# Patient Record
Sex: MALE | Race: WHITE | NOT HISPANIC OR LATINO | Employment: FULL TIME | URBAN - NONMETROPOLITAN AREA
[De-identification: names, ages, dates, MRNs, and addresses within clinical notes are randomized per-mention and may not be internally consistent; named-entity substitution may affect disease eponyms.]

---

## 2017-01-20 ENCOUNTER — OFFICE VISIT (OUTPATIENT)
Dept: MEDICAL GROUP | Facility: PHYSICIAN GROUP | Age: 67
End: 2017-01-20
Payer: MEDICARE

## 2017-01-20 VITALS
SYSTOLIC BLOOD PRESSURE: 132 MMHG | WEIGHT: 210 LBS | BODY MASS INDEX: 32.96 KG/M2 | DIASTOLIC BLOOD PRESSURE: 80 MMHG | OXYGEN SATURATION: 95 % | HEIGHT: 67 IN | TEMPERATURE: 97 F | HEART RATE: 73 BPM | RESPIRATION RATE: 16 BRPM

## 2017-01-20 DIAGNOSIS — M15.9 PRIMARY OSTEOARTHRITIS INVOLVING MULTIPLE JOINTS: ICD-10-CM

## 2017-01-20 DIAGNOSIS — G89.29 CHRONIC BILATERAL LOW BACK PAIN, WITH SCIATICA PRESENCE UNSPECIFIED: ICD-10-CM

## 2017-01-20 DIAGNOSIS — F11.20 OPIOID DEPENDENCE, CONTINUOUS (HCC): ICD-10-CM
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
## 2017-01-20 DIAGNOSIS — M54.50 CHRONIC BILATERAL LOW BACK PAIN WITHOUT SCIATICA: ICD-10-CM

## 2017-01-20 DIAGNOSIS — G89.29 CHRONIC BILATERAL LOW BACK PAIN WITHOUT SCIATICA: ICD-10-CM

## 2017-01-20 DIAGNOSIS — G47.01 INSOMNIA DUE TO MEDICAL CONDITION: ICD-10-CM

## 2017-01-20 DIAGNOSIS — M54.5 CHRONIC BILATERAL LOW BACK PAIN, WITH SCIATICA PRESENCE UNSPECIFIED: ICD-10-CM

## 2017-01-20 PROCEDURE — 99213 OFFICE O/P EST LOW 20 MIN: CPT | Performed by: NURSE PRACTITIONER

## 2017-01-20 RX ORDER — CYCLOBENZAPRINE HCL 10 MG
10 TABLET ORAL 3 TIMES DAILY PRN
Qty: 30 TAB | Refills: 3 | Status: SHIPPED | OUTPATIENT
Start: 2017-01-20 | End: 2017-07-14 | Stop reason: SDUPTHER

## 2017-01-20 RX ORDER — HYDROCODONE BITARTRATE AND ACETAMINOPHEN 10; 325 MG/1; MG/1
1 TABLET ORAL EVERY 6 HOURS PRN
Qty: 120 TAB | Refills: 0 | Status: SHIPPED | OUTPATIENT
Start: 2017-03-04 | End: 2017-01-20 | Stop reason: SDUPTHER

## 2017-01-20 RX ORDER — HYDROCODONE BITARTRATE AND ACETAMINOPHEN 10; 325 MG/1; MG/1
1 TABLET ORAL EVERY 6 HOURS PRN
Qty: 120 TAB | Refills: 0 | Status: SHIPPED | OUTPATIENT
Start: 2017-02-04 | End: 2017-01-20 | Stop reason: SDUPTHER

## 2017-01-20 RX ORDER — HYDROCODONE BITARTRATE AND ACETAMINOPHEN 10; 325 MG/1; MG/1
1 TABLET ORAL EVERY 6 HOURS PRN
Qty: 120 TAB | Refills: 0 | Status: SHIPPED | OUTPATIENT
Start: 2017-04-04 | End: 2017-04-13 | Stop reason: SDUPTHER

## 2017-01-20 NOTE — ASSESSMENT & PLAN NOTE
Consequences of Chronic Opiate therapy:  (5 A's)  Pain level without medications: 10  Pain level after pain medications: 6  What does pain medication benefit for you? MOVE hands  What does pain medication not benefit for you? NONE  Analgesia:  significantly improved  Activity:  significantly improved  Adverse Events:  Positive UDS and now clear  Aberrant Behaviors:  NONE currently.   Affect/Mood: good grooming, full facial expressions, normal speech pattern and content, normal thought patterns, normal perception  Last CMP:   Current   Appropriate Imaging done:

## 2017-01-20 NOTE — MR AVS SNAPSHOT
"        Gregg Juan   2017 11:20 AM   Office Visit   MRN: [id number]    Department:  [facility]   Dept Phone:  [phone]    Description:  Male : 1950   Provider:  CONSTANTINO Gaytan           Reason for Visit     Follow-Up meds      Allergies as of 2017     No Known Allergies      You were diagnosed with     Opioid dependence, continuous (CMS-HCC)   [427364]       Chronic bilateral low back pain without sciatica   [8652147]       Primary osteoarthritis involving multiple joints   [4573193]       Insomnia due to medical condition   [187781]       Fall, initial encounter   [051529]       Chronic bilateral low back pain, with sciatica presence unspecified   [3549921]         Vital Signs     Blood Pressure Pulse Temperature Respirations Height Weight    132/80 mmHg 73 36.1 °C (97 °F) 16 1.714 m (5' 7.48\") 95.255 kg (210 lb)    Body Mass Index Oxygen Saturation Smoking Status             32.42 kg/m2 95% Former Smoker         Basic Information     Date Of Birth Sex Race Ethnicity Preferred Language    1950 Male White Non- English      Problem List              ICD-10-CM Priority Class Noted - Resolved    Chronic low back pain M54.5, G89.29   2014 - Present    Osteoarthritis M19.90   2014 - Present    COPD (chronic obstructive pulmonary disease) (CMS-HCC) J44.9   2014 - Present    Dyslipidemia E78.5   2014 - Present    Vitamin D deficiency E55.9   2014 - Present    Opioid dependence, continuous (CMS-HCC) F11.20   10/20/2014 - Present    Chronic pain syndrome G89.4   10/20/2014 - Present    De Quervain's tenosynovitis, bilateral M65.4   2015 - Present    Trigger finger of both hands M65.30   2016 - Present    Fall W19.XXXA   2016 - Present    Positive urine drug screen R82.5   2016 - Present    Insomnia due to medical condition G47.01   2017 - Present      Health Maintenance        Date Due Completion Dates    IMM DTaP/Tdap/Td " Vaccine (1 - Tdap) 10/14/1970 ---    IMM ZOSTER VACCINE 10/14/2011 ---    IMM INFLUENZA (1) 9/1/2016 ---    IMM PNEUMOCOCCAL 65+ (ADULT) LOW/MEDIUM RISK SERIES (1 of 2 - PCV13) 10/14/2016 ---    COLONOSCOPY 4/7/2024 4/7/2014 (Declined)    Override on 4/7/2014: Patient Declined            Current Immunizations     No immunizations on file.      Below and/or attached are the medications your provider expects you to take. Review all of your home medications and newly ordered medications with your provider and/or pharmacist. Follow medication instructions as directed by your provider and/or pharmacist. Please keep your medication list with you and share with your provider. Update the information when medications are discontinued, doses are changed, or new medications (including over-the-counter products) are added; and carry medication information at all times in the event of emergency situations     Allergies:  No Known Allergies          Medications  Valid as of: January 20, 2017 - 11:40 AM    Generic Name Brand Name Tablet Size Instructions for use    Albuterol Sulfate (Aero Soln) albuterol 108 (90 BASE) MCG/ACT INHALE 2 PUFFS BY MOUTH EVERY 6 HOURS AS NEEDED FOR SHORTNESS OF BREATH        Cyclobenzaprine HCl (Tab) FLEXERIL 10 MG Take 1 Tab by mouth 3 times a day as needed.        Hydrocodone-Acetaminophen (Tab) NORCO 5-325 MG         Hydrocodone-Acetaminophen (Tab) NORCO  MG Take 1 Tab by mouth every 6 hours as needed for up to 30 days.        Pneumococcal 13-Eunice Conj Vacc (Suspension) PREVNAR 13  0.5 mL by Intramuscular route Once PRN for up to 1 dose.        .                 Medicines prescribed today were sent to:     [facility] - [address] [facility]    [address] [place], [place] [address] [address]    Phone: [phone] Fax: [phone]    Open 24 Hours?: No      Medication refill instructions:       If your prescription bottle indicates you have medication refills left, it is not necessary to call your  provider’s office. Please contact your pharmacy and they will refill your medication.    If your prescription bottle indicates you do not have any refills left, you may request refills at any time through one of the following ways: The online [employer] system (except Urgent Care), by calling your provider’s office, or by asking your pharmacy to contact your provider’s office with a refill request. Medication refills are processed only during regular business hours and may not be available until the next business day. Your provider may request additional information or to have a follow-up visit with you prior to refilling your medication.   *Please Note: Medication refills are assigned a new Rx number when refilled electronically. Your pharmacy may indicate that no refills were authorized even though a new prescription for the same medication is available at the pharmacy. Please request the medicine by name with the pharmacy before contacting your provider for a refill.        Other Notes About Your Plan     Patient UDS appropriate 11/ 2016.     UDS is inappropriate. + MJ and Ampehtamines. 10/27/2106                   [employer] Status: Patient Declined

## 2017-01-20 NOTE — PROGRESS NOTES
Chief Complaint   Patient presents with   • Follow-Up     meds       HISTORY OF PRESENT ILLNESS: Patient is a 66 y.o. established male patient here for return visit on pain management. In October 2016 patient had a positive urine drug screen for marijuana which he fully admitted to. There was also an additional positivity to methyl amphetamines that patient denied he subsequently has had a clean drug screen. Is currently only taking the hydrocodone as directed. He is in significant pain today due to the weather. The weather is quite cold and he has osteoarthritis and tendon pain seems to be exacerbated by the cold weather.    Opioid dependence, continuous  Consequences of Chronic Opiate therapy:  (5 A's)  Pain level without medications: 10  Pain level after pain medications: 6  What does pain medication benefit for you? MOVE hands  What does pain medication not benefit for you? NONE  Analgesia:  significantly improved  Activity:  significantly improved  Adverse Events:  Positive UDS and now clear  Aberrant Behaviors:  NONE currently.   Affect/Mood: good grooming, full facial expressions, normal speech pattern and content, normal thought patterns, normal perception  Last CMP:   Current   Appropriate Imaging done:       Patient has insomnia and had taken some Flexeril 10 mg which seemed to help him sleep. The Flexeril offers a slight bit of sedation which also can be hazardous as secondary to wrist following that we will continue to refill this as long as patient is getting benefit versus the risk. For his insomnia    Allergies:Review of patient's allergies indicates no known allergies.    Current Outpatient Prescriptions Ordered in [employer]   Medication Sig Dispense Refill   • [START ON 4/4/2017] hydrocodone/acetaminophen (NORCO)  MG Tab Take 1 Tab by mouth every 6 hours as needed for up to 30 days. 120 Tab 0   • cyclobenzaprine (FLEXERIL) 10 MG Tab Take 1 Tab by mouth 3 times a day as needed. 30 Tab 3   •  "albuterol (VENTOLIN HFA) 108 (90 BASE) MCG/ACT Aero Soln inhalation aerosol INHALE 2 PUFFS BY MOUTH EVERY 6 HOURS AS NEEDED FOR SHORTNESS OF BREATH 16 g 3   • NORCO 5-325 MG Tab per tablet      • pneumococcal 13-Eunice Conj Vacc (PREVNAR 13) syringe 0.5 mL by Intramuscular route Once PRN for up to 1 dose. 0.5 Each 0     No current [facility]-ordered facility-administered medications on file.       No past medical history on file.    Social History   Substance Use Topics   • Smoking status: Former Smoker -- 35 years     Types: Cigarettes     Quit date: 2005   • Smokeless tobacco: Never Used   • Alcohol Use: No       Family Status   Relation Status Death Age   • Mother     • Father     • Sister Alive    • Brother Alive    • Maternal Grandmother     • Maternal Grandfather     • Paternal Grandmother     • Paternal Grandfather     • Brother Alive      Family History   Problem Relation Age of Onset   • Dementia Mother    • Heart Disease Father    • Heart Attack Father    • Alcohol/Drug Father    • Cancer Neg Hx    • Diabetes Neg Hx    • Hypertension Neg Hx    • Arthritis Brother        ROS: As documented in my HPI      Exam:  Blood pressure 132/80, pulse 73, temperature 36.1 °C (97 °F), resp. rate 16, height 1.714 m (5' 7.48\"), weight 95.255 kg (210 lb), SpO2 95 %.  General:  Well nourished, well developed male in NAD  Head: No lesions  Neck: Supple.  Pulmonary:  Normal effort. No rales, ronchi, or wheezing.  Cardiovascular: Regular rate and rhythm without murmur.   Abdomen: Soft nontender, normal bowel sounds  Extremities: no clubbing, cyanosis, or edema.  BACK Back:  Back symmetric, no curvature. ROM normal. No CVA tenderness. Tender at SI joints and Hips.  Hands: turned in and swollen joints. No redness.   Psych: Alert and oriented x3. Normal mood and affect.   Neurological: No focal deficits- using cane for ambulation and balance.     Please note that this dictation was " created using voice recognition software. I have made every reasonable attempt to correct obvious errors, but I expect that there are errors of grammar and possibly content that I did not discover before finalizing the note.    Assessment/Plan:   Opioid dependence, continuous (CMS-[facility])  Current status of condition is chronic and controlled on therapy.      Chronic bilateral low back pain without sciatica  hydrocodone/acetaminophen (NORCO)  MG Tab    : hydrocodone/acetaminophen (NORCO)  MG Tab     hydrocodone/acetaminophen (NORCO)  MG Tab    Primary osteoarthritis involving multiple joints  hydrocodone/acetaminophen (NORCO)  MG Tab    cyclobenzaprine (FLEXERIL) 10 MG Tab    : hydrocodone/acetaminophen (NORCO)  MG Tab     hydrocodone/acetaminophen (NORCO)  MG Tab    Insomnia due to medical condition           Chronic bilateral low back pain, with sciatica presence unspecified  cyclobenzaprine (FLEXERIL) 10 MG Tab

## 2017-01-30 ENCOUNTER — TELEPHONE (OUTPATIENT)
Dept: MEDICAL GROUP | Facility: PHYSICIAN GROUP | Age: 67
End: 2017-01-30

## 2017-01-30 NOTE — TELEPHONE ENCOUNTER
Manda with [facility] requesting ok to fill Norco on Feb 3 and March 3 as original RX written for Feb 4 and March 4 and pharmacy is closed. Please advise

## 2017-04-03 ENCOUNTER — HOSPITAL ENCOUNTER (OUTPATIENT)
Facility: MEDICAL CENTER | Age: 67
End: 2017-04-03
Attending: NURSE PRACTITIONER
Payer: MEDICARE

## 2017-04-03 ENCOUNTER — OFFICE VISIT (OUTPATIENT)
Dept: URGENT CARE | Facility: PHYSICIAN GROUP | Age: 67
End: 2017-04-03
Payer: MEDICARE

## 2017-04-03 VITALS
WEIGHT: 213 LBS | BODY MASS INDEX: 32.28 KG/M2 | OXYGEN SATURATION: 97 % | RESPIRATION RATE: 16 BRPM | HEIGHT: 68 IN | HEART RATE: 88 BPM | TEMPERATURE: 99.1 F | DIASTOLIC BLOOD PRESSURE: 76 MMHG | SYSTOLIC BLOOD PRESSURE: 142 MMHG

## 2017-04-03 DIAGNOSIS — R31.9 HEMATURIA: ICD-10-CM

## 2017-04-03 LAB
APPEARANCE UR: NORMAL
BILIRUB UR STRIP-MCNC: NEGATIVE MG/DL
COLOR UR AUTO: NORMAL
GLUCOSE UR STRIP.AUTO-MCNC: NEGATIVE MG/DL
KETONES UR STRIP.AUTO-MCNC: NEGATIVE MG/DL
LEUKOCYTE ESTERASE UR QL STRIP.AUTO: NEGATIVE
NITRITE UR QL STRIP.AUTO: NEGATIVE
PH UR STRIP.AUTO: 6 [PH] (ref 5–8)
PROT UR QL STRIP: 30 MG/DL
RBC UR QL AUTO: NORMAL
SP GR UR STRIP.AUTO: 1.02
UROBILINOGEN UR STRIP-MCNC: NEGATIVE MG/DL

## 2017-04-03 PROCEDURE — 1036F TOBACCO NON-USER: CPT | Performed by: NURSE PRACTITIONER

## 2017-04-03 PROCEDURE — 1101F PT FALLS ASSESS-DOCD LE1/YR: CPT | Performed by: NURSE PRACTITIONER

## 2017-04-03 PROCEDURE — 87086 URINE CULTURE/COLONY COUNT: CPT

## 2017-04-03 PROCEDURE — 81002 URINALYSIS NONAUTO W/O SCOPE: CPT | Performed by: NURSE PRACTITIONER

## 2017-04-03 PROCEDURE — 4040F PNEUMOC VAC/ADMIN/RCVD: CPT | Mod: 8P | Performed by: NURSE PRACTITIONER

## 2017-04-03 PROCEDURE — 3017F COLORECTAL CA SCREEN DOC REV: CPT | Mod: 8P | Performed by: NURSE PRACTITIONER

## 2017-04-03 PROCEDURE — G8419 CALC BMI OUT NRM PARAM NOF/U: HCPCS | Performed by: NURSE PRACTITIONER

## 2017-04-03 PROCEDURE — 99214 OFFICE O/P EST MOD 30 MIN: CPT | Performed by: NURSE PRACTITIONER

## 2017-04-03 PROCEDURE — G8432 DEP SCR NOT DOC, RNG: HCPCS | Performed by: NURSE PRACTITIONER

## 2017-04-03 RX ORDER — CIPROFLOXACIN 500 MG/1
500 TABLET, FILM COATED ORAL 2 TIMES DAILY
Qty: 20 TAB | Refills: 0 | Status: SHIPPED | OUTPATIENT
Start: 2017-04-03 | End: 2018-02-01

## 2017-04-03 NOTE — MR AVS SNAPSHOT
"Vikyyd Juan   4/3/2017 11:00 AM   Office Visit   MRN: [id number]    Department:  [facility]   Dept Phone:  [phone]    Description:  Male : 1950   Provider:  BRITTANIE Neil           Reason for Visit     Blood in Urine X 3 days      Allergies as of 4/3/2017     No Known Allergies      You were diagnosed with     Hematuria   [[id number]]         Vital Signs     Blood Pressure Pulse Temperature Respirations Height Weight    142/76 mmHg 88 37.3 °C (99.1 °F) 16 1.727 m (5' 8\") 96.616 kg (213 lb)    Body Mass Index Oxygen Saturation Smoking Status             32.39 kg/m2 97% Former Smoker         Basic Information     Date Of Birth Sex Race Ethnicity Preferred Language    1950 Male White Non- English      Your appointments     2017 10:40 AM   Established Patient with CONSTANTINO Gaytan   [address] [address] [phone]           You will be receiving a confirmation call a few days before your appointment from our automated call confirmation system.              Problem List              ICD-10-CM Priority Class Noted - Resolved    Chronic low back pain M54.5, G89.29   2014 - Present    Osteoarthritis M19.90   2014 - Present    COPD (chronic obstructive pulmonary disease) (CMS-HCC) J44.9   2014 - Present    Dyslipidemia E78.5   2014 - Present    Vitamin D deficiency E55.9   2014 - Present    Opioid dependence, continuous (CMS-HCC) F11.20   10/20/2014 - Present    Chronic pain syndrome G89.4   10/20/2014 - Present    De Quervain's tenosynovitis, bilateral M65.4   2015 - Present    Trigger finger of both hands M65.30   2016 - Present    Fall W19.XXXA   2016 - Present    Positive urine drug screen R82.5   2016 - Present    Insomnia due to medical condition G47.01   2017 - Present      Health Maintenance        Date Due Completion Dates    IMM " DTaP/Tdap/Td Vaccine (1 - Tdap) 10/14/1970 ---    IMM ZOSTER VACCINE 10/14/2011 ---    IMM PNEUMOCOCCAL 65+ (ADULT) LOW/MEDIUM RISK SERIES (1 of 2 - PCV13) 10/14/2016 ---    COLONOSCOPY 4/7/2024 4/7/2014 (Declined)    Override on 4/7/2014: Patient Declined            Results     POCT Urinalysis      Component Value Standard Range & Units    POC Color Brown Negative    POC Appearance Cloudy Negative    POC Leukocyte Esterase Negative Negative    POC Nitrites Negative Negative    POC Urobiligen Negative Negative (0.2) mg/dL    POC Protein 30 Negative mg/dL    POC Urine PH 6.0 5.0 - 8.0    POC Blood Large hemolyzed Negative    POC Specific Gravity 1.020 <1.005 - >1.030    POC Ketones Negative Negative mg/dL    POC Biliruben Negative Negative mg/dL    POC Glucose negative Negative mg/dL                        Current Immunizations     No immunizations on file.      Below and/or attached are the medications your provider expects you to take. Review all of your home medications and newly ordered medications with your provider and/or pharmacist. Follow medication instructions as directed by your provider and/or pharmacist. Please keep your medication list with you and share with your provider. Update the information when medications are discontinued, doses are changed, or new medications (including over-the-counter products) are added; and carry medication information at all times in the event of emergency situations     Allergies:  No Known Allergies          Medications  Valid as of: April 03, 2017 - 11:53 AM    Generic Name Brand Name Tablet Size Instructions for use    Albuterol Sulfate (Aero Soln) albuterol 108 (90 BASE) MCG/ACT INHALE 2 PUFFS BY MOUTH EVERY 6 HOURS AS NEEDED FOR SHORTNESS OF BREATH        Ciprofloxacin HCl (Tab) CIPRO 500 MG Take 1 Tab by mouth 2 times a day.        Cyclobenzaprine HCl (Tab) FLEXERIL 10 MG Take 1 Tab by mouth 3 times a day as needed.        Hydrocodone-Acetaminophen (Tab) NORCO 5-325  MG         Hydrocodone-Acetaminophen (Tab) NORCO  MG Take 1 Tab by mouth every 6 hours as needed for up to 30 days.        Pneumococcal 13-Eunice Conj Vacc (Suspension) PREVNAR 13  0.5 mL by Intramuscular route Once PRN for up to 1 dose.        .                 Medicines prescribed today were sent to:     [facility] [place] - [address] [address]    Phone: [phone] Fax: [phone]    Open 24 Hours?: No      Medication refill instructions:       If your prescription bottle indicates you have medication refills left, it is not necessary to call your provider’s office. Please contact your pharmacy and they will refill your medication.    If your prescription bottle indicates you do not have any refills left, you may request refills at any time through one of the following ways: The online [employer] system (except Urgent Care), by calling your provider’s office, or by asking your pharmacy to contact your provider’s office with a refill request. Medication refills are processed only during regular business hours and may not be available until the next business day. Your provider may request additional information or to have a follow-up visit with you prior to refilling your medication.   *Please Note: Medication refills are assigned a new Rx number when refilled electronically. Your pharmacy may indicate that no refills were authorized even though a new prescription for the same medication is available at the pharmacy. Please request the medicine by name with the pharmacy before contacting your provider for a refill.        Your To Do List     Future Labs/Procedures Complete By Expires    URINE CULTURE(NEW)  As directed 4/4/2018      Other Notes About Your Plan     Patient UDS appropriate 11/ 2016.     UDS is inappropriate. + MJ and Ampehtamines. 10/27/2106                   [employer] Status: Patient Declined

## 2017-04-03 NOTE — PROGRESS NOTES
Subjective:      Gregg Martinez is a 66 y.o. male who presents with Blood in Urine            HPI  Gregg is a 66 year old male who is here for hematuria x 3 days. 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 Denies back pain, low abdominal pain, fever, n/v, incontinence, swelling/pain in testicles, d/c from penis. No overuse of Ibuprofen, takes meds for chronic back pain. Had similar episode last summer with similar activities but blood went away. States no blood in urine the last 2 days but today it came back. Has been drinking cranberry juice and green tea.    PMH:  has no past medical history of Heart attack (CMS-HCC), Heart murmur, Seizure (CMS-[facility]), Stroke (CMS-[facility]), Hypertension, Type II or unspecified type diabetes mellitus without mention of complication, not stated as uncontrolled, or Unspecified asthma(493.90).  MEDS:   Current outpatient prescriptions:   •  ciprofloxacin (CIPRO) 500 MG Tab, Take 1 Tab by mouth 2 times a day., Disp: 20 Tab, Rfl: 0  •  [START ON 4/4/2017] hydrocodone/acetaminophen (NORCO)  MG Tab, Take 1 Tab by mouth every 6 hours as needed for up to 30 days., Disp: 120 Tab, Rfl: 0  •  cyclobenzaprine (FLEXERIL) 10 MG Tab, Take 1 Tab by mouth 3 times a day as needed., Disp: 30 Tab, Rfl: 3  •  albuterol (VENTOLIN HFA) 108 (90 BASE) MCG/ACT Aero Soln inhalation aerosol, INHALE 2 PUFFS BY MOUTH EVERY 6 HOURS AS NEEDED FOR SHORTNESS OF BREATH, Disp: 16 g, Rfl: 3  •  pneumococcal 13-Eunice Conj Vacc (PREVNAR 13) syringe, 0.5 mL by Intramuscular route Once PRN for up to 1 dose., Disp: 0.5 Each, Rfl: 0  •  NORCO 5-325 MG Tab per tablet, , Disp: , Rfl:   ALLERGIES: No Known Allergies  SURGHX:   Past Surgical History   Procedure Laterality Date   • Knee arthroscopy     • Open reduction       SOCHX:  reports that he quit smoking about 11 years ago. His smoking use included Cigarettes. He quit after 35 years of use. He has never used smokeless tobacco. He reports that he  "does not drink alcohol or use illicit drugs.  FH: Family history was reviewed, no pertinent findings to report    Review of Systems   All other systems reviewed and are negative.         Objective:     /76 mmHg  Pulse 88  Temp(Src) 37.3 °C (99.1 °F)  Resp 16  Ht 1.727 m (5' 8\")  Wt 96.616 kg (213 lb)  BMI 32.39 kg/m2  SpO2 97%     Physical Exam   Constitutional: He is oriented to person, place, and time. He appears well-developed and well-nourished. No distress.   HENT:   Head: Normocephalic.   Eyes: Conjunctivae and EOM are normal. Pupils are equal, round, and reactive to light.   Cardiovascular: Normal rate.    Pulmonary/Chest: Effort normal.   Abdominal: Soft. Bowel sounds are normal. He exhibits no distension and no ascites. There is no tenderness. There is no rigidity, no rebound, no guarding and no CVA tenderness.   Musculoskeletal: Normal range of motion.   Neurological: He is alert and oriented to person, place, and time.   Skin: Skin is warm and dry. He is not diaphoretic.   Vitals reviewed.         POC Color Brown Negative Final      POC Appearance Cloudy Negative Final     POC Leukocyte Esterase Negative Negative Final     POC Nitrites Negative Negative Final     POC Urobiligen Negative Negative (0.2) mg/dL Final     POC Protein 30 Negative mg/dL Final     POC Urine PH 6.0 5.0 - 8.0 Final     POC Blood Large hemolyzed Negative Final     POC Specific Gravity 1.020 <1.005 - >1.030 Final     POC Ketones Negative Negative mg/dL Final     POC Biliruben Negative Negative mg/dL Final     POC Glucose negative Negative mg/dL Final          Assessment/Plan:     1. Hematuria    - POCT Urinalysis  - URINE CULTURE(NEW); Future  - ciprofloxacin (CIPRO) 500 MG Tab; Take 1 Tab by mouth 2 times a day.  Dispense: 20 Tab; Refill: 0    Increase water intake  Urinate more frequently and empty bladder completely  Avoid lifting heavy objects that exacerbate chronic back pain  Monitor for back/flank pain, difficulty " urinating, blood in urine, fever, n/v, dysuria- need re-evaluation

## 2017-04-05 LAB
BACTERIA UR CULT: NORMAL
SIGNIFICANT IND 70042: NORMAL
SITE SITE: NORMAL
SOURCE SOURCE: NORMAL

## 2017-04-13 ENCOUNTER — OFFICE VISIT (OUTPATIENT)
Dept: MEDICAL GROUP | Facility: PHYSICIAN GROUP | Age: 67
End: 2017-04-13
Payer: MEDICARE

## 2017-04-13 VITALS
TEMPERATURE: 98.2 F | HEART RATE: 80 BPM | BODY MASS INDEX: 33.34 KG/M2 | HEIGHT: 68 IN | SYSTOLIC BLOOD PRESSURE: 130 MMHG | WEIGHT: 220 LBS | DIASTOLIC BLOOD PRESSURE: 78 MMHG | RESPIRATION RATE: 14 BRPM | OXYGEN SATURATION: 96 %

## 2017-04-13 DIAGNOSIS — G89.29 CHRONIC BILATERAL LOW BACK PAIN WITHOUT SCIATICA: ICD-10-CM

## 2017-04-13 DIAGNOSIS — M54.50 CHRONIC BILATERAL LOW BACK PAIN WITHOUT SCIATICA: ICD-10-CM

## 2017-04-13 DIAGNOSIS — F11.20 OPIOID DEPENDENCE, CONTINUOUS (HCC): ICD-10-CM

## 2017-04-13 DIAGNOSIS — M54.5 CHRONIC BILATERAL LOW BACK PAIN, WITH SCIATICA PRESENCE UNSPECIFIED: ICD-10-CM

## 2017-04-13 DIAGNOSIS — M15.9 PRIMARY OSTEOARTHRITIS INVOLVING MULTIPLE JOINTS: ICD-10-CM

## 2017-04-13 DIAGNOSIS — G89.29 CHRONIC BILATERAL LOW BACK PAIN, WITH SCIATICA PRESENCE UNSPECIFIED: ICD-10-CM

## 2017-04-13 DIAGNOSIS — Z79.891 CHRONIC USE OF OPIATE DRUGS THERAPEUTIC PURPOSES: ICD-10-CM

## 2017-04-13 PROCEDURE — 3017F COLORECTAL CA SCREEN DOC REV: CPT | Mod: 8P | Performed by: NURSE PRACTITIONER

## 2017-04-13 PROCEDURE — 1036F TOBACCO NON-USER: CPT | Performed by: NURSE PRACTITIONER

## 2017-04-13 PROCEDURE — 99214 OFFICE O/P EST MOD 30 MIN: CPT | Performed by: NURSE PRACTITIONER

## 2017-04-13 PROCEDURE — 4040F PNEUMOC VAC/ADMIN/RCVD: CPT | Mod: 8P | Performed by: NURSE PRACTITIONER

## 2017-04-13 PROCEDURE — G8432 DEP SCR NOT DOC, RNG: HCPCS | Performed by: NURSE PRACTITIONER

## 2017-04-13 PROCEDURE — G8419 CALC BMI OUT NRM PARAM NOF/U: HCPCS | Performed by: NURSE PRACTITIONER

## 2017-04-13 PROCEDURE — 1101F PT FALLS ASSESS-DOCD LE1/YR: CPT | Performed by: NURSE PRACTITIONER

## 2017-04-13 RX ORDER — HYDROCODONE BITARTRATE AND ACETAMINOPHEN 10; 325 MG/1; MG/1
1 TABLET ORAL EVERY 6 HOURS PRN
Qty: 120 TAB | Refills: 0 | Status: SHIPPED | OUTPATIENT
Start: 2017-05-04 | End: 2017-04-13 | Stop reason: SDUPTHER

## 2017-04-13 RX ORDER — HYDROCODONE BITARTRATE AND ACETAMINOPHEN 10; 325 MG/1; MG/1
1 TABLET ORAL EVERY 6 HOURS PRN
Qty: 120 TAB | Refills: 0 | Status: SHIPPED | OUTPATIENT
Start: 2017-06-02 | End: 2017-04-13 | Stop reason: SDUPTHER

## 2017-04-13 RX ORDER — HYDROCODONE BITARTRATE AND ACETAMINOPHEN 10; 325 MG/1; MG/1
1 TABLET ORAL EVERY 6 HOURS PRN
Qty: 120 TAB | Refills: 0 | Status: SHIPPED | OUTPATIENT
Start: 2017-07-02 | End: 2017-07-14 | Stop reason: SDUPTHER

## 2017-04-13 ASSESSMENT — LIFESTYLE VARIABLES: HISTORY_ALCOHOL_USE: 0

## 2017-04-13 ASSESSMENT — ENCOUNTER SYMPTOMS: DEPRESSION: 0

## 2017-04-13 NOTE — ASSESSMENT & PLAN NOTE
Patient has chronic back pain. He takes hydrocodone for pain. He is here for refill on pain medications.

## 2017-04-13 NOTE — ASSESSMENT & PLAN NOTE
Chronic pain recheck:   Last dose of controlled substance: today   Chronic pain treated with norco 10 mg/ 325  taken 4 times a day    He  reports that he does not drink alcohol.  He  reports that he does not use illicit drugs.    Consequences of Chronic Opiate therapy:  (5 A's)  Analgesia: Compared to no treatment or prior treatment, pain is currently improved  Activity: improved  Adverse Events: He denies constipation, dry mouth, itchy skin, nausea and sedation  Aberrant Behaviors: He reports he is taking medication as prescribed and is not veering from agreed treatment regimen. There have been no inappropriate refills or lost/stolen meds reported.   Affect/Mood: Pain is not impacting patient's mood.  Patient denies depression/anxiety.    Nonnarcotic treatments that are being used: none.   Treatment goals discussed.    Opioid Risk Score: 7    Interpretation of Opioid Risk Score   Score 0-3 = Low risk of abuse. Do UDS at least once per year.  Score 4-7 = Moderate risk of abuse. Do UDS 1-4 times per year.  Score 8+ = High risk of abuse. Refer to specialist.    Last order of CONTROLLED SUBSTANCE TREATMENT AGREEMENT was found on 4/13/2017 from Office Visit on 4/13/2017     UDS Summary     Patient has no health maintenance due at this time        Most recent UDS reviewed today and is consistent with prescribed medications.    I have reviewed the medical records, the Prescription Monitoring Program and I have determined that controlled substance treatment is medically indicated.

## 2017-04-13 NOTE — PROGRESS NOTES
Chief Complaint   Patient presents with   • Chronic Opiate Therapy       HISTORY OF PRESENT ILLNESS: Patient is a @ age 66 here  today to discuss:    Interval history:     Hospitalizations NO     Injuries NO     Illness  NO - HEMATURIA treated with antibiotics. Resolved. Asymptomatic.  Last recheck on chronic conditions was 10 /2016,         Chronic low back pain  Patient has chronic back pain. He takes hydrocodone for pain. He is here for refill on pain medications.     Chronic use of opiate drugs therapeutic purposes  Chronic pain recheck:   Last dose of controlled substance: today   Chronic pain treated with norco 10 mg/ 325  taken 4 times a day    He  reports that he does not drink alcohol.  He  reports that he does not use illicit drugs. ( History of abnormal UDS) Clear now.     Consequences of Chronic Opiate therapy:  (5 A's)  Analgesia: Compared to no treatment or prior treatment, pain is currently improved  Activity: improved  Adverse Events: He denies constipation, dry mouth, itchy skin, nausea and sedation  Aberrant Behaviors: He reports he is taking medication as prescribed and is not veering from agreed treatment regimen. There have been no inappropriate refills or lost/stolen meds reported.   Affect/Mood: Pain is not impacting patient's mood.  Patient denies depression/anxiety.    Nonnarcotic treatments that are being used: none.   Treatment goals discussed.    Opioid Risk Score: 7    Interpretation of Opioid Risk Score   Score 0-3 = Low risk of abuse. Do UDS at least once per year.  Score 4-7 = Moderate risk of abuse. Do UDS 1-4 times per year.  Score 8+ = High risk of abuse. Refer to specialist.    Last order of CONTROLLED SUBSTANCE TREATMENT AGREEMENT was found on 4/13/2017 from Office Visit on 4/13/2017     UDS Summary - recent was consistent.     Patient has no health maintenance due at this time        Most recent UDS reviewed today and is consistent with prescribed medications.    I have reviewed  "the medical records, the Prescription Monitoring Program and I have determined that controlled substance treatment is medically indicated.              Allergies:Review of patient's allergies indicates no known allergies.    Current Outpatient Prescriptions Ordered in [facility]   Medication Sig Dispense Refill   • [START ON 2017] hydrocodone/acetaminophen (NORCO)  MG Tab Take 1 Tab by mouth every 6 hours as needed for up to 30 days. 120 Tab 0   • cyclobenzaprine (FLEXERIL) 10 MG Tab Take 1 Tab by mouth 3 times a day as needed. 30 Tab 3   • albuterol (VENTOLIN HFA) 108 (90 BASE) MCG/ACT Aero Soln inhalation aerosol INHALE 2 PUFFS BY MOUTH EVERY 6 HOURS AS NEEDED FOR SHORTNESS OF BREATH 16 g 3   • pneumococcal 13-Eunice Conj Vacc (PREVNAR 13) syringe 0.5 mL by Intramuscular route Once PRN for up to 1 dose. 0.5 Each 0   • ciprofloxacin (CIPRO) 500 MG Tab Take 1 Tab by mouth 2 times a day. 20 Tab 0   • NORCO 5-325 MG Tab per tablet        No current Epic-ordered facility-administered medications on file.       History reviewed. No pertinent past medical history.    Social History   Substance Use Topics   • Smoking status: Former Smoker -- 35 years     Types: Cigarettes     Quit date: 2005   • Smokeless tobacco: Never Used   • Alcohol Use: No       Family Status   Relation Status Death Age   • Mother     • Father     • Sister Alive    • Brother Alive    • Maternal Grandmother     • Maternal Grandfather     • Paternal Grandmother     • Paternal Grandfather     • Brother Alive      Family History   Problem Relation Age of Onset   • Dementia Mother    • Heart Disease Father    • Heart Attack Father    • Alcohol/Drug Father    • Cancer Neg Hx    • Diabetes Neg Hx    • Hypertension Neg Hx    • Arthritis Brother        ROS: As documented in my HPI      Exam:  Blood pressure 130/78, pulse 80, temperature 36.8 °C (98.2 °F), resp. rate 14, height 1.727 m (5' 8\"), weight " 99.791 kg (220 lb), SpO2 96 %.  General:  Well nourished, well developed male in NAD  Head: Nontender scalp. No lesions  Neck: Supple.   Pulmonary:  Normal effort.   Cardiovascular: Regular rate and rhythm without murmur.   BACK: using cane for support. Tender at lumbar disc. Gross motor and sensation is present.   Extremities: no clubbing, cyanosis, or edema.  Psych: Alert and oriented x3.  Neurological: No focal deficits    Please note that this dictation was created using voice recognition software. I have made every reasonable attempt to correct obvious errors, but I expect that there are errors of grammar and possibly content that I did not discover before finalizing the note.    Assessment/Plan:  1. Opioid dependence, continuous (CMS-[facility])  Controlled Substance Treatment Agreement   2. Chronic bilateral low back pain, with sciatica presence unspecified  Current status of condition is chronic and controlled on therapy.     3. Chronic use of opiate drugs therapeutic purposes  Current status of condition is chronic and controlled on therapy.     4. Chronic bilateral low back pain without sciatica  hydrocodone/acetaminophen (NORCO)  MG Tab    : hydrocodone/acetaminophen (NORCO)  MG Tab    : hydrocodone/acetaminophen (NORCO)  MG Tab   5. Primary osteoarthritis involving multiple joints  hydrocodone/acetaminophen (NORCO)  MG Tab         hydrocodone/acetaminophen (NORCO)  MG Tab

## 2017-04-13 NOTE — MR AVS SNAPSHOT
"        Gregg Martinez   2017 10:40 AM   Office Visit   MRN: [id number]    Department:  [facility]   Dept Phone:  [phone]    Description:  Male : 1950   Provider:  CONSTANTINO Gaytan           Reason for Visit     Chronic Opiate Therapy           Allergies as of 2017     No Known Allergies      You were diagnosed with     Opioid dependence, continuous (CMS-HCC)   [139297]       Chronic bilateral low back pain, with sciatica presence unspecified   [8417905]       Chronic use of opiate drugs therapeutic purposes   [6042357]       Chronic bilateral low back pain without sciatica   [7543882]       Primary osteoarthritis involving multiple joints   [1415512]         Vital Signs     Blood Pressure Pulse Temperature Respirations Height Weight    130/78 mmHg 80 36.8 °C (98.2 °F) 14 1.727 m (5' 8\") 99.791 kg (220 lb)    Body Mass Index Oxygen Saturation Smoking Status             33.46 kg/m2 96% Former Smoker         Basic Information     Date Of Birth Sex Race Ethnicity Preferred Language    1950 Male White Non- English      Your appointments     2017 10:40 AM   Established Patient with CONSTANTINO Gaytan   [address] [address] [phone]           You will be receiving a confirmation call a few days before your appointment from our automated call confirmation system.              Problem List              ICD-10-CM Priority Class Noted - Resolved    Chronic low back pain M54.5, G89.29   2014 - Present    Osteoarthritis M19.90   2014 - Present    COPD (chronic obstructive pulmonary disease) (CMS-HCC) J44.9   2014 - Present    Dyslipidemia E78.5   2014 - Present    Vitamin D deficiency E55.9   2014 - Present    Opioid dependence, continuous (CMS-HCC) F11.20   10/20/2014 - Present    De Quervain's tenosynovitis, bilateral M65.4   2015 - Present    Trigger finger of both hands " M65.30   1/11/2016 - Present    Fall W19.XXXA   4/4/2016 - Present    Insomnia due to medical condition G47.01   1/20/2017 - Present    Chronic use of opiate drugs therapeutic purposes Z79.899   4/13/2017 - Present      Health Maintenance        Date Due Completion Dates    IMM DTaP/Tdap/Td Vaccine (1 - Tdap) 10/14/1970 ---    IMM ZOSTER VACCINE 10/14/2011 ---    IMM PNEUMOCOCCAL 65+ (ADULT) LOW/MEDIUM RISK SERIES (1 of 2 - PCV13) 10/14/2016 ---    COLONOSCOPY 4/7/2024 4/7/2014 (Declined)    Override on 4/7/2014: Patient Declined            Current Immunizations     No immunizations on file.      Below and/or attached are the medications your provider expects you to take. Review all of your home medications and newly ordered medications with your provider and/or pharmacist. Follow medication instructions as directed by your provider and/or pharmacist. Please keep your medication list with you and share with your provider. Update the information when medications are discontinued, doses are changed, or new medications (including over-the-counter products) are added; and carry medication information at all times in the event of emergency situations     Allergies:  No Known Allergies          Medications  Valid as of: April 13, 2017 - 10:37 AM    Generic Name Brand Name Tablet Size Instructions for use    Albuterol Sulfate (Aero Soln) albuterol 108 (90 BASE) MCG/ACT INHALE 2 PUFFS BY MOUTH EVERY 6 HOURS AS NEEDED FOR SHORTNESS OF BREATH        Ciprofloxacin HCl (Tab) CIPRO 500 MG Take 1 Tab by mouth 2 times a day.        Cyclobenzaprine HCl (Tab) FLEXERIL 10 MG Take 1 Tab by mouth 3 times a day as needed.        Hydrocodone-Acetaminophen (Tab) NORCO 5-325 MG         Hydrocodone-Acetaminophen (Tab) NORCO  MG Take 1 Tab by mouth every 6 hours as needed for up to 30 days.        Pneumococcal 13-Eunice Conj Vacc (Suspension) PREVNAR 13  0.5 mL by Intramuscular route Once PRN for up to 1 dose.        .                    Medicines prescribed today were sent to:     [facility] - [place], [place] - [address] [facility]    [address] [facility] [address] [place] [address]    Phone: [phone] Fax: [phone]    Open 24 Hours?: No      Medication refill instructions:       If your prescription bottle indicates you have medication refills left, it is not necessary to call your provider’s office. Please contact your pharmacy and they will refill your medication.    If your prescription bottle indicates you do not have any refills left, you may request refills at any time through one of the following ways: The online [employer] system (except Urgent Care), by calling your provider’s office, or by asking your pharmacy to contact your provider’s office with a refill request. Medication refills are processed only during regular business hours and may not be available until the next business day. Your provider may request additional information or to have a follow-up visit with you prior to refilling your medication.   *Please Note: Medication refills are assigned a new Rx number when refilled electronically. Your pharmacy may indicate that no refills were authorized even though a new prescription for the same medication is available at the pharmacy. Please request the medicine by name with the pharmacy before contacting your provider for a refill.        Other Notes About Your Plan                      MyChart Status: Patient Declined

## 2017-06-09 ENCOUNTER — TELEPHONE (OUTPATIENT)
Dept: MEDICAL GROUP | Facility: PHYSICIAN GROUP | Age: 67
End: 2017-06-09

## 2017-06-09 RX ORDER — HYDROCODONE BITARTRATE AND ACETAMINOPHEN 10; 325 MG/1; MG/1
1 TABLET ORAL EVERY 8 HOURS PRN
Qty: 60 TAB | Refills: 0 | Status: SHIPPED | OUTPATIENT
Start: 2017-06-09 | End: 2017-07-02
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
## 2017-06-14 ENCOUNTER — TELEPHONE (OUTPATIENT)
Dept: MEDICAL GROUP | Facility: PHYSICIAN GROUP | Age: 67
End: 2017-06-14
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
## 2017-07-14 ENCOUNTER — OFFICE VISIT (OUTPATIENT)
Dept: MEDICAL GROUP | Facility: PHYSICIAN GROUP | Age: 67
End: 2017-07-14
Payer: MEDICARE

## 2017-07-14 VITALS
HEART RATE: 74 BPM | RESPIRATION RATE: 16 BRPM | HEIGHT: 68 IN | OXYGEN SATURATION: 98 % | DIASTOLIC BLOOD PRESSURE: 76 MMHG | BODY MASS INDEX: 30.92 KG/M2 | SYSTOLIC BLOOD PRESSURE: 128 MMHG | TEMPERATURE: 98.1 F | WEIGHT: 204 LBS

## 2017-07-14 DIAGNOSIS — M15.9 PRIMARY OSTEOARTHRITIS INVOLVING MULTIPLE JOINTS: ICD-10-CM

## 2017-07-14 DIAGNOSIS — G89.29 CHRONIC BILATERAL LOW BACK PAIN WITHOUT SCIATICA: ICD-10-CM

## 2017-07-14 DIAGNOSIS — G89.29 CHRONIC BILATERAL LOW BACK PAIN, WITH SCIATICA PRESENCE UNSPECIFIED: ICD-10-CM

## 2017-07-14 DIAGNOSIS — M54.5 CHRONIC BILATERAL LOW BACK PAIN, WITH SCIATICA PRESENCE UNSPECIFIED: ICD-10-CM
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
## 2017-07-14 DIAGNOSIS — M54.50 CHRONIC BILATERAL LOW BACK PAIN WITHOUT SCIATICA: ICD-10-CM

## 2017-07-14 DIAGNOSIS — M79.89 SWELLING OF RIGHT HAND: ICD-10-CM

## 2017-07-14 DIAGNOSIS — Z79.891 CHRONIC USE OF OPIATE DRUGS THERAPEUTIC PURPOSES: ICD-10-CM

## 2017-07-14 PROCEDURE — 99213 OFFICE O/P EST LOW 20 MIN: CPT | Performed by: NURSE PRACTITIONER

## 2017-07-14 RX ORDER — HYDROCODONE BITARTRATE AND ACETAMINOPHEN 10; 325 MG/1; MG/1
1 TABLET ORAL EVERY 6 HOURS PRN
Qty: 120 TAB | Refills: 0 | Status: SHIPPED | OUTPATIENT
Start: 2017-08-03 | End: 2017-07-14 | Stop reason: SDUPTHER

## 2017-07-14 RX ORDER — CYCLOBENZAPRINE HCL 10 MG
10 TABLET ORAL 3 TIMES DAILY PRN
Qty: 30 TAB | Refills: 3 | Status: SHIPPED | OUTPATIENT
Start: 2017-07-14 | End: 2018-01-03 | Stop reason: SDUPTHER

## 2017-07-14 RX ORDER — HYDROCODONE BITARTRATE AND ACETAMINOPHEN 10; 325 MG/1; MG/1
1 TABLET ORAL EVERY 6 HOURS PRN
Qty: 120 TAB | Refills: 0 | Status: SHIPPED | OUTPATIENT
Start: 2017-09-03 | End: 2017-10-04 | Stop reason: SDUPTHER

## 2017-07-14 NOTE — MR AVS SNAPSHOT
"        Gregg Juan   2017 10:20 AM   Office Visit   MRN: [id number]    Department:  [facility]   Dept Phone:  [phone]    Description:  Male : 1950   Provider:  CONSTANTINO Gaytan           Reason for Visit     Medication Refill hydrocodone, albuterol, cyclobenzaprine      Allergies as of 2017     No Known Allergies      You were diagnosed with     Chronic bilateral low back pain, with sciatica presence unspecified   [4682533]       Swelling of right hand   [4558803]       Chronic use of opiate drugs therapeutic purposes   [2485262]       Chronic bilateral low back pain without sciatica   [6187433]       Primary osteoarthritis involving multiple joints   [5539647]       Fall, initial encounter   [424395]         Vital Signs     Blood Pressure Pulse Temperature Respirations Height Weight    128/76 mmHg 74 36.7 °C (98.1 °F) 16 1.727 m (5' 7.99\") 92.534 kg (204 lb)    Body Mass Index Oxygen Saturation Smoking Status             31.03 kg/m2 98% Former Smoker         Basic Information     Date Of Birth Sex Race Ethnicity Preferred Language    1950 Male White Non- English      Your appointments     Oct 04, 2017 11:20 AM   Established Patient with CONSTANTINO Gaytan   [address] [address] [phone]           You will be receiving a confirmation call a few days before your appointment from our automated call confirmation system.              Problem List              ICD-10-CM Priority Class Noted - Resolved    Chronic low back pain M54.5, G89.29 High  2014 - Present    Osteoarthritis M19.90 Low  2014 - Present    COPD (chronic obstructive pulmonary disease) (CMS-HCC) J44.9 High  2014 - Present    Dyslipidemia E78.5 High  2014 - Present    Vitamin D deficiency E55.9 High  2014 - Present    Opioid dependence, continuous (CMS-HCC) F11.20 High  10/20/2014 - Present    De Quervain's " tenosynovitis, bilateral M65.4 Low  1/9/2015 - Present    Trigger finger of both hands M65.30 High  1/11/2016 - Present    Fall W19.XXXA Low  4/4/2016 - Present    Insomnia due to medical condition G47.01 Low  1/20/2017 - Present    Chronic use of opiate drugs therapeutic purposes Z79.891 High  4/13/2017 - Present    Swelling of right hand M79.89   7/14/2017 - Present      Health Maintenance        Date Due Completion Dates    IMM DTaP/Tdap/Td Vaccine (1 - Tdap) 10/14/1970 ---    IMM ZOSTER VACCINE 10/14/2011 ---    IMM PNEUMOCOCCAL 65+ (ADULT) LOW/MEDIUM RISK SERIES (1 of 2 - PCV13) 10/14/2016 ---    IMM INFLUENZA (1) 9/1/2017 ---    COLONOSCOPY 4/7/2024 4/7/2014 (Declined)    Override on 4/7/2014: Patient Declined            Current Immunizations     No immunizations on file.      Below and/or attached are the medications your provider expects you to take. Review all of your home medications and newly ordered medications with your provider and/or pharmacist. Follow medication instructions as directed by your provider and/or pharmacist. Please keep your medication list with you and share with your provider. Update the information when medications are discontinued, doses are changed, or new medications (including over-the-counter products) are added; and carry medication information at all times in the event of emergency situations     Allergies:  No Known Allergies          Medications  Valid as of: July 14, 2017 - 11:03 AM    Generic Name Brand Name Tablet Size Instructions for use    Albuterol Sulfate (Aero Soln) albuterol 108 (90 BASE) MCG/ACT INHALE 2 PUFFS BY MOUTH EVERY 6 HOURS AS NEEDED FOR SHORTNESS OF BREATH        Ciprofloxacin HCl (Tab) CIPRO 500 MG Take 1 Tab by mouth 2 times a day.        Cyclobenzaprine HCl (Tab) FLEXERIL 10 MG Take 1 Tab by mouth 3 times a day as needed.        Hydrocodone-Acetaminophen (Tab) NORCO 5-325 MG         Hydrocodone-Acetaminophen (Tab) NORCO  MG Take 1 Tab by mouth  every 6 hours as needed for up to 30 days.        Pneumococcal 13-Eunice Conj Vacc (Suspension) PREVNAR 13  0.5 mL by Intramuscular route Once PRN for up to 1 dose.        .                 Medicines prescribed today were sent to:     [facility] [place] - [address] [facility]    [address] [place] [address] [place] [address]    Phone: [phone] Fax: [phone]    Open 24 Hours?: No      Medication refill instructions:       If your prescription bottle indicates you have medication refills left, it is not necessary to call your provider’s office. Please contact your pharmacy and they will refill your medication.    If your prescription bottle indicates you do not have any refills left, you may request refills at any time through one of the following ways: The online [employer] system (except Urgent Care), by calling your provider’s office, or by asking your pharmacy to contact your provider’s office with a refill request. Medication refills are processed only during regular business hours and may not be available until the next business day. Your provider may request additional information or to have a follow-up visit with you prior to refilling your medication.   *Please Note: Medication refills are assigned a new Rx number when refilled electronically. Your pharmacy may indicate that no refills were authorized even though a new prescription for the same medication is available at the pharmacy. Please request the medicine by name with the pharmacy before contacting your provider for a refill.        Your To Do List     Future Labs/Procedures Complete By Expires    MR-LUMBAR SPINE-W/O  As directed 7/14/2018      Referral     A referral request has been sent to our patient care coordination department. Please allow 3-5 business days for us to process this request and contact you either by phone or mail. If you do not hear from us by the 5th business day, please call us at [phone].        Other Notes About Your Plan                            MyChart Status: Patient Declined
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
## 2017-07-14 NOTE — ASSESSMENT & PLAN NOTE
Patient has a 2 week episode of hand swelling near the wrist. No trauma or infection noted. Patient has history of tendonitis.

## 2017-07-14 NOTE — ASSESSMENT & PLAN NOTE
"Patient states his back \"went out\"  . Pain was like a knife in his back. No numbness. Bowel and Bladder normal. Patient had to lay down on his side to relieve the pain. Patient needs to repeat his MRI . He is extremely claustrophobic.   "

## 2017-08-08 ENCOUNTER — APPOINTMENT (OUTPATIENT)
Dept: MEDICAL GROUP | Facility: PHYSICIAN GROUP | Age: 67
End: 2017-08-08
Payer: MEDICARE

## 2017-08-08 ENCOUNTER — TELEMEDICINE ORIGINATING SITE VISIT (OUTPATIENT)
Dept: MEDICAL GROUP | Facility: PHYSICIAN GROUP | Age: 67
End: 2017-08-08
Payer: MEDICARE

## 2017-08-08 DIAGNOSIS — G89.29 CHRONIC RIGHT-SIDED LOW BACK PAIN WITHOUT SCIATICA: ICD-10-CM

## 2017-08-08 DIAGNOSIS — M54.50 CHRONIC RIGHT-SIDED LOW BACK PAIN WITHOUT SCIATICA: ICD-10-CM

## 2017-08-17 ENCOUNTER — TELEPHONE (OUTPATIENT)
Dept: MEDICAL GROUP | Facility: CLINIC | Age: 67
End: 2017-08-17

## 2017-08-31 ENCOUNTER — TELEPHONE (OUTPATIENT)
Dept: MEDICAL GROUP | Facility: PHYSICIAN GROUP | Age: 67
End: 2017-08-31

## 2017-08-31 NOTE — TELEPHONE ENCOUNTER
Ten with [facility] asking for early refill tomorrow 9/1 for Norco. Original RX due Sept 3 and pharmacy closed the 3 and 4. I  for this time early refill.

## 2017-10-03 NOTE — ASSESSMENT & PLAN NOTE
Patient is on chronic pain management and is due for labs.  Patient feeling poorly with the weather. Has referral to pain management.   Chronic pain recheck:   Last dose of controlled substance: This am   Chronic pain treated with Norco 10 mg / 325  taken 2-3 times a day    He  reports that he does not drink alcohol.  He  reports that he does not use drugs.    Consequences of Chronic Opiate therapy:  (5 A's)  Analgesia: Compared to no treatment or prior treatment, pain is currently improved  Activity: improved  Adverse Events: He denies constipation, dry mouth, itchy skin, nausea and sedation  Aberrant Behaviors: He reports he is taking medication as prescribed and is not veering from agreed treatment regimen. There have been no inappropriate refills or lost/stolen meds reported.   Affect/Mood: Pain is not impacting patient's mood.  Patient denies depression/anxiety.    Nonnarcotic treatments that are being used: muscle relaxers.   Treatment goals discussed.    Opioid Risk Score: 7    Interpretation of Opioid Risk Score   Score 0-3 = Low risk of abuse. Do UDS at least once per year.  Score 4-7 = Moderate risk of abuse. Do UDS 1-4 times per year.  Score 8+ = High risk of abuse. Refer to specialist.    Last order of CONTROLLED SUBSTANCE TREATMENT AGREEMENT was found on 4/13/2017 from Office Visit on 4/13/2017     UDS Summary                URINE DRUG SCREEN Next Due 10/16/2017      Done 10/21/2016 PAIN MANAGEMENT PANEL, SCRN W/ RFLX TO QNT        Most recent UDS reviewed today and is consistent with prescribed medications.    I have reviewed the medical records, the Prescription Monitoring Program and I have determined that controlled substance treatment is medically indicated.

## 2017-10-04 ENCOUNTER — OFFICE VISIT (OUTPATIENT)
Dept: MEDICAL GROUP | Facility: PHYSICIAN GROUP | Age: 67
End: 2017-10-04
Payer: MEDICARE

## 2017-10-04 VITALS
BODY MASS INDEX: 31.71 KG/M2 | HEART RATE: 80 BPM | SYSTOLIC BLOOD PRESSURE: 130 MMHG | HEIGHT: 67 IN | WEIGHT: 202 LBS | DIASTOLIC BLOOD PRESSURE: 74 MMHG | RESPIRATION RATE: 14 BRPM | TEMPERATURE: 98.6 F | OXYGEN SATURATION: 99 %

## 2017-10-04 DIAGNOSIS — Z79.891 CHRONIC USE OF OPIATE DRUGS THERAPEUTIC PURPOSES: ICD-10-CM

## 2017-10-04 DIAGNOSIS — E55.9 VITAMIN D DEFICIENCY: ICD-10-CM

## 2017-10-04 DIAGNOSIS — E78.5 DYSLIPIDEMIA: ICD-10-CM

## 2017-10-04 DIAGNOSIS — M15.9 PRIMARY OSTEOARTHRITIS INVOLVING MULTIPLE JOINTS: ICD-10-CM

## 2017-10-04 DIAGNOSIS — M54.50 CHRONIC BILATERAL LOW BACK PAIN WITHOUT SCIATICA: ICD-10-CM

## 2017-10-04 DIAGNOSIS — R05.9 COUGH: ICD-10-CM

## 2017-10-04 DIAGNOSIS — Z12.5 SCREENING FOR PROSTATE CANCER: ICD-10-CM

## 2017-10-04 DIAGNOSIS — R06.02 SOB (SHORTNESS OF BREATH): ICD-10-CM

## 2017-10-04 DIAGNOSIS — G89.29 CHRONIC BILATERAL LOW BACK PAIN WITHOUT SCIATICA: ICD-10-CM

## 2017-10-04 PROCEDURE — 99214 OFFICE O/P EST MOD 30 MIN: CPT | Performed by: NURSE PRACTITIONER

## 2017-10-04 RX ORDER — HYDROCODONE BITARTRATE AND ACETAMINOPHEN 10; 325 MG/1; MG/1
1 TABLET ORAL EVERY 6 HOURS PRN
Qty: 120 TAB | Refills: 0 | Status: SHIPPED | OUTPATIENT
Start: 2017-11-03 | End: 2017-12-03

## 2017-10-04 RX ORDER — HYDROCODONE BITARTRATE AND ACETAMINOPHEN 10; 325 MG/1; MG/1
1 TABLET ORAL EVERY 6 HOURS PRN
Qty: 120 TAB | Refills: 0 | Status: SHIPPED | OUTPATIENT
Start: 2017-10-04 | End: 2017-10-04 | Stop reason: SDUPTHER

## 2017-10-04 RX ORDER — AZITHROMYCIN 250 MG/1
TABLET, FILM COATED ORAL
Qty: 6 TAB | Refills: 0 | Status: SHIPPED | OUTPATIENT
Start: 2017-10-04 | End: 2018-02-01

## 2017-10-04 RX ORDER — ALBUTEROL SULFATE 90 UG/1
AEROSOL, METERED RESPIRATORY (INHALATION)
Qty: 16 G | Refills: 3 | Status: SHIPPED | OUTPATIENT
Start: 2017-10-04 | End: 2018-01-03 | Stop reason: SDUPTHER

## 2017-10-04 NOTE — PROGRESS NOTES
Chief Complaint   Patient presents with   • Back Pain     med refill       HISTORY OF PRESENT ILLNESS: Patient is a @ age 66 here  today to discuss:     Interval history:     Hospitalizations  No     Injuries  No     Illness No - chronic.         Dyslipidemia  Patient has a history of hyperlipidemia need to obtain new labs.    Chronic use of opiate drugs therapeutic purposes  Patient is on chronic pain management and is due for labs. He has chronic back pain and hand pain. Patient has seen [facility]. He will obtain MRI in [place] when transportation available.   Patient feeling poorly with the weather. Has referral to pain management.   Chronic pain recheck:   Last dose of controlled substance: This am   Chronic pain treated with Norco 10 mg / 325  taken 2-3 times a day    He  reports that he does not drink alcohol.  He  reports that he does not use drugs.    Consequences of Chronic Opiate therapy:  (5 A's)  Analgesia: Compared to no treatment or prior treatment, pain is currently improved  Activity: improved  Adverse Events: He denies constipation, dry mouth, itchy skin, nausea and sedation  Aberrant Behaviors: He reports he is taking medication as prescribed and is not veering from agreed treatment regimen. There have been no inappropriate refills or lost/stolen meds reported.   Affect/Mood: Pain is not impacting patient's mood.  Patient denies depression/anxiety.    Nonnarcotic treatments that are being used: muscle relaxers.   Treatment goals discussed.    Opioid Risk Score: 7    Interpretation of Opioid Risk Score   Score 0-3 = Low risk of abuse. Do UDS at least once per year.  Score 4-7 = Moderate risk of abuse. Do UDS 1-4 times per year.  Score 8+ = High risk of abuse. Refer to specialist.    Last order of CONTROLLED SUBSTANCE TREATMENT AGREEMENT was found on 4/13/2017 from Office Visit on 4/13/2017     UDS Summary                URINE DRUG SCREEN Next Due 10/16/2017      Done 10/21/2016 PAIN MANAGEMENT  PANEL, SCRN W/ RFLX TO QNT        Most recent UDS reviewed today and is consistent with prescribed medications.    I have reviewed the medical records, the Prescription Monitoring Program and I have determined that controlled substance treatment is medically indicated.        Vitamin D deficiency  Patient has history of vitamin D deficiency will need to recheck levels.    Cough  Patient reports - carmella some sort of illness a month ago. Patient had running nose, cough and fever. Patient now has nagging cough with mucus production. Having some shortness of breath. Patient does not have inhaler. He has tried mucinex.     Trigger finger of both hands  Patient continues to have chronic pain to both his hands. His right hand is slightly more deformed than the left with swelling very evident no redness or heat noted.        Allergies:Review of patient's allergies indicates no known allergies.    Current Outpatient Prescriptions Ordered in [facility]   Medication Sig Dispense Refill   • [START ON 11/3/2017] hydrocodone/acetaminophen (NORCO)  MG Tab Take 1 Tab by mouth every 6 hours as needed for up to 30 days. 120 Tab 0   • [START ON 11/3/2017] hydrocodone/acetaminophen (NORCO)  MG Tab Take 1 Tab by mouth every 6 hours as needed for up to 30 days. 120 Tab 0   • albuterol (VENTOLIN HFA) 108 (90 Base) MCG/ACT Aero Soln inhalation aerosol INHALE 2 PUFFS BY MOUTH EVERY 6 HOURS AS NEEDED FOR SHORTNESS OF BREATH 16 g 3   • azithromycin (ZITHROMAX) 250 MG Tab Take two pills today and then one pill daily until finished 6 Tab 0   • cyclobenzaprine (FLEXERIL) 10 MG Tab Take 1 Tab by mouth 3 times a day as needed. 30 Tab 3   • ciprofloxacin (CIPRO) 500 MG Tab Take 1 Tab by mouth 2 times a day. 20 Tab 0   • NORCO 5-325 MG Tab per tablet      • pneumococcal 13-Eunice Conj Vacc (PREVNAR 13) syringe 0.5 mL by Intramuscular route Once PRN for up to 1 dose. 0.5 Each 0     No current [facility]-ordered facility-administered medications on  "file.        No past medical history on file.    Social History   Substance Use Topics   • Smoking status: Former Smoker     Years: 35.00     Types: Cigarettes     Quit date: 2005   • Smokeless tobacco: Never Used   • Alcohol use No       Family Status   Relation Status   • Mother    • Father    • Sister Alive   • Brother Alive   • Maternal Grandmother    • Maternal Grandfather    • Paternal Grandmother    • Paternal Grandfather    • Brother Alive     Family History   Problem Relation Age of Onset   • Dementia Mother    • Heart Disease Father    • Heart Attack Father    • Alcohol/Drug Father    • Cancer Neg Hx    • Diabetes Neg Hx    • Hypertension Neg Hx    • Arthritis Brother        ROS: As documented in my HPI      Exam:  Blood pressure 130/74, pulse 80, temperature 37 °C (98.6 °F), resp. rate 14, height 1.702 m (5' 7\"), weight 91.6 kg (202 lb), SpO2 99 %.  General:  Well nourished, well developed male in NAD  Head: Nontender scalp. No lesions  Neck: Supple. Symmetric Thyroid palpated. No bruits  Pulmonary:  Fair effort. Wheezing and rhoncerous cough.   Cardiovascular: Regular rate and rhythm without murmur.   Abdomen: Soft nontender,  Extremities: no clubbing, cyanosis, or edema.  Hands: bilateral swelling to distal joints. Contracture to right hand   Psych: Alert and oriented x3.    Neurological: No focal deficits    Please note that this dictation was created using voice recognition software. I have made every reasonable attempt to correct obvious errors, but I expect that there are errors of grammar and possibly content that I did not discover before finalizing the note.    Assessment/Plan:  1. Dyslipidemia  LIPID PROFILE    TSH+FREE T4   2. Chronic use of opiate drugs therapeutic purposes  LIPID PROFILE    COMP METABOLIC PANEL    VITAMIN D,25 HYDROXY   3. Vitamin D deficiency  VITAMIN D,25 HYDROXY   4. Screening for prostate cancer  PROSTATE SPECIFIC AG " SCREENING   5. Cough  azithromycin (ZITHROMAX) 250 MG Tab   6. Chronic bilateral low back pain without sciatica  hydrocodone/acetaminophen (NORCO)  MG Tab           7. Primary osteoarthritis involving multiple joints  hydrocodone/acetaminophen (NORCO)  MG Tab           8. SOB (shortness of breath)  albuterol (VENTOLIN HFA) 108 (90 Base) MCG/ACT Aero Soln inhalation aerosol   9. Trigger finger of both hands  Current status of condition is chronic and controlled on therapy.

## 2017-10-04 NOTE — ASSESSMENT & PLAN NOTE
Patient continues to have chronic pain to both his hands. His right hand is slightly more deformed than the left with swelling very evident no redness or heat noted.

## 2017-10-04 NOTE — LETTER
October 4, 2017        Gregg Martinez  [address]  [place] [address]        Dear Gregg:    This is reminder that you will need to have your lab work done before your next pain management visit. These labs have been ordered and you do not need to carry any paperwork with you. Make sure you have fasted at least 10 hours prior to the labs. You can do this at the [facility], [facility] or any [facility] lab.    Remember also to schedule your MRI see you can pursue specialist for your back.    If you have any questions or concerns, please don't hesitate to call.        Sincerely,        DONG Gaytan.    Electronically Signed

## 2017-10-04 NOTE — ASSESSMENT & PLAN NOTE
Patient reports - carmella some sort of illness a month ago. Patient had running nose, cough and fever. Patient now has nagging cough with mucus production. Having some shortness of breath. Patient does not have inhaler. He has tried mucinex.

## 2017-11-30 ENCOUNTER — HOSPITAL ENCOUNTER (OUTPATIENT)
Dept: LAB | Facility: MEDICAL CENTER | Age: 67
End: 2017-11-30
Attending: NURSE PRACTITIONER
Payer: MEDICARE

## 2017-11-30 DIAGNOSIS — Z79.891 CHRONIC USE OF OPIATE DRUGS THERAPEUTIC PURPOSES: ICD-10-CM

## 2017-11-30 DIAGNOSIS — E55.9 VITAMIN D DEFICIENCY: ICD-10-CM

## 2017-11-30 DIAGNOSIS — Z12.5 SCREENING FOR PROSTATE CANCER: ICD-10-CM

## 2017-11-30 DIAGNOSIS — E78.5 DYSLIPIDEMIA: ICD-10-CM

## 2017-11-30 LAB
25(OH)D3 SERPL-MCNC: 30 NG/ML (ref 30–100)
ALBUMIN SERPL BCP-MCNC: 4.2 G/DL (ref 3.2–4.9)
ALBUMIN/GLOB SERPL: 1.6 G/DL
ALP SERPL-CCNC: 64 U/L (ref 30–99)
ALT SERPL-CCNC: 28 U/L (ref 2–50)
ANION GAP SERPL CALC-SCNC: 10 MMOL/L (ref 0–11.9)
AST SERPL-CCNC: 19 U/L (ref 12–45)
BILIRUB SERPL-MCNC: 0.6 MG/DL (ref 0.1–1.5)
BUN SERPL-MCNC: 15 MG/DL (ref 8–22)
CALCIUM SERPL-MCNC: 9.8 MG/DL (ref 8.5–10.5)
CHLORIDE SERPL-SCNC: 104 MMOL/L (ref 96–112)
CHOLEST SERPL-MCNC: 198 MG/DL (ref 100–199)
CO2 SERPL-SCNC: 25 MMOL/L (ref 20–33)
CREAT SERPL-MCNC: 0.65 MG/DL (ref 0.5–1.4)
GFR SERPL CREATININE-BSD FRML MDRD: >60 ML/MIN/1.73 M 2
GLOBULIN SER CALC-MCNC: 2.7 G/DL (ref 1.9–3.5)
GLUCOSE SERPL-MCNC: 86 MG/DL (ref 65–99)
HDLC SERPL-MCNC: 45 MG/DL
LDLC SERPL CALC-MCNC: 104 MG/DL
POTASSIUM SERPL-SCNC: 4.2 MMOL/L (ref 3.6–5.5)
PROT SERPL-MCNC: 6.9 G/DL (ref 6–8.2)
PSA SERPL-MCNC: 1.67 NG/ML (ref 0–4)
SODIUM SERPL-SCNC: 139 MMOL/L (ref 135–145)
T4 FREE SERPL-MCNC: 0.74 NG/DL (ref 0.53–1.43)
TRIGL SERPL-MCNC: 245 MG/DL (ref 0–149)
TSH SERPL DL<=0.005 MIU/L-ACNC: 2.35 UIU/ML (ref 0.3–3.7)

## 2017-11-30 PROCEDURE — 80061 LIPID PANEL: CPT

## 2017-11-30 PROCEDURE — 80053 COMPREHEN METABOLIC PANEL: CPT

## 2017-11-30 PROCEDURE — 84153 ASSAY OF PSA TOTAL: CPT | Mod: GA

## 2017-11-30 PROCEDURE — 84439 ASSAY OF FREE THYROXINE: CPT

## 2017-11-30 PROCEDURE — 36415 COLL VENOUS BLD VENIPUNCTURE: CPT

## 2017-11-30 PROCEDURE — 82306 VITAMIN D 25 HYDROXY: CPT

## 2017-11-30 PROCEDURE — 84443 ASSAY THYROID STIM HORMONE: CPT

## 2018-01-03 ENCOUNTER — OFFICE VISIT (OUTPATIENT)
Dept: MEDICAL GROUP | Facility: PHYSICIAN GROUP | Age: 68
End: 2018-01-03
Payer: MEDICARE

## 2018-01-03 VITALS
HEART RATE: 78 BPM | HEIGHT: 67 IN | OXYGEN SATURATION: 98 % | SYSTOLIC BLOOD PRESSURE: 130 MMHG | DIASTOLIC BLOOD PRESSURE: 82 MMHG | RESPIRATION RATE: 16 BRPM | TEMPERATURE: 98.2 F | BODY MASS INDEX: 32.65 KG/M2 | WEIGHT: 208 LBS

## 2018-01-03 DIAGNOSIS — M79.89 SWELLING OF RIGHT HAND: ICD-10-CM

## 2018-01-03 DIAGNOSIS — J42 CHRONIC BRONCHITIS, UNSPECIFIED CHRONIC BRONCHITIS TYPE (HCC): ICD-10-CM

## 2018-01-03 DIAGNOSIS — R06.02 SOB (SHORTNESS OF BREATH): ICD-10-CM

## 2018-01-03 DIAGNOSIS — M15.9 PRIMARY OSTEOARTHRITIS INVOLVING MULTIPLE JOINTS: ICD-10-CM

## 2018-01-03 DIAGNOSIS — M54.5 CHRONIC BILATERAL LOW BACK PAIN, WITH SCIATICA PRESENCE UNSPECIFIED: ICD-10-CM
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
## 2018-01-03 DIAGNOSIS — M54.50 CHRONIC RIGHT-SIDED LOW BACK PAIN WITHOUT SCIATICA: ICD-10-CM

## 2018-01-03 DIAGNOSIS — G89.29 CHRONIC RIGHT-SIDED LOW BACK PAIN WITHOUT SCIATICA: ICD-10-CM

## 2018-01-03 DIAGNOSIS — G89.29 CHRONIC BILATERAL LOW BACK PAIN, WITH SCIATICA PRESENCE UNSPECIFIED: ICD-10-CM

## 2018-01-03 DIAGNOSIS — Z79.891 CHRONIC USE OF OPIATE DRUGS THERAPEUTIC PURPOSES: ICD-10-CM

## 2018-01-03 PROCEDURE — 99213 OFFICE O/P EST LOW 20 MIN: CPT | Performed by: NURSE PRACTITIONER

## 2018-01-03 RX ORDER — ALBUTEROL SULFATE 90 UG/1
AEROSOL, METERED RESPIRATORY (INHALATION)
Qty: 16 G | Refills: 3 | Status: SHIPPED | OUTPATIENT
Start: 2018-01-03 | End: 2018-03-08 | Stop reason: SDUPTHER

## 2018-01-03 RX ORDER — CYCLOBENZAPRINE HCL 10 MG
10 TABLET ORAL 3 TIMES DAILY PRN
Qty: 60 TAB | Refills: 3 | Status: SHIPPED | OUTPATIENT
Start: 2018-01-03 | End: 2018-02-01

## 2018-01-03 ASSESSMENT — PATIENT HEALTH QUESTIONNAIRE - PHQ9: CLINICAL INTERPRETATION OF PHQ2 SCORE: 0

## 2018-01-03 NOTE — ASSESSMENT & PLAN NOTE
Patient continues to have pain in lower back. Patient came to the realization he no longer wants to take pain medicine. He's been taking hydrocodone for over 20 years and has decided to just go easy on it and is not requesting a refill of hydrocodone today. However he did share with me that he is taking cannabis in the form of smoking for pain relief and muscle spasm. I appreciated his honesty and told him that it is perfectly fine for him to discontinue the controlled substance and if he would prefer cannabis and so be it.

## 2018-01-03 NOTE — PROGRESS NOTES
Chief Complaint   Patient presents with   • Back Pain     MRI scheduled Friday / having surgery   • Other     no medications wanted       HISTORY OF PRESENT ILLNESS: Patient is a @ age 67 here  today to discuss going off pain medication.     Interval history:     Hospitalizations  No     Injuries no     Illness no     Ongoing evaluation from [facility].         Chronic use of opiate drugs therapeutic purposes  Patient has decided not to continue with chronic use of opioids for pain.         Trigger finger of both hands  Patient continues to have pain in digits.  His mobility is affected by: pain in joints of wrist.     COPD (chronic obstructive pulmonary disease) (CMS-[facility])  Patient will use his albuterol inhaler as needed. NO wheeze or COUGH. Needs to have refill of inhaler. No fever. No chills. Patient lives out about 2 hours, exposed to hay and dust.     Chronic low back pain  Patient continues to have pain in lower back. Patient came to the realization he no longer wants to take pain medicine. He's been taking hydrocodone for over 20 years and has decided to just go easy on it and is not requesting a refill of hydrocodone today. However he did share with me that he is taking cannabis in the form of smoking for pain relief and muscle spasm. I appreciated his honesty and told him that it is perfectly fine for him to discontinue the controlled substance and if he would prefer cannabis and so be it.    Swelling of right hand  Patient continues to see orthopedics/neurosurgery. He has an MRI scheduled for this week and will follow up with them. He is using muscle relaxers that seemed to help with his back and his hands I will refill that today.        Allergies:Patient has no known allergies.    Current Outpatient Prescriptions Ordered in [facility]   Medication Sig Dispense Refill   • albuterol (VENTOLIN HFA) 108 (90 Base) MCG/ACT Aero Soln inhalation aerosol INHALE 2 PUFFS BY MOUTH EVERY 6 HOURS AS NEEDED FOR SHORTNESS  "OF BREATH 16 g 3   • cyclobenzaprine (FLEXERIL) 10 MG Tab Take 1 Tab by mouth 3 times a day as needed. 60 Tab 3   • NORCO 5-325 MG Tab per tablet      • azithromycin (ZITHROMAX) 250 MG Tab Take two pills today and then one pill daily until finished 6 Tab 0   • ciprofloxacin (CIPRO) 500 MG Tab Take 1 Tab by mouth 2 times a day. 20 Tab 0   • pneumococcal 13-Eunice Conj Vacc (PREVNAR 13) syringe 0.5 mL by Intramuscular route Once PRN for up to 1 dose. 0.5 Each 0     No current [facility]-ordered facility-administered medications on file.        No past medical history on file.    Social History   Substance Use Topics   • Smoking status: Former Smoker     Years: 35.00     Types: Cigarettes     Quit date: 2005   • Smokeless tobacco: Never Used   • Alcohol use No       Family Status   Relation Status   • Mother    • Father    • Sister Alive   • Brother Alive   • Maternal Grandmother    • Maternal Grandfather    • Paternal Grandmother    • Paternal Grandfather    • Brother Alive   • Neg Hx      Family History   Problem Relation Age of Onset   • Dementia Mother    • Heart Disease Father    • Heart Attack Father    • Alcohol/Drug Father    • Arthritis Brother    • Cancer Neg Hx    • Diabetes Neg Hx    • Hypertension Neg Hx        ROS: As documented in my HPI      Exam:  Blood pressure 130/82, pulse 78, temperature 36.8 °C (98.2 °F), resp. rate 16, height 1.702 m (5' 7\"), weight 94.3 kg (208 lb), SpO2 98 %.  General:  Well nourished, well developed male in NAD  Head: Nontender scalp. No lesions  Neck: Supple. Symmetric   Pulmonary:  Normal effort. No rales, ronchi, or wheezing.  Cardiovascular: Regular rate   Abdomen: Soft nontender, normal bowel sounds  Extremities: Right hand swollen at wrist. ROM very painful at joints. No heat or redness.  Psych: Alert and oriented x3.  Neurological: No focal deficits    Please note that this dictation was created using voice recognition " software. I have made every reasonable attempt to correct obvious errors, but I expect that there are errors of grammar and possibly content that I did not discover before finalizing the note.    Assessment/Plan:  1. Chronic use of opiate drugs therapeutic purposes   NO RX today. Per patient's wishes. He may call back for continuation of medication. Right now he is using cannabis for pain relief     Trigger finger of both hands   stable But does depend on Flexeril to help with muscle spasms     Chronic bronchitis, unspecified chronic bronchitis type (CMS-HCC)  Refilled inhaler Albuterol     Chronic right-sided low back pain without sciatica  Flexeril ordered. May use cannabis on his own discretion.     SOB (shortness of breath)  albuterol (VENTOLIN HFA) 108 (90 Base) MCG/ACT Aero Soln inhalation aerosol    Fall, initial encounter  cyclobenzaprine (FLEXERIL) 10 MG Tab              Swelling of right hand   Patient has follow-up MRI and will continue to see neurosurgery

## 2018-01-03 NOTE — ASSESSMENT & PLAN NOTE
Patient will use his albuterol inhaler as needed. NO wheeze or COUGH. Needs to have refill of inhaler. No fever. No chills. Patient lives out about 2 hours, exposed to hay and dust.

## 2018-01-03 NOTE — ASSESSMENT & PLAN NOTE
Patient continues to see orthopedics/neurosurgery. He has an MRI scheduled for this week and will follow up with them. He is using muscle relaxers that seemed to help with his back and his hands I will refill that today.

## 2018-01-04 ENCOUNTER — TELEPHONE (OUTPATIENT)
Dept: MEDICAL GROUP | Facility: PHYSICIAN GROUP | Age: 68
End: 2018-01-04

## 2018-01-04 NOTE — TELEPHONE ENCOUNTER
MEDICATION PRIOR AUTHORIZATION NEEDED:    1. Name of Medication: Flexeril    2. Requested By (Name of Pharmacy): [facility]     3. Is insurance on file current? YES    4. What is the name & phone number of the 3rd party payor? [facility] [phone] FAX / [phone] Phone

## 2018-01-05 ENCOUNTER — HOSPITAL ENCOUNTER (OUTPATIENT)
Dept: RADIOLOGY | Facility: MEDICAL CENTER | Age: 68
End: 2018-01-05
Attending: NURSE PRACTITIONER
Payer: MEDICARE

## 2018-01-05 ENCOUNTER — TELEPHONE (OUTPATIENT)
Dept: MEDICAL GROUP | Facility: PHYSICIAN GROUP | Age: 68
End: 2018-01-05

## 2018-01-05 DIAGNOSIS — G89.29 CHRONIC BILATERAL LOW BACK PAIN, WITH SCIATICA PRESENCE UNSPECIFIED: ICD-10-CM

## 2018-01-05 DIAGNOSIS — M54.5 CHRONIC BILATERAL LOW BACK PAIN, WITH SCIATICA PRESENCE UNSPECIFIED: ICD-10-CM

## 2018-01-05 PROCEDURE — 72148 MRI LUMBAR SPINE W/O DYE: CPT

## 2018-01-06 ENCOUNTER — TELEPHONE (OUTPATIENT)
Dept: MEDICAL GROUP | Facility: PHYSICIAN GROUP | Age: 68
End: 2018-01-06

## 2018-01-06 NOTE — TELEPHONE ENCOUNTER
MEDICATION PRIOR AUTHORIZATION NEEDED:    1. Name of Medication: Flexeril    2. Requested By (Name of Pharmacy): [facility]     3. Is insurance on file current? yes    4. What is the name & phone number of the 3rd party payor? Enrike Arriaga [phone] FAX / [phone] PHONE

## 2018-02-01 ENCOUNTER — HOSPITAL ENCOUNTER (INPATIENT)
Facility: MEDICAL CENTER | Age: 68
LOS: 2 days | DRG: 871 | End: 2018-02-03
Attending: EMERGENCY MEDICINE | Admitting: INTERNAL MEDICINE
Payer: MEDICARE

## 2018-02-01 ENCOUNTER — APPOINTMENT (OUTPATIENT)
Dept: RADIOLOGY | Facility: MEDICAL CENTER | Age: 68
DRG: 871 | End: 2018-02-01
Attending: EMERGENCY MEDICINE
Payer: MEDICARE

## 2018-02-01 ENCOUNTER — RESOLUTE PROFESSIONAL BILLING HOSPITAL PROF FEE (OUTPATIENT)
Dept: HOSPITALIST | Facility: MEDICAL CENTER | Age: 68
End: 2018-02-01
Payer: MEDICARE

## 2018-02-01 DIAGNOSIS — J44.1 COPD EXACERBATION (HCC): ICD-10-CM

## 2018-02-01 DIAGNOSIS — J42 CHRONIC BRONCHITIS, UNSPECIFIED CHRONIC BRONCHITIS TYPE (HCC): ICD-10-CM

## 2018-02-01 DIAGNOSIS — J44.1 ACUTE EXACERBATION OF CHRONIC OBSTRUCTIVE PULMONARY DISEASE (COPD) (HCC): ICD-10-CM

## 2018-02-01 LAB
ANION GAP SERPL CALC-SCNC: 11 MMOL/L (ref 0–11.9)
BASOPHILS # BLD AUTO: 1.1 % (ref 0–1.8)
BASOPHILS # BLD: 0.13 K/UL (ref 0–0.12)
BNP SERPL-MCNC: 8 PG/ML (ref 0–100)
BUN SERPL-MCNC: 13 MG/DL (ref 8–22)
CALCIUM SERPL-MCNC: 9 MG/DL (ref 8.5–10.5)
CHLORIDE SERPL-SCNC: 100 MMOL/L (ref 96–112)
CO2 SERPL-SCNC: 26 MMOL/L (ref 20–33)
CREAT SERPL-MCNC: 0.68 MG/DL (ref 0.5–1.4)
EKG IMPRESSION: NORMAL
EKG IMPRESSION: NORMAL
EOSINOPHIL # BLD AUTO: 0.01 K/UL (ref 0–0.51)
EOSINOPHIL NFR BLD: 0.1 % (ref 0–6.9)
ERYTHROCYTE [DISTWIDTH] IN BLOOD BY AUTOMATED COUNT: 47.8 FL (ref 35.9–50)
FLUAV RNA SPEC QL NAA+PROBE: NEGATIVE
FLUBV RNA SPEC QL NAA+PROBE: NEGATIVE
GLUCOSE SERPL-MCNC: 140 MG/DL (ref 65–99)
HCT VFR BLD AUTO: 45 % (ref 42–52)
HGB BLD-MCNC: 15.2 G/DL (ref 14–18)
IMM GRANULOCYTES # BLD AUTO: 0.21 K/UL (ref 0–0.11)
IMM GRANULOCYTES NFR BLD AUTO: 1.7 % (ref 0–0.9)
LYMPHOCYTES # BLD AUTO: 1.59 K/UL (ref 1–4.8)
LYMPHOCYTES NFR BLD: 13.2 % (ref 22–41)
MCH RBC QN AUTO: 30.9 PG (ref 27–33)
MCHC RBC AUTO-ENTMCNC: 33.8 G/DL (ref 33.7–35.3)
MCV RBC AUTO: 91.5 FL (ref 81.4–97.8)
MONOCYTES # BLD AUTO: 0.82 K/UL (ref 0–0.85)
MONOCYTES NFR BLD AUTO: 6.8 % (ref 0–13.4)
NEUTROPHILS # BLD AUTO: 9.31 K/UL (ref 1.82–7.42)
NEUTROPHILS NFR BLD: 77.1 % (ref 44–72)
NRBC # BLD AUTO: 0 K/UL
NRBC BLD-RTO: 0 /100 WBC
PLATELET # BLD AUTO: 538 K/UL (ref 164–446)
PMV BLD AUTO: 9 FL (ref 9–12.9)
POTASSIUM SERPL-SCNC: 4 MMOL/L (ref 3.6–5.5)
PROCALCITONIN SERPL-MCNC: <0.05 NG/ML
RBC # BLD AUTO: 4.92 M/UL (ref 4.7–6.1)
SODIUM SERPL-SCNC: 137 MMOL/L (ref 135–145)
TROPONIN I SERPL-MCNC: 0.01 NG/ML (ref 0–0.04)
WBC # BLD AUTO: 12.1 K/UL (ref 4.8–10.8)

## 2018-02-01 PROCEDURE — 700111 HCHG RX REV CODE 636 W/ 250 OVERRIDE (IP): Performed by: INTERNAL MEDICINE

## 2018-02-01 PROCEDURE — 84484 ASSAY OF TROPONIN QUANT: CPT

## 2018-02-01 PROCEDURE — 700102 HCHG RX REV CODE 250 W/ 637 OVERRIDE(OP): Performed by: INTERNAL MEDICINE

## 2018-02-01 PROCEDURE — 84145 PROCALCITONIN (PCT): CPT

## 2018-02-01 PROCEDURE — 87502 INFLUENZA DNA AMP PROBE: CPT

## 2018-02-01 PROCEDURE — 99223 1ST HOSP IP/OBS HIGH 75: CPT | Mod: AI | Performed by: INTERNAL MEDICINE

## 2018-02-01 PROCEDURE — 99285 EMERGENCY DEPT VISIT HI MDM: CPT

## 2018-02-01 PROCEDURE — 71045 X-RAY EXAM CHEST 1 VIEW: CPT

## 2018-02-01 PROCEDURE — 96374 THER/PROPH/DIAG INJ IV PUSH: CPT

## 2018-02-01 PROCEDURE — 700101 HCHG RX REV CODE 250: Performed by: EMERGENCY MEDICINE

## 2018-02-01 PROCEDURE — 700105 HCHG RX REV CODE 258: Performed by: INTERNAL MEDICINE

## 2018-02-01 PROCEDURE — 770006 HCHG ROOM/CARE - MED/SURG/GYN SEMI*

## 2018-02-01 PROCEDURE — 85025 COMPLETE CBC W/AUTO DIFF WBC: CPT

## 2018-02-01 PROCEDURE — A9270 NON-COVERED ITEM OR SERVICE: HCPCS | Performed by: INTERNAL MEDICINE

## 2018-02-01 PROCEDURE — 94640 AIRWAY INHALATION TREATMENT: CPT

## 2018-02-01 PROCEDURE — 700111 HCHG RX REV CODE 636 W/ 250 OVERRIDE (IP): Performed by: EMERGENCY MEDICINE

## 2018-02-01 PROCEDURE — 93005 ELECTROCARDIOGRAM TRACING: CPT | Performed by: EMERGENCY MEDICINE

## 2018-02-01 PROCEDURE — 94760 N-INVAS EAR/PLS OXIMETRY 1: CPT

## 2018-02-01 PROCEDURE — 83880 ASSAY OF NATRIURETIC PEPTIDE: CPT

## 2018-02-01 PROCEDURE — 93005 ELECTROCARDIOGRAM TRACING: CPT

## 2018-02-01 PROCEDURE — 36415 COLL VENOUS BLD VENIPUNCTURE: CPT

## 2018-02-01 PROCEDURE — 80048 BASIC METABOLIC PNL TOTAL CA: CPT

## 2018-02-01 RX ORDER — ALBUTEROL SULFATE 90 UG/1
1 AEROSOL, METERED RESPIRATORY (INHALATION)
Status: DISCONTINUED | OUTPATIENT
Start: 2018-02-01 | End: 2018-02-03 | Stop reason: HOSPADM

## 2018-02-01 RX ORDER — CYCLOBENZAPRINE HCL 10 MG
10 TABLET ORAL 3 TIMES DAILY PRN
Status: DISCONTINUED | OUTPATIENT
Start: 2018-02-01 | End: 2018-02-03 | Stop reason: HOSPADM

## 2018-02-01 RX ORDER — AMOXICILLIN 250 MG
2 CAPSULE ORAL 2 TIMES DAILY
Status: DISCONTINUED | OUTPATIENT
Start: 2018-02-01 | End: 2018-02-03 | Stop reason: HOSPADM

## 2018-02-01 RX ORDER — IPRATROPIUM BROMIDE AND ALBUTEROL SULFATE 2.5; .5 MG/3ML; MG/3ML
3 SOLUTION RESPIRATORY (INHALATION)
Status: DISCONTINUED | OUTPATIENT
Start: 2018-02-01 | End: 2018-02-03 | Stop reason: HOSPADM

## 2018-02-01 RX ORDER — GUAIFENESIN/DEXTROMETHORPHAN 100-10MG/5
10 SYRUP ORAL EVERY 6 HOURS PRN
Status: DISCONTINUED | OUTPATIENT
Start: 2018-02-01 | End: 2018-02-03 | Stop reason: HOSPADM

## 2018-02-01 RX ORDER — CYCLOBENZAPRINE HCL 10 MG
10 TABLET ORAL 3 TIMES DAILY PRN
COMMUNITY
End: 2018-06-07 | Stop reason: SDUPTHER

## 2018-02-01 RX ORDER — BISACODYL 10 MG
10 SUPPOSITORY, RECTAL RECTAL
Status: DISCONTINUED | OUTPATIENT
Start: 2018-02-01 | End: 2018-02-03 | Stop reason: HOSPADM

## 2018-02-01 RX ORDER — AZITHROMYCIN 250 MG/1
250 TABLET, FILM COATED ORAL DAILY
Status: DISCONTINUED | OUTPATIENT
Start: 2018-02-02 | End: 2018-02-03 | Stop reason: HOSPADM

## 2018-02-01 RX ORDER — METHYLPREDNISOLONE SODIUM SUCCINATE 40 MG/ML
40 INJECTION, POWDER, LYOPHILIZED, FOR SOLUTION INTRAMUSCULAR; INTRAVENOUS EVERY 6 HOURS
Status: DISCONTINUED | OUTPATIENT
Start: 2018-02-02 | End: 2018-02-03 | Stop reason: HOSPADM

## 2018-02-01 RX ORDER — ACETAMINOPHEN 325 MG/1
650 TABLET ORAL EVERY 6 HOURS PRN
Status: DISCONTINUED | OUTPATIENT
Start: 2018-02-01 | End: 2018-02-03 | Stop reason: HOSPADM

## 2018-02-01 RX ORDER — ONDANSETRON 2 MG/ML
4 INJECTION INTRAMUSCULAR; INTRAVENOUS EVERY 4 HOURS PRN
Status: DISCONTINUED | OUTPATIENT
Start: 2018-02-01 | End: 2018-02-03 | Stop reason: HOSPADM

## 2018-02-01 RX ORDER — POLYETHYLENE GLYCOL 3350 17 G/17G
1 POWDER, FOR SOLUTION ORAL
Status: DISCONTINUED | OUTPATIENT
Start: 2018-02-01 | End: 2018-02-03 | Stop reason: HOSPADM

## 2018-02-01 RX ORDER — SODIUM CHLORIDE 9 MG/ML
INJECTION, SOLUTION INTRAVENOUS CONTINUOUS
Status: DISCONTINUED | OUTPATIENT
Start: 2018-02-01 | End: 2018-02-03 | Stop reason: HOSPADM

## 2018-02-01 RX ORDER — ONDANSETRON 4 MG/1
4 TABLET, ORALLY DISINTEGRATING ORAL EVERY 4 HOURS PRN
Status: DISCONTINUED | OUTPATIENT
Start: 2018-02-01 | End: 2018-02-03 | Stop reason: HOSPADM

## 2018-02-01 RX ORDER — PREDNISONE 20 MG/1
60 TABLET ORAL ONCE
Status: COMPLETED | OUTPATIENT
Start: 2018-02-01 | End: 2018-02-01

## 2018-02-01 RX ORDER — AZITHROMYCIN 250 MG/1
500 TABLET, FILM COATED ORAL ONCE
Status: COMPLETED | OUTPATIENT
Start: 2018-02-01 | End: 2018-02-01

## 2018-02-01 RX ADMIN — ALBUTEROL SULFATE 2.5 MG: 2.5 SOLUTION RESPIRATORY (INHALATION) at 19:48

## 2018-02-01 RX ADMIN — SODIUM CHLORIDE: 9 INJECTION, SOLUTION INTRAVENOUS at 23:45

## 2018-02-01 RX ADMIN — METHYLPREDNISOLONE SODIUM SUCCINATE 40 MG: 40 INJECTION, POWDER, FOR SOLUTION INTRAMUSCULAR; INTRAVENOUS at 22:20

## 2018-02-01 RX ADMIN — AZITHROMYCIN 500 MG: 250 TABLET, FILM COATED ORAL at 22:18

## 2018-02-01 RX ADMIN — PREDNISONE 60 MG: 20 TABLET ORAL at 19:37

## 2018-02-01 ASSESSMENT — COPD QUESTIONNAIRES
HAVE YOU SMOKED AT LEAST 100 CIGARETTES IN YOUR ENTIRE LIFE: YES
DURING THE PAST 4 WEEKS HOW MUCH DID YOU FEEL SHORT OF BREATH: NONE/LITTLE OF THE TIME
DO YOU EVER COUGH UP ANY MUCUS OR PHLEGM?: NO/ONLY WITH OCCASIONAL COLDS OR INFECTIONS
COPD SCREENING SCORE: 4

## 2018-02-01 ASSESSMENT — PATIENT HEALTH QUESTIONNAIRE - PHQ9
SUM OF ALL RESPONSES TO PHQ9 QUESTIONS 1 AND 2: 0
1. LITTLE INTEREST OR PLEASURE IN DOING THINGS: NOT AT ALL
2. FEELING DOWN, DEPRESSED, IRRITABLE, OR HOPELESS: NOT AT ALL
SUM OF ALL RESPONSES TO PHQ QUESTIONS 1-9: 0

## 2018-02-01 ASSESSMENT — LIFESTYLE VARIABLES
EVER_SMOKED: YES
ALCOHOL_USE: NO

## 2018-02-01 ASSESSMENT — PAIN SCALES - GENERAL: PAINLEVEL_OUTOF10: 1

## 2018-02-02 ENCOUNTER — APPOINTMENT (OUTPATIENT)
Dept: RADIOLOGY | Facility: MEDICAL CENTER | Age: 68
DRG: 871 | End: 2018-02-02
Attending: INTERNAL MEDICINE
Payer: MEDICARE

## 2018-02-02 PROBLEM — A41.9 SEPSIS (HCC): Status: ACTIVE | Noted: 2018-02-02

## 2018-02-02 LAB
LACTATE BLD-SCNC: 1.6 MMOL/L (ref 0.5–2)
LACTATE BLD-SCNC: 2.1 MMOL/L (ref 0.5–2)

## 2018-02-02 PROCEDURE — 94640 AIRWAY INHALATION TREATMENT: CPT

## 2018-02-02 PROCEDURE — 36415 COLL VENOUS BLD VENIPUNCTURE: CPT

## 2018-02-02 PROCEDURE — 700101 HCHG RX REV CODE 250: Performed by: INTERNAL MEDICINE

## 2018-02-02 PROCEDURE — 700105 HCHG RX REV CODE 258: Performed by: INTERNAL MEDICINE

## 2018-02-02 PROCEDURE — 83605 ASSAY OF LACTIC ACID: CPT | Mod: 91

## 2018-02-02 PROCEDURE — 770006 HCHG ROOM/CARE - MED/SURG/GYN SEMI*

## 2018-02-02 PROCEDURE — A9270 NON-COVERED ITEM OR SERVICE: HCPCS | Performed by: INTERNAL MEDICINE

## 2018-02-02 PROCEDURE — 700111 HCHG RX REV CODE 636 W/ 250 OVERRIDE (IP): Performed by: INTERNAL MEDICINE

## 2018-02-02 PROCEDURE — 700102 HCHG RX REV CODE 250 W/ 637 OVERRIDE(OP): Performed by: INTERNAL MEDICINE

## 2018-02-02 PROCEDURE — 99233 SBSQ HOSP IP/OBS HIGH 50: CPT | Performed by: INTERNAL MEDICINE

## 2018-02-02 PROCEDURE — 71046 X-RAY EXAM CHEST 2 VIEWS: CPT

## 2018-02-02 PROCEDURE — 87040 BLOOD CULTURE FOR BACTERIA: CPT

## 2018-02-02 PROCEDURE — 94760 N-INVAS EAR/PLS OXIMETRY 1: CPT

## 2018-02-02 RX ORDER — SODIUM CHLORIDE 9 MG/ML
30 INJECTION, SOLUTION INTRAVENOUS
Status: DISCONTINUED | OUTPATIENT
Start: 2018-02-02 | End: 2018-02-03 | Stop reason: HOSPADM

## 2018-02-02 RX ORDER — SODIUM CHLORIDE 9 MG/ML
500 INJECTION, SOLUTION INTRAVENOUS
Status: DISCONTINUED | OUTPATIENT
Start: 2018-02-02 | End: 2018-02-03 | Stop reason: HOSPADM

## 2018-02-02 RX ADMIN — GUAIFENESIN AND DEXTROMETHORPHAN 10 ML: 100; 10 SYRUP ORAL at 17:51

## 2018-02-02 RX ADMIN — ALBUTEROL SULFATE 2.5 MG: 2.5 SOLUTION RESPIRATORY (INHALATION) at 18:45

## 2018-02-02 RX ADMIN — METHYLPREDNISOLONE SODIUM SUCCINATE 40 MG: 40 INJECTION, POWDER, FOR SOLUTION INTRAMUSCULAR; INTRAVENOUS at 23:33

## 2018-02-02 RX ADMIN — METHYLPREDNISOLONE SODIUM SUCCINATE 40 MG: 40 INJECTION, POWDER, FOR SOLUTION INTRAMUSCULAR; INTRAVENOUS at 12:03

## 2018-02-02 RX ADMIN — ENOXAPARIN SODIUM 40 MG: 100 INJECTION SUBCUTANEOUS at 09:08

## 2018-02-02 RX ADMIN — SODIUM CHLORIDE: 9 INJECTION, SOLUTION INTRAVENOUS at 20:12

## 2018-02-02 RX ADMIN — METHYLPREDNISOLONE SODIUM SUCCINATE 40 MG: 40 INJECTION, POWDER, FOR SOLUTION INTRAMUSCULAR; INTRAVENOUS at 06:14

## 2018-02-02 RX ADMIN — STANDARDIZED SENNA CONCENTRATE AND DOCUSATE SODIUM 2 TABLET: 8.6; 5 TABLET, FILM COATED ORAL at 20:12

## 2018-02-02 RX ADMIN — AZITHROMYCIN 250 MG: 250 TABLET, FILM COATED ORAL at 09:08

## 2018-02-02 RX ADMIN — ALBUTEROL SULFATE 2.5 MG: 2.5 SOLUTION RESPIRATORY (INHALATION) at 22:23

## 2018-02-02 RX ADMIN — ALBUTEROL SULFATE 1 PUFF: 90 AEROSOL, METERED RESPIRATORY (INHALATION) at 17:46

## 2018-02-02 RX ADMIN — METHYLPREDNISOLONE SODIUM SUCCINATE 40 MG: 40 INJECTION, POWDER, FOR SOLUTION INTRAMUSCULAR; INTRAVENOUS at 17:46

## 2018-02-02 ASSESSMENT — ENCOUNTER SYMPTOMS
SPUTUM PRODUCTION: 1
DOUBLE VISION: 0
SHORTNESS OF BREATH: 1
ABDOMINAL PAIN: 0
BACK PAIN: 0
NECK PAIN: 0
COUGH: 1
VOMITING: 0
DEPRESSION: 0
CHILLS: 0
SORE THROAT: 0
DIZZINESS: 0
MYALGIAS: 0
FEVER: 0
HEADACHES: 0
NAUSEA: 0
BLURRED VISION: 0
PALPITATIONS: 0

## 2018-02-02 ASSESSMENT — PAIN SCALES - GENERAL: PAINLEVEL_OUTOF10: 0

## 2018-02-02 ASSESSMENT — PATIENT HEALTH QUESTIONNAIRE - PHQ9
2. FEELING DOWN, DEPRESSED, IRRITABLE, OR HOPELESS: NOT AT ALL
1. LITTLE INTEREST OR PLEASURE IN DOING THINGS: NOT AT ALL
SUM OF ALL RESPONSES TO PHQ9 QUESTIONS 1 AND 2: 0
SUM OF ALL RESPONSES TO PHQ QUESTIONS 1-9: 0

## 2018-02-02 ASSESSMENT — LIFESTYLE VARIABLES: DO YOU DRINK ALCOHOL: NO

## 2018-02-02 NOTE — ED PROVIDER NOTES
"ED Provider Note      CHIEF COMPLAINT  Chief Complaint   Patient presents with   • Shortness of Breath   • Cough       HPI  Gregg Martinez is a 67 y.o. male with a history of COPD who presents complaining of shortness of breath.    Patient reports developing \"a cold\" 2 days ago. He has had myalgias and chills but no fever. Patient reports clear sputum but denies vomiting, diarrhea, hemoptysis, calf pain, leg swelling, chest pain.  Patients admits to dyspnea on exertion. He does not have home O2. He has been using his albuterol inhaler without relief.    Patient quit smoking 20 years ago.    ALLERGIESm  No Known Allergies    CURRENT MEDICATIONS  Home Medications     Reviewed by Kenya Leonardo R.N. (Registered Nurse) on 02/01/18 at 1918  Med List Status: Complete   Medication Last Dose Status   albuterol (VENTOLIN HFA) 108 (90 Base) MCG/ACT Aero Soln inhalation aerosol  Active   cyclobenzaprine (FLEXERIL) 10 MG Tab  Active   pneumococcal 13-Eunice Conj Vacc (PREVNAR 13) syringe 4/13/2017 Active                PAST MEDICAL HISTORY   has a past medical history of Chronic back pain; Lumbar stress fracture; and PNA (pneumonia).    SURGICAL HISTORY   has a past surgical history that includes knee arthroscopy and open reduction.    SOCIAL HISTORY  Social History     Social History Main Topics   • Smoking status: Former Smoker     Years: 35.00     Types: Cigarettes     Quit date: 4/7/2005   • Smokeless tobacco: Never Used   • Alcohol use No   • Drug use: No   • Sexual activity: Not on file     Patient lives in [place]    REVIEW OF SYSTEMS  See HPI for further details.  All other systems are negative except as above in HPI.      PHYSICAL EXAM  VITAL SIGNS: /132   Pulse (!) 110   Temp 37.7 °C (99.8 °F)   Resp (!) 22   Ht 1.753 m (5' 9\")   Wt 86.2 kg (190 lb)   SpO2 88%   BMI 28.06 kg/m²     General:  WDWN, nontoxic appearing in NAD; A+Ox3; V/S as above; tachycardic, hypoxic  Skin: warm and dry; good color; no " rash  HEENT: NCAT; EOMs intact; PERRL; no scleral icterus   Neck: FROM; no meningismus, no JVD  Cardiovascular: Tachycardic heart rate and regular rhythm.  No murmurs, rubs, or gallops; pulses 2+ bilaterally radially and DP areas  Lungs: Clear to auscultation with decreased air movement bilaterally.  No wheezes, rhonchi, or rales.   Abdomen: BS present; soft; NTND; no rebound, guarding, or rigidity.  No organomegaly or pulsatile mass  Extremities: ROBERTSON x 4; no e/o trauma; no pedal edema; neg Yogesh's  Neurologic: CNs III-XII grossly intact; speech clear; distal sensation intact; strength 5/5 UE/LEs;  Psychiatric: Appropriate affect, normal mood    LABS  Results for orders placed or performed during the hospital encounter of 02/01/18   CBC w/ Differential   Result Value Ref Range    WBC 12.1 (H) 4.8 - 10.8 K/uL    RBC 4.92 4.70 - 6.10 M/uL    Hemoglobin 15.2 14.0 - 18.0 g/dL    Hematocrit 45.0 42.0 - 52.0 %    MCV 91.5 81.4 - 97.8 fL    MCH 30.9 27.0 - 33.0 pg    MCHC 33.8 33.7 - 35.3 g/dL    RDW 47.8 35.9 - 50.0 fL    Platelet Count 538 (H) 164 - 446 K/uL    MPV 9.0 9.0 - 12.9 fL    Neutrophils-Polys 77.10 (H) 44.00 - 72.00 %    Lymphocytes 13.20 (L) 22.00 - 41.00 %    Monocytes 6.80 0.00 - 13.40 %    Eosinophils 0.10 0.00 - 6.90 %    Basophils 1.10 0.00 - 1.80 %    Immature Granulocytes 1.70 (H) 0.00 - 0.90 %    Nucleated RBC 0.00 /100 WBC    Neutrophils (Absolute) 9.31 (H) 1.82 - 7.42 K/uL    Lymphs (Absolute) 1.59 1.00 - 4.80 K/uL    Monos (Absolute) 0.82 0.00 - 0.85 K/uL    Eos (Absolute) 0.01 0.00 - 0.51 K/uL    Baso (Absolute) 0.13 (H) 0.00 - 0.12 K/uL    Immature Granulocytes (abs) 0.21 (H) 0.00 - 0.11 K/uL    NRBC (Absolute) 0.00 K/uL   Basic Metabolic Panel (BMP)   Result Value Ref Range    Sodium 137 135 - 145 mmol/L    Potassium 4.0 3.6 - 5.5 mmol/L    Chloride 100 96 - 112 mmol/L    Co2 26 20 - 33 mmol/L    Glucose 140 (H) 65 - 99 mg/dL    Bun 13 8 - 22 mg/dL    Creatinine 0.68 0.50 - 1.40 mg/dL    Calcium  9.0 8.5 - 10.5 mg/dL    Anion Gap 11.0 0.0 - 11.9   Btype Natriuretic Peptide   Result Value Ref Range    B Natriuretic Peptide 8 0 - 100 pg/mL   Troponin STAT   Result Value Ref Range    Troponin I 0.01 0.00 - 0.04 ng/mL   ESTIMATED GFR   Result Value Ref Range    GFR If African American >60 >60 mL/min/1.73 m 2    GFR If Non African American >60 >60 mL/min/1.73 m 2   EKG (NOW)   Result Value Ref Range    Report       [facility] Emergency Dept.    Test Date:  2018  Pt Name:    QUYEN MORTENSEN                 Department: ER  MRN:        [id number]                      Room:       [facility]  Gender:     Male                         Technician: [id number]  :        1950                   Requested By:ELLIE JONES  Order #:    [id number]                    Reading MD:    Measurements  Intervals                                Axis  Rate:       104                          P:          61  LA:         148                          QRS:        0  QRSD:       80                           T:          39  QT:         336  QTc:        442    Interpretive Statements  SINUS TACHYCARDIA  INDETERMINATE QRS AXIS  LOW VOLTAGE IN FRONTAL LEADS  BORDERLINE T ABNORMALITIES, ANTERIOR LEADS  No previous ECG available for comparison           EKG  12 Lead EKG obtained at 7:42 PM and interpreted by me to show:  Rhythm: Sinus tachycardia  Rate: 104  Intervals:   LA: 148   QRS: 80   QTC: 442   T-wave inversions noted in V1-V3.  No ectopy    No ST changes  Clinical Impression: Sinus tachycardiac with nonspecific T-wave changes. No prior EKG available.    IMAGING  DX-CHEST-PORTABLE (1 VIEW)   Final Result      No acute cardiopulmonary abnormality.            MEDICAL RECORD  I have reviewed patient's medical record and pertinent results are listed below.      COURSE & MEDICAL DECISION MAKING  I have reviewed any medical record information, laboratory studies and radiographic results as noted.    Quyen Mortensen is a 67 y.o.  male former smoker who presents complaining of shortness of breath.  Patient is hypoxic on room air here to 86% following DuoNeb. Repeat nebulizer was ordered along with prednisone 60 mg by mouth. I do not suspect PE, CHF, or ACS.    Labs demonstrate a mild cytosis of 12.1 with elevation of immature granulocytes to 1.7. Glucose was elevated to 140. Troponin and BNP are negative. Chest x-ray demonstrates no acute pathology.    20:40  I discussed the case with the hospitalist who agrees to admit the patient.  Influenza is pending    FINAL IMPRESSION  1. Acute exacerbation of chronic obstructive pulmonary disease (COPD) (CMS-[facility])             Electronically signed by: Lashonda Gamez, 2/1/2018 7:21 PM

## 2018-02-02 NOTE — ASSESSMENT & PLAN NOTE
This is sepsis (without associated acute organ dysfunction). source of infection is upper respiratory with acute on chronic respiratory failure secondary to COPD exacerbation   Sepsis protocol was initiated  Lactic acid on arrival 2.1- since returned to normal limits  WBC 12.1, will recheck in am, however may be elevated artificially due to steroid use   Iv Fluids, Steroids and Antibiotics  Follow blood cultures

## 2018-02-02 NOTE — PROGRESS NOTES
[facility] Hospitalist Progress Note    Date of Service: 2018    Chief Complaint  67 y.o. male admitted 2018 with shortness of breath and cough.  Patient has known history of COPD he is not O2 dependent at this time.     Interval Problem Update  - Patient states breathing has improved but is far from baseline. Sepsis protocol was initiated on admission. Patient requiring 2-3L to maintain oxygen saturations. Patient is wheezy on auscultation. Will continue with steroids, abx and frequent breathing treatments. Will attempt to wean oxygen as tolerated by patient. Blood cultures pending. Trend labs in am.     Consultants/Specialty  None    Disposition  Likely home once medically cleared, TBD if patient will need home oxygen.         Review of Systems   Constitutional: Negative for chills, fever and malaise/fatigue.   HENT: Positive for congestion. Negative for sore throat.    Eyes: Negative for blurred vision and double vision.   Respiratory: Positive for cough, sputum production and shortness of breath.    Cardiovascular: Negative for chest pain and palpitations.   Gastrointestinal: Negative for abdominal pain, nausea and vomiting.   Genitourinary: Negative for dysuria, frequency and urgency.   Musculoskeletal: Negative for back pain, myalgias and neck pain.   Skin: Negative for rash.   Neurological: Negative for dizziness and headaches.   Psychiatric/Behavioral: Negative for depression.      Physical Exam  Laboratory/Imaging   Hemodynamics  Temp (24hrs), Av.9 °C (98.5 °F), Min:36.4 °C (97.5 °F), Max:37.7 °C (99.8 °F)   Temperature: 36.6 °C (97.9 °F)  Pulse  Av.1  Min: 93  Max: 118 Heart Rate (Monitored): (!) 108  Blood Pressure : 116/94, NIBP: 128/72      Respiratory      Respiration: 14, Pulse Oximetry: 98 %, O2 Daily Delivery Respiratory : Nasal Cannula     Given By:: Mouthpiece  RUL Breath Sounds: Diminished, RML Breath Sounds: Diminished;Expiratory Wheezes, RLL Breath Sounds: Diminished, JAY  Breath Sounds: Diminished;Expiratory Wheezes, LLL Breath Sounds: Diminished    Fluids  No intake or output data in the 24 hours ending 02/02/18 1406    Nutrition  Orders Placed This Encounter   Procedures   • Diet Order     Standing Status:   Standing     Number of Occurrences:   1     Order Specific Question:   Diet:     Answer:   Regular [1]     Physical Exam   Constitutional: He is oriented to person, place, and time. He appears well-developed. He is active and cooperative. No distress. Nasal cannula in place.   HENT:   Head: Normocephalic and atraumatic.   Nose: Nose normal.   Mouth/Throat: Oropharynx is clear and moist.   Eyes: Conjunctivae and lids are normal.   Neck: Neck supple. No tracheal tenderness present.   Cardiovascular: Normal rate, regular rhythm and normal heart sounds.  Exam reveals no gallop.    No murmur heard.  Pulmonary/Chest: No accessory muscle usage. He has decreased breath sounds. He has wheezes in the left upper field.   Abdominal: Soft. Normal appearance and bowel sounds are normal. He exhibits no distension. There is no tenderness.   Neurological: He is alert and oriented to person, place, and time. He has normal strength.   Skin: Skin is warm and dry. He is not diaphoretic.   Psychiatric: He has a normal mood and affect. His speech is normal and behavior is normal.   Nursing note and vitals reviewed.      Recent Labs      02/01/18 1926   WBC  12.1*   RBC  4.92   HEMOGLOBIN  15.2   HEMATOCRIT  45.0   MCV  91.5   MCH  30.9   MCHC  33.8   RDW  47.8   PLATELETCT  538*   MPV  9.0     Recent Labs      02/01/18 1926   SODIUM  137   POTASSIUM  4.0   CHLORIDE  100   CO2  26   GLUCOSE  140*   BUN  13   CREATININE  0.68   CALCIUM  9.0         Recent Labs      02/01/18 1926   BNPBTYPENAT  8              Assessment/Plan     Sepsis (CMS-[facility])   Assessment & Plan    This is sepsis (without associated acute organ dysfunction). source of infection is upper respiratory with acute on chronic  respiratory failure secondary to COPD exacerbation   Sepsis protocol was initiated  Lactic acid on arrival 2.1- since returned to normal limits  WBC 12.1, will recheck in am, however may be elevated artificially due to steroid use   Iv Fluids, Steroids and Antibiotics  Follow blood cultures        COPD exacerbation (CMS-HCC)   Assessment & Plan    COPD exacerbation  -COPD protocol  -IV steroids  -Oxygen therapy as needed, wean as tolerated   -RT protocol  -No pneumonia on imaging  -Influenza PCR negative          Chronic low back pain- (present on admission)   Assessment & Plan    Tylenol as needed  Stable at this time             Reviewed items::  Labs reviewed and Medications reviewed  Arboleda catheter::  No Arboleda  DVT prophylaxis pharmacological::  Enoxaparin (Lovenox)  Ulcer Prophylaxis::  Not indicated  Antibiotics:  Treating active infection/contamination beyond 24 hours perioperative coverage

## 2018-02-02 NOTE — ED TRIAGE NOTES
Chief Complaint   Patient presents with   • Shortness of Breath   • Cough     Pt BIB EMS from [facility] with SOB x2 days with a cough, ems found pt to be saturating 88% on room air pt was wheezy, ems administered albuterol treatment, pt state his SOB has improved, pt  Now saturating 88% on room air 2L of oxygen applied to pt. Lungs diminished bilaterally when auscultated.

## 2018-02-02 NOTE — ASSESSMENT & PLAN NOTE
COPD exacerbation  -COPD protocol  -IV steroids  -Oxygen therapy as needed, wean as tolerated   -RT protocol  -No pneumonia on imaging  -Influenza PCR negative

## 2018-02-02 NOTE — H&P
Hospital Medicine History and Physical    Date of Service  2/1/2018    Chief Complaint  Chief Complaint   Patient presents with   • Shortness of Breath   • Cough       History of Presenting Illness  67 y.o. male with history of COPD(not O2 dependent), who presented 2/1/2018 with worsening of shortness of breath, chills, cold, started 2 days ago. Associated with cough with clear sputum.  He has been taking albuterol once a day. She does have history of smoking, quit 20 years ago   Found to be hypoxic, needed to be on 2 L of oxygen. Influenza screen negative. Chest x-ray without acute abnormalities. Pro-calcitonin is low.  She denies chest pain, fever, abdominal pain, nausea, vomiting, diarrhea, headache, sick contact   Tight air movement and wheezes bilaterally on exam.   Primary Care Physician  Radha Persaud A.P.N.    Consultants  None    Code Status  Full code    Review of Systems  ROS  Please see HPI, all other systems were reviewed and are negative (AMA/CMS criteria)       Past Medical History  Past Medical History:   Diagnosis Date   • Chronic back pain    • Lumbar stress fracture    • PNA (pneumonia)        Surgical History  Past Surgical History:   Procedure Laterality Date   • KNEE ARTHROSCOPY     • OPEN REDUCTION         Medications  No current facility-administered medications on file prior to encounter.      Current Outpatient Prescriptions on File Prior to Encounter   Medication Sig Dispense Refill   • albuterol (VENTOLIN HFA) 108 (90 Base) MCG/ACT Aero Soln inhalation aerosol INHALE 2 PUFFS BY MOUTH EVERY 6 HOURS AS NEEDED FOR SHORTNESS OF BREATH 16 g 3       Family History  Family History   Problem Relation Age of Onset   • Dementia Mother    • Heart Disease Father    • Heart Attack Father    • Alcohol/Drug Father    • Arthritis Brother    • Cancer Neg Hx    • Diabetes Neg Hx    • Hypertension Neg Hx    Patient has family history of dementia in mother, heart attack and alcohol abuse in father  "    Social History  Social History   Substance Use Topics   • Smoking status: Former Smoker     Years: 35.00     Types: Cigarettes     Quit date: 2005   • Smokeless tobacco: Never Used   • Alcohol use No       Allergies  No Known Allergies     Physical Exam  Laboratory   Hemodynamics  Temp (24hrs), Av.4 °C (99.3 °F), Min:37.1 °C (98.8 °F), Max:37.7 °C (99.8 °F)   Temperature: 37.1 °C (98.8 °F)  Pulse  Av.4  Min: 94  Max: 118 Heart Rate (Monitored): (!) 108  Blood Pressure : 125/71, NIBP: 128/72      Respiratory      Respiration: 18, Pulse Oximetry: 88 %, O2 Daily Delivery Respiratory : Nasal Cannula     Given By:: Mouthpiece  RUL Breath Sounds: Diminished, RML Breath Sounds: Diminished;Expiratory Wheezes, RLL Breath Sounds: Diminished, JYA Breath Sounds: Diminished;Expiratory Wheezes, LLL Breath Sounds: Diminished    Physical Exam  Vitals/ General Appearance:   Weight/BMI: Body mass index is 28.06 kg/m².  Blood pressure 125/71, pulse 94, temperature 37.1 °C (98.8 °F), resp. rate 18, height 1.753 m (5' 9\"), weight 86.2 kg (190 lb), SpO2 88 %.   Vitals:    18 2130 18 2200 18 2230 18 2320   BP:    125/71   Pulse: (!) 108 (!) 104 (!) 111 94   Resp:    18   Temp:    37.1 °C (98.8 °F)   SpO2: (!) 77% 89% 88%    Weight:       Height:        Oxygen Therapy:  Pulse Oximetry: 88 %, O2 (LPM): 2, O2 Delivery: Silicone Nasal Cannula    Constitutional:  In moderate distress  HENMT: Normocephalic, atraumatic, b/l ears normal, nose normal  Eyes:  EOMI, conjunctiva normal, no discharge  Neck: no tracheal deviation, supple  Cardiovascular: tachycardic,  Rhythm regular, no murmurs, no rubs or gallops; no cyanosis, clubbing or edema  Lungs: Bilateral wheezing. Poor air movement  Abdomen: soft, non-tender, no guarding or rebound  Skin: warm, dry, no erythema, no rash  Neurologic: Alert and oriented, no focal deficits, CN II-XII normal  Psychiatric: No anxiety or depression    Recent Labs      " 02/01/18 1926   WBC  12.1*   RBC  4.92   HEMOGLOBIN  15.2   HEMATOCRIT  45.0   MCV  91.5   MCH  30.9   MCHC  33.8   RDW  47.8   PLATELETCT  538*   MPV  9.0     Recent Labs      02/01/18 1926   SODIUM  137   POTASSIUM  4.0   CHLORIDE  100   CO2  26   GLUCOSE  140*   BUN  13   CREATININE  0.68   CALCIUM  9.0     Recent Labs      02/01/18 1926   GLUCOSE  140*         Recent Labs      02/01/18 1926   BNPBTYPENAT  8         Lab Results   Component Value Date    TROPONINI 0.01 02/01/2018     Urinalysis:  No results found for: SPECGRAVITY, GLUCOSEUR, KETONES, NITRITE, WBCURINE, RBCURINE, BACTERIA, EPITHELCELL     Imaging  DX-CHEST-PORTABLE (1 VIEW)   Final Result      No acute cardiopulmonary abnormality.      DX-CHEST-2 VIEWS    (Results Pending)      Assessment/Plan     I anticipate this patient will require at least two midnights for appropriate medical management, necessitating inpatient admission.    Sepsis (CMS-HCC)   Overview    This is simple sepsis, source of infection is upper respiratory infection/bronchitis  Started on sepsis protocol  IV fluids, antibiotic  Follow blood culture     COPD exacerbation (CMS-HCC)   Assessment & Plan    COPD exacerbation  - COPD protocol  -IV steroids  - RT protocol  -No pneumonia on imaging  - influenza PCR negative          Chronic low back pain- (present on admission)   Assessment & Plan    Tylenol as needed  sTable            Prophylaxis: lovenox       I spent 80 minutes evaluating Gregg Martinez, reviewing the chart, vitals, labs and imaging, discussing the case with ED physician, medication reconciliation, placing orders and enacting the plan above.    Sections of this note have been dictated using Dragon Speech recognition software. While care and attention has been placed to ensure the accuracy of this note, there can be occasional typographical errors that may be missed and I apologize if this situation occurs. Please take these errors into context. If questions  occur please do not hesitate to call or notify the author of this note for clarification.      CC DONG Gaytan.

## 2018-02-02 NOTE — CARE PLAN
Problem: Discharge Barriers/Planning  Goal: Patient's continuum of care needs will be met  Outcome: PROGRESSING AS EXPECTED  Pending Xray.     Problem: Respiratory:  Goal: Respiratory status will improve  Outcome: PROGRESSING AS EXPECTED  RT on board. IS in use. IV steroids in use.

## 2018-02-02 NOTE — RESPIRATORY CARE
COPD EDUCATION by COPD CLINICAL EDUCATOR  2/2/2018 at 8:56 AM by Teresa Ryan     Patient reviewed by COPD education team. Patient does not qualify for COPD program.

## 2018-02-02 NOTE — PROGRESS NOTES
Received report from night shift RN, assumed care. Pt. Is awake, on bed. A&Ox4,  Stand by assist , pt. denies pain,due medications given. On continuous IV infusion at 100 cc/hr , tolerating well. Pt. On 2L O2 via NC, tolerating well. Plan of care was safety, comfort and rest. Discussed plan of care. Call light and personal belongings within reach, bed kept low, treaded socks on. Assisted as necessary. Kept rested and comfortable at all times.    Pending Xray.

## 2018-02-03 ENCOUNTER — PATIENT OUTREACH (OUTPATIENT)
Dept: HEALTH INFORMATION MANAGEMENT | Facility: OTHER | Age: 68
End: 2018-02-03

## 2018-02-03 VITALS
SYSTOLIC BLOOD PRESSURE: 111 MMHG | OXYGEN SATURATION: 92 % | BODY MASS INDEX: 29.22 KG/M2 | HEIGHT: 69 IN | DIASTOLIC BLOOD PRESSURE: 74 MMHG | TEMPERATURE: 98.2 F | RESPIRATION RATE: 16 BRPM | WEIGHT: 197.31 LBS | HEART RATE: 98 BPM

## 2018-02-03 PROBLEM — A41.9 SEPSIS (HCC): Status: RESOLVED | Noted: 2018-02-02 | Resolved: 2018-02-03

## 2018-02-03 LAB
ALBUMIN SERPL BCP-MCNC: 4.1 G/DL (ref 3.2–4.9)
ALBUMIN/GLOB SERPL: 1.7 G/DL
ALP SERPL-CCNC: 45 U/L (ref 30–99)
ALT SERPL-CCNC: 13 U/L (ref 2–50)
ANION GAP SERPL CALC-SCNC: 8 MMOL/L (ref 0–11.9)
AST SERPL-CCNC: 14 U/L (ref 12–45)
BASOPHILS # BLD AUTO: 0.5 % (ref 0–1.8)
BASOPHILS # BLD: 0.08 K/UL (ref 0–0.12)
BILIRUB SERPL-MCNC: 0.4 MG/DL (ref 0.1–1.5)
BUN SERPL-MCNC: 17 MG/DL (ref 8–22)
CALCIUM SERPL-MCNC: 8.5 MG/DL (ref 8.5–10.5)
CHLORIDE SERPL-SCNC: 106 MMOL/L (ref 96–112)
CO2 SERPL-SCNC: 25 MMOL/L (ref 20–33)
CREAT SERPL-MCNC: 0.68 MG/DL (ref 0.5–1.4)
EOSINOPHIL # BLD AUTO: 0.01 K/UL (ref 0–0.51)
EOSINOPHIL NFR BLD: 0.1 % (ref 0–6.9)
ERYTHROCYTE [DISTWIDTH] IN BLOOD BY AUTOMATED COUNT: 50.4 FL (ref 35.9–50)
GLOBULIN SER CALC-MCNC: 2.4 G/DL (ref 1.9–3.5)
GLUCOSE SERPL-MCNC: 130 MG/DL (ref 65–99)
HCT VFR BLD AUTO: 42.9 % (ref 42–52)
HGB BLD-MCNC: 14 G/DL (ref 14–18)
IMM GRANULOCYTES # BLD AUTO: 0.23 K/UL (ref 0–0.11)
IMM GRANULOCYTES NFR BLD AUTO: 1.4 % (ref 0–0.9)
LYMPHOCYTES # BLD AUTO: 1.24 K/UL (ref 1–4.8)
LYMPHOCYTES NFR BLD: 7.3 % (ref 22–41)
MCH RBC QN AUTO: 31.3 PG (ref 27–33)
MCHC RBC AUTO-ENTMCNC: 32.6 G/DL (ref 33.7–35.3)
MCV RBC AUTO: 95.8 FL (ref 81.4–97.8)
MONOCYTES # BLD AUTO: 0.89 K/UL (ref 0–0.85)
MONOCYTES NFR BLD AUTO: 5.2 % (ref 0–13.4)
NEUTROPHILS # BLD AUTO: 14.52 K/UL (ref 1.82–7.42)
NEUTROPHILS NFR BLD: 85.5 % (ref 44–72)
NRBC # BLD AUTO: 0 K/UL
NRBC BLD-RTO: 0 /100 WBC
PLATELET # BLD AUTO: 541 K/UL (ref 164–446)
PMV BLD AUTO: 9.3 FL (ref 9–12.9)
POTASSIUM SERPL-SCNC: 4.7 MMOL/L (ref 3.6–5.5)
PROT SERPL-MCNC: 6.5 G/DL (ref 6–8.2)
RBC # BLD AUTO: 4.48 M/UL (ref 4.7–6.1)
SODIUM SERPL-SCNC: 139 MMOL/L (ref 135–145)
WBC # BLD AUTO: 17 K/UL (ref 4.8–10.8)

## 2018-02-03 PROCEDURE — 700105 HCHG RX REV CODE 258: Performed by: INTERNAL MEDICINE

## 2018-02-03 PROCEDURE — 700111 HCHG RX REV CODE 636 W/ 250 OVERRIDE (IP): Performed by: INTERNAL MEDICINE

## 2018-02-03 PROCEDURE — 36415 COLL VENOUS BLD VENIPUNCTURE: CPT

## 2018-02-03 PROCEDURE — 85025 COMPLETE CBC W/AUTO DIFF WBC: CPT

## 2018-02-03 PROCEDURE — 99238 HOSP IP/OBS DSCHRG MGMT 30/<: CPT | Performed by: INTERNAL MEDICINE

## 2018-02-03 PROCEDURE — 700101 HCHG RX REV CODE 250: Performed by: INTERNAL MEDICINE

## 2018-02-03 PROCEDURE — 94640 AIRWAY INHALATION TREATMENT: CPT

## 2018-02-03 PROCEDURE — 700102 HCHG RX REV CODE 250 W/ 637 OVERRIDE(OP): Performed by: INTERNAL MEDICINE

## 2018-02-03 PROCEDURE — 94760 N-INVAS EAR/PLS OXIMETRY 1: CPT

## 2018-02-03 PROCEDURE — 80053 COMPREHEN METABOLIC PANEL: CPT

## 2018-02-03 PROCEDURE — A9270 NON-COVERED ITEM OR SERVICE: HCPCS | Performed by: INTERNAL MEDICINE

## 2018-02-03 RX ORDER — PREDNISONE 20 MG/1
40 TABLET ORAL DAILY
Qty: 10 TAB | Refills: 0 | Status: SHIPPED | OUTPATIENT
Start: 2018-02-03 | End: 2019-06-26 | Stop reason: SDUPTHER

## 2018-02-03 RX ORDER — ACETAMINOPHEN 325 MG/1
650 TABLET ORAL EVERY 6 HOURS PRN
Qty: 30 TAB | Refills: 0 | COMMUNITY
Start: 2018-02-03 | End: 2018-02-15

## 2018-02-03 RX ORDER — AZITHROMYCIN 250 MG/1
250 TABLET, FILM COATED ORAL DAILY
Qty: 3 TAB | Refills: 0 | Status: SHIPPED | OUTPATIENT
Start: 2018-02-03 | End: 2018-02-06

## 2018-02-03 RX ADMIN — ALBUTEROL SULFATE 2.5 MG: 2.5 SOLUTION RESPIRATORY (INHALATION) at 09:17

## 2018-02-03 RX ADMIN — SODIUM CHLORIDE: 9 INJECTION, SOLUTION INTRAVENOUS at 05:00

## 2018-02-03 RX ADMIN — AZITHROMYCIN 250 MG: 250 TABLET, FILM COATED ORAL at 08:23

## 2018-02-03 RX ADMIN — METHYLPREDNISOLONE SODIUM SUCCINATE 40 MG: 40 INJECTION, POWDER, FOR SOLUTION INTRAMUSCULAR; INTRAVENOUS at 05:00

## 2018-02-03 RX ADMIN — METHYLPREDNISOLONE SODIUM SUCCINATE 40 MG: 40 INJECTION, POWDER, FOR SOLUTION INTRAMUSCULAR; INTRAVENOUS at 12:41

## 2018-02-03 RX ADMIN — ALBUTEROL SULFATE 2.5 MG: 2.5 SOLUTION RESPIRATORY (INHALATION) at 03:22

## 2018-02-03 ASSESSMENT — PATIENT HEALTH QUESTIONNAIRE - PHQ9
2. FEELING DOWN, DEPRESSED, IRRITABLE, OR HOPELESS: NOT AT ALL
SUM OF ALL RESPONSES TO PHQ QUESTIONS 1-9: 0
SUM OF ALL RESPONSES TO PHQ9 QUESTIONS 1 AND 2: 0
1. LITTLE INTEREST OR PLEASURE IN DOING THINGS: NOT AT ALL

## 2018-02-03 NOTE — CARE PLAN
Problem: Pain Management  Goal: Pain level will decrease to patient's comfort goal  Outcome: PROGRESSING AS EXPECTED  Patient denies having any pain at this time. Patient has not needed any pain medication this shift.

## 2018-02-03 NOTE — CARE PLAN
Problem: Fluid Volume:  Goal: Will maintain balanced intake and output  Outcome: PROGRESSING AS EXPECTED  Patient is receiving IV fluids and is urinating frequently.

## 2018-02-03 NOTE — DISCHARGE SUMMARY
Hospital Medicine Discharge Note     Patient ID:  Gregg Martinez  [id number]  67 y.o.male  1950    Admit Date:  2/1/2018       Discharge Date:   2/3/2018    Primary Care Provider: CONSTANTINO Gaytan    Admitting Physician: Travis Cespedes M.D.  Discharging Physician: Dmitri Sanz M.D.    Chief Complaint: COPD, SOB     Discharge Diagnoses:   Active Problems:    Chronic low back pain- continue outpatient management follow-up with PCP    COPD exacerbation (CMS-HCC)- continue dose of steroids, he utilize oxygen therapy, follow-up with PCP    Sepsis ([facility] [place] Resolved       Chronic Medical Problems:  Past Medical History:   Diagnosis Date   • Chronic back pain    • Lumbar stress fracture    • PNA (pneumonia)        Code Status: Full Code    Hospital Summary:       Please refer to H&P by Travis Cespedes M.D. on 2/1/2018 for full details.      In brief, Gregg Martinez is a 67 y.o. male who was admitted 2/1/2018 for shortness of breath and cough.  Patient has known history of COPD and chronic low back pain. Patient states he had 2 days of worsening shortness of breath accompanied with chills and cold. Patient presented to the emergency room and was found to be hypoxic requiring 2 L of oxygen therefore admitted.  Patient was admitted to the hospital for further workup and monitoring. He was placed on oxygen therapy as well as antiemetics and analgesics as needed. Chest x-ray was obtained which shows no acute cardiopulmonary disease however emphysematous changes are noted. CBC and BMP were obtained which were essentially negative with the exception of slightly elevated white blood cell count and elevated glucose. Pro calcitonin was obtained which was low. Influenza swab was negative. BNP within normal limits. Troponin negative. Blood culture x 2 remain negative to date.Patient unable to be weaned of oxygen, home oxygen arranged via / case management. Patient will be discharged on 5 day course of  steroids as well as PO antibiotics to finish course of treatment. Patient encouraged to follow up with his primary care physician for weaning of Oxygen.   On exam today patient has no wheezes auscultated to bilateral lung fields. Patient is up ambulating in halls with portable tank. Patient is able to maintain oxygen saturations with assistance of O2. Patient recommended to follow up with pulmonary as well.     Therefore, he is discharged in good and stable condition with close outpatient follow-up.    Consultants:      None     Studies:    Imaging/ Testing:      DX-CHEST-2 VIEWS   Final Result      1.  No acute cardiopulmonary disease.      2.  Emphysematous changes of the chest.      DX-CHEST-PORTABLE (1 VIEW)   Final Result      No acute cardiopulmonary abnormality.            Laboratory:   Recent Labs      02/01/18 1926 02/03/18   0254   WBC  12.1*  17.0*   RBC  4.92  4.48*   HEMOGLOBIN  15.2  14.0   HEMATOCRIT  45.0  42.9   MCV  91.5  95.8   MCH  30.9  31.3   MCHC  33.8  32.6*   RDW  47.8  50.4*   PLATELETCT  538*  541*   MPV  9.0  9.3       Recent Labs      02/01/18 1926 02/03/18   0254   SODIUM  137  139   POTASSIUM  4.0  4.7   CHLORIDE  100  106   CO2  26  25   GLUCOSE  140*  130*   BUN  13  17   CREATININE  0.68  0.68   CALCIUM  9.0  8.5       Recent Labs      02/01/18 1926 02/03/18   0254   ALTSGPT   --   13   ASTSGOT   --   14   ALKPHOSPHAT   --   45   TBILIRUBIN   --   0.4   GLUCOSE  140*  130*        Recent Labs      02/01/18 1926   BNPBTYPENAT  8             Recent Labs      02/01/18 1926   TROPONINI  0.01         Results     Procedure Component Value Units Date/Time    Blood Culture [[id number]] Collected:  02/02/18 0903    Order Status:  Completed Specimen:  Blood from Peripheral Updated:  02/03/18 0657     Significant Indicator NEG     Source BLD     Site Peripheral     Blood Culture --     No Growth    Note: Blood cultures are incubated for 5 days and  are monitored  "continuously.Positive blood cultures  are called to the RN and reported as soon as  they are identified.      Narrative:       Per Hospital Policy: Only change Specimen Src: to \"Line\" if  specified by physician order.    Blood Culture [[id number]] Collected:  02/02/18 0903    Order Status:  Completed Specimen:  Blood from Peripheral Updated:  02/03/18 0657     Significant Indicator NEG     Source BLD     Site PERIPHERAL     Blood Culture --     No Growth    Note: Blood cultures are incubated for 5 days and  are monitored continuously.Positive blood cultures  are called to the RN and reported as soon as  they are identified.      Narrative:       Per Hospital Policy: Only change Specimen Src: to \"Line\" if  specified by physician order.    Blood Culture [[id number]]     Order Status:  Canceled Specimen:  Blood from Peripheral     Blood Culture [[id number]]     Order Status:  Canceled Specimen:  Blood from Peripheral     Culture Respiratory W/ GRM STN [[id number]]     Order Status:  Completed Specimen:  Respirate from Sputum     INFLUENZA A/B BY PCR [[id number]] Collected:  02/01/18 2112    Order Status:  Completed Specimen:  Nasal from Nasopharyngeal Updated:  02/01/18 2157     Influenza virus A RNA Negative     Influenza virus B, PCR Negative          Blood Culture   Date Value Ref Range Status   02/02/2018   Preliminary    No Growth    Note: Blood cultures are incubated for 5 days and  are monitored continuously.Positive blood cultures  are called to the RN and reported as soon as  they are identified.     02/02/2018   Preliminary    No Growth    Note: Blood cultures are incubated for 5 days and  are monitored continuously.Positive blood cultures  are called to the RN and reported as soon as  they are identified.          Procedures/Surgeries:        None     Disposition:    Discharge home    Condition:  Stable    Instructions:   Activity: As tolerated.  Diet: As tolerated   Followup: With PCP and pulmonology "   Instructions:  -Use oxygen continuously until re-evaluated by PCP or Pulmonary.   -Complete all medications in entirety   -Given instructions to return to the ER if any new or worsening symptoms, worsening condition, or failure to improve  -Call PCP for followup  -No smoking, no alcohol, no caffeine  -Encourage risk factor reduction with tobacco and alcohol abstinence, diet changes, weight loss, and exercise.     Follow-Up  No follow-up provider specified.  Future Appointments  Date Time Provider Department Center   3/8/2018 2:40 PM Dallas Alvares M.D. [facility] [place] [facility]     Date Time Provider Department Center   3/14/2018 10:20 AM CONSTANTINO Gayatn [facility] [place] [facility]       Discharge Medications:           Medication List      START taking these medications      Instructions   acetaminophen 325 MG Tabs  Commonly known as:  TYLENOL   Take 2 Tabs by mouth every 6 hours as needed (Mild Pain; (Pain scale 1-3); Temp greater than 100.5 F).  Dose:  650 mg     azithromycin 250 MG Tabs  Commonly known as:  ZITHROMAX   Take 1 Tab by mouth every day for 3 days.  Dose:  250 mg     predniSONE 20 MG Tabs  Commonly known as:  DELTASONE   Take 2 Tabs by mouth every day for 5 days.  Dose:  40 mg        CONTINUE taking these medications      Instructions   albuterol 108 (90 Base) MCG/ACT Aers inhalation aerosol  Commonly known as:  VENTOLIN HFA   INHALE 2 PUFFS BY MOUTH EVERY 6 HOURS AS NEEDED FOR SHORTNESS OF BREATH     cyclobenzaprine 10 MG Tabs  Commonly known as:  FLEXERIL   Take 10 mg by mouth 3 times a day as needed for Muscle Spasms.  Dose:  10 mg                  Total time of the discharge process exceeds 40 minutes. This included face to face with the patient, medication reconciliation, care co ordination with Nursing  involved in patient care and discussion and co ordination with case management.     Please CC the above physicians    BRITTON Ha  2/3/2018  10:25 AM

## 2018-02-03 NOTE — DISCHARGE PLANNING
Received call from Moshe at [facility], they have accepted patient on service.  Per Moshe he will be bedside in about 1 hour.

## 2018-02-03 NOTE — DISCHARGE PLANNING
Received choice form from [facility] at 1120.  Referral sent to [facility] at 1123 on 02-03-18.  Spoke with Moshe at [facility], patient will need to bring tanks to the [place] office for refills.  Per Moshe he will review of acceptance.

## 2018-02-03 NOTE — DISCHARGE PLANNING
Medical Social Work    Referral: SW notified that pt requiring O2 to dc home.     Intervention: SW followed up w/ [facility] re: O2 delivery to [place], [place] where pt resides. [facility] but pt will need to drive to [place] to have tanks refilled. BALTAZAR met w/ pt to discuss and pt states willingness to do this. BALTAZAR faxed CHOICE to [facility] ([phone]) for [place].     Plan: Pt to dc home today (friend providing transportation) pending O2 delivery.

## 2018-02-03 NOTE — DISCHARGE INSTRUCTIONS
Discharge Instructions    Discharged to home by car with friend. Discharged via wheelchair, hospital escort: Yes.  Special equipment needed: Oxygen    Be sure to schedule a follow-up appointment with your primary care doctor or any specialists as instructed.     Discharge Plan:   Influenza Vaccine Indication: Patient Refuses    I understand that a diet low in cholesterol, fat, and sodium is recommended for good health. Unless I have been given specific instructions below for another diet, I accept this instruction as my diet prescription.   Other diet: Regular    Special Instructions: None    · Is patient discharged on Warfarin / Coumadin?   No     Depression / Suicide Risk    As you are discharged from this [facility] facility, it is important to learn how to keep safe from harming yourself.    Recognize the warning signs:  · Abrupt changes in personality, positive or negative- including increase in energy   · Giving away possessions  · Change in eating patterns- significant weight changes-  positive or negative  · Change in sleeping patterns- unable to sleep or sleeping all the time   · Unwillingness or inability to communicate  · Depression  · Unusual sadness, discouragement and loneliness  · Talk of wanting to die  · Neglect of personal appearance   · Rebelliousness- reckless behavior  · Withdrawal from people/activities they love  · Confusion- inability to concentrate     If you or a loved one observes any of these behaviors or has concerns about self-harm, here's what you can do:  · Talk about it- your feelings and reasons for harming yourself  · Remove any means that you might use to hurt yourself (examples: pills, rope, extension cords, firearm)  · Get professional help from the community (Mental Health, Substance Abuse, psychological counseling)  · Do not be alone:Call your Safe Contact- someone whom you trust who will be there for you.  · Call your local CRISIS HOTLINE 017-4427 or 103-140-6118  · Call  your local Children's Mobile Crisis Response Team [place] (984) 829-6768 or www.[employer]  · Call the toll free National Suicide Prevention Hotlines   · National Suicide Prevention Lifeline 644-279-VLRI (8459)  · National Hope Line Network 800-SUICIDE (863-1466)    Chronic Obstructive Pulmonary Disease  Chronic obstructive pulmonary disease (COPD) is a common lung condition in which airflow from the lungs is limited. COPD is a general term that can be used to describe many different lung problems that limit airflow, including both chronic bronchitis and emphysema. If you have COPD, your lung function will probably never return to normal, but there are measures you can take to improve lung function and make yourself feel better.  CAUSES   · Smoking (common).  · Exposure to secondhand smoke.  · Genetic problems.  · Chronic inflammatory lung diseases or recurrent infections.  SYMPTOMS  · Shortness of breath, especially with physical activity.  · Deep, persistent (chronic) cough with a large amount of thick mucus.  · Wheezing.  · Rapid breaths (tachypnea).  · Gray or bluish discoloration (cyanosis) of the skin, especially in your fingers, toes, or lips.  · Fatigue.  · Weight loss.  · Frequent infections or episodes when breathing symptoms become much worse (exacerbations).  · Chest tightness.  DIAGNOSIS  Your health care provider will take a medical history and perform a physical examination to diagnose COPD. Additional tests for COPD may include:  · Lung (pulmonary) function tests.  · Chest X-ray.  · CT scan.  · Blood tests.  TREATMENT   Treatment for COPD may include:  · Inhaler and nebulizer medicines. These help manage the symptoms of COPD and make your breathing more comfortable.  · Supplemental oxygen. Supplemental oxygen is only helpful if you have a low oxygen level in your blood.  · Exercise and physical activity. These are beneficial for nearly all people with COPD.  · Lung surgery or  transplant.  · Nutrition therapy to gain weight, if you are underweight.  · Pulmonary rehabilitation. This may involve working with a team of health care providers and specialists, such as respiratory, occupational, and physical therapists.  HOME CARE INSTRUCTIONS  · Take all medicines (inhaled or pills) as directed by your health care provider.  · Avoid over-the-counter medicines or cough syrups that dry up your airway (such as antihistamines) and slow down the elimination of secretions unless instructed otherwise by your health care provider.  · If you are a smoker, the most important thing that you can do is stop smoking. Continuing to smoke will cause further lung damage and breathing trouble. Ask your health care provider for help with quitting smoking. He or she can direct you to community resources or hospitals that provide support.  · Avoid exposure to irritants such as smoke, chemicals, and fumes that aggravate your breathing.  · Use oxygen therapy and pulmonary rehabilitation if directed by your health care provider. If you require home oxygen therapy, ask your health care provider whether you should purchase a pulse oximeter to measure your oxygen level at home.  · Avoid contact with individuals who have a contagious illness.  · Avoid extreme temperature and humidity changes.  · Eat healthy foods. Eating smaller, more frequent meals and resting before meals may help you maintain your strength.  · Stay active, but balance activity with periods of rest. Exercise and physical activity will help you maintain your ability to do things you want to do.  · Preventing infection and hospitalization is very important when you have COPD. Make sure to receive all the vaccines your health care provider recommends, especially the pneumococcal and influenza vaccines. Ask your health care provider whether you need a pneumonia vaccine.  · Learn and use relaxation techniques to manage stress.  · Learn and use controlled  breathing techniques as directed by your health care provider. Controlled breathing techniques include:  ¨ Pursed lip breathing. Start by breathing in (inhaling) through your nose for 1 second. Then, purse your lips as if you were going to whistle and breathe out (exhale) through the pursed lips for 2 seconds.  ¨ Diaphragmatic breathing. Start by putting one hand on your abdomen just above your waist. Inhale slowly through your nose. The hand on your abdomen should move out. Then purse your lips and exhale slowly. You should be able to feel the hand on your abdomen moving in as you exhale.  · Learn and use controlled coughing to clear mucus from your lungs. Controlled coughing is a series of short, progressive coughs. The steps of controlled coughing are:  1. Lean your head slightly forward.  2. Breathe in deeply using diaphragmatic breathing.  3. Try to hold your breath for 3 seconds.  4. Keep your mouth slightly open while coughing twice.  5. Spit any mucus out into a tissue.  6. Rest and repeat the steps once or twice as needed.  SEEK MEDICAL CARE IF:  · You are coughing up more mucus than usual.  · There is a change in the color or thickness of your mucus.  · Your breathing is more labored than usual.  · Your breathing is faster than usual.  SEEK IMMEDIATE MEDICAL CARE IF:  · You have shortness of breath while you are resting.  · You have shortness of breath that prevents you from:  ¨ Being able to talk.  ¨ Performing your usual physical activities.  · You have chest pain lasting longer than 5 minutes.  · Your skin color is more cyanotic than usual.  · You measure low oxygen saturations for longer than 5 minutes with a pulse oximeter.  MAKE SURE YOU:  · Understand these instructions.  · Will watch your condition.  · Will get help right away if you are not doing well or get worse.     This information is not intended to replace advice given to you by your health care provider. Make sure you discuss any questions  you have with your health care provider.     Document Released: 09/27/2006 Document Revised: 01/08/2016 Document Reviewed: 08/14/2014  [employer] Interactive Patient Education ©2016 [employer]        Oxygen Use at Home  Oxygen can be prescribed for home use. The prescription will show the flow rate. This is how much oxygen is to be used per minute. This will be listed in liters per minute (LPM or L/M). A liter is a metric measurement of volume.  You will use oxygen therapy as directed. It can be used while exercising, sleeping, or at rest. You may need oxygen continuously. Your health care provider may order a blood oxygen test (arterial blood gas or pulse oximetry test) that will show what your oxygen level is. Your health care provider will use these measurements to learn about your needs and follow your progress.  Home oxygen therapy is commonly used on patients with various lung (pulmonary) related conditions. Some of these conditions include:  · Asthma.  · Lung cancer.  · Pneumonia.  · Emphysema.  · Chronic bronchitis.  · Cystic fibrosis.  · Other lung diseases.  · Pulmonary fibrosis.  · Occupational lung disease.  · Heart failure.  · Chronic obstructive pulmonary disease (COPD).  3 COMMON WAYS OF PROVIDING OXYGEN THERAPY  · Gas: The gas form of oxygen is put into variously sized cylinders or tanks. The cylinders or oxygen tanks contain compressed oxygen. The cylinder is equipped with a regulator that controls the flow rate. Because the flow of oxygen out of the cylinder is constant, an oxygen conserving device may be attached to the system to avoid waste. This device releases the gas only when you inhale and cuts it off when you exhale. Oxygen can be provided in a small cylinder that can be carried with you. Large tanks are heavy and are only for stationary use. After use, empty tanks must be exchanged for full tanks.  · Liquid: The liquid form of oxygen is put into a container similar to a thermos. When  released, the liquid converts to a gas and you breathe it in just like the compressed gas. This storage method takes up less space than the compressed gas cylinder, and you can transfer the liquid to a small, portable vessel at home. Liquid oxygen is more expensive than the compressed gas, and the vessel vents when not in use. An oxygen conserving device may be built into the vessel to conserve the oxygen. Liquid oxygen is very cold, around 297° below zero.  · Oxygen concentrator: This medical device filters oxygen from room air and gives almost 100% oxygen to the patient. Oxygen concentrators are powered by electricity. Benefits of this system are:  ¨ It does not need to be resupplied.  ¨ It is not as costly as liquid oxygen.  ¨ Extra tubing permits the user to move around easier.  There are several types of small, portable oxygen systems available which can help you remain active and mobile. You must have a cylinder of oxygen as a backup in the event of a power failure. Advise your electric power company that you are on oxygen therapy in order to get priority service when there is a power failure.  OXYGEN DELIVERY DEVICES  There are 3 common ways to deliver oxygen to your body.  · Nasal cannula. This is a 2-pronged device inserted in the nostrils that is connected to tubing carrying the oxygen. The tubing can rest on the ears or be attached to the frame of eyeglasses.  · Mask. People who need a high flow of oxygen generally use a mask.  · Transtracheal catheter. Transtracheal oxygen therapy requires the insertion of a small, flexible tube (catheter) in the windpipe (trachea). This catheter is held in place by a necklace. Since transtracheal oxygen bypasses the mouth, nose, and throat, a humidifier is absolutely required at flow rates of 1 LPM or greater.  OXYGEN USE SAFETY TIPS  · Never smoke while using oxygen. Oxygen does not burn or explode, but flammable materials will burn faster in the presence of  "oxygen.  · Keep a fire extinguisher close by. Let your fire department know that you have oxygen in your home.  · Warn visitors not to smoke near you when you are using oxygen. Put up \"no smoking\" signs in your home where you most often use the oxygen.  · When you go to a restaurant with your portable oxygen source, ask to be seated in the nonsmoking section.  · Stay at least 5 feet away from gas stoves, candles, lighted fireplaces, or other heat sources.  · Do not use materials that burn easily (flammable) while using your oxygen.  · If you use an oxygen cylinder, make sure it is secured to some fixed object or in a stand. If you use liquid oxygen, make sure the vessel is kept upright to keep the oxygen from pouring out. Liquid oxygen is so cold it can hurt your skin.  · If you use an oxygen concentrator, call your electric company so you will be given priority service if your power goes out. Avoid using extension cords, if possible.  · Regularly test your smoke detectors at home to make sure they work. If you receive care in your home from a nurse or other health care provider, he or she may also check to make sure your smoke detectors work.  GUIDELINES FOR CLEANING YOUR EQUIPMENT  · Wash the nasal prongs with a liquid soap. Thoroughly rinse them once or twice a week.  · Replace the prongs every 2 to 4 weeks. If you have an infection (cold, pneumonia) change them when you are well.  · Your health care provider will give you instructions on how to clean your transtracheal catheter.  · The humidifier bottle should be washed with soap and warm water and rinsed thoroughly between each refill. Air-dry the bottle before filling it with sterile or distilled water. The bottle and its top should be disinfected after they are cleaned.  · If you use an oxygen concentrator, unplug the unit. Then wipe down the cabinet with a damp cloth and dry it daily. The air filter should be cleaned at least twice a week.  · Follow your " home medical equipment and service company's directions for cleaning the compressor filter.  HOME CARE INSTRUCTIONS   · Do not change the flow of oxygen unless directed by your health care provider.  · Do not use alcohol or other sedating drugs unless instructed. They slow your breathing rate.  · Do not use materials that burn easily (flammable) while using your oxygen.  · Always keep a spare tank of oxygen. Plan ahead for holidays when you may not be able to get a prescription filled.  · Use water-based lubricants on your lips or nostrils. Do not use an oil-based product like petroleum jelly.  · To prevent your cheeks or the skin behind your ears from becoming irritated, tuck some gauze under the tubing.  · If you have persistent redness under your nose, call your health care provider.  · When you no longer need oxygen, your doctor will have the oxygen discontinued. Oxygen is not addicting or habit forming.  · Use the oxygen as instructed. Too much oxygen can be harmful and too little will not give you the benefit you need.  · Shortness of breath is not always from a lack of oxygen. If your oxygen level is not the cause of your shortness of breath, taking oxygen will not help.  SEEK MEDICAL CARE IF:   · You have frequent headaches.  · You have shortness of breath or a lasting cough.  · You have anxiety.  · You are confused.  · You are drowsy or sleepy all the time.  · You develop an illness which aggravates your breathing.  · You cannot exercise.  · You are restless.  · You have blue lips or fingernails.  · You have difficult or irregular breathing and it is getting worse.  · You have a fever.     This information is not intended to replace advice given to you by your health care provider. Make sure you discuss any questions you have with your health care provider.     Document Released: 03/09/2005 Document Revised: 01/08/2016 Document Reviewed: 07/30/2014  [employer] Interactive Patient Education ©2016 [employer]

## 2018-02-03 NOTE — CARE PLAN
Problem: Safety  Goal: Will remain free from falls  Outcome: PROGRESSING AS EXPECTED    Intervention: Implement fall precautions  Safety education was provided to patient. Patient is alert and oriented. Voiced understanding.       Problem: Venous Thromboembolism (VTW)/Deep Vein Thrombosis (DVT) Prevention:  Goal: Patient will participate in Venous Thrombosis (VTE)/Deep Vein Thrombosis (DVT)Prevention Measures  Outcome: PROGRESSING AS EXPECTED    Intervention: Assess and monitor for anticoagulation complications  Patient was educated about Lovenox administration. Patient insisted refuse the injection. Nurse gave education. Patient voiced understanding.   Intervention: Encourage patient to perform ankle flex, foot rotation, and knee flex exercises in addition to other prophylatic measures every hour while awake  Patient is ambulatory.

## 2018-02-03 NOTE — FACE TO FACE
Face to Face Note  -  Durable Medical Equipment    BRITTON Ha - NPI: [id number]  I certify that this patient is under my care and that they had a durable medical equipment(DME)face to face encounter by myself that meets the physician DME face-to-face encounter requirements with this patient on:    Date of encounter:   Patient:                    MRN:                       YOB: 2017  Gregg Martinez  [id number]  1950     The encounter with the patient was in whole, or in part, for the following medical condition, which is the primary reason for durable medical equipment:  COPD    I certify that, based on my findings, the following durable medical equipment is medically necessary:  Oxygen.    HOME O2 Saturation Measurements:(Values must be present for Home Oxygen orders)  Room air sat at rest: 86  Room air sat with amb: 84  With liters of O2: 1, O2 sat at rest with O2: 93  With Liters of O2: 3, O2 sat with amb with O2 : 93  Is the patient mobile?: Yes    My Clinical findings support the need for the above equipment due to:  Hypoxia    Supporting Symptoms: Hypoxia

## 2018-02-03 NOTE — PROGRESS NOTES
Patient is discharged from hospital service. Patient is alert and oriented, denies pain or discomfort at this time. Oxygen was delivered to bedside. Dimissal instruction reviewed with patient, no question or concerns at this time. Patient escorted to exit by staff per .

## 2018-02-03 NOTE — PROGRESS NOTES
Report taken from Maria Del Carmen Hargrove. Patient is resting comfortably. No concerns or issues at this time. All precautions are in place. Cont to monitor.

## 2018-02-04 ENCOUNTER — PATIENT OUTREACH (OUTPATIENT)
Dept: HEALTH INFORMATION MANAGEMENT | Facility: OTHER | Age: 68
End: 2018-02-04

## 2018-02-07 LAB
BACTERIA BLD CULT: NORMAL
BACTERIA BLD CULT: NORMAL
SIGNIFICANT IND 70042: NORMAL
SIGNIFICANT IND 70042: NORMAL
SITE SITE: NORMAL
SITE SITE: NORMAL
SOURCE SOURCE: NORMAL
SOURCE SOURCE: NORMAL

## 2018-02-15 ENCOUNTER — OFFICE VISIT (OUTPATIENT)
Dept: MEDICAL GROUP | Facility: PHYSICIAN GROUP | Age: 68
End: 2018-02-15
Payer: MEDICARE

## 2018-02-15 VITALS
SYSTOLIC BLOOD PRESSURE: 134 MMHG | BODY MASS INDEX: 30.78 KG/M2 | TEMPERATURE: 98.4 F | HEIGHT: 69 IN | WEIGHT: 207.8 LBS | HEART RATE: 90 BPM | DIASTOLIC BLOOD PRESSURE: 84 MMHG | RESPIRATION RATE: 16 BRPM | OXYGEN SATURATION: 94 %

## 2018-02-15 DIAGNOSIS — J42 CHRONIC BRONCHITIS, UNSPECIFIED CHRONIC BRONCHITIS TYPE (HCC): ICD-10-CM

## 2018-02-15 DIAGNOSIS — J44.1 COPD EXACERBATION (HCC): ICD-10-CM

## 2018-02-15 PROCEDURE — 99213 OFFICE O/P EST LOW 20 MIN: CPT | Performed by: NURSE PRACTITIONER

## 2018-02-16 NOTE — PROGRESS NOTES
CC: Follow-up after hospitalization for COPD exacerbation    HISTORY OF THE PRESENT ILLNESS: Patient is a 67 y.o. male. This pleasant patient is here today for follow-up after hospitalization for COPD exacerbation.        COPD exacerbation (CMS-[facility])  This is a chronic health condition that is usually well-managed with albuterol inhaler as needed.  Patient was recently hospitalized for exacerbation from 2/1/18 to 2/3/18.  I have reviewed hospital discharge summary.  During hospital stay chest xray was obtained and showed no cardiopulmonary disease, did note emphysematous changes. CBC and BMP essentially negative with exception of slightly elevated WBC and elevated glucose. BNP normal. Troponin negative.  Blood cultures negative. Influenza PCR negative. Patient was placed on oxygen for hypoxia.  Patient was discharged on 5 day course of steroids, oral antibiotics, and supplemental oxygen.  Patient presents today reporting that he discontinued oxygen 2 days ago and is using albuterol inhaler once a day. Heery reports he feels much better. His room air sats are 94%.  He denies cough, fever, shortness of breath. He admits to some fatigue, but is able to work.  We discussed a pulmonology consult.  Patient is agreeable if can do consult via telemedicine.            Allergies: Patient has no known allergies.    Current Outpatient Prescriptions Ordered in [facility]   Medication Sig Dispense Refill   • cyclobenzaprine (FLEXERIL) 10 MG Tab Take 10 mg by mouth 3 times a day as needed for Muscle Spasms.     • albuterol (VENTOLIN HFA) 108 (90 Base) MCG/ACT Aero Soln inhalation aerosol INHALE 2 PUFFS BY MOUTH EVERY 6 HOURS AS NEEDED FOR SHORTNESS OF BREATH 16 g 3     No current [facility]-ordered facility-administered medications on file.        Past Medical History:   Diagnosis Date   • Chronic back pain    • Lumbar stress fracture    • PNA (pneumonia)        Past Surgical History:   Procedure Laterality Date   • KNEE ARTHROSCOPY     • OPEN  "REDUCTION         Social History   Substance Use Topics   • Smoking status: Former Smoker     Years: 35.00     Types: Cigarettes     Quit date: 4/7/2005   • Smokeless tobacco: Never Used   • Alcohol use No       Family History   Problem Relation Age of Onset   • Dementia Mother    • Heart Disease Father    • Heart Attack Father    • Alcohol/Drug Father    • Arthritis Brother    • Cancer Neg Hx    • Diabetes Neg Hx    • Hypertension Neg Hx        ROS:    As in HPI         Exam: Blood pressure 134/84, pulse 90, temperature 36.9 °C (98.4 °F), resp. rate 16, height 1.753 m (5' 9\"), weight 94.3 kg (207 lb 12.8 oz), SpO2 94 %. Body mass index is 30.69 kg/m².    General: Alert, pleasant, well nourished, well developed male in NAD  HEENT: Normocephalic. Eyes conjunctiva clear lids without ptosis, pupils equal and reactive to light, ears normal shape and contour, canals are clear bilaterally, tympanic membranes are pearly gray with good light reflex, oropharynx is without erythema, edema or exudates.   Neck: Supple. Thyroid is not enlarged.  Pulmonary: Clear to ausculation.  Normal effort. No rales, ronchi, or wheezing.  Cardiovascular: Regular rate and rhythm without murmur.   Abdomen: Soft, nontender, nondistended. Normal bowel sounds. Liver and spleen are not palpable  Neurologic: Grossly nonfocal  Lymph: No cervical or supraclavicular lymph nodes are palpable  Skin: Warm and dry.  No obvious lesions. No cyanosis.  Musculoskeletal: Normal gait.   Psych: Normal mood and affect. Alert and oriented. Judgment and insight is normal.    Please note that this dictation was created using voice recognition software. I have made every reasonable attempt to correct obvious errors, but I expect that there are errors of grammar and possibly content that I did not discover before finalizing the note.      Assessment/Plan  1. COPD exacerbation (CMS-HCC)  Stable. Patient will continue with albuterol as needed. His room air saturation " today is 94% after walking to the room from the lobby. Will refer to pulmonology for consultation.  - REFERRAL TO PULMONOLOGY    Patient will follow up in clinic Previously loosely scheduled appointment with Dr. Alvares next month. He will follow up sooner if needed.

## 2018-02-16 NOTE — ASSESSMENT & PLAN NOTE
This is a chronic health condition that is usually well-managed with albuterol inhaler as needed.  Patient was recently hospitalized for exacerbation from 2/1/18 to 2/3/18.  I have reviewed hospital discharge summary.  During hospital stay chest xray was obtained and showed no cardiopulmonary disease, did note emphysematous changes. CBC and BMP essentially negative with exception of slightly elevated WBC and elevated glucose. BNP normal. Troponin negative.  Blood cultures negative. Influenza PCR negative. Patient was placed on oxygen for hypoxia.  Patient was dischared on 5 day course of steroids, oral antibiotics, and supplemental oxygen.  Patient presents today reporting that he discontinued oxygen 2 days ago and is using albuterol inhaler once a day. He reports that he feels so much better. His room air sats are 94%.  He denies cough, fever, shortness of breath. He admits to some fatigue, but is able to work.  We discussed a pulmonology consult.  Patient is agreeable if can do consult via telemedicine.

## 2018-03-08 ENCOUNTER — OFFICE VISIT (OUTPATIENT)
Dept: MEDICAL GROUP | Facility: PHYSICIAN GROUP | Age: 68
End: 2018-03-08
Payer: MEDICARE

## 2018-03-08 VITALS
WEIGHT: 213.8 LBS | BODY MASS INDEX: 32.4 KG/M2 | TEMPERATURE: 98.8 F | RESPIRATION RATE: 16 BRPM | OXYGEN SATURATION: 93 % | SYSTOLIC BLOOD PRESSURE: 130 MMHG | DIASTOLIC BLOOD PRESSURE: 80 MMHG | HEART RATE: 95 BPM | HEIGHT: 68 IN

## 2018-03-08 DIAGNOSIS — Z23 NEED FOR VACCINATION: ICD-10-CM

## 2018-03-08 DIAGNOSIS — J42 CHRONIC BRONCHITIS, UNSPECIFIED CHRONIC BRONCHITIS TYPE (HCC): ICD-10-CM

## 2018-03-08 DIAGNOSIS — R06.02 SOB (SHORTNESS OF BREATH): ICD-10-CM

## 2018-03-08 PROBLEM — J44.1 COPD EXACERBATION (HCC): Status: RESOLVED | Noted: 2018-02-01 | Resolved: 2018-03-08

## 2018-03-08 PROCEDURE — 90670 PCV13 VACCINE IM: CPT | Performed by: FAMILY MEDICINE

## 2018-03-08 PROCEDURE — G0009 ADMIN PNEUMOCOCCAL VACCINE: HCPCS | Performed by: FAMILY MEDICINE

## 2018-03-08 PROCEDURE — 99213 OFFICE O/P EST LOW 20 MIN: CPT | Mod: 25 | Performed by: FAMILY MEDICINE

## 2018-03-08 RX ORDER — ALBUTEROL SULFATE 90 UG/1
AEROSOL, METERED RESPIRATORY (INHALATION)
Qty: 1 INHALER | Refills: 3 | Status: SHIPPED | OUTPATIENT
Start: 2018-03-08 | End: 2018-06-07 | Stop reason: SDUPTHER

## 2018-03-08 NOTE — ASSESSMENT & PLAN NOTE
Presents for follow up after copd exacerbation. His cough is gone, not needing oxygen anymore O2 is 93 on room air today  He is ok to get prevnar 13 today, wants to wait on flu shot as he is afraid of needles will get it next visit.   Needs refill on inhalers.   He does not smoke  He uses his albuterol inhaler every morning.

## 2018-03-12 NOTE — PROGRESS NOTES
"Subjective:   Gregg Martinez is a 67 y.o. male here today for evaluation and management of:     COPD (chronic obstructive pulmonary disease) (CMS-HCC)  Presents for follow up after copd exacerbation. His cough is gone, not needing oxygen anymore O2 is 93 on room air today  He is ok to get prevnar 13 today, wants to wait on flu shot as he is afraid of needles will get it next visit.   Needs refill on inhalers.   He does not smoke  He uses his albuterol inhaler every morning.            Current medicines (including changes today)  Current Outpatient Prescriptions   Medication Sig Dispense Refill   • albuterol (VENTOLIN HFA) 108 (90 Base) MCG/ACT Aero Soln inhalation aerosol INHALE 2 PUFFS BY MOUTH EVERY 6 HOURS AS NEEDED FOR SHORTNESS OF BREATH 1 Inhaler 3   • cyclobenzaprine (FLEXERIL) 10 MG Tab Take 10 mg by mouth 3 times a day as needed for Muscle Spasms.       No current facility-administered medications for this visit.      He  has a past medical history of Chronic back pain; Lumbar stress fracture; and PNA (pneumonia). He also has no past medical history of Heart attack; Heart murmur; Hypertension; Seizure (CMS-HCC); Stroke (CMS-HCC); Type II or unspecified type diabetes mellitus without mention of complication, not stated as uncontrolled; or Unspecified asthma(493.90).    ROS  No chest pain, no shortness of breath, no abdominal pain       Objective:     Blood pressure 130/80, pulse 95, temperature 37.1 °C (98.8 °F), resp. rate 16, height 1.727 m (5' 8\"), weight 97 kg (213 lb 12.8 oz), SpO2 93 %. Body mass index is 32.51 kg/m².   Physical Exam:  Constitutional: Alert, no distress.  Skin: Warm, dry, good turgor, no rashes in visible areas.  Eye: Equal, round and reactive, conjunctiva clear, lids normal.  ENMT: Lips without lesions, good dentition, oropharynx clear.  Neck: Trachea midline, no masses, no thyromegaly. No cervical or supraclavicular lymphadenopathy  Respiratory: Unlabored respiratory effort, lungs " clear to auscultation, no wheezes, no ronchi.  Cardiovascular: Normal S1, S2, no murmur, no edema.  Abdomen: Soft, non-tender, no masses, no hepatosplenomegaly.  Psych: Alert and oriented x3, normal affect and mood.        Assessment and Plan:   The following treatment plan was discussed    1. Need for vaccination  - Prevnar 13 PCV-13    2. SOB (shortness of breath)  - albuterol (VENTOLIN HFA) 108 (90 Base) MCG/ACT Aero Soln inhalation aerosol; INHALE 2 PUFFS BY MOUTH EVERY 6 HOURS AS NEEDED FOR SHORTNESS OF BREATH  Dispense: 1 Inhaler; Refill: 3    3. Chronic bronchitis, unspecified chronic bronchitis type (CMS-HCC)  Improved symptoms after acute exacerbation. Continue with albuterol as needed. He does not want to start a daily inhaled steroid yet. Continue to encourage this to avoid exacerbations.       Followup: Return in about 3 months (around 6/8/2018) for flu shot and COPD.

## 2018-06-07 ENCOUNTER — OFFICE VISIT (OUTPATIENT)
Dept: MEDICAL GROUP | Facility: PHYSICIAN GROUP | Age: 68
End: 2018-06-07
Payer: MEDICARE

## 2018-06-07 VITALS
TEMPERATURE: 98.2 F | OXYGEN SATURATION: 96 % | SYSTOLIC BLOOD PRESSURE: 126 MMHG | HEART RATE: 81 BPM | DIASTOLIC BLOOD PRESSURE: 76 MMHG | HEIGHT: 68 IN | RESPIRATION RATE: 16 BRPM | BODY MASS INDEX: 32.89 KG/M2 | WEIGHT: 217 LBS

## 2018-06-07 DIAGNOSIS — G89.29 CHRONIC RIGHT-SIDED LOW BACK PAIN WITHOUT SCIATICA: ICD-10-CM

## 2018-06-07 DIAGNOSIS — E78.5 DYSLIPIDEMIA: ICD-10-CM

## 2018-06-07 DIAGNOSIS — J42 CHRONIC BRONCHITIS, UNSPECIFIED CHRONIC BRONCHITIS TYPE (HCC): ICD-10-CM

## 2018-06-07 DIAGNOSIS — R06.02 SOB (SHORTNESS OF BREATH): ICD-10-CM

## 2018-06-07 DIAGNOSIS — M79.89 SWELLING OF RIGHT HAND: ICD-10-CM

## 2018-06-07 DIAGNOSIS — R73.01 ELEVATED FASTING GLUCOSE: ICD-10-CM

## 2018-06-07 DIAGNOSIS — M54.50 CHRONIC RIGHT-SIDED LOW BACK PAIN WITHOUT SCIATICA: ICD-10-CM

## 2018-06-07 PROBLEM — Z79.891 CHRONIC USE OF OPIATE DRUGS THERAPEUTIC PURPOSES: Status: RESOLVED | Noted: 2017-04-13 | Resolved: 2018-06-07

## 2018-06-07 PROBLEM — G47.01 INSOMNIA DUE TO MEDICAL CONDITION: Status: RESOLVED | Noted: 2017-01-20 | Resolved: 2018-06-07

## 2018-06-07 PROBLEM — R05.9 COUGH: Status: RESOLVED | Noted: 2017-10-04 | Resolved: 2018-06-07

## 2018-06-07 PROCEDURE — 99214 OFFICE O/P EST MOD 30 MIN: CPT | Performed by: FAMILY MEDICINE

## 2018-06-07 RX ORDER — ALBUTEROL SULFATE 90 UG/1
AEROSOL, METERED RESPIRATORY (INHALATION)
Qty: 1 INHALER | Refills: 3 | Status: SHIPPED | OUTPATIENT
Start: 2018-06-07 | End: 2018-12-20 | Stop reason: SDUPTHER

## 2018-06-07 RX ORDER — CYCLOBENZAPRINE HCL 10 MG
10 TABLET ORAL 3 TIMES DAILY PRN
Qty: 90 TAB | Refills: 3 | Status: SHIPPED | OUTPATIENT
Start: 2018-06-07 | End: 2019-06-26 | Stop reason: SDUPTHER

## 2018-06-07 NOTE — PROGRESS NOTES
"Subjective:   Gregg Martinez is a 67 y.o. male here today for evaluation and management of:     Dyslipidemia  Takes flax seed  Not on a statin  Only slight elevation in LDL and HDL is >40  Eats ice cream for dessert: explains his mild elevated TG    COPD (chronic obstructive pulmonary disease) (CMS-[facility])  Chronic condition, he quit smoking many years ago  No cough, wheezing or shortness of breath  Occasionally uses his albuterol inhaler.     Swelling of right hand  Due to arthritis.   No acute changes   Location of mild swelling at base of 1st MCP of R hand  No TTP no erythema.   He is able to use his hand without difficulty.     Chronic low back pain  Well controlled on flexeril, this helps him get a good night's rest.          Current medicines (including changes today)  Current Outpatient Prescriptions   Medication Sig Dispense Refill   • cyclobenzaprine (FLEXERIL) 10 MG Tab Take 1 Tab by mouth 3 times a day as needed for Muscle Spasms. 90 Tab 3   • albuterol (VENTOLIN HFA) 108 (90 Base) MCG/ACT Aero Soln inhalation aerosol INHALE 2 PUFFS BY MOUTH EVERY 6 HOURS AS NEEDED FOR SHORTNESS OF BREATH 1 Inhaler 3     No current facility-administered medications for this visit.      He  has a past medical history of Chronic back pain; Lumbar stress fracture; and PNA (pneumonia). He also has no past medical history of Heart attack ([facility]); Heart murmur; Hypertension; Seizure ([facility]); Stroke ([facility]); Type II or unspecified type diabetes mellitus without mention of complication, not stated as uncontrolled; or Unspecified asthma(493.90).    ROS  No chest pain, no shortness of breath, no abdominal pain       Objective:     Blood pressure 126/76, pulse 81, temperature 36.8 °C (98.2 °F), resp. rate 16, height 1.727 m (5' 8\"), weight 98.4 kg (217 lb), SpO2 96 %. Body mass index is 32.99 kg/m².   Physical Exam:  Constitutional: Alert, no distress.  Skin: Warm, dry, good turgor, no rashes in visible areas.  Eye: Equal, round and reactive, " conjunctiva clear, lids normal.  ENMT: Lips without lesions, good dentition, oropharynx clear.  Neck: Trachea midline, no masses, no thyromegaly. No cervical or supraclavicular lymphadenopathy  Respiratory: Unlabored respiratory effort, lungs clear to auscultation, no wheezes, no ronchi.  Cardiovascular: Normal S1, S2, no murmur, no edema.  Abdomen: Soft, non-tender, no masses, no hepatosplenomegaly.  Psych: Alert and oriented x3, normal affect and mood.        Assessment and Plan:   The following treatment plan was discussed    1. Dyslipidemia  Encouraged some changes  - LIPID PROFILE; Future    2. Chronic bronchitis, unspecified chronic bronchitis type (HCC)  Stable, well controlled.   - albuterol (VENTOLIN HFA) 108 (90 Base) MCG/ACT Aero Soln inhalation aerosol; INHALE 2 PUFFS BY MOUTH EVERY 6 HOURS AS NEEDED FOR SHORTNESS OF BREATH  Dispense: 1 Inhaler; Refill: 3    3. Swelling of right hand  No acute changes,   Advised on ibuprofen or tylenol or ice if needed.     4. Chronic right-sided low back pain without sciatica  Controlled with flexeril  - cyclobenzaprine (FLEXERIL) 10 MG Tab; Take 1 Tab by mouth 3 times a day as needed for Muscle Spasms.  Dispense: 90 Tab; Refill: 3    5. SOB (shortness of breath)  Controlled with albuterol as needed.   - albuterol (VENTOLIN HFA) 108 (90 Base) MCG/ACT Aero Soln inhalation aerosol; INHALE 2 PUFFS BY MOUTH EVERY 6 HOURS AS NEEDED FOR SHORTNESS OF BREATH  Dispense: 1 Inhaler; Refill: 3    6. Elevated fasting glucose  Encouraged diet changes.   - HEMOGLOBIN A1C; Future      Followup: Return in about 6 months (around 12/7/2018) for dyslipidemia, elevated fasting glucose.

## 2018-06-07 NOTE — ASSESSMENT & PLAN NOTE
Takes flax seed  Not on a statin  Only slight elevation in LDL and HDL is >40  Eats ice cream for dessert: explains his mild elevated TG

## 2018-06-07 NOTE — ASSESSMENT & PLAN NOTE
Due to arthritis.   No acute changes   Location of mild swelling at base of 1st MCP of R hand  No TTP no erythema.   He is able to use his hand without difficulty.

## 2018-06-07 NOTE — ASSESSMENT & PLAN NOTE
Chronic condition, he quit smoking many years ago  No cough, wheezing or shortness of breath  Occasionally uses his albuterol inhaler.

## 2018-12-14 ENCOUNTER — HOSPITAL ENCOUNTER (OUTPATIENT)
Dept: LAB | Facility: MEDICAL CENTER | Age: 68
End: 2018-12-14
Attending: FAMILY MEDICINE
Payer: MEDICARE

## 2018-12-14 DIAGNOSIS — E78.5 DYSLIPIDEMIA: ICD-10-CM

## 2018-12-14 DIAGNOSIS — R73.01 ELEVATED FASTING GLUCOSE: ICD-10-CM

## 2018-12-14 LAB
CHOLEST SERPL-MCNC: 161 MG/DL (ref 100–199)
FASTING STATUS PATIENT QL REPORTED: NORMAL
HDLC SERPL-MCNC: 39 MG/DL
LDLC SERPL CALC-MCNC: 74 MG/DL
TRIGL SERPL-MCNC: 239 MG/DL (ref 0–149)

## 2018-12-14 PROCEDURE — 80061 LIPID PANEL: CPT

## 2018-12-14 PROCEDURE — 83036 HEMOGLOBIN GLYCOSYLATED A1C: CPT | Mod: GA

## 2018-12-14 PROCEDURE — 36415 COLL VENOUS BLD VENIPUNCTURE: CPT

## 2018-12-15 LAB
EST. AVERAGE GLUCOSE BLD GHB EST-MCNC: 126 MG/DL
HBA1C MFR BLD: 6 % (ref 0–5.6)

## 2018-12-16 NOTE — PROGRESS NOTES
Gregg,  Your A1c and triglycerides are slightly high. These can be corrected by eating more healthy foods like vegetables, less sweet and processed foods.   Dallas Alvares M.D.

## 2018-12-20 ENCOUNTER — OFFICE VISIT (OUTPATIENT)
Dept: MEDICAL GROUP | Facility: PHYSICIAN GROUP | Age: 68
End: 2018-12-20
Payer: MEDICARE

## 2018-12-20 VITALS
HEIGHT: 68 IN | TEMPERATURE: 97.6 F | SYSTOLIC BLOOD PRESSURE: 126 MMHG | HEART RATE: 74 BPM | WEIGHT: 202 LBS | RESPIRATION RATE: 16 BRPM | OXYGEN SATURATION: 96 % | BODY MASS INDEX: 30.62 KG/M2 | DIASTOLIC BLOOD PRESSURE: 72 MMHG

## 2018-12-20 DIAGNOSIS — R06.02 SOB (SHORTNESS OF BREATH): ICD-10-CM

## 2018-12-20 DIAGNOSIS — J42 CHRONIC BRONCHITIS, UNSPECIFIED CHRONIC BRONCHITIS TYPE (HCC): ICD-10-CM

## 2018-12-20 DIAGNOSIS — R73.01 ELEVATED FASTING GLUCOSE: ICD-10-CM

## 2018-12-20 DIAGNOSIS — E55.9 VITAMIN D DEFICIENCY: ICD-10-CM

## 2018-12-20 DIAGNOSIS — Z23 NEED FOR VACCINATION: ICD-10-CM

## 2018-12-20 PROCEDURE — 99214 OFFICE O/P EST MOD 30 MIN: CPT | Mod: 25 | Performed by: FAMILY MEDICINE

## 2018-12-20 PROCEDURE — G0008 ADMIN INFLUENZA VIRUS VAC: HCPCS | Performed by: FAMILY MEDICINE

## 2018-12-20 PROCEDURE — 90662 IIV NO PRSV INCREASED AG IM: CPT | Performed by: FAMILY MEDICINE

## 2018-12-20 RX ORDER — ALBUTEROL SULFATE 90 UG/1
AEROSOL, METERED RESPIRATORY (INHALATION)
Qty: 1 INHALER | Refills: 11 | Status: SHIPPED | OUTPATIENT
Start: 2018-12-20 | End: 2019-05-30

## 2018-12-20 NOTE — ASSESSMENT & PLAN NOTE
Prediabetes, advised more vegetables. Less sugary drink and food, increase vegetables, decrease starches.

## 2018-12-20 NOTE — ASSESSMENT & PLAN NOTE
Well controlled. Refill on albuterol inhaler done.   Influenza vaccination done.   Already had his pneumonia vaccination done.

## 2018-12-30 NOTE — PROGRESS NOTES
"Subjective:   Gregg Martinez is a 68 y.o. male here today for evaluation and management of:     COPD (chronic obstructive pulmonary disease) (CMS-HCC)  Well controlled. Refill on albuterol inhaler done.   Influenza vaccination done.   Already had his pneumonia vaccination done.     Vitamin D deficiency  Controlled on vit d supplement.     Elevated fasting glucose  Prediabetes, advised more vegetables. Less sugary drink and food, increase vegetables, decrease starches.          Current medicines (including changes today)  Current Outpatient Prescriptions   Medication Sig Dispense Refill   • albuterol (VENTOLIN HFA) 108 (90 Base) MCG/ACT Aero Soln inhalation aerosol INHALE 2 PUFFS BY MOUTH EVERY 6 HOURS AS NEEDED FOR SHORTNESS OF BREATH 1 Inhaler 11   • cyclobenzaprine (FLEXERIL) 10 MG Tab Take 1 Tab by mouth 3 times a day as needed for Muscle Spasms. 90 Tab 3     No current facility-administered medications for this visit.      He  has a past medical history of Chronic back pain; Lumbar stress fracture; and PNA (pneumonia). He also has no past medical history of Heart attack ([facility]); Heart murmur; Hypertension; Seizure ([facility]); Stroke ([facility]); Type II or unspecified type diabetes mellitus without mention of complication, not stated as uncontrolled; or Unspecified asthma(493.90).    ROS  No chest pain, no shortness of breath, no abdominal pain       Objective:     Blood pressure 126/72, pulse 74, temperature 36.4 °C (97.6 °F), temperature source Temporal, resp. rate 16, height 1.727 m (5' 8\"), weight 91.6 kg (202 lb), SpO2 96 %. Body mass index is 30.71 kg/m².   Physical Exam:  Constitutional: Alert, no distress.  Skin: Warm, dry, good turgor, no rashes in visible areas.  Eye: Equal, round and reactive, conjunctiva clear, lids normal.  ENMT: Lips without lesions, good dentition, oropharynx clear.  Neck: Trachea midline, no masses, no thyromegaly. No cervical or supraclavicular lymphadenopathy  Respiratory: Unlabored " respiratory effort, lungs clear to auscultation, no wheezes, no ronchi.  Cardiovascular: Normal S1, S2, no murmur, no edema.  Abdomen: Soft, non-tender, no masses, no hepatosplenomegaly.  Psych: Alert and oriented x3, normal affect and mood.        Assessment and Plan:   The following treatment plan was discussed    1. Need for vaccination  - INFLUENZA VACCINE, HIGH DOSE (65+ ONLY)    2. SOB (shortness of breath)  - albuterol (VENTOLIN HFA) 108 (90 Base) MCG/ACT Aero Soln inhalation aerosol; INHALE 2 PUFFS BY MOUTH EVERY 6 HOURS AS NEEDED FOR SHORTNESS OF BREATH  Dispense: 1 Inhaler; Refill: 11    3. Chronic bronchitis, unspecified chronic bronchitis type (HCC)  - albuterol (VENTOLIN HFA) 108 (90 Base) MCG/ACT Aero Soln inhalation aerosol; INHALE 2 PUFFS BY MOUTH EVERY 6 HOURS AS NEEDED FOR SHORTNESS OF BREATH  Dispense: 1 Inhaler; Refill: 11    4. Elevated fasting glucose  - COMP METABOLIC PANEL; Future  - HEMOGLOBIN A1C; Future    5. Vitamin D deficiency  Continue vit d supplement.       Followup: Return in about 6 months (around 6/20/2019) for labs prediabetes, copd.

## 2019-05-29 DIAGNOSIS — R06.02 SOB (SHORTNESS OF BREATH): ICD-10-CM

## 2019-05-29 NOTE — TELEPHONE ENCOUNTER
Was the patient seen in the last year in this department? Yes    Does patient have an active prescription for medications requested? Yes    Received Request Via: Pharmacy      Pt met protocol?: Yes   Pt last ov 12/2018 albuterol script was sent 12/20/19 pharmacy may need to change brand

## 2019-05-30 RX ORDER — ALBUTEROL SULFATE 90 MCG
HFA AEROSOL WITH ADAPTER (GRAM) INHALATION
Qty: 1 INHALER | Refills: 2 | Status: SHIPPED | OUTPATIENT
Start: 2019-05-30 | End: 2019-06-26 | Stop reason: SDUPTHER

## 2019-06-26 ENCOUNTER — TELEPHONE (OUTPATIENT)
Dept: MEDICAL GROUP | Facility: PHYSICIAN GROUP | Age: 69
End: 2019-06-26

## 2019-06-26 ENCOUNTER — OFFICE VISIT (OUTPATIENT)
Dept: MEDICAL GROUP | Facility: PHYSICIAN GROUP | Age: 69
End: 2019-06-26
Payer: MEDICARE

## 2019-06-26 VITALS
HEIGHT: 68 IN | TEMPERATURE: 99.2 F | RESPIRATION RATE: 16 BRPM | BODY MASS INDEX: 30.16 KG/M2 | HEART RATE: 68 BPM | DIASTOLIC BLOOD PRESSURE: 70 MMHG | WEIGHT: 199 LBS | SYSTOLIC BLOOD PRESSURE: 118 MMHG | OXYGEN SATURATION: 94 %

## 2019-06-26 DIAGNOSIS — Z23 NEED FOR VACCINATION: ICD-10-CM

## 2019-06-26 DIAGNOSIS — M65.4 DE QUERVAIN'S TENOSYNOVITIS, BILATERAL: ICD-10-CM

## 2019-06-26 DIAGNOSIS — G89.29 CHRONIC RIGHT-SIDED LOW BACK PAIN WITHOUT SCIATICA: ICD-10-CM

## 2019-06-26 DIAGNOSIS — E78.5 DYSLIPIDEMIA: ICD-10-CM

## 2019-06-26 DIAGNOSIS — R73.01 ELEVATED FASTING GLUCOSE: ICD-10-CM

## 2019-06-26 DIAGNOSIS — R06.02 SOB (SHORTNESS OF BREATH): ICD-10-CM

## 2019-06-26 DIAGNOSIS — M54.50 CHRONIC RIGHT-SIDED LOW BACK PAIN WITHOUT SCIATICA: ICD-10-CM

## 2019-06-26 DIAGNOSIS — M79.89 SWELLING OF RIGHT HAND: ICD-10-CM

## 2019-06-26 DIAGNOSIS — D72.829 LEUKOCYTOSIS, UNSPECIFIED TYPE: ICD-10-CM

## 2019-06-26 PROCEDURE — 99214 OFFICE O/P EST MOD 30 MIN: CPT | Mod: 25 | Performed by: FAMILY MEDICINE

## 2019-06-26 PROCEDURE — G0009 ADMIN PNEUMOCOCCAL VACCINE: HCPCS | Performed by: FAMILY MEDICINE

## 2019-06-26 PROCEDURE — 90732 PPSV23 VACC 2 YRS+ SUBQ/IM: CPT | Performed by: FAMILY MEDICINE

## 2019-06-26 PROCEDURE — 90715 TDAP VACCINE 7 YRS/> IM: CPT | Performed by: FAMILY MEDICINE

## 2019-06-26 PROCEDURE — 90472 IMMUNIZATION ADMIN EACH ADD: CPT | Performed by: FAMILY MEDICINE

## 2019-06-26 RX ORDER — PREDNISONE 20 MG/1
40 TABLET ORAL DAILY
Qty: 10 TAB | Refills: 0 | Status: SHIPPED | OUTPATIENT
Start: 2019-06-26 | End: 2019-07-01

## 2019-06-26 RX ORDER — CYCLOBENZAPRINE HCL 10 MG
10 TABLET ORAL 3 TIMES DAILY PRN
Qty: 90 TAB | Refills: 3 | Status: SHIPPED | OUTPATIENT
Start: 2019-06-26 | End: 2020-02-19 | Stop reason: SDUPTHER

## 2019-06-26 RX ORDER — ALBUTEROL SULFATE 90 UG/1
AEROSOL, METERED RESPIRATORY (INHALATION)
Qty: 2 INHALER | Refills: 5 | Status: SHIPPED | OUTPATIENT
Start: 2019-06-26 | End: 2020-02-19 | Stop reason: SDUPTHER

## 2019-06-26 ASSESSMENT — PATIENT HEALTH QUESTIONNAIRE - PHQ9: CLINICAL INTERPRETATION OF PHQ2 SCORE: 0

## 2019-06-26 NOTE — TELEPHONE ENCOUNTER
FINAL PRIOR AUTHORIZATION STATUS:    1.  Name of Medication & Dose: Flexeril     2. Prior Auth Status: Approved through 6/26/2020     3. Action Taken: Pharmacy Notified: yes Patient Notified: no

## 2019-06-26 NOTE — PROGRESS NOTES
"Subjective:   Gregg Martinez is a 68 y.o. male here today for evaluation and management of:     De Quervain's tenosynovitis, bilateral  Patient has swelling both thumbs at the base of his thumbs this is a chronic condition ongoing for about a year.  He notes the steroid taper had worked for his back in the past.  He declines referral to sports medicine for steroid injection as it would require driving to [place].     Swelling of right hand  Patient has intermittent sharp pain and swelling at the base of thumb of right hand.  Sometimes drops objects due to the pain.  Will provide Voltaren gel.  Advised that he can use ice, wrap, brace.  Will provide referral to orthopedic in [place].         Current medicines (including changes today)  Current Outpatient Prescriptions   Medication Sig Dispense Refill   • Diclofenac Sodium 1 % Gel 1 g to affected area twice a day as needed for pain 60 g 5   • cyclobenzaprine (FLEXERIL) 10 MG Tab Take 1 Tab by mouth 3 times a day as needed for Muscle Spasms. 90 Tab 3   • albuterol (PROVENTIL HFA) 108 (90 Base) MCG/ACT Aero Soln inhalation aerosol INHALE 2 PUFFS BY MOUTH EVERY 6 HOURS AS NEEDED FOR SHORTNESS OF BREATH 2 Inhaler 5   • predniSONE (DELTASONE) 20 MG Tab Take 2 Tabs by mouth every day for 5 days. 10 Tab 0     No current facility-administered medications for this visit.      He  has a past medical history of Chronic back pain; Lumbar stress fracture; and PNA (pneumonia). He also has no past medical history of Heart attack (HCC); Heart murmur; Hypertension; Seizure (HCC); Stroke (HCC); Type II or unspecified type diabetes mellitus without mention of complication, not stated as uncontrolled; or Unspecified asthma(493.90).    ROS  No chest pain, no shortness of breath, no abdominal pain       Objective:     /70 (BP Location: Right arm, Patient Position: Sitting, BP Cuff Size: Adult)   Pulse 68   Temp 37.3 °C (99.2 °F) (Temporal)   Resp 16   Ht 1.727 m (5' 8\")   Wt 90.3 kg " (199 lb)   SpO2 94%  Body mass index is 30.26 kg/m².   Physical Exam:  Constitutional: Alert, no distress.  Skin: Warm, dry, good turgor, no rashes in visible areas.  Eye: Equal, round and reactive, conjunctiva clear, lids normal.  ENMT: Lips without lesions, good dentition, oropharynx clear.  Neck: Trachea midline, no masses, no thyromegaly. No cervical or supraclavicular lymphadenopathy  Respiratory: Unlabored respiratory effort, lungs clear to auscultation, no wheezes, no ronchi.  Cardiovascular: Normal S1, S2, no murmur, no edema.  Abdomen: Soft, non-tender, no masses, no hepatosplenomegaly.  Psych: Alert and oriented x3, normal affect and mood.  MSK: Soft tissue swelling at the base of the first MCP right hand        Assessment and Plan:   The following treatment plan was discussed    1. Need for vaccination  - TDAP VACCINE =>8YO IM  - Pneumococcal Polysaccharide Vaccine 23-Valent =>1yo SQ/IM    2. De Quervain's tenosynovitis, bilateral  Advised on ice, brace   rx for steroid taper for inflammation.   - DX-HAND 2- RIGHT; Future  - REFERRAL TO ORTHOPEDICS    3. Swelling of right hand  - DX-HAND 2- RIGHT; Future  - REFERRAL TO ORTHOPEDICS    4. Leukocytosis, unspecified type  - CBC WITH DIFFERENTIAL; Future    5. Elevated fasting glucose  - Comp Metabolic Panel; Future  - HEMOGLOBIN A1C; Future    6. Dyslipidemia  - Lipid Profile; Future      Followup: Return in about 6 months (around 12/26/2019) for fax xray order to [facility] 6 mo for labs, arthritis, copd.

## 2019-11-03 ENCOUNTER — OCCUPATIONAL MEDICINE (OUTPATIENT)
Dept: URGENT CARE | Facility: PHYSICIAN GROUP | Age: 69
End: 2019-11-03
Payer: COMMERCIAL

## 2019-11-03 ENCOUNTER — OFFICE VISIT (OUTPATIENT)
Dept: URGENT CARE | Facility: PHYSICIAN GROUP | Age: 69
End: 2019-11-03
Payer: MEDICARE

## 2019-11-03 VITALS
OXYGEN SATURATION: 93 % | BODY MASS INDEX: 29.8 KG/M2 | RESPIRATION RATE: 14 BRPM | HEART RATE: 74 BPM | WEIGHT: 196 LBS | SYSTOLIC BLOOD PRESSURE: 124 MMHG | DIASTOLIC BLOOD PRESSURE: 80 MMHG | TEMPERATURE: 97.3 F

## 2019-11-03 DIAGNOSIS — S40.012A CONTUSION OF MULTIPLE SITES OF LEFT SHOULDER AND UPPER ARM, INITIAL ENCOUNTER: ICD-10-CM

## 2019-11-03 DIAGNOSIS — S40.022A CONTUSION OF LEFT UPPER EXTREMITY, INITIAL ENCOUNTER: ICD-10-CM

## 2019-11-03 DIAGNOSIS — S90.01XA CONTUSION OF RIGHT ANKLE, INITIAL ENCOUNTER: ICD-10-CM

## 2019-11-03 DIAGNOSIS — S40.012A CONTUSION OF LEFT SHOULDER, INITIAL ENCOUNTER: ICD-10-CM

## 2019-11-03 DIAGNOSIS — S80.11XA CONTUSION OF RIGHT LOWER LEG, INITIAL ENCOUNTER: ICD-10-CM

## 2019-11-03 DIAGNOSIS — S40.812A ABRASION OF LEFT UPPER EXTREMITY, INITIAL ENCOUNTER: ICD-10-CM

## 2019-11-03 DIAGNOSIS — S40.022A CONTUSION OF MULTIPLE SITES OF LEFT SHOULDER AND UPPER ARM, INITIAL ENCOUNTER: ICD-10-CM

## 2019-11-03 PROCEDURE — 99214 OFFICE O/P EST MOD 30 MIN: CPT | Mod: 29 | Performed by: PHYSICIAN ASSISTANT

## 2019-11-03 PROCEDURE — 99214 OFFICE O/P EST MOD 30 MIN: CPT | Performed by: PHYSICIAN ASSISTANT

## 2019-11-03 RX ORDER — CEPHALEXIN 500 MG/1
500 CAPSULE ORAL 4 TIMES DAILY
Qty: 28 CAP | Refills: 0 | Status: SHIPPED | OUTPATIENT
Start: 2019-11-03 | End: 2019-11-10

## 2019-11-03 RX ORDER — OXYCODONE HYDROCHLORIDE AND ACETAMINOPHEN 5; 325 MG/1; MG/1
1 TABLET ORAL EVERY 6 HOURS PRN
Qty: 20 TAB | Refills: 0 | Status: SHIPPED | OUTPATIENT
Start: 2019-11-03 | End: 2023-01-03 | Stop reason: SDUPTHER

## 2019-11-03 NOTE — PROGRESS NOTES
"Chief Complaint   Patient presents with   • Arm Injury     L arm happened yesterday    • Leg Injury     R leg        HISTORY OF PRESENT ILLNESS: Patient is a 69 y.o. male who presents today for the following:    Patient comes in for evaluation of injury sustained yesterday from his scalp.  He states, \"my cow beat the hell out of me.\"  Patient states his scalp cornered him and estimates the cow to weigh approximately 1800 pounds.  Patient states he suspects his left shoulder was dislocated but popped back in when he tried to get away from the cow.  He has pain in the left shoulder and left forearm.  He denies any pain in the elbow and hand but does have decreased flexion of digits 3 through 5 in the left hand.  He has pain in the right lower leg and ankle.  He states the cow stepped on him and has significant swelling and bruising.  He has pins-and-needles sensation in the right lower leg as well as the left hand.       Patient Active Problem List    Diagnosis Date Noted   • Trigger finger of both hands 01/11/2016     Priority: High   • Vitamin D deficiency 06/09/2014     Priority: High   • COPD (chronic obstructive pulmonary disease) (HCC) 05/08/2014     Priority: High   • Dyslipidemia 05/08/2014     Priority: High   • Chronic low back pain 04/07/2014     Priority: High   • De Quervain's tenosynovitis, bilateral 01/09/2015     Priority: Low   • Osteoarthritis 04/07/2014     Priority: Low   • Elevated fasting glucose 12/20/2018   • Swelling of right hand 07/14/2017       Allergies:Patient has no known allergies.    Current Outpatient Medications Ordered in Epic   Medication Sig Dispense Refill   • cephALEXin (KEFLEX) 500 MG Cap Take 1 Cap by mouth 4 times a day for 7 days. 28 Cap 0   • oxyCODONE-acetaminophen (PERCOCET) 5-325 MG Tab Take 1 Tab by mouth every 6 hours as needed for Moderate Pain for up to 5 days. 20 Tab 0   • cyclobenzaprine (FLEXERIL) 10 MG Tab Take 1 Tab by mouth 3 times a day as needed for Muscle " Spasms. 90 Tab 3   • albuterol (PROVENTIL HFA) 108 (90 Base) MCG/ACT Aero Soln inhalation aerosol INHALE 2 PUFFS BY MOUTH EVERY 6 HOURS AS NEEDED FOR SHORTNESS OF BREATH 2 Inhaler 5   • Diclofenac Sodium 1 % Gel 1 g to affected area twice a day as needed for pain 60 g 5     No current Epic-ordered facility-administered medications on file.        Past Medical History:   Diagnosis Date   • Chronic back pain    • Lumbar stress fracture    • PNA (pneumonia)        Social History     Tobacco Use   • Smoking status: Former Smoker     Years: 35.00     Types: Cigarettes     Last attempt to quit: 2005     Years since quittin.5   • Smokeless tobacco: Never Used   Substance Use Topics   • Alcohol use: No     Alcohol/week: 0.0 oz   • Drug use: No       Family Status   Relation Name Status   • Mo     • Fa     • Sis  Alive   • Bro  Alive   • MGMo     • MGFa     • PGMo     • PGFa     • Bro  Alive   • Neg Hx  (Not Specified)     Family History   Problem Relation Age of Onset   • Dementia Mother    • Heart Disease Father    • Heart Attack Father    • Alcohol/Drug Father    • Arthritis Brother    • Cancer Neg Hx    • Diabetes Neg Hx    • Hypertension Neg Hx        Review of Systems:   Constitutional ROS: No unexpected change in weight, No weakness, No fatigue  Eye ROS: No recent significant change in vision, No eye pain, redness, discharge  Ear ROS: No drainage, No tinnitus or vertigo, No recent change in hearing  Mouth/Throat ROS: No teeth or gum problems, No bleeding gums, No tongue complaints  Neck ROS: No swollen glands, No significant pain in neck  Pulmonary ROS: No chronic cough, sputum, or hemoptysis, No dyspnea on exertion, No wheezing  Cardiovascular ROS: No diaphoresis, No edema, No palpitations  Gastrointestinal ROS: No change in bowel habits, No significant change in appetite, No nausea, vomiting, diarrhea, or constipation  Musculoskeletal/Extremities ROS: Positive  for pain in multiple joints.  Hematologic/Lymphatic ROS: No chills, No night sweats, No weight loss  Skin/Integumentary ROS: No edema, No evidence of rash, No itching      Exam:  /80   Pulse 74   Temp 36.3 °C (97.3 °F) (Temporal)   Resp 14   Wt 88.9 kg (196 lb)   SpO2 93%   General: Well developed, well nourished. No distress.  Pulmonary: Unlabored respiratory effort.   Left shoulder: Tenderness on the anterior aspect with decreased active range of motion due to pain.  No soft tissue swelling or ecchymosis is noted.  Left upper arm: No localized tenderness, swelling, or ecchymosis is noted.  Left elbow: Full range of motion without ecchymosis, soft tissue swelling, or localized tenderness.  Left forearm: Diffuse edema and tenderness is noted.  Abrasion is noted on the dorsum, mid forearm, with mild weeping.  Left wrist: Tenderness to touch and pain with range of motion/supination.  Left hand: Unable to extend digits 3 through 5.  Fifth digit is chronically flexed.  Pain in the forearm when trying to extend digits 3 and 4.  Trace edema is noted diffusely without localized tenderness.  Right lower leg: Diffuse edema is noted with ecchymosis on the medial distal third extending into the medial aspect of the right ankle and foot.  Localized tenderness is noted.  Neurologic: Grossly nonfocal. No facial asymmetry noted.  Skin: Warm, dry, good turgor. No rashes in visible areas.   Psych: Normal mood. Alert and oriented x3. Judgment and insight is normal.    Assessment/Plan:  X-ray is unavailable at this location today.  Sugar tong splint was placed in the left arm in sling provided.  Patient have x-rays performed tomorrow at an outside location.  Patient was very little relief with acetaminophen and ibuprofen.  Has taken Norco chronically in the past with little change in symptoms.    Prescription Monitoring Program report was reviewed. No evidence of medication abuse or misuse. Discussed the Controlled  Substance Use Informed Consent which includes risks and benefits, proper use, storage and disposal, refills, minors, opioids and narcotics, and alternatives. I have assessed the patient’s risk for abuse, dependency, and addiction using the validated Opioid Risk Tool available at https://www.[facility].com/ldzjvp-wkft-fjnf-ort-narcotic-abuse. All questions answered.   1. Contusion of right ankle, initial encounter  DX-ANKLE 3+ VIEWS RIGHT    oxyCODONE-acetaminophen (PERCOCET) 5-325 MG Tab   2. Contusion of left shoulder, initial encounter  DX-SHOULDER 2+ LEFT    oxyCODONE-acetaminophen (PERCOCET) 5-325 MG Tab   3. Contusion of left upper extremity, initial encounter  DX-FOREARM LEFT    DX-HAND 3+ LEFT    DX-ELBOW-COMPLETE 3+ LEFT    oxyCODONE-acetaminophen (PERCOCET) 5-325 MG Tab   4. Abrasion of left upper extremity, initial encounter  cephALEXin (KEFLEX) 500 MG Cap

## 2019-11-03 NOTE — LETTER
"   [facility]  [address] [place] [address] MEENA Malone [address]  Phone:  [phone] - Fax:  [phone]   [facility] Progress Report and Disability Certification  Date of Service: 11/3/2019   No Show:  No  Date / Time of Next Visit: 11/11/2019   Claim Information   Patient Name: Gregg Martinez  Claim Number:     Employer:  [employer] Date of Injury: 11/2/2019     Insurer / TPA: Misc Workers Comp  ID / SSN:     Occupation: Ranch  Diagnosis: Diagnoses of Contusion of right ankle, initial encounter, Contusion of right lower leg, initial encounter, Contusion of multiple sites of left shoulder and upper arm, initial encounter, Contusion of left upper extremity, initial encounter, and Abrasion of left upper extremity, initial encounter were pertinent to this visit.    Medical Information   Related to Industrial Injury? Yes    Subjective Complaints:  DOI: 11/2/19 around 1400 hrs  Patient comes in for evaluation of injury sustained yesterday from a cow. 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 He states, \"my cow beat the hell out of me.\" Patient states his cow cornered him and estimates the cow to weigh approximately 1800 pounds. Patient states he suspects his left shoulder was dislocated but popped back in when he tried to get away from the cow. He has pain in the left shoulder and left forearm. He denies any pain in the elbow and hand but does have decreased flexion of digits 3 through 5 in the left hand.  Decreased extension in his fifth fingers bilaterally is chronic.  He has pain in the right lower leg and ankle. He states the cow stepped on him and has significant swelling and bruising. He has pins-and-needles sensation in the right lower leg as well as the left hand.  He has used over-the-counter medication with no relief of his pain.   Objective Findings: Left shoulder: Tenderness on the anterior aspect with decreased active range of motion due to " pain. No soft tissue swelling or ecchymosis is noted.  Left upper arm: No localized tenderness, swelling, or ecchymosis is noted.  Left elbow: Full range of motion without ecchymosis, soft tissue swelling, or localized tenderness.  Left forearm: Diffuse edema and tenderness is noted. Abrasion is noted on the dorsum, mid forearm, with mild weeping.  Left wrist: Tenderness to touch and pain with range of motion/supination.  Left hand: Unable to extend digits 3 through 5. Fifth digit is chronically flexed. Pain in the forearm when trying to extend digits 3 and 4. Trace edema is noted diffusely without localized tenderness.  Right lower leg: Diffuse edema is noted with ecchymosis on the medial distal third extending into the medial aspect of the right ankle and foot. Localized tenderness is noted.   Pre-Existing Condition(s):     Assessment:   Initial Visit    Status: Additional Care Required  Permanent Disability:No    Plan: Medication    Diagnostics: X-ray  Comments:negative    Comments:  Assessment/Plan:  X-ray is unavailable at this location today. Sugar tong splint was placed in the left arm in sling provided. Patient have x-rays performed tomorrow at an outside location. Patient was very little relief with acetaminophen and ibupro  fen. Has taken Norco chronically in the past with little change in symptoms.     Prescription Monitoring Program report was reviewed. No evidence of medication abuse or misuse. Discussed the Controlled Substance Use Informed Consent which includes ri  sks and benefits, proper use, storage and disposal, refills, minors, opioids and narcotics, and alternatives. I have assessed the patient's risk for abuse, dependency, and addiction using the validated Opioid Risk Tool available at https://www.mdcalc  .com/poifys-temg-hcop-ort-narcotic-abuse. All questions answered.     11/4/19 1400 hrs.  Discussed negative x-ray results with the patient.  Patient continues to have pain with ambulation  therefore crutches were provided.  Continues to have decreased   range of motion of the left shoulder.  Continue using the splint but recommend passive range of motion frequently throughout the day.  Recommend follow-up in 1 week for reevaluation, sooner for any significant changes in symptoms.  Refer to D 39 for   restrictions.  May need to consider ultrasound of the right lower extremity if pain and swelling does not improve.  1. Contusion of right ankle, initial encounter    2. Contusion of right lower leg, initial encounter    3. Contusion of multiple site  s of left shoulder and upper arm, initial encounter    4. Contusion of left upper extremity, initial encounter    5. Abrasion of left upper extremity, initial encounter     Disability Information   Status: Released to Restricted Duty    From:  2019  Through: 2019 Restrictions are: Temporary   Physical Restrictions   Sitting:    Standing:  < or = to 1 hr/day Stoopin hrs/day Bendin hrs/day   Squattin hrs/day Walking:  < or = to 1 hr/day Climbin hrs/day Pushing:      Pulling:    Other:    Reaching Above Shoulder (L): 0 hrs/day Reaching Above Shoulder (R):       Reaching Below Shoulder (L):    Reaching Below Shoulder (R):      Not to exceed Weight Limits   Carrying(hrs):   Weight Limit(lb):   Lifting(hrs):   Weight  Limit(lb):     Comments: No use of the left upper extremity.  Crutches as needed to aid in ambulation given right lower extremity pain.  No lifting.  Shoulder sling to remain in place until follow-up visit.    Repetitive Actions   Hands: i.e. Fine Manipulations from Graspin hrs/day  Comments:LUE   Feet: i.e. Operating Foot Controls: 0 hrs/day   Driving / Operate Machinery: 0 hrs/day   Physician Name: [facility] Physician Signature: JOSE ALBERTO Peoples P.A.-C. e-Signature: Dr. Naldo Mccarthy, Medical Director   Clinic Name / Location: [address]  [address] Clinic Phone Number: Dept: [phone]   Appointment Time: 1:40 Pm Visit Start Time: 1:59 PM   Check-In Time:  1:53 Pm Visit Discharge Time: 2:25 PM   Original-Treating Physician or Chiropractor    Page 2-Insurer/TPA    Page 3-Employer    Page 4-Employee

## 2019-11-03 NOTE — LETTER
EMPLOYEE’S CLAIM FOR COMPENSATION/ REPORT OF INITIAL TREATMENT  FORM C-4    EMPLOYEE’S CLAIM - PROVIDE ALL INFORMATION REQUESTED   First Name  Gregg Last Name  Juan Birthdate                    1950                Sex  male Claim Number   Home Address  [address] Age  69 y.o. 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 (Be specific an answer in detail. Use additional sheet if necessary)  working cows   If you believe that you have an occupational disease, when did you first have knowledge of the disability and it relationship to your employment?  NA Witnesses to the Accident  Sheldon Feng      Nature of Injury or Occupational Disease  Fracture  Part(s) of Body Injured or Affected  Lower Arm (L), Lower Leg (R), N/A    I certify that the above is true and correct to the best of my knowledge and that I have provided this information in order to obtain the benefits of [place] or [place]  I hereby authorize any  physician, chiropractor, surgeon, practitioner, or other person, any hospital, including [facility], any medical service organization, any insurance company, or other institution or organization to release to each other, any medical or other information, including benefits paid or payable, pertinent to this injury or disease, except information relative to diagnosis, treatment and/or counseling for AIDS, psychological conditions, alcohol or controlled substances, for which I must give specific authorization.  A Photostat of this authorization shall be as valid as the original.     Date   11/4/2019   [facility]   Employee’s Signature   THIS REPORT MUST BE COMPLETED AND MAILED WITHIN 3 WORKING DAYS OF TREATMENT   [facility] [place]  Name of Facility  [place]   Date  11/3/2019 Diagnosis  (S90.01XA) Contusion of right ankle, initial encounter  (S80.11XA) Contusion of right lower leg, initial encounter  (S40.012A,  S40.022A) Contusion of multiple sites of left shoulder and upper arm, initial encounter  (S40.022A) Contusion of left upper extremity, initial encounter  (S40.812A) Abrasion of left upper extremity, initial encounter Is there evidence the injured employee was under the influence of alcohol and/or another controlled substance at the time of accident?   Hour  1:59 PM Description of Injury or Disease  Diagnoses of Contusion of right ankle, initial encounter, Contusion of right lower leg, initial encounter, Contusion of multiple sites of left shoulder and upper arm, initial encounter, Contusion of left upper extremity, initial encounter, and Abrasion of left upper extremity, initial encounter were pertinent to this visit. No   Treatment  Assessment/Plan:  X-ray is unavailable at this location today. Sugar tong splint was placed in the left arm in sling provided. Patient have x-rays performed tomorrow at an outside location. Patient was very little  relief with acetaminophen and ibuprofen. Has taken Norco chronically in the past with little change in symptoms.    11/4/19 1400 hrs.  Discussed negative x-ray results with the patient.  Patient continues to have pain with ambulation therefore crutches were provided.  Continues to have decreased range of motion of the left shoulder.  Continue using the splint but recommend passive range of motion frequently throughout the day.  Recommend follow-up in 1 week for reevaluation, sooner for any significant changes in symptoms.  Refer to D 39 for restrictions.  May need to consider ultrasound of the right lower extremity if pain and swelling does not improve.  1. Contusion of right ankle, initial encounter    2. Contusion of right lower leg, initial encounter    3. Contusion of multiple sites of left shoulder and upper arm, initial encounter    4. Contusion of left upper extremity, initial encounter    5. Abrasion of left upper extremity, initial encounter        Have you advised the patient to remain off work five days or more? No   X-Ray Findings  Negative   If Yes   From Date  To Date      From information given by the employee, together with medical evidence, can you directly connect this injury or occupational disease as job incurred?  Yes If No Full Duty No Modified Duty  Yes   Is additional medical care by a physician indicated?  Yes If Modified Duty, Specify any Limitations / Restrictions  See D39   Do you know of any previous injury or disease contributing to this condition or occupational disease?                            No   Date  11/4/2019 Print Doctor’s Name [facility] I certify the employer’s copy of  this form was mailed on:   Address  [address] Insurer’s Use Only     [facility]  [address]    Provider’s Tax ID Number  [id number] Telephone  Dept: [phone]        alee-JOSE ALBERTO Uribe P.A.-C.   e-Signature: Dr. Naldo Mccarthy,   Medical Director Degree  MD       "      ORIGINAL-TREATING PHYSICIAN OR CHIROPRACTOR    PAGE 2-INSURER/TPA    PAGE 3-EMPLOYER    PAGE 4-EMPLOYEE             Form C-4 (rev.10/07)              BRIEF DESCRIPTION OF RIGHTS AND BENEFITS  (Pursuant to [place]RS 616C.050)    Notice of Injury or Occupational Disease (Incident Report Form C-1): If an injury or occupational disease (OD) arises out of and in the course of employment, you must provide written notice to your employer as soon as practicable, but no later than 7 days after the accident or OD. Your employer shall maintain a sufficient supply of the required forms.    Claim for Compensation (Form C-4): If medical treatment is sought, the form C-4 is available at the place of initial treatment. A completed \"Claim for Compensation\" (Form C-4) must be filed within 90 days after an accident or OD. The treating physician or chiropractor must, within 3 working days after treatment, complete and mail to the employer, the employer's insurer and third-party , the Claim for Compensation.    Medical Treatment: If you require medical treatment for your on-the-job injury or OD, you may be required to select a physician or chiropractor from a list provided by your workers’ compensation insurer, if it has contracted with an Organization for Managed Care (MCO) or Preferred Provider Organization (PPO) or providers of health care. If your employer has not entered into a contract with an MCO or PPO, you may select a physician or chiropractor from the Panel of Physicians and Chiropractors. Any medical costs related to your industrial injury or OD will be paid by your insurer.    Temporary Total Disability (TTD): If your doctor has certified that you are unable to work for a period of at least 5 consecutive days, or 5 cumulative days in a 20-day period, or places restrictions on you that your employer does not accommodate, you may be entitled to TTD compensation.    Temporary Partial Disability (TPD): If the wage " you receive upon reemployment is less than the compensation for TTD to which you are entitled, the insurer may be required to pay you TPD compensation to make up the difference. TPD can only be paid for a maximum of 24 months.    Permanent Partial Disability (PPD): When your medical condition is stable and there is an indication of a PPD as a result of your injury or OD, within 30 days, your insurer must arrange for an evaluation by a rating physician or chiropractor to determine the degree of your PPD. The amount of your PPD award depends on the date of injury, the results of the PPD evaluation and your age and wage.    Permanent Total Disability (PTD): If you are medically certified by a treating physician or chiropractor as permanently and totally disabled and have been granted a PTD status by your insurer, you are entitled to receive monthly benefits not to exceed 66 2/3% of your average monthly wage. The amount of your PTD payments is subject to reduction if you previously received a PPD award.    Vocational Rehabilitation Services: You may be eligible for vocational rehabilitation services if you are unable to return to the job due to a permanent physical impairment or permanent restrictions as a result of your injury or occupational disease.    Transportation and Per Adam Reimbursement: You may be eligible for travel expenses and per adam associated with medical treatment.    Reopening: You may be able to reopen your claim if your condition worsens after claim closure.    Appeal Process: If you disagree with a written determination issued by the insurer or the insurer does not respond to your request, you may appeal to the Department of Administration, , by following the instructions contained in your determination letter. You must appeal the determination within 70 days from the date of the determination letter at [address], [address], [place], [place] [address], or [address]  [facility], [address], [place], [place] [address]. If you disagree with the  decision, you may appeal to the Department of Administration, . You must file your appeal within 30 days from the date of the  decision letter at [address], [address], [place], [place] [address], or [address], [address], [place], [place] [address]. If you disagree with a decision of an , you may file a petition for judicial review with the District Court. You must do so within 30 days of the Appeal Officer’s decision. You may be represented by an  at your own expense or you may contact the [facility] for possible representation.    [place]  for Injured Workers (NAIW): If you disagree with a  decision, you may request that NAIW represent you without charge at an  Hearing. For information regarding denial of benefits, you may contact the [facility] at: [address], [address], [place] [address], [phone], or [address], [address], [place] [address], [phone]    To File a Complaint with the Division: If you wish to file a complaint with the  of the Division of Industrial Relations (DIR),  please contact the Workers’ Compensation Section, [address], [address], [place], [place] [address], telephone [phone], or [address], [address], [place], [place] [address], telephone [phone].    For assistance with Workers’ Compensation Issues: You may contact the Office of the Governor Consumer Health Assistance, [address], [address], [place], [place] [address], Toll Free 1-867.966.5424, Web site: http://govcha.[facility].nv., E-mail malachi@govcha.[facility].nv.                   __________________________________________________________________                                                     __11/4/2019_______        Employee Name / Signature                                                                                                                                               Date                                                                                                                                                                                                     D-2 (rev. 06/18)

## 2019-11-04 ENCOUNTER — HOSPITAL ENCOUNTER (OUTPATIENT)
Dept: RADIOLOGY | Facility: MEDICAL CENTER | Age: 69
End: 2019-11-04
Attending: PHYSICIAN ASSISTANT
Payer: MEDICARE

## 2019-11-04 ENCOUNTER — TELEPHONE (OUTPATIENT)
Dept: URGENT CARE | Facility: PHYSICIAN GROUP | Age: 69
End: 2019-11-04

## 2019-11-04 ENCOUNTER — APPOINTMENT (OUTPATIENT)
Dept: URGENT CARE | Facility: PHYSICIAN GROUP | Age: 69
End: 2019-11-04
Payer: MEDICARE

## 2019-11-04 VITALS
SYSTOLIC BLOOD PRESSURE: 124 MMHG | OXYGEN SATURATION: 93 % | WEIGHT: 196 LBS | RESPIRATION RATE: 14 BRPM | TEMPERATURE: 97.3 F | HEART RATE: 74 BPM | DIASTOLIC BLOOD PRESSURE: 80 MMHG | BODY MASS INDEX: 29.8 KG/M2

## 2019-11-04 DIAGNOSIS — S40.022A CONTUSION OF LEFT UPPER EXTREMITY, INITIAL ENCOUNTER: ICD-10-CM

## 2019-11-04 DIAGNOSIS — S40.012A CONTUSION OF LEFT SHOULDER, INITIAL ENCOUNTER: ICD-10-CM

## 2019-11-04 DIAGNOSIS — S90.01XA CONTUSION OF RIGHT ANKLE, INITIAL ENCOUNTER: ICD-10-CM

## 2019-11-04 PROCEDURE — 73090 X-RAY EXAM OF FOREARM: CPT | Mod: LT

## 2019-11-04 PROCEDURE — 73610 X-RAY EXAM OF ANKLE: CPT | Mod: RT

## 2019-11-04 PROCEDURE — 73030 X-RAY EXAM OF SHOULDER: CPT | Mod: LT

## 2019-11-04 PROCEDURE — 73130 X-RAY EXAM OF HAND: CPT | Mod: LT

## 2019-11-04 PROCEDURE — 73080 X-RAY EXAM OF ELBOW: CPT | Mod: LT

## 2019-11-04 NOTE — PROGRESS NOTES
"No chief complaint on file.      HISTORY OF PRESENT ILLNESS: Patient is a 69 y.o. male who presents today for the following:    DOI: 11/2/19 around 1400 hrs  Patient comes in for evaluation of injury sustained yesterday from a cow. 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 He states, \"my cow beat the hell out of me.\" Patient states his cow cornered him and estimates the cow to weigh approximately 1800 pounds. Patient states he suspects his left shoulder was dislocated but popped back in when he tried to get away from the cow. He has pain in the left shoulder and left forearm. He denies any pain in the elbow and hand but does have decreased flexion of digits 3 through 5 in the left hand.  Decreased extension in his fifth fingers bilaterally is chronic.  He has pain in the right lower leg and ankle. He states the cow stepped on him and has significant swelling and bruising. He has pins-and-needles sensation in the right lower leg as well as the left hand.  He has used over-the-counter medication with no relief of his pain.    Allergies:Patient has no known allergies.    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  Hematologic/Lymphatic ROS: No chills, No night sweats, No weight loss  Skin/Integumentary ROS: No edema, No evidence of rash, No itching      Exam:  /80   Pulse 74   Temp 36.3 °C (97.3 °F)   Resp 14   Wt 88.9 kg (196 lb)   SpO2 93%   General: Well developed, well nourished. No " distress.  Pulmonary: Unlabored respiratory effort.   Left shoulder: Tenderness on the anterior aspect with decreased active range of motion due to pain. No soft tissue swelling or ecchymosis is noted.  Left upper arm: No localized tenderness, swelling, or ecchymosis is noted.  Left elbow: Full range of motion without ecchymosis, soft tissue swelling, or localized tenderness.  Left forearm: Diffuse edema and tenderness is noted. Abrasion is noted on the dorsum, mid forearm, with mild weeping.  Left wrist: Tenderness to touch and pain with range of motion/supination.  Left hand: Unable to extend digits 3 through 5. Fifth digit is chronically flexed. Pain in the forearm when trying to extend digits 3 and 4. Trace edema is noted diffusely without localized tenderness.  Right lower leg: Diffuse edema is noted with ecchymosis on the medial distal third extending into the medial aspect of the right ankle and foot. Localized tenderness is noted.  Neurologic: Grossly nonfocal. No facial asymmetry noted.  Skin: Warm, dry, good turgor. No rashes in visible areas.   Psych: Normal mood. Alert and oriented x3. Judgment and insight is normal.    Assessment/Plan:  X-ray is unavailable at this location today. Sugar tong splint was placed in the left arm in sling provided. Patient have x-rays performed tomorrow at an outside location. Patient was very little relief with acetaminophen and ibuprofen. Has taken Norco chronically in the past with little change in symptoms.     Prescription Monitoring Program report was reviewed. No evidence of medication abuse or misuse. Discussed the Controlled Substance Use Informed Consent which includes risks and benefits, proper use, storage and disposal, refills, minors, opioids and narcotics, and alternatives. I have assessed the patient’s risk for abuse, dependency, and addiction using the validated Opioid Risk Tool available at https://www.mdcalc.com/geelkf-fdco-lhcu-ort-narcotic-abuse. All  questions answered.     11/4/19 1400 hrs.  Discussed negative x-ray results with the patient.  Patient continues to have pain with ambulation therefore crutches were provided.  Continues to have decreased range of motion of the left shoulder.  Continue using the splint but recommend passive range of motion frequently throughout the day.  Recommend follow-up in 1 week for reevaluation, sooner for any significant changes in symptoms.  Refer to D 39 for restrictions.  May need to consider ultrasound of the right lower extremity if pain and swelling does not improve.  1. Contusion of right ankle, initial encounter     2. Contusion of right lower leg, initial encounter     3. Contusion of multiple sites of left shoulder and upper arm, initial encounter     4. Contusion of left upper extremity, initial encounter     5. Abrasion of left upper extremity, initial encounter

## 2019-11-04 NOTE — TELEPHONE ENCOUNTER
Patient came in to inquire if results from xrays were back yet. He can be reached at [phone].

## 2019-11-11 ENCOUNTER — OCCUPATIONAL MEDICINE (OUTPATIENT)
Dept: URGENT CARE | Facility: PHYSICIAN GROUP | Age: 69
End: 2019-11-11
Payer: COMMERCIAL

## 2019-11-11 VITALS
OXYGEN SATURATION: 94 % | TEMPERATURE: 97.8 F | BODY MASS INDEX: 28.95 KG/M2 | SYSTOLIC BLOOD PRESSURE: 126 MMHG | DIASTOLIC BLOOD PRESSURE: 72 MMHG | WEIGHT: 191 LBS | HEIGHT: 68 IN | HEART RATE: 96 BPM | RESPIRATION RATE: 18 BRPM

## 2019-11-11 DIAGNOSIS — S40.022D CONTUSION OF MULTIPLE SITES OF LEFT SHOULDER AND UPPER ARM, SUBSEQUENT ENCOUNTER: ICD-10-CM

## 2019-11-11 DIAGNOSIS — S80.11XD CONTUSION OF RIGHT LOWER LEG, SUBSEQUENT ENCOUNTER: ICD-10-CM

## 2019-11-11 DIAGNOSIS — S40.012D CONTUSION OF MULTIPLE SITES OF LEFT SHOULDER AND UPPER ARM, SUBSEQUENT ENCOUNTER: ICD-10-CM

## 2019-11-11 DIAGNOSIS — S90.01XD CONTUSION OF RIGHT ANKLE, SUBSEQUENT ENCOUNTER: ICD-10-CM

## 2019-11-11 DIAGNOSIS — S40.022D CONTUSION OF LEFT UPPER EXTREMITY, SUBSEQUENT ENCOUNTER: ICD-10-CM

## 2019-11-11 PROCEDURE — 99214 OFFICE O/P EST MOD 30 MIN: CPT | Mod: 29 | Performed by: PHYSICIAN ASSISTANT
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
## 2019-11-12 ENCOUNTER — APPOINTMENT (OUTPATIENT)
Dept: URGENT CARE | Facility: PHYSICIAN GROUP | Age: 69
End: 2019-11-12
Payer: COMMERCIAL

## 2019-12-02 ENCOUNTER — OCCUPATIONAL MEDICINE (OUTPATIENT)
Dept: URGENT CARE | Facility: PHYSICIAN GROUP | Age: 69
End: 2019-12-02
Payer: COMMERCIAL

## 2019-12-02 VITALS
SYSTOLIC BLOOD PRESSURE: 128 MMHG | HEART RATE: 78 BPM | OXYGEN SATURATION: 97 % | BODY MASS INDEX: 29.5 KG/M2 | RESPIRATION RATE: 14 BRPM | TEMPERATURE: 97.7 F | WEIGHT: 194 LBS | DIASTOLIC BLOOD PRESSURE: 68 MMHG

## 2019-12-02 DIAGNOSIS — S90.01XD CONTUSION OF RIGHT ANKLE, SUBSEQUENT ENCOUNTER: ICD-10-CM

## 2019-12-02 DIAGNOSIS — S80.11XD CONTUSION OF RIGHT LOWER LEG, SUBSEQUENT ENCOUNTER: ICD-10-CM

## 2019-12-02 DIAGNOSIS — S40.012D CONTUSION OF MULTIPLE SITES OF LEFT SHOULDER AND UPPER ARM, SUBSEQUENT ENCOUNTER: ICD-10-CM

## 2019-12-02 DIAGNOSIS — S40.022D CONTUSION OF LEFT UPPER EXTREMITY, SUBSEQUENT ENCOUNTER: ICD-10-CM

## 2019-12-02 DIAGNOSIS — S40.022D CONTUSION OF MULTIPLE SITES OF LEFT SHOULDER AND UPPER ARM, SUBSEQUENT ENCOUNTER: ICD-10-CM

## 2019-12-02 PROCEDURE — 99214 OFFICE O/P EST MOD 30 MIN: CPT | Mod: 29 | Performed by: PHYSICIAN ASSISTANT

## 2019-12-02 RX ORDER — METHYLPREDNISOLONE 4 MG/1
TABLET ORAL
Qty: 21 TAB | Refills: 0 | Status: SHIPPED | OUTPATIENT
Start: 2019-12-02 | End: 2020-01-20

## 2019-12-02 RX ORDER — TIZANIDINE 4 MG/1
4 TABLET ORAL EVERY 6 HOURS PRN
Qty: 30 TAB | Refills: 3 | Status: SHIPPED | OUTPATIENT
Start: 2019-12-02 | End: 2020-02-19
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
## 2019-12-30 ENCOUNTER — OCCUPATIONAL MEDICINE (OUTPATIENT)
Dept: URGENT CARE | Facility: PHYSICIAN GROUP | Age: 69
End: 2019-12-30
Payer: COMMERCIAL

## 2019-12-30 VITALS
WEIGHT: 194 LBS | SYSTOLIC BLOOD PRESSURE: 122 MMHG | DIASTOLIC BLOOD PRESSURE: 70 MMHG | TEMPERATURE: 96.9 F | OXYGEN SATURATION: 95 % | RESPIRATION RATE: 14 BRPM | HEART RATE: 84 BPM | BODY MASS INDEX: 29.5 KG/M2

## 2019-12-30 DIAGNOSIS — S40.022D CONTUSION OF MULTIPLE SITES OF LEFT SHOULDER AND UPPER ARM, SUBSEQUENT ENCOUNTER: ICD-10-CM

## 2019-12-30 DIAGNOSIS — S90.01XD CONTUSION OF RIGHT ANKLE, SUBSEQUENT ENCOUNTER: ICD-10-CM

## 2019-12-30 DIAGNOSIS — S40.012D CONTUSION OF MULTIPLE SITES OF LEFT SHOULDER AND UPPER ARM, SUBSEQUENT ENCOUNTER: ICD-10-CM

## 2019-12-30 PROCEDURE — 99213 OFFICE O/P EST LOW 20 MIN: CPT | Performed by: FAMILY MEDICINE

## 2019-12-30 ASSESSMENT — ENCOUNTER SYMPTOMS
FEVER: 0
BACK PAIN: 0
CHILLS: 0
FOCAL WEAKNESS: 0
NECK PAIN: 0

## 2019-12-30 ASSESSMENT — PAIN SCALES - GENERAL: PAINLEVEL: 9=SEVERE PAIN
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
## 2020-01-20 ENCOUNTER — OCCUPATIONAL MEDICINE (OUTPATIENT)
Dept: URGENT CARE | Facility: PHYSICIAN GROUP | Age: 70
End: 2020-01-20
Payer: COMMERCIAL

## 2020-01-20 VITALS
SYSTOLIC BLOOD PRESSURE: 110 MMHG | HEART RATE: 80 BPM | TEMPERATURE: 98 F | HEIGHT: 69 IN | OXYGEN SATURATION: 94 % | RESPIRATION RATE: 16 BRPM | DIASTOLIC BLOOD PRESSURE: 78 MMHG | WEIGHT: 206 LBS | BODY MASS INDEX: 30.51 KG/M2

## 2020-01-20 DIAGNOSIS — S90.01XD CONTUSION OF RIGHT ANKLE, SUBSEQUENT ENCOUNTER: ICD-10-CM

## 2020-01-20 DIAGNOSIS — S40.012D CONTUSION OF MULTIPLE SITES OF SHOULDER AND UPPER ARM, LEFT, SUBSEQUENT ENCOUNTER: ICD-10-CM

## 2020-01-20 DIAGNOSIS — S40.022D CONTUSION OF MULTIPLE SITES OF SHOULDER AND UPPER ARM, LEFT, SUBSEQUENT ENCOUNTER: ICD-10-CM

## 2020-01-20 PROCEDURE — 99213 OFFICE O/P EST LOW 20 MIN: CPT | Performed by: NURSE PRACTITIONER

## 2020-01-20 ASSESSMENT — ENCOUNTER SYMPTOMS
FOCAL WEAKNESS: 0
TINGLING: 0
CHILLS: 0
WEAKNESS: 0
FEVER: 0
BRUISES/BLEEDS EASILY: 0
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
## 2020-01-27 ENCOUNTER — OCCUPATIONAL MEDICINE (OUTPATIENT)
Dept: URGENT CARE | Facility: PHYSICIAN GROUP | Age: 70
End: 2020-01-27
Payer: COMMERCIAL

## 2020-01-27 VITALS
HEIGHT: 69 IN | OXYGEN SATURATION: 94 % | SYSTOLIC BLOOD PRESSURE: 128 MMHG | WEIGHT: 206 LBS | TEMPERATURE: 97.8 F | BODY MASS INDEX: 30.51 KG/M2 | HEART RATE: 82 BPM | RESPIRATION RATE: 16 BRPM | DIASTOLIC BLOOD PRESSURE: 72 MMHG

## 2020-01-27 DIAGNOSIS — S40.022D CONTUSION OF MULTIPLE SITES OF LEFT SHOULDER AND UPPER ARM, SUBSEQUENT ENCOUNTER: Primary | ICD-10-CM

## 2020-01-27 DIAGNOSIS — S40.012D CONTUSION OF MULTIPLE SITES OF LEFT SHOULDER AND UPPER ARM, SUBSEQUENT ENCOUNTER: Primary | ICD-10-CM

## 2020-01-27 DIAGNOSIS — S90.01XD CONTUSION OF RIGHT ANKLE, SUBSEQUENT ENCOUNTER: ICD-10-CM

## 2020-01-27 PROCEDURE — 99214 OFFICE O/P EST MOD 30 MIN: CPT | Performed by: PHYSICIAN ASSISTANT

## 2020-01-27 ASSESSMENT — PAIN SCALES - GENERAL: PAINLEVEL: NO PAIN
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
## 2020-02-12 ENCOUNTER — HOSPITAL ENCOUNTER (OUTPATIENT)
Dept: LAB | Facility: MEDICAL CENTER | Age: 70
End: 2020-02-12
Attending: FAMILY MEDICINE
Payer: MEDICARE

## 2020-02-12 DIAGNOSIS — D72.829 LEUKOCYTOSIS, UNSPECIFIED TYPE: ICD-10-CM

## 2020-02-12 DIAGNOSIS — R73.01 ELEVATED FASTING GLUCOSE: ICD-10-CM

## 2020-02-12 DIAGNOSIS — E78.5 DYSLIPIDEMIA: ICD-10-CM

## 2020-02-12 LAB
ALBUMIN SERPL BCP-MCNC: 5 G/DL (ref 3.2–4.9)
ALBUMIN/GLOB SERPL: 2 G/DL
ALP SERPL-CCNC: 79 U/L (ref 30–99)
ALT SERPL-CCNC: 15 U/L (ref 2–50)
ANION GAP SERPL CALC-SCNC: 9 MMOL/L (ref 0–11.9)
ANISOCYTOSIS BLD QL SMEAR: ABNORMAL
AST SERPL-CCNC: 16 U/L (ref 12–45)
BASOPHILS # BLD AUTO: 0.8 % (ref 0–1.8)
BASOPHILS # BLD: 0.15 K/UL (ref 0–0.12)
BILIRUB SERPL-MCNC: 0.5 MG/DL (ref 0.1–1.5)
BUN SERPL-MCNC: 17 MG/DL (ref 8–22)
BURR CELLS BLD QL SMEAR: NORMAL
CALCIUM SERPL-MCNC: 10 MG/DL (ref 8.5–10.5)
CHLORIDE SERPL-SCNC: 101 MMOL/L (ref 96–112)
CHOLEST SERPL-MCNC: 169 MG/DL (ref 100–199)
CO2 SERPL-SCNC: 29 MMOL/L (ref 20–33)
COMMENT 1642: NORMAL
CREAT SERPL-MCNC: 0.88 MG/DL (ref 0.5–1.4)
EOSINOPHIL # BLD AUTO: 0.21 K/UL (ref 0–0.51)
EOSINOPHIL NFR BLD: 1.2 % (ref 0–6.9)
ERYTHROCYTE [DISTWIDTH] IN BLOOD BY AUTOMATED COUNT: 66 FL (ref 35.9–50)
EST. AVERAGE GLUCOSE BLD GHB EST-MCNC: 114 MG/DL
FASTING STATUS PATIENT QL REPORTED: NORMAL
GLOBULIN SER CALC-MCNC: 2.5 G/DL (ref 1.9–3.5)
GLUCOSE SERPL-MCNC: 88 MG/DL (ref 65–99)
HBA1C MFR BLD: 5.6 % (ref 0–5.6)
HCT VFR BLD AUTO: 47.8 % (ref 42–52)
HDLC SERPL-MCNC: 34 MG/DL
HGB BLD-MCNC: 14.5 G/DL (ref 14–18)
IMM GRANULOCYTES # BLD AUTO: 1.24 K/UL (ref 0–0.11)
IMM GRANULOCYTES NFR BLD AUTO: 6.9 % (ref 0–0.9)
LDLC SERPL CALC-MCNC: 61 MG/DL
LG PLATELETS BLD QL SMEAR: NORMAL
LYMPHOCYTES # BLD AUTO: 3.24 K/UL (ref 1–4.8)
LYMPHOCYTES NFR BLD: 17.9 % (ref 22–41)
MCH RBC QN AUTO: 28.1 PG (ref 27–33)
MCHC RBC AUTO-ENTMCNC: 30.3 G/DL (ref 33.7–35.3)
MCV RBC AUTO: 92.6 FL (ref 81.4–97.8)
MICROCYTES BLD QL SMEAR: ABNORMAL
MONOCYTES # BLD AUTO: 0.98 K/UL (ref 0–0.85)
MONOCYTES NFR BLD AUTO: 5.4 % (ref 0–13.4)
MORPHOLOGY BLD-IMP: NORMAL
NEUTROPHILS # BLD AUTO: 12.28 K/UL (ref 1.82–7.42)
NEUTROPHILS NFR BLD: 67.8 % (ref 44–72)
NRBC # BLD AUTO: 0.12 K/UL
NRBC BLD-RTO: 0.7 /100 WBC
OVALOCYTES BLD QL SMEAR: NORMAL
PLATELET # BLD AUTO: 838 K/UL (ref 164–446)
PLATELET BLD QL SMEAR: NORMAL
PMV BLD AUTO: 9.4 FL (ref 9–12.9)
POIKILOCYTOSIS BLD QL SMEAR: NORMAL
POLYCHROMASIA BLD QL SMEAR: NORMAL
POTASSIUM SERPL-SCNC: 4.7 MMOL/L (ref 3.6–5.5)
PROT SERPL-MCNC: 7.5 G/DL (ref 6–8.2)
RBC # BLD AUTO: 5.16 M/UL (ref 4.7–6.1)
RBC BLD AUTO: PRESENT
SODIUM SERPL-SCNC: 139 MMOL/L (ref 135–145)
TRIGL SERPL-MCNC: 369 MG/DL (ref 0–149)
WBC # BLD AUTO: 18.1 K/UL (ref 4.8–10.8)

## 2020-02-12 PROCEDURE — 80061 LIPID PANEL: CPT

## 2020-02-12 PROCEDURE — 36415 COLL VENOUS BLD VENIPUNCTURE: CPT

## 2020-02-12 PROCEDURE — 83036 HEMOGLOBIN GLYCOSYLATED A1C: CPT | Mod: GA

## 2020-02-12 PROCEDURE — 80053 COMPREHEN METABOLIC PANEL: CPT

## 2020-02-12 PROCEDURE — 85025 COMPLETE CBC W/AUTO DIFF WBC: CPT

## 2020-02-13 DIAGNOSIS — D75.839 THROMBOCYTOSIS: ICD-10-CM

## 2020-02-13 DIAGNOSIS — D72.829 LEUKOCYTOSIS, UNSPECIFIED TYPE: ICD-10-CM

## 2020-02-14 NOTE — RESULT ENCOUNTER NOTE
Please advise patient that I reviewed lab results. The platelets are quite high. I've requested the hematologist (blood specialist) to have a look and see how this can be treated.   Dallas Alvares M.D.

## 2020-02-18 ENCOUNTER — TELEPHONE (OUTPATIENT)
Dept: MEDICAL GROUP | Facility: PHYSICIAN GROUP | Age: 70
End: 2020-02-18

## 2020-02-18 DIAGNOSIS — D72.829 LEUKOCYTOSIS, UNSPECIFIED TYPE: ICD-10-CM

## 2020-02-18 DIAGNOSIS — D75.839 THROMBOCYTOSIS: ICD-10-CM

## 2020-02-18 NOTE — TELEPHONE ENCOUNTER
Justine,   For Mr. Escobedo, for leukocytosis and thrombocytosis I see a note from referrals saying he requests a telemedicine visit. Is this possible with heme/onc?  Thank you.   Dallas Alvares M.D.

## 2020-02-19 ENCOUNTER — TELEPHONE (OUTPATIENT)
Dept: MEDICAL GROUP | Facility: PHYSICIAN GROUP | Age: 70
End: 2020-02-19

## 2020-02-19 ENCOUNTER — OFFICE VISIT (OUTPATIENT)
Dept: MEDICAL GROUP | Facility: PHYSICIAN GROUP | Age: 70
End: 2020-02-19
Payer: MEDICARE

## 2020-02-19 VITALS
BODY MASS INDEX: 30.51 KG/M2 | SYSTOLIC BLOOD PRESSURE: 122 MMHG | OXYGEN SATURATION: 94 % | DIASTOLIC BLOOD PRESSURE: 74 MMHG | TEMPERATURE: 97.9 F | WEIGHT: 206 LBS | HEART RATE: 84 BPM | RESPIRATION RATE: 16 BRPM | HEIGHT: 69 IN

## 2020-02-19 DIAGNOSIS — E78.5 DYSLIPIDEMIA: ICD-10-CM

## 2020-02-19 DIAGNOSIS — E78.1 HIGH BLOOD TRIGLYCERIDES: ICD-10-CM

## 2020-02-19 DIAGNOSIS — D72.829 LEUKOCYTOSIS, UNSPECIFIED TYPE: ICD-10-CM

## 2020-02-19 DIAGNOSIS — D75.839 THROMBOCYTOSIS: ICD-10-CM

## 2020-02-19 DIAGNOSIS — R06.02 SOB (SHORTNESS OF BREATH): ICD-10-CM

## 2020-02-19 DIAGNOSIS — M54.50 CHRONIC RIGHT-SIDED LOW BACK PAIN WITHOUT SCIATICA: ICD-10-CM

## 2020-02-19 DIAGNOSIS — G89.29 CHRONIC RIGHT-SIDED LOW BACK PAIN WITHOUT SCIATICA: ICD-10-CM

## 2020-02-19 PROCEDURE — 99214 OFFICE O/P EST MOD 30 MIN: CPT | Performed by: FAMILY MEDICINE

## 2020-02-19 RX ORDER — ALBUTEROL SULFATE 90 UG/1
AEROSOL, METERED RESPIRATORY (INHALATION)
Qty: 2 INHALER | Refills: 5 | Status: SHIPPED | OUTPATIENT
Start: 2020-02-19 | End: 2020-04-06 | Stop reason: SDUPTHER

## 2020-02-19 RX ORDER — CYCLOBENZAPRINE HCL 10 MG
10 TABLET ORAL 3 TIMES DAILY PRN
Qty: 90 TAB | Refills: 3 | Status: SHIPPED | OUTPATIENT
Start: 2020-02-19 | End: 2021-07-06

## 2020-02-19 ASSESSMENT — PATIENT HEALTH QUESTIONNAIRE - PHQ9: CLINICAL INTERPRETATION OF PHQ2 SCORE: 0

## 2020-02-19 NOTE — ASSESSMENT & PLAN NOTE
Results for QUYEN MORTENSEN (MRN [id number]) as of 2/19/2020 09:11   Ref. Range 2/12/2020 07:52   Cholesterol,Tot Latest Ref Range: 100 - 199 mg/dL 169   Triglycerides Latest Ref Range: 0 - 149 mg/dL 369 (H)   HDL Latest Ref Range: >=40 mg/dL 34 (A)   LDL Latest Ref Range: <100 mg/dL 61

## 2020-02-19 NOTE — ASSESSMENT & PLAN NOTE
Persistent leukocytosis  He does not smoke  He has chronic copd  Coordinating with hematology for evaluation further  Has no night sweats or LAD

## 2020-02-19 NOTE — PROGRESS NOTES
"Subjective:   Gregg Mortensen is a 69 y.o. male here today for evaluation and management of:     Dyslipidemia  Results for GREGG MORTENSEN (MRN [id number]) as of 2/19/2020 09:11   Ref. Range 2/12/2020 07:52   Cholesterol,Tot Latest Ref Range: 100 - 199 mg/dL 169   Triglycerides Latest Ref Range: 0 - 149 mg/dL 369 (H)   HDL Latest Ref Range: >=40 mg/dL 34 (A)   LDL Latest Ref Range: <100 mg/dL 61       Leukocytosis  Persistent leukocytosis  He does not smoke  He has chronic copd  Coordinating with hematology for evaluation further  Has no night sweats or LAD    Thrombocytosis (HCC)  Elevated platelets worsening >800K  Has no history of blood clots. 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 US abdomen ordered to evaluate his spleen.   Referral to hematology done, trying to schedule patient for an appointment, some restrictions due to not being able to drive to [place].          Current medicines (including changes today)  Current Outpatient Medications   Medication Sig Dispense Refill   • albuterol (PROVENTIL HFA) 108 (90 Base) MCG/ACT Aero Soln inhalation aerosol INHALE 2 PUFFS BY MOUTH EVERY 6 HOURS AS NEEDED FOR SHORTNESS OF BREATH 2 Inhaler 5   • cyclobenzaprine (FLEXERIL) 10 MG Tab Take 1 Tab by mouth 3 times a day as needed for Muscle Spasms. 90 Tab 3     No current facility-administered medications for this visit.      He  has a past medical history of Chronic back pain, Lumbar stress fracture, and PNA (pneumonia). He also has no past medical history of Heart attack (HCC), Heart murmur, Hypertension, Seizure (HCC), Stroke (HCC), Type II or unspecified type diabetes mellitus without mention of complication, not stated as uncontrolled, or Unspecified asthma(493.90).    ROS  No chest pain, no shortness of breath, no abdominal pain       Objective:     /74   Pulse 84   Temp 36.6 °C (97.9 °F) (Temporal)   Resp 16   Ht 1.753 m (5' 9\")   Wt 93.4 kg (206 lb)  "  SpO2 94%  Body mass index is 30.42 kg/m².   Physical Exam:  Constitutional: Alert, no distress.  Skin: Warm, dry, good turgor, no rashes in visible areas.  Eye: Equal, round and reactive, conjunctiva clear, lids normal.  ENMT: Lips without lesions, good dentition, oropharynx clear.  Neck: Trachea midline, no masses, no thyromegaly. No cervical or supraclavicular lymphadenopathy  Respiratory: Unlabored respiratory effort, lungs clear to auscultation, no wheezes, no ronchi.  Cardiovascular: Normal S1, S2, no murmur, no edema.  Abdomen: Soft, non-tender, no masses, no hepatosplenomegaly.  Psych: Alert and oriented x3, normal affect and mood.        Assessment and Plan:   The following treatment plan was discussed    1. Leukocytosis, unspecified type  Continue to monitor.   Repeat labs in 3-6 months.     2. Thrombocytosis (HCC)  Continue to monitor.   Repeat labs in 3-6 months.   - US-ABDOMEN COMPLETE SURVEY; Future    3. Dyslipidemia  Elevated TG  Encouraged to cut back on sweets/desserts.   He does not drink alcohol, or soda or juices.   Repeat labs ordered.     4. SOB (shortness of breath)  - albuterol (PROVENTIL HFA) 108 (90 Base) MCG/ACT Aero Soln inhalation aerosol; INHALE 2 PUFFS BY MOUTH EVERY 6 HOURS AS NEEDED FOR SHORTNESS OF BREATH  Dispense: 2 Inhaler; Refill: 5    5. Chronic right-sided low back pain without sciatica  - cyclobenzaprine (FLEXERIL) 10 MG Tab; Take 1 Tab by mouth 3 times a day as needed for Muscle Spasms.  Dispense: 90 Tab; Refill: 3      Followup: Return in about 6 months (around 8/19/2020) for elevated WBC, platelets, TG.

## 2020-02-19 NOTE — PATIENT INSTRUCTIONS
Fasting labs 1 week before next visit in August  Follow up with referral to hematologist (blood specialist) they will contact you.

## 2020-02-19 NOTE — TELEPHONE ENCOUNTER
Justine,   I am seeing Mr. Escobedo for his appointment with me to review the labs. he cannot drive to [place] but there is a bus that can take him into [place] and bring him back to [place] on Tuesday if he can get an appointment with hematology between 9.30 and 12 noon on Tuesday 2/25/2020.     Let me know if I should forward this to the referrals department for the appointment.   Thank you,   Dallas Alvares M.D.

## 2020-02-19 NOTE — ASSESSMENT & PLAN NOTE
Elevated platelets worsening >800K  Has no history of blood clots. 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 US abdomen ordered to evaluate his spleen.   Referral to hematology done, trying to schedule patient for an appointment, some restrictions due to not being able to drive to [place].

## 2020-02-20 NOTE — PROGRESS NOTES
03/2/20    Subjective    Chief Complaint:  Elevated H/H and platelet count    HPI:      ROS:    Constitutional:  Skin:  HENT:  Cardiovascular:  Respiratory:  GI:  :  Musculoskeletal:  Neuro:  Psych:    PMH:      No Known Allergies    Illnesses:    Surgeries:    Medications:    Current Outpatient Medications on File Prior to Visit   Medication Sig Dispense Refill   • albuterol (PROVENTIL HFA) 108 (90 Base) MCG/ACT Aero Soln inhalation aerosol INHALE 2 PUFFS BY MOUTH EVERY 6 HOURS AS NEEDED FOR SHORTNESS OF BREATH 2 Inhaler 5   • cyclobenzaprine (FLEXERIL) 10 MG Tab Take 1 Tab by mouth 3 times a day as needed for Muscle Spasms. 90 Tab 3     No current facility-administered medications on file prior to visit.        FH:  F -  M -   Sibs -   C -     SH:  S -   E -     Objective    Vitals:    There were no vitals taken for this visit.    Physical Exam:    Appears well-developed and well-nourished. No distress.    Head -  Normocephalic .   Eyes - Pupils are equal, round, and reactive to light. Conjunctivae and EOM are normal. No scleral icterus.   Ears - normal hearing  Mouth - Throat: Oropharynx is clear and moist. No oropharyngeal exudate  Neck - Normal range of motion. Neck supple. No thyromegaly  Cardiovascular - Normal rate, regular rhythm, normal heart sounds and intact distal pulses. No  gallop, murmur or rub  Pulmonary - Normal breath sounds.  No wheeze, rales or rhonci  Breast - symmetrical. No mass on indentation.  Abdominal -Soft. No distension, tenderness, organomegaly or mass  Extremities-  No edema or tenderness.    Nodes - No submental, submandibular, preauricular, cervical, axillary or inguinal adenopathy.    Neurological -   Alert and oriented to person, place, and time. No focal findings  Skin - Skin is warm and dry. No rash noted. Not diaphoretic. No erythema. No pallor. No jaundice   Psychiatric -  Normal mood and affect.    Labs:  Results for QUYEN MORTENSEN (MRN [id number]) as of 2/20/2020 13:16   Ref.  Range 2/1/2018 19:26 2/3/2018 02:54 2/12/2020 07:52   WBC Latest Ref Range: 4.8 - 10.8 K/uL 12.1 (H) 17.0 (H) 18.1 (H)   RBC Latest Ref Range: 4.70 - 6.10 M/uL 4.92 4.48 (L) 5.16   Hemoglobin Latest Ref Range: 14.0 - 18.0 g/dL 15.2 14.0 14.5   Hematocrit Latest Ref Range: 42.0 - 52.0 % 45.0 42.9 47.8   MCV Latest Ref Range: 81.4 - 97.8 fL 91.5 95.8 92.6   MCH Latest Ref Range: 27.0 - 33.0 pg 30.9 31.3 28.1   MCHC Latest Ref Range: 33.7 - 35.3 g/dL 33.8 32.6 (L) 30.3 (L)   RDW Latest Ref Range: 35.9 - 50.0 fL 47.8 50.4 (H) 66.0 (H)   Platelet Count Latest Ref Range: 164 - 446 K/uL 538 (H) 541 (H) 838 (H)   MPV Latest Ref Range: 9.0 - 12.9 fL 9.0 9.3 9.4   Neutrophils-Polys Latest Ref Range: 44.00 - 72.00 % 77.10 (H) 85.50 (H) 67.80   Neutrophils (Absolute) Latest Ref Range: 1.82 - 7.42 K/uL 9.31 (H) 14.52 (H) 12.28 (H)   Lymphocytes Latest Ref Range: 22.00 - 41.00 % 13.20 (L) 7.30 (L) 17.90 (L)       Assessment    Imp:    Visit Diagnosis:  No diagnosis found.      Plan:      Demar Green M.D.

## 2020-02-21 NOTE — TELEPHONE ENCOUNTER
I have called and spoke to patient to inform him of this. Patient stated he got a cll that it is 05/02 and will keep the appointment

## 2020-02-21 NOTE — TELEPHONE ENCOUNTER
Please advise patient that appt is 2/25 at 2 pm as no appts in the window he needs. Very imp to keep this appointment. Can someone drive him to [place] and back?   Dallas Alvares M.D.

## 2020-03-02 ENCOUNTER — APPOINTMENT (OUTPATIENT)
Dept: HEMATOLOGY ONCOLOGY | Facility: MEDICAL CENTER | Age: 70
End: 2020-03-02
Payer: MEDICARE

## 2020-03-02 PROBLEM — D58.2 ELEVATED HEMOGLOBIN (HCC): Status: ACTIVE | Noted: 2020-03-02

## 2020-04-06 DIAGNOSIS — R06.02 SOB (SHORTNESS OF BREATH): ICD-10-CM

## 2020-04-06 RX ORDER — ALBUTEROL SULFATE 90 UG/1
AEROSOL, METERED RESPIRATORY (INHALATION)
Qty: 3 INHALER | Refills: 1 | Status: SHIPPED | OUTPATIENT
Start: 2020-04-06 | End: 2020-09-02 | Stop reason: SDUPTHER

## 2020-04-06 NOTE — TELEPHONE ENCOUNTER
Was the patient seen in the last year in this department? Yes    Does patient have an active prescription for medications requested? yes    Received Request Via: Pharmacy      Pt met protocol?: Yes  Ov 2/20, pcp sent rx to [id number] on day of appt. cecilia is asking for 90ds

## 2020-04-21 ENCOUNTER — OFFICE VISIT (OUTPATIENT)
Dept: URGENT CARE | Facility: PHYSICIAN GROUP | Age: 70
End: 2020-04-21
Payer: MEDICARE

## 2020-04-21 VITALS
RESPIRATION RATE: 16 BRPM | BODY MASS INDEX: 30.42 KG/M2 | WEIGHT: 206 LBS | OXYGEN SATURATION: 95 % | TEMPERATURE: 97.8 F | DIASTOLIC BLOOD PRESSURE: 80 MMHG | HEART RATE: 74 BPM | SYSTOLIC BLOOD PRESSURE: 128 MMHG

## 2020-04-21 DIAGNOSIS — M72.2 PLANTAR FASCIITIS OF LEFT FOOT: ICD-10-CM

## 2020-04-21 PROCEDURE — 99213 OFFICE O/P EST LOW 20 MIN: CPT | Performed by: PHYSICIAN ASSISTANT

## 2020-04-21 ASSESSMENT — ENCOUNTER SYMPTOMS
FEVER: 0
TINGLING: 0
FALLS: 0
CHILLS: 0
FOCAL WEAKNESS: 0
SENSORY CHANGE: 0

## 2020-04-21 ASSESSMENT — FIBROSIS 4 INDEX: FIB4 SCORE: 0.34

## 2020-04-21 ASSESSMENT — PAIN SCALES - GENERAL: PAINLEVEL: 5=MODERATE PAIN

## 2020-04-21 NOTE — PROGRESS NOTES
Subjective:      Gregg Martinez is a 69 y.o. male who presents with Foot Pain (L heel pain x3-4d)            HPI  The patient is a 69-year-old male who presents to the clinic complaining of left heel pain onset 3 to 4 days ago.  He states he has been walking in the field which is a lot more increased walking activity in the last several days than normal.  He states the pain has been gradual.  His heel pain is mild to moderate located to the bottom of his heel exacerbated with walking.  Pain is worse in the morning time and gradually improves throughout the day.  He denies any injury, abrasions, lacerations.  He denies any swelling.  He denies any numbness, tingling, weakness to his feet.  He has no other complaints      Review of Systems   Constitutional: Negative for chills and fever.   Musculoskeletal: Negative for falls.        Heal pain left     Neurological: Negative for tingling, sensory change and focal weakness.          Objective:     /80   Pulse 74   Temp 36.6 °C (97.8 °F) (Temporal)   Resp 16   Wt 93.4 kg (206 lb)   SpO2 95%   BMI 30.42 kg/m²      Physical Exam  Vitals signs reviewed.   Constitutional:       General: He is not in acute distress.     Appearance: Normal appearance. He is not ill-appearing or toxic-appearing.   Eyes:      Conjunctiva/sclera: Conjunctivae normal.   Musculoskeletal:        Feet:    Feet:      Comments: Left foot: Full ROM. Mild pain to heal with passive dorsiflexion. Sensation intact. Cap refill , 2 sec. Distal pulses intact. Strength intact. DTR intact.   Skin:     General: Skin is warm and dry.      Capillary Refill: Capillary refill takes less than 2 seconds.      Findings: No bruising, erythema or rash.   Neurological:      General: No focal deficit present.      Mental Status: He is alert and oriented to person, place, and time.   Psychiatric:         Mood and Affect: Mood normal.         Behavior: Behavior normal.       Past Medical History:   Diagnosis Date      • Chronic back pain    • Lumbar stress fracture    • PNA (pneumonia)       Past Surgical History:   Procedure Laterality Date   • KNEE ARTHROSCOPY     • OPEN REDUCTION        Social History     Socioeconomic History   • Marital status: Single     Spouse name: Not on file   • Number of children: Not on file   • Years of education: Not on file   • Highest education level: Not on file   Occupational History   • Not on file   Social Needs   • Financial resource strain: Not on file   • Food insecurity     Worry: Not on file     Inability: Not on file   • Transportation needs     Medical: Not on file     Non-medical: Not on file   Tobacco Use   • Smoking status: Former Smoker     Packs/day: 0.00     Years: 35.00     Pack years: 0.00     Types: Cigarettes     Last attempt to quit: 4/7/2005     Years since quitting: 15.0   • Smokeless tobacco: Never Used   Substance and Sexual Activity   • Alcohol use: No     Alcohol/week: 0.0 oz   • Drug use: No   • Sexual activity: Not on file   Lifestyle   • Physical activity     Days per week: Not on file     Minutes per session: Not on file   • Stress: Not on file   Relationships   • Social connections     Talks on phone: Not on file     Gets together: Not on file     Attends Mu-ism service: Not on file     Active member of club or organization: Not on file     Attends meetings of clubs or organizations: Not on file     Relationship status: Not on file   • Intimate partner violence     Fear of current or ex partner: Not on file     Emotionally abused: Not on file     Physically abused: Not on file     Forced sexual activity: Not on file   Other Topics Concern   • Not on file   Social History Narrative   • Not on file    Patient has no known allergies.            Assessment/Plan:     1. Plantar fasciitis of left foot    Discussed with patient signs and symptoms most likely consistent with a plantar fasciitis which is inflammation of the sheath and tendon area.  There are no signs  of infection or trauma identified on examination.     Discussed initial treatment of supportive care such as ibuprofen and Tylenol alternating every 4 hours, ice, rest, elevation, stretches with dorsiflexion.  Discharge instructions given to patient about plantar fasciitis.    Instructed to return to the urgent care if symptoms persist or worsen.  Follow-up with primary care provider.  Follow-up emergently if any numbness, tingling, weakness, redness, swelling, fevers, chills severe foot pain, or any other concerns.    Supportive care, differential diagnoses, and indications for immediate follow-up discussed with patient.    Pathogenesis of diagnosis discussed including typical length and natural progression. Patient expresses understanding and agrees to plan.    Please note that this dictation was created using voice recognition software. I have made every reasonable attempt to correct obvious errors, but I expect that there are errors of grammar and possibly content that I did not discover before finalizing the note.

## 2020-04-21 NOTE — PATIENT INSTRUCTIONS
Plantar Fasciitis  Plantar fasciitis is a painful foot condition that affects the heel. It occurs when the band of tissue that connects the toes to the heel bone (plantar fascia) becomes irritated. This can happen after exercising too much or doing other repetitive activities (overuse injury). The pain from plantar fasciitis can range from mild irritation to severe pain that makes it difficult for you to walk or move. The pain is usually worse in the morning or after you have been sitting or lying down for a while.  CAUSES  This condition may be caused by:  · Standing for long periods of time.  · Wearing shoes that do not fit.  · Doing high-impact activities, including running, aerobics, and ballet.  · Being overweight.  · Having an abnormal way of walking (gait).  · Having tight calf muscles.  · Having high arches in your feet.  · Starting a new athletic activity.  SYMPTOMS  The main symptom of this condition is heel pain. Other symptoms include:  · Pain that gets worse after activity or exercise.  · Pain that is worse in the morning or after resting.  · Pain that goes away after you walk for a few minutes.  DIAGNOSIS  This condition may be diagnosed based on your signs and symptoms. Your health care provider will also do a physical exam to check for:  · A tender area on the bottom of your foot.  · A high arch in your foot.  · Pain when you move your foot.  · Difficulty moving your foot.  You may also need to have imaging studies to confirm the diagnosis. These can include:  · X-rays.  · Ultrasound.  · MRI.  TREATMENT   Treatment for plantar fasciitis depends on the severity of the condition. Your treatment may include:  · Rest, ice, and over-the-counter pain medicines to manage your pain.  · Exercises to stretch your calves and your plantar fascia.  · A splint that holds your foot in a stretched, upward position while you sleep (night splint).  · Physical therapy to relieve symptoms and prevent problems in the  future.  · Cortisone injections to relieve severe pain.  · Extracorporeal shock wave therapy (ESWT) to stimulate damaged plantar fascia with electrical impulses. It is often used as a last resort before surgery.  · Surgery, if other treatments have not worked after 12 months.  HOME CARE INSTRUCTIONS  · Take medicines only as directed by your health care provider.  · Avoid activities that cause pain.  · Roll the bottom of your foot over a bag of ice or a bottle of cold water. Do this for 20 minutes, 3-4 times a day.  · Perform simple stretches as directed by your health care provider.  · Try wearing athletic shoes with air-sole or gel-sole cushions or soft shoe inserts.  · Wear a night splint while sleeping, if directed by your health care provider.  · Keep all follow-up appointments with your health care provider.  PREVENTION   · Do not perform exercises or activities that cause heel pain.  · Consider finding low-impact activities if you continue to have problems.  · Lose weight if you need to.  The best way to prevent plantar fasciitis is to avoid the activities that aggravate your plantar fascia.  SEEK MEDICAL CARE IF:  · Your symptoms do not go away after treatment with home care measures.  · Your pain gets worse.  · Your pain affects your ability to move or do your daily activities.  This information is not intended to replace advice given to you by your health care provider. Make sure you discuss any questions you have with your health care provider.  Document Released: 09/12/2002 Document Revised: 04/10/2017 Document Reviewed: 10/28/2015  [employer] Patient Education © 2017 [employer]

## 2020-05-22 ENCOUNTER — TELEPHONE (OUTPATIENT)
Dept: MEDICAL GROUP | Facility: PHYSICIAN GROUP | Age: 70
End: 2020-05-22

## 2020-05-22 NOTE — TELEPHONE ENCOUNTER
[facility] called stating that patient has an appointment with them on 6/22 at 12:45pm

## 2020-06-22 ENCOUNTER — HOSPITAL ENCOUNTER (OUTPATIENT)
Dept: LAB | Facility: MEDICAL CENTER | Age: 70
End: 2020-06-22
Attending: INTERNAL MEDICINE
Payer: MEDICARE

## 2020-06-22 PROCEDURE — 84100 ASSAY OF PHOSPHORUS: CPT

## 2020-06-22 PROCEDURE — 84550 ASSAY OF BLOOD/URIC ACID: CPT

## 2020-06-22 PROCEDURE — 83615 LACTATE (LD) (LDH) ENZYME: CPT

## 2020-06-22 PROCEDURE — 80053 COMPREHEN METABOLIC PANEL: CPT

## 2020-06-23 LAB
ALBUMIN SERPL BCP-MCNC: 4.6 G/DL (ref 3.2–4.9)
ALBUMIN/GLOB SERPL: 2.3 G/DL
ALP SERPL-CCNC: 90 U/L (ref 30–99)
ALT SERPL-CCNC: 14 U/L (ref 2–50)
ANION GAP SERPL CALC-SCNC: 14 MMOL/L (ref 7–16)
AST SERPL-CCNC: 12 U/L (ref 12–45)
BILIRUB SERPL-MCNC: 0.2 MG/DL (ref 0.1–1.5)
BUN SERPL-MCNC: 21 MG/DL (ref 8–22)
CALCIUM SERPL-MCNC: 9.2 MG/DL (ref 8.5–10.5)
CHLORIDE SERPL-SCNC: 102 MMOL/L (ref 96–112)
CO2 SERPL-SCNC: 23 MMOL/L (ref 20–33)
CREAT SERPL-MCNC: 0.83 MG/DL (ref 0.5–1.4)
GLOBULIN SER CALC-MCNC: 2 G/DL (ref 1.9–3.5)
GLUCOSE SERPL-MCNC: 89 MG/DL (ref 65–99)
LDH SERPL L TO P-CCNC: 524 U/L (ref 107–266)
PHOSPHATE SERPL-MCNC: 3.8 MG/DL (ref 2.5–4.5)
POTASSIUM SERPL-SCNC: 4.7 MMOL/L (ref 3.6–5.5)
PROT SERPL-MCNC: 6.6 G/DL (ref 6–8.2)
SODIUM SERPL-SCNC: 139 MMOL/L (ref 135–145)
URATE SERPL-MCNC: 6.8 MG/DL (ref 2.5–8.3)

## 2020-08-19 ENCOUNTER — HOSPITAL ENCOUNTER (OUTPATIENT)
Dept: LAB | Facility: MEDICAL CENTER | Age: 70
End: 2020-08-19
Attending: INTERNAL MEDICINE
Payer: MEDICARE

## 2020-08-19 LAB
ALBUMIN SERPL BCP-MCNC: 4.8 G/DL (ref 3.2–4.9)
ALBUMIN/GLOB SERPL: 2.4 G/DL
ALP SERPL-CCNC: 74 U/L (ref 30–99)
ALT SERPL-CCNC: 9 U/L (ref 2–50)
ANION GAP SERPL CALC-SCNC: 12 MMOL/L (ref 7–16)
ANISOCYTOSIS BLD QL SMEAR: ABNORMAL
AST SERPL-CCNC: 11 U/L (ref 12–45)
BASOPHILS # BLD AUTO: 2 % (ref 0–1.8)
BASOPHILS # BLD: 0.22 K/UL (ref 0–0.12)
BILIRUB SERPL-MCNC: 0.4 MG/DL (ref 0.1–1.5)
BUN SERPL-MCNC: 20 MG/DL (ref 8–22)
CALCIUM SERPL-MCNC: 9.5 MG/DL (ref 8.5–10.5)
CHLORIDE SERPL-SCNC: 102 MMOL/L (ref 96–112)
CO2 SERPL-SCNC: 25 MMOL/L (ref 20–33)
COMMENT 1642: NORMAL
CREAT SERPL-MCNC: 0.81 MG/DL (ref 0.5–1.4)
EOSINOPHIL # BLD AUTO: 0.09 K/UL (ref 0–0.51)
EOSINOPHIL NFR BLD: 0.8 % (ref 0–6.9)
ERYTHROCYTE [DISTWIDTH] IN BLOOD BY AUTOMATED COUNT: 69.2 FL (ref 35.9–50)
GIANT PLATELETS BLD QL SMEAR: NORMAL
GLOBULIN SER CALC-MCNC: 2 G/DL (ref 1.9–3.5)
GLUCOSE SERPL-MCNC: 98 MG/DL (ref 65–99)
HCT VFR BLD AUTO: 44.1 % (ref 42–52)
HGB BLD-MCNC: 13.7 G/DL (ref 14–18)
IMM GRANULOCYTES # BLD AUTO: 0.38 K/UL (ref 0–0.11)
IMM GRANULOCYTES NFR BLD AUTO: 3.4 % (ref 0–0.9)
LDH SERPL L TO P-CCNC: 351 U/L (ref 107–266)
LYMPHOCYTES # BLD AUTO: 2.3 K/UL (ref 1–4.8)
LYMPHOCYTES NFR BLD: 20.6 % (ref 22–41)
MACROCYTES BLD QL SMEAR: ABNORMAL
MCH RBC QN AUTO: 29 PG (ref 27–33)
MCHC RBC AUTO-ENTMCNC: 31.1 G/DL (ref 33.7–35.3)
MCV RBC AUTO: 93.4 FL (ref 81.4–97.8)
MICROCYTES BLD QL SMEAR: ABNORMAL
MONOCYTES # BLD AUTO: 0.59 K/UL (ref 0–0.85)
MONOCYTES NFR BLD AUTO: 5.3 % (ref 0–13.4)
MORPHOLOGY BLD-IMP: NORMAL
NEUTROPHILS # BLD AUTO: 7.57 K/UL (ref 1.82–7.42)
NEUTROPHILS NFR BLD: 67.9 % (ref 44–72)
NRBC # BLD AUTO: 0.03 K/UL
NRBC BLD-RTO: 0.3 /100 WBC
OVALOCYTES BLD QL SMEAR: NORMAL
PLATELET # BLD AUTO: 669 K/UL (ref 164–446)
PLATELET BLD QL SMEAR: NORMAL
PMV BLD AUTO: 9.5 FL (ref 9–12.9)
POLYCHROMASIA BLD QL SMEAR: NORMAL
POTASSIUM SERPL-SCNC: 4.7 MMOL/L (ref 3.6–5.5)
PROT SERPL-MCNC: 6.8 G/DL (ref 6–8.2)
RBC # BLD AUTO: 4.72 M/UL (ref 4.7–6.1)
RBC BLD AUTO: PRESENT
SODIUM SERPL-SCNC: 139 MMOL/L (ref 135–145)
VARIANT LYMPHS BLD QL SMEAR: NORMAL
WBC # BLD AUTO: 11.2 K/UL (ref 4.8–10.8)

## 2020-08-19 PROCEDURE — 85025 COMPLETE CBC W/AUTO DIFF WBC: CPT

## 2020-08-19 PROCEDURE — 36415 COLL VENOUS BLD VENIPUNCTURE: CPT

## 2020-08-19 PROCEDURE — 83615 LACTATE (LD) (LDH) ENZYME: CPT

## 2020-08-19 PROCEDURE — 80053 COMPREHEN METABOLIC PANEL: CPT

## 2020-08-26 ENCOUNTER — TELEPHONE (OUTPATIENT)
Dept: MEDICAL GROUP | Facility: PHYSICIAN GROUP | Age: 70
End: 2020-08-26

## 2020-08-26 NOTE — TELEPHONE ENCOUNTER
:   Please help pt reschedule his visit with me today at 10 am which is a personal time, with my apologies.     MAs:   Please get clinic note from [place] to present  Visits with [facility].   Thank you.   Dallas Alvares M.D.

## 2020-08-27 NOTE — TELEPHONE ENCOUNTER
I have called and LVM to call back RE appointment with Dallas Alvares M.D.      Records have been requested and faxed to the front fax

## 2020-09-02 ENCOUNTER — OFFICE VISIT (OUTPATIENT)
Dept: MEDICAL GROUP | Facility: PHYSICIAN GROUP | Age: 70
End: 2020-09-02
Payer: MEDICARE

## 2020-09-02 VITALS
RESPIRATION RATE: 12 BRPM | WEIGHT: 194 LBS | HEIGHT: 68 IN | TEMPERATURE: 98.6 F | OXYGEN SATURATION: 95 % | HEART RATE: 70 BPM | SYSTOLIC BLOOD PRESSURE: 134 MMHG | BODY MASS INDEX: 29.4 KG/M2 | DIASTOLIC BLOOD PRESSURE: 76 MMHG

## 2020-09-02 DIAGNOSIS — D72.829 LEUKOCYTOSIS, UNSPECIFIED TYPE: ICD-10-CM

## 2020-09-02 DIAGNOSIS — J42 CHRONIC BRONCHITIS, UNSPECIFIED CHRONIC BRONCHITIS TYPE (HCC): ICD-10-CM

## 2020-09-02 DIAGNOSIS — D75.839 THROMBOCYTOSIS: ICD-10-CM

## 2020-09-02 DIAGNOSIS — R06.02 SOB (SHORTNESS OF BREATH): ICD-10-CM

## 2020-09-02 PROBLEM — M79.89 SWELLING OF RIGHT HAND: Status: RESOLVED | Noted: 2017-07-14 | Resolved: 2020-09-02

## 2020-09-02 PROCEDURE — 99214 OFFICE O/P EST MOD 30 MIN: CPT | Performed by: FAMILY MEDICINE

## 2020-09-02 RX ORDER — HYDROXYUREA 500 MG/1
CAPSULE ORAL
COMMUNITY
Start: 2020-07-09 | End: 2020-09-02 | Stop reason: SDUPTHER

## 2020-09-02 RX ORDER — ALBUTEROL SULFATE 90 UG/1
AEROSOL, METERED RESPIRATORY (INHALATION)
Qty: 3 EACH | Refills: 3 | Status: SHIPPED | OUTPATIENT
Start: 2020-09-02 | End: 2021-06-15 | Stop reason: SDUPTHER

## 2020-09-02 RX ORDER — HYDROXYUREA 500 MG/1
500 CAPSULE ORAL DAILY
Qty: 90 CAP | Refills: 3 | Status: SHIPPED | OUTPATIENT
Start: 2020-09-02 | End: 2021-10-15 | Stop reason: SDUPTHER

## 2020-09-02 ASSESSMENT — FIBROSIS 4 INDEX: FIB4 SCORE: 0.37

## 2020-09-02 NOTE — ASSESSMENT & PLAN NOTE
Was evaluated by heme/onc, started on medication and labs show improving of platelets.   Results for QUYEN MORTENSEN (MRN [id number]) as of 9/2/2020 11:40   Ref. Range 2/12/2020 07:52 8/19/2020 09:22   WBC Latest Ref Range: 4.8 - 10.8 K/uL 18.1 (H) 11.2 (H)   RBC Latest Ref Range: 4.70 - 6.10 M/uL 5.16 4.72   Hemoglobin Latest Ref Range: 14.0 - 18.0 g/dL 14.5 13.7 (L)   Hematocrit Latest Ref Range: 42.0 - 52.0 % 47.8 44.1   MCV Latest Ref Range: 81.4 - 97.8 fL 92.6 93.4   MCH Latest Ref Range: 27.0 - 33.0 pg 28.1 29.0   MCHC Latest Ref Range: 33.7 - 35.3 g/dL 30.3 (L) 31.1 (L)   RDW Latest Ref Range: 35.9 - 50.0 fL 66.0 (H) 69.2 (H)   Platelet Count Latest Ref Range: 164 - 446 K/uL 838 (H) 669 (H)   MPV Latest Ref Range: 9.0 - 12.9 fL 9.4 9.5   He does not recall the name of the medication, likely hydrea, will request records and refill as he is out of the pills.
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
## 2020-09-02 NOTE — ASSESSMENT & PLAN NOTE
Improving, will request rec from heme/onc for name of medication to refill  Will call patient later today to find out name from his pill bottle at home.   Results for QUYEN MORTENSEN (MRN [id number]) as of 9/2/2020 11:40   Ref. Range 2/12/2020 07:52 8/19/2020 09:22   WBC Latest Ref Range: 4.8 - 10.8 K/uL 18.1 (H) 11.2 (H)   RBC Latest Ref Range: 4.70 - 6.10 M/uL 5.16 4.72   Hemoglobin Latest Ref Range: 14.0 - 18.0 g/dL 14.5 13.7 (L)   Hematocrit Latest Ref Range: 42.0 - 52.0 % 47.8 44.1   MCV Latest Ref Range: 81.4 - 97.8 fL 92.6 93.4   MCH Latest Ref Range: 27.0 - 33.0 pg 28.1 29.0   MCHC Latest Ref Range: 33.7 - 35.3 g/dL 30.3 (L) 31.1 (L)   RDW Latest Ref Range: 35.9 - 50.0 fL 66.0 (H) 69.2 (H)   Platelet Count Latest Ref Range: 164 - 446 K/uL 838 (H) 669 (H)   MPV Latest Ref Range: 9.0 - 12.9 fL 9.4 9.5

## 2020-09-02 NOTE — LETTER
[facility]  Dallas Alvares M.D.  [address] [place] [address]  Fax: [phone]   Authorization for Release/Disclosure of   Protected Health Information   Name: QUYEN MORTENSEN : 1950 SSN: [id number]   Address: [address] [address] Phone:    [phone] (home)    I authorize the entity listed below to release/disclose the PHI below to:   [facility]/Dallas Alvares M.D. and Dallas Alvares M.D.   Provider or Entity Name:  [facility]       Address   City, State, [facility]   Phone:      Fax:     Reason for request: continuity of care   Information to be released:    [  ] LAST COLONOSCOPY,  including any PATH REPORT and follow-up  [  ] LAST FIT/COLOGUARD RESULT [  ] LAST DEXA  [  ] LAST MAMMOGRAM  [  ] LAST PAP  [  ] LAST LABS [  ] RETINA EXAM REPORT  [  ] IMMUNIZATION RECORDS  [  ] Release all info      [  ] Check here and initial the line next to each item to release ALL health information INCLUDING  _____ Care and treatment for drug and / or alcohol abuse  _____ HIV testing, infection status, or AIDS  _____ Genetic Testing    DATES OF SERVICE OR TIME PERIOD TO BE DISCLOSED: _____________  I understand and acknowledge that:  * This Authorization may be revoked at any time by you in writing, except if your health information has already been used or disclosed.  * Your health information that will be used or disclosed as a result of you signing this authorization could be re-disclosed by the recipient. If this occurs, your re-disclosed health information may no longer be protected by State or Federal laws.  * You may refuse to sign this Authorization. Your refusal will not affect your ability to obtain treatment.  * This Authorization becomes effective upon signing and will  on (date) __________.      If no date is indicated, this Authorization will  one (1) year from the signature date.    Name: Quyen Mortensen    Signature:   Date:     2020       PLEASE FAX  REQUESTED RECORDS BACK TO: [phone]
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
## 2020-12-22 ENCOUNTER — OFFICE VISIT (OUTPATIENT)
Dept: MEDICAL GROUP | Facility: PHYSICIAN GROUP | Age: 70
End: 2020-12-22
Payer: MEDICARE

## 2020-12-22 VITALS
TEMPERATURE: 97.8 F | DIASTOLIC BLOOD PRESSURE: 86 MMHG | SYSTOLIC BLOOD PRESSURE: 138 MMHG | HEIGHT: 68 IN | HEART RATE: 86 BPM | RESPIRATION RATE: 16 BRPM | BODY MASS INDEX: 30.31 KG/M2 | WEIGHT: 200 LBS | OXYGEN SATURATION: 93 %

## 2020-12-22 DIAGNOSIS — D75.839 THROMBOCYTOSIS: ICD-10-CM

## 2020-12-22 DIAGNOSIS — D58.2 ELEVATED HEMOGLOBIN (HCC): ICD-10-CM

## 2020-12-22 DIAGNOSIS — Z11.59 ENCOUNTER FOR HEPATITIS C SCREENING TEST FOR LOW RISK PATIENT: ICD-10-CM

## 2020-12-22 PROCEDURE — 99214 OFFICE O/P EST MOD 30 MIN: CPT | Performed by: FAMILY MEDICINE

## 2020-12-22 ASSESSMENT — FIBROSIS 4 INDEX: FIB4 SCORE: 0.38

## 2020-12-29 ENCOUNTER — HOSPITAL ENCOUNTER (OUTPATIENT)
Dept: LAB | Facility: MEDICAL CENTER | Age: 70
End: 2020-12-29
Attending: FAMILY MEDICINE
Payer: MEDICARE

## 2020-12-29 ENCOUNTER — NON-PROVIDER VISIT (OUTPATIENT)
Dept: MEDICAL GROUP | Facility: PHYSICIAN GROUP | Age: 70
End: 2020-12-29
Payer: MEDICARE

## 2020-12-29 VITALS — WEIGHT: 200 LBS | HEIGHT: 68 IN | BODY MASS INDEX: 30.31 KG/M2

## 2020-12-29 DIAGNOSIS — D72.829 LEUKOCYTOSIS, UNSPECIFIED TYPE: ICD-10-CM

## 2020-12-29 DIAGNOSIS — E78.1 HIGH BLOOD TRIGLYCERIDES: ICD-10-CM

## 2020-12-29 DIAGNOSIS — Z23 NEED FOR VACCINATION: ICD-10-CM

## 2020-12-29 DIAGNOSIS — E78.5 DYSLIPIDEMIA: ICD-10-CM

## 2020-12-29 DIAGNOSIS — D75.839 THROMBOCYTOSIS: ICD-10-CM

## 2020-12-29 DIAGNOSIS — Z11.59 ENCOUNTER FOR HEPATITIS C SCREENING TEST FOR LOW RISK PATIENT: ICD-10-CM

## 2020-12-29 LAB
ALBUMIN SERPL BCP-MCNC: 5 G/DL (ref 3.2–4.9)
ALBUMIN/GLOB SERPL: 2.2 G/DL
ALP SERPL-CCNC: 78 U/L (ref 30–99)
ALT SERPL-CCNC: 13 U/L (ref 2–50)
ANION GAP SERPL CALC-SCNC: 9 MMOL/L (ref 7–16)
AST SERPL-CCNC: 11 U/L (ref 12–45)
BASOPHILS # BLD AUTO: 1.3 % (ref 0–1.8)
BASOPHILS # BLD: 0.17 K/UL (ref 0–0.12)
BILIRUB SERPL-MCNC: 0.6 MG/DL (ref 0.1–1.5)
BUN SERPL-MCNC: 18 MG/DL (ref 8–22)
CALCIUM SERPL-MCNC: 9.9 MG/DL (ref 8.5–10.5)
CHLORIDE SERPL-SCNC: 99 MMOL/L (ref 96–112)
CHOLEST SERPL-MCNC: 193 MG/DL (ref 100–199)
CO2 SERPL-SCNC: 29 MMOL/L (ref 20–33)
CREAT SERPL-MCNC: 0.91 MG/DL (ref 0.5–1.4)
EOSINOPHIL # BLD AUTO: 0.02 K/UL (ref 0–0.51)
EOSINOPHIL NFR BLD: 0.2 % (ref 0–6.9)
ERYTHROCYTE [DISTWIDTH] IN BLOOD BY AUTOMATED COUNT: 58.5 FL (ref 35.9–50)
FASTING STATUS PATIENT QL REPORTED: NORMAL
GLOBULIN SER CALC-MCNC: 2.3 G/DL (ref 1.9–3.5)
GLUCOSE SERPL-MCNC: 90 MG/DL (ref 65–99)
HCT VFR BLD AUTO: 51.4 % (ref 42–52)
HCV AB SER QL: NORMAL
HDLC SERPL-MCNC: 39 MG/DL
HGB BLD-MCNC: 16.1 G/DL (ref 14–18)
IMM GRANULOCYTES # BLD AUTO: 0.11 K/UL (ref 0–0.11)
IMM GRANULOCYTES NFR BLD AUTO: 0.9 % (ref 0–0.9)
LDLC SERPL CALC-MCNC: 110 MG/DL
LYMPHOCYTES # BLD AUTO: 2.41 K/UL (ref 1–4.8)
LYMPHOCYTES NFR BLD: 18.9 % (ref 22–41)
MCH RBC QN AUTO: 30.6 PG (ref 27–33)
MCHC RBC AUTO-ENTMCNC: 31.3 G/DL (ref 33.7–35.3)
MCV RBC AUTO: 97.5 FL (ref 81.4–97.8)
MONOCYTES # BLD AUTO: 0.72 K/UL (ref 0–0.85)
MONOCYTES NFR BLD AUTO: 5.6 % (ref 0–13.4)
NEUTROPHILS # BLD AUTO: 9.35 K/UL (ref 1.82–7.42)
NEUTROPHILS NFR BLD: 73.1 % (ref 44–72)
NRBC # BLD AUTO: 0 K/UL
NRBC BLD-RTO: 0 /100 WBC
PLATELET # BLD AUTO: 540 K/UL (ref 164–446)
PMV BLD AUTO: 9.1 FL (ref 9–12.9)
POTASSIUM SERPL-SCNC: 5.2 MMOL/L (ref 3.6–5.5)
PROT SERPL-MCNC: 7.3 G/DL (ref 6–8.2)
RBC # BLD AUTO: 5.27 M/UL (ref 4.7–6.1)
SODIUM SERPL-SCNC: 137 MMOL/L (ref 135–145)
TRIGL SERPL-MCNC: 220 MG/DL (ref 0–149)
WBC # BLD AUTO: 12.8 K/UL (ref 4.8–10.8)

## 2020-12-29 PROCEDURE — 85025 COMPLETE CBC W/AUTO DIFF WBC: CPT

## 2020-12-29 PROCEDURE — 36415 COLL VENOUS BLD VENIPUNCTURE: CPT

## 2020-12-29 PROCEDURE — 86803 HEPATITIS C AB TEST: CPT

## 2020-12-29 PROCEDURE — G0008 ADMIN INFLUENZA VIRUS VAC: HCPCS | Performed by: NURSE PRACTITIONER

## 2020-12-29 PROCEDURE — 80053 COMPREHEN METABOLIC PANEL: CPT

## 2020-12-29 PROCEDURE — 80061 LIPID PANEL: CPT

## 2020-12-29 PROCEDURE — 90662 IIV NO PRSV INCREASED AG IM: CPT | Performed by: NURSE PRACTITIONER

## 2020-12-29 ASSESSMENT — FIBROSIS 4 INDEX: FIB4 SCORE: 0.38

## 2021-01-13 NOTE — PROGRESS NOTES
Subjective:   Gregg Mortensen is a 70 y.o. male here today for evaluation and management of:     Trigger finger of both hands  Chronic condition, no acute changes.     Thrombocytosis (HCC)  Results for GREGG MORTENSEN (MRN [id number]) as of 1/13/2021 09:54   Ref. Range 2/12/2020 07:52 6/22/2020 10:21 8/19/2020 09:22 12/29/2020 08:40   WBC Latest Ref Range: 4.8 - 10.8 K/uL 18.1 (H)  11.2 (H) 12.8 (H)   RBC Latest Ref Range: 4.70 - 6.10 M/uL 5.16  4.72 5.27   Hemoglobin Latest Ref Range: 14.0 - 18.0 g/dL 14.5  13.7 (L) 16.1   Hematocrit Latest Ref Range: 42.0 - 52.0 % 47.8  44.1 51.4   MCV Latest Ref Range: 81.4 - 97.8 fL 92.6  93.4 97.5   MCH Latest Ref Range: 27.0 - 33.0 pg 28.1  29.0 30.6   MCHC Latest Ref Range: 33.7 - 35.3 g/dL 30.3 (L)  31.1 (L) 31.3 (L)   RDW Latest Ref Range: 35.9 - 50.0 fL 66.0 (H)  69.2 (H) 58.5 (H)   Platelet Count Latest Ref Range: 164 - 446 K/uL 838 (H)  669 (H) 540 (H)   Continues on hydrea. Was evaluated by [facility]  Platelets improving.       Elevated hemoglobin (HCC)  Continues on hydrea with improving labs.   No blood clots.          Current medicines (including changes today)  Current Outpatient Medications   Medication Sig Dispense Refill   • albuterol (PROVENTIL HFA) 108 (90 Base) MCG/ACT Aero Soln inhalation aerosol INHALE 2 PUFFS BY MOUTH EVERY 6 HOURS AS NEEDED FOR SHORTNESS OF BREATH 3 Each 3   • hydroxyurea (HYDREA) 500 MG Cap Take 1 Cap by mouth every day. 90 Cap 3   • cyclobenzaprine (FLEXERIL) 10 MG Tab Take 1 Tab by mouth 3 times a day as needed for Muscle Spasms. 90 Tab 3     No current facility-administered medications for this visit.      He  has a past medical history of Chronic back pain, Lumbar stress fracture, and PNA (pneumonia). He also has no past medical history of Heart attack (HCC), Heart murmur, Hypertension, Seizure (HCC), Stroke (HCC), Type II or unspecified type diabetes mellitus without mention of complication, not stated as uncontrolled, or Unspecified  "asthma(493.90).    ROS  No chest pain, no shortness of breath, no abdominal pain       Objective:     /86   Pulse 86   Temp 36.6 °C (97.8 °F) (Temporal)   Resp 16   Ht 1.727 m (5' 8\")   Wt 90.7 kg (200 lb)   SpO2 93%  Body mass index is 30.41 kg/m².   Physical Exam:  Constitutional: Alert, no distress.  Skin: Warm, dry, good turgor, no rashes in visible areas.  Eye: Equal, round and reactive, conjunctiva clear, lids normal.  ENMT: Lips without lesions, good dentition, oropharynx clear.  Neck: Trachea midline, no masses, no thyromegaly. No cervical or supraclavicular lymphadenopathy  Respiratory: Unlabored respiratory effort, lungs clear to auscultation, no wheezes, no ronchi.  Cardiovascular: Normal S1, S2, no murmur, no edema.  Abdomen: Soft, non-tender, no masses, no hepatosplenomegaly.  Psych: Alert and oriented x3, normal affect and mood.        Assessment and Plan:   The following treatment plan was discussed    1. Encounter for hepatitis C screening test for low risk patient    - HEP C VIRUS ANTIBODY; Future      Followup: Return in about 3 months (around 3/22/2021) for lab appt march 9th or 23rd at 9 am. .         "

## 2021-01-13 NOTE — ASSESSMENT & PLAN NOTE
Results for QUYEN MORTENSEN (MRN [id number]) as of 1/13/2021 09:54   Ref. Range 2/12/2020 07:52 6/22/2020 10:21 8/19/2020 09:22 12/29/2020 08:40   WBC Latest Ref Range: 4.8 - 10.8 K/uL 18.1 (H)  11.2 (H) 12.8 (H)   RBC Latest Ref Range: 4.70 - 6.10 M/uL 5.16  4.72 5.27   Hemoglobin Latest Ref Range: 14.0 - 18.0 g/dL 14.5  13.7 (L) 16.1   Hematocrit Latest Ref Range: 42.0 - 52.0 % 47.8  44.1 51.4   MCV Latest Ref Range: 81.4 - 97.8 fL 92.6  93.4 97.5   MCH Latest Ref Range: 27.0 - 33.0 pg 28.1  29.0 30.6   MCHC Latest Ref Range: 33.7 - 35.3 g/dL 30.3 (L)  31.1 (L) 31.3 (L)   RDW Latest Ref Range: 35.9 - 50.0 fL 66.0 (H)  69.2 (H) 58.5 (H)   Platelet Count Latest Ref Range: 164 - 446 K/uL 838 (H)  669 (H) 540 (H)   Continues on hydrea. Was evaluated by [facility]  Platelets improving.

## 2021-03-03 DIAGNOSIS — Z23 NEED FOR VACCINATION: ICD-10-CM

## 2021-03-09 ENCOUNTER — HOSPITAL ENCOUNTER (OUTPATIENT)
Dept: LAB | Facility: MEDICAL CENTER | Age: 71
End: 2021-03-09
Attending: FAMILY MEDICINE
Payer: MEDICARE

## 2021-03-09 DIAGNOSIS — D75.839 THROMBOCYTOSIS: ICD-10-CM

## 2021-03-09 DIAGNOSIS — D72.829 LEUKOCYTOSIS, UNSPECIFIED TYPE: ICD-10-CM

## 2021-03-09 PROCEDURE — 85025 COMPLETE CBC W/AUTO DIFF WBC: CPT

## 2021-03-09 PROCEDURE — 36415 COLL VENOUS BLD VENIPUNCTURE: CPT

## 2021-03-10 LAB
BASOPHILS # BLD AUTO: 1.8 % (ref 0–1.8)
BASOPHILS # BLD: 0.22 K/UL (ref 0–0.12)
EOSINOPHIL # BLD AUTO: 0.07 K/UL (ref 0–0.51)
EOSINOPHIL NFR BLD: 0.6 % (ref 0–6.9)
ERYTHROCYTE [DISTWIDTH] IN BLOOD BY AUTOMATED COUNT: 58 FL (ref 35.9–50)
HCT VFR BLD AUTO: 50.3 % (ref 42–52)
HGB BLD-MCNC: 15.9 G/DL (ref 14–18)
IMM GRANULOCYTES # BLD AUTO: 0.47 K/UL (ref 0–0.11)
IMM GRANULOCYTES NFR BLD AUTO: 3.9 % (ref 0–0.9)
LYMPHOCYTES # BLD AUTO: 2.79 K/UL (ref 1–4.8)
LYMPHOCYTES NFR BLD: 23.2 % (ref 22–41)
MCH RBC QN AUTO: 31.9 PG (ref 27–33)
MCHC RBC AUTO-ENTMCNC: 31.6 G/DL (ref 33.7–35.3)
MCV RBC AUTO: 100.8 FL (ref 81.4–97.8)
MONOCYTES # BLD AUTO: 0.68 K/UL (ref 0–0.85)
MONOCYTES NFR BLD AUTO: 5.7 % (ref 0–13.4)
NEUTROPHILS # BLD AUTO: 7.78 K/UL (ref 1.82–7.42)
NEUTROPHILS NFR BLD: 64.8 % (ref 44–72)
NRBC # BLD AUTO: 0 K/UL
NRBC BLD-RTO: 0 /100 WBC
PLATELET # BLD AUTO: 602 K/UL (ref 164–446)
PMV BLD AUTO: 8.9 FL (ref 9–12.9)
RBC # BLD AUTO: 4.99 M/UL (ref 4.7–6.1)
WBC # BLD AUTO: 12 K/UL (ref 4.8–10.8)

## 2021-03-23 ENCOUNTER — OFFICE VISIT (OUTPATIENT)
Dept: MEDICAL GROUP | Facility: PHYSICIAN GROUP | Age: 71
End: 2021-03-23
Payer: MEDICARE

## 2021-03-23 VITALS
HEIGHT: 68 IN | TEMPERATURE: 98.4 F | WEIGHT: 204 LBS | OXYGEN SATURATION: 97 % | RESPIRATION RATE: 16 BRPM | SYSTOLIC BLOOD PRESSURE: 118 MMHG | DIASTOLIC BLOOD PRESSURE: 80 MMHG | HEART RATE: 61 BPM | BODY MASS INDEX: 30.92 KG/M2

## 2021-03-23 DIAGNOSIS — J42 CHRONIC BRONCHITIS, UNSPECIFIED CHRONIC BRONCHITIS TYPE (HCC): ICD-10-CM

## 2021-03-23 DIAGNOSIS — D75.839 THROMBOCYTOSIS: ICD-10-CM

## 2021-03-23 DIAGNOSIS — E78.5 DYSLIPIDEMIA: ICD-10-CM

## 2021-03-23 PROCEDURE — 99214 OFFICE O/P EST MOD 30 MIN: CPT | Performed by: FAMILY MEDICINE

## 2021-03-23 RX ORDER — ATORVASTATIN CALCIUM 20 MG/1
20 TABLET, FILM COATED ORAL DAILY
Qty: 90 TABLET | Refills: 3 | Status: SHIPPED | OUTPATIENT
Start: 2021-03-23 | End: 2022-03-23

## 2021-03-23 ASSESSMENT — FIBROSIS 4 INDEX: FIB4 SCORE: 0.35

## 2021-03-23 ASSESSMENT — PATIENT HEALTH QUESTIONNAIRE - PHQ9: CLINICAL INTERPRETATION OF PHQ2 SCORE: 0

## 2021-03-23 NOTE — PROGRESS NOTES
"Subjective:   Gregg Martinez is a 70 y.o. male here today for evaluation and management of:     COPD (chronic obstructive pulmonary disease) (CMS-[facility])  Chronic condition, no acute changes.   Stable on albuterol as needed.     Thrombocytosis (HCC)  Is currently taking hydrea 500mg daily as recommended by hematology.   He needs to have a follow up.   Labs ordered.   Platelets are at 602       I agree with the assessment and plan by the medical student.     Current medicines (including changes today)  Current Outpatient Medications   Medication Sig Dispense Refill   • atorvastatin (LIPITOR) 20 MG Tab Take 1 tablet by mouth every day. 90 tablet 3   • albuterol (PROVENTIL HFA) 108 (90 Base) MCG/ACT Aero Soln inhalation aerosol INHALE 2 PUFFS BY MOUTH EVERY 6 HOURS AS NEEDED FOR SHORTNESS OF BREATH 3 Each 3   • hydroxyurea (HYDREA) 500 MG Cap Take 1 Cap by mouth every day. 90 Cap 3   • cyclobenzaprine (FLEXERIL) 10 MG Tab Take 1 Tab by mouth 3 times a day as needed for Muscle Spasms. 90 Tab 3     No current facility-administered medications for this visit.     He  has a past medical history of Chronic back pain, Lumbar stress fracture, and PNA (pneumonia). He also has no past medical history of Heart attack ([facility]), Heart murmur, Hypertension, Seizure (HCC), Stroke ([facility]), Type II or unspecified type diabetes mellitus without mention of complication, not stated as uncontrolled, or Unspecified asthma(493.90).    ROS  No chest pain, no shortness of breath, no abdominal pain       Objective:     /80   Pulse 61   Temp 36.9 °C (98.4 °F) (Temporal)   Resp 16   Ht 1.727 m (5' 8\")   Wt 92.5 kg (204 lb)   SpO2 97%  Body mass index is 31.02 kg/m².   Physical Exam:  Constitutional: Alert, no distress.  Skin: Warm, dry, good turgor, no rashes in visible areas.  Eye: Equal, round and reactive, conjunctiva clear, lids normal.  ENMT: Lips without lesions, good dentition, oropharynx clear.  Neck: Trachea midline, no masses, no " thyromegaly. No cervical or supraclavicular lymphadenopathy  Respiratory: Unlabored respiratory effort, lungs clear to auscultation, no wheezes, no ronchi.  Cardiovascular: Normal S1, S2, no murmur, no edema.  Abdomen: Soft, non-tender, no masses, no hepatosplenomegaly.  Psych: Alert and oriented x3, normal affect and mood.        Assessment and Plan:   The following treatment plan was discussed    1. Thrombocytosis (HCC)    - CBC WITH DIFFERENTIAL; Future  - REFERRAL TO HEMATOLOGY ONCOLOGY    2. Dyslipidemia    - atorvastatin (LIPITOR) 20 MG Tab; Take 1 tablet by mouth every day.  Dispense: 90 tablet; Refill: 3  - Comp Metabolic Panel; Future  - Lipid Profile; Future    3. Chronic bronchitis, unspecified chronic bronchitis type (HCC)        Followup: Return in about 6 months (around 9/23/2021) for thrombocytosis, dyslipidemia, provide RSVP information pls.

## 2021-03-23 NOTE — PATIENT INSTRUCTIONS
Start taking atorvastatin 20 mg once daily.   Referral to hematology done.  Recheck labs in 6 months.

## 2021-03-23 NOTE — ASSESSMENT & PLAN NOTE
Is currently taking hydrea 500mg daily as recommended by hematology.   He needs to have a follow up.   Labs ordered.   Platelets are at 602

## 2021-03-23 NOTE — NON-PROVIDER
A 70 y.o. male comes to the clinic for treatment and evaluation of    Thrombocytosis  Is taking hydroxyurea (HYDREA) 500 MG Cap daily.  Was evaluated by hematology.   Does not drive, will ask hematology to follow up virtual visit.   Ref. Range 8/19/2020 09:22 12/29/2020 08:40 3/9/2021 08:44   WBC Latest Ref Range: 4.8 - 10.8 K/uL 11.2 (H) 12.8 (H) 12.0 (H)   RBC Latest Ref Range: 4.70 - 6.10 M/uL 4.72 5.27 4.99   Hemoglobin Latest Ref Range: 14.0 - 18.0 g/dL 13.7 (L) 16.1 15.9   Hematocrit Latest Ref Range: 42.0 - 52.0 % 44.1 51.4 50.3   MCV Latest Ref Range: 81.4 - 97.8 fL 93.4 97.5 100.8 (H)   MCH Latest Ref Range: 27.0 - 33.0 pg 29.0 30.6 31.9   MCHC Latest Ref Range: 33.7 - 35.3 g/dL 31.1 (L) 31.3 (L) 31.6 (L)   RDW Latest Ref Range: 35.9 - 50.0 fL 69.2 (H) 58.5 (H) 58.0 (H)   Platelet Count Latest Ref Range: 164 - 446 K/uL 669 (H) 540 (H) 602 (H)   MPV Latest Ref Range: 9.0 - 12.9 fL 9.5 9.1 8.9 (L)     Dyslipidemia     Ref. Range 2/12/2020 07:52 12/29/2020 08:40   Cholesterol,Tot Latest Ref Range: 100 - 199 mg/dL 169 193   Triglycerides Latest Ref Range: 0 - 149 mg/dL 369 (H) 220 (H)   HDL Latest Ref Range: >=40 mg/dL 34 (A) 39 (A)   LDL Latest Ref Range: <100 mg/dL 61 110 (H)    Talked about starting a statin. Discussed side effects.  Will start atorvastatin 20mg daily. Will recheck labs.    COPD  Has albuterol (PROVENTIL HFA) 108 (90 Base) MCG/ACT Aero Soln inhalation aerosol.  Has not had any recent exacerbations. Uses inhaler twice a week. Has not had shortness of breath or chest pain.

## 2021-06-15 ENCOUNTER — OFFICE VISIT (OUTPATIENT)
Dept: MEDICAL GROUP | Facility: PHYSICIAN GROUP | Age: 71
End: 2021-06-15
Payer: MEDICARE

## 2021-06-15 ENCOUNTER — HOSPITAL ENCOUNTER (OUTPATIENT)
Dept: LAB | Facility: MEDICAL CENTER | Age: 71
End: 2021-06-15
Attending: FAMILY MEDICINE
Payer: MEDICARE

## 2021-06-15 VITALS
OXYGEN SATURATION: 95 % | HEART RATE: 85 BPM | TEMPERATURE: 98.2 F | WEIGHT: 202 LBS | BODY MASS INDEX: 31.71 KG/M2 | HEIGHT: 67 IN | SYSTOLIC BLOOD PRESSURE: 136 MMHG | RESPIRATION RATE: 16 BRPM | DIASTOLIC BLOOD PRESSURE: 88 MMHG

## 2021-06-15 DIAGNOSIS — R03.0 ELEVATED BP WITHOUT DIAGNOSIS OF HYPERTENSION: ICD-10-CM

## 2021-06-15 DIAGNOSIS — E78.5 DYSLIPIDEMIA: ICD-10-CM

## 2021-06-15 DIAGNOSIS — D58.2 ELEVATED HEMOGLOBIN (HCC): ICD-10-CM

## 2021-06-15 DIAGNOSIS — D75.839 THROMBOCYTOSIS: ICD-10-CM

## 2021-06-15 DIAGNOSIS — L72.3 SEBACEOUS CYST: ICD-10-CM

## 2021-06-15 DIAGNOSIS — M67.431 GANGLION CYST OF DORSUM OF RIGHT WRIST: ICD-10-CM

## 2021-06-15 DIAGNOSIS — J42 CHRONIC BRONCHITIS, UNSPECIFIED CHRONIC BRONCHITIS TYPE (HCC): ICD-10-CM

## 2021-06-15 DIAGNOSIS — R06.02 SOB (SHORTNESS OF BREATH): ICD-10-CM

## 2021-06-15 DIAGNOSIS — D72.829 LEUKOCYTOSIS, UNSPECIFIED TYPE: ICD-10-CM

## 2021-06-15 PROCEDURE — 36415 COLL VENOUS BLD VENIPUNCTURE: CPT

## 2021-06-15 PROCEDURE — 80061 LIPID PANEL: CPT

## 2021-06-15 PROCEDURE — 80053 COMPREHEN METABOLIC PANEL: CPT

## 2021-06-15 PROCEDURE — 85025 COMPLETE CBC W/AUTO DIFF WBC: CPT

## 2021-06-15 PROCEDURE — 99214 OFFICE O/P EST MOD 30 MIN: CPT | Performed by: FAMILY MEDICINE

## 2021-06-15 RX ORDER — ALBUTEROL SULFATE 90 UG/1
AEROSOL, METERED RESPIRATORY (INHALATION)
Qty: 2 EACH | Refills: 3 | Status: SHIPPED | OUTPATIENT
Start: 2021-06-15 | End: 2021-11-09 | Stop reason: SDUPTHER

## 2021-06-15 ASSESSMENT — FIBROSIS 4 INDEX: FIB4 SCORE: 0.35

## 2021-06-15 NOTE — ASSESSMENT & PLAN NOTE
Left upper chest sebaceous cyst: he has been able to express cheesy material from it in the past.   Was smaller and now a growing larger.   Currently about the size of a jelly bean.

## 2021-06-15 NOTE — ASSESSMENT & PLAN NOTE
Large non tender ganglion cyst of right wrist. 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

## 2021-06-15 NOTE — ASSESSMENT & PLAN NOTE
Chronic condition, stable. He has an albuterol inhaler which he uses only once in a while if he is doing very strenuous work and gets out of breath.   He works hard ranching and lambing.

## 2021-06-15 NOTE — ASSESSMENT & PLAN NOTE
Improving, on hydrea, missed his dose today  He is going to make a follow up with his hematologist.   Cbc drawn today.

## 2021-06-15 NOTE — ASSESSMENT & PLAN NOTE
Elevated bp 142/86  Recheck at end of the visit:   If elevated recheck in a week   If persistent elevation will start lisinopril 10 mg

## 2021-06-15 NOTE — PROGRESS NOTES
Subjective:   Gregg Martinez is a 70 y.o. male here today for evaluation and management of:     Elevated BP without diagnosis of hypertension  Elevated bp 142/86  Recheck at end of the visit:   If elevated recheck in a week   If persistent elevation will start lisinopril 10 mg    COPD (chronic obstructive pulmonary disease) (CMS-HCC)  Chronic condition, stable. He has an albuterol inhaler which he uses only once in a while if he is doing very strenuous work and gets out of breath.   He works hard ranching and lambing.     Elevated hemoglobin (HCC)  Chronic condition, controlled on hydrea. He will make arrangements for transportation to go into his hematologist.       Thrombocytosis (HCC)  Improving, on hydrea, missed his dose today  He is going to make a follow up with his hematologist.   Cbc drawn today.       Leukocytosis  Improved on hydrea, has no fever,   Has a lymph node that is prominent and rubber anterior to left shoulder.   US ordered for review.     Sebaceous cyst  Left upper chest sebaceous cyst: he has been able to express cheesy material from it in the past.   Was smaller and now a growing larger.   Currently about the size of a jelly bean.     Ganglion cyst of dorsum of right wrist  Large non tender ganglion cyst of right wrist. 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 90 tablet 3   • hydroxyurea (HYDREA) 500 MG Cap Take 1 Cap by  "mouth every day. 90 Cap 3   • cyclobenzaprine (FLEXERIL) 10 MG Tab Take 1 Tab by mouth 3 times a day as needed for Muscle Spasms. 90 Tab 3     No current facility-administered medications for this visit.     He  has a past medical history of Chronic back pain, Lumbar stress fracture, and PNA (pneumonia). He also has no past medical history of Heart attack (HCC), Heart murmur, Hypertension, Seizure (HCC), Stroke (HCC), Type II or unspecified type diabetes mellitus without mention of complication, not stated as uncontrolled, or Unspecified asthma(493.90).    ROS  No chest pain, no shortness of breath, no abdominal pain       Objective:     /88   Pulse 85   Temp 36.8 °C (98.2 °F) (Temporal)   Resp 16   Ht 1.702 m (5' 7\")   Wt 91.6 kg (202 lb)   SpO2 95%  Body mass index is 31.64 kg/m².   Physical Exam:  Constitutional: Alert, no distress.  Skin: Warm, dry, good turgor, no rashes in visible areas.  Eye: Equal, round and reactive, conjunctiva clear, lids normal.  ENMT: Lips without lesions, good dentition, oropharynx clear.  Neck: Trachea midline, no masses, no thyromegaly. No cervical or supraclavicular lymphadenopathy  Respiratory: Unlabored respiratory effort, lungs clear to auscultation, no wheezes, no ronchi.  Cardiovascular: Normal S1, S2, no murmur, no edema.  Abdomen: Soft, non-tender, no masses, no hepatosplenomegaly.  Psych: Alert and oriented x3, normal affect and mood.        Assessment and Plan:   The following treatment plan was discussed    1. Elevated BP without diagnosis of hypertension  Recheck next visit.     2. Chronic bronchitis, unspecified chronic bronchitis type (HCC)  Stable.     3. Elevated hemoglobin (HCC)  improving    4. SOB (shortness of breath)    - albuterol (PROVENTIL HFA) 108 (90 Base) MCG/ACT Aero Soln inhalation aerosol; INHALE 2 PUFFS BY MOUTH EVERY 6 HOURS AS NEEDED FOR SHORTNESS OF BREATH  Dispense: 2 Each; Refill: 3    5. Thrombocytosis (HCC)  improving    6. " Leukocytosis, unspecified type  Improving  Continue hydrea  Continue follow up with heme/onc as recommended.       Followup: Return for recheck bp end of visit if >140/90 1 wk MA bp check, f/v as sched: cyst removal, poss gang cyst asp.

## 2021-06-15 NOTE — ASSESSMENT & PLAN NOTE
Chronic condition, controlled on hydrea. He will make arrangements for transportation to go into his hematologist.

## 2021-06-16 LAB
ALBUMIN SERPL BCP-MCNC: 4.8 G/DL (ref 3.2–4.9)
ALBUMIN/GLOB SERPL: 1.7 G/DL
ALP SERPL-CCNC: 114 U/L (ref 30–99)
ALT SERPL-CCNC: 23 U/L (ref 2–50)
ANION GAP SERPL CALC-SCNC: 15 MMOL/L (ref 7–16)
AST SERPL-CCNC: 21 U/L (ref 12–45)
BASOPHILS # BLD AUTO: 1.8 % (ref 0–1.8)
BASOPHILS # BLD: 0.21 K/UL (ref 0–0.12)
BILIRUB SERPL-MCNC: 0.9 MG/DL (ref 0.1–1.5)
BUN SERPL-MCNC: 19 MG/DL (ref 8–22)
CALCIUM SERPL-MCNC: 10.1 MG/DL (ref 8.5–10.5)
CHLORIDE SERPL-SCNC: 101 MMOL/L (ref 96–112)
CHOLEST SERPL-MCNC: 126 MG/DL (ref 100–199)
CO2 SERPL-SCNC: 22 MMOL/L (ref 20–33)
CREAT SERPL-MCNC: 0.93 MG/DL (ref 0.5–1.4)
EOSINOPHIL # BLD AUTO: 0.05 K/UL (ref 0–0.51)
EOSINOPHIL NFR BLD: 0.4 % (ref 0–6.9)
ERYTHROCYTE [DISTWIDTH] IN BLOOD BY AUTOMATED COUNT: 55.4 FL (ref 35.9–50)
FASTING STATUS PATIENT QL REPORTED: NORMAL
GLOBULIN SER CALC-MCNC: 2.9 G/DL (ref 1.9–3.5)
GLUCOSE SERPL-MCNC: 92 MG/DL (ref 65–99)
HCT VFR BLD AUTO: 51.9 % (ref 42–52)
HDLC SERPL-MCNC: 36 MG/DL
HGB BLD-MCNC: 16.7 G/DL (ref 14–18)
IMM GRANULOCYTES # BLD AUTO: 0.42 K/UL (ref 0–0.11)
IMM GRANULOCYTES NFR BLD AUTO: 3.5 % (ref 0–0.9)
LDLC SERPL CALC-MCNC: 55 MG/DL
LYMPHOCYTES # BLD AUTO: 3.17 K/UL (ref 1–4.8)
LYMPHOCYTES NFR BLD: 26.5 % (ref 22–41)
MCH RBC QN AUTO: 31.7 PG (ref 27–33)
MCHC RBC AUTO-ENTMCNC: 32.2 G/DL (ref 33.7–35.3)
MCV RBC AUTO: 98.7 FL (ref 81.4–97.8)
MONOCYTES # BLD AUTO: 0.88 K/UL (ref 0–0.85)
MONOCYTES NFR BLD AUTO: 7.4 % (ref 0–13.4)
NEUTROPHILS # BLD AUTO: 7.23 K/UL (ref 1.82–7.42)
NEUTROPHILS NFR BLD: 60.4 % (ref 44–72)
NRBC # BLD AUTO: 0 K/UL
NRBC BLD-RTO: 0 /100 WBC
PLATELET # BLD AUTO: 583 K/UL (ref 164–446)
PMV BLD AUTO: 9.1 FL (ref 9–12.9)
POTASSIUM SERPL-SCNC: 4.8 MMOL/L (ref 3.6–5.5)
PROT SERPL-MCNC: 7.7 G/DL (ref 6–8.2)
RBC # BLD AUTO: 5.26 M/UL (ref 4.7–6.1)
SODIUM SERPL-SCNC: 138 MMOL/L (ref 135–145)
TRIGL SERPL-MCNC: 176 MG/DL (ref 0–149)
WBC # BLD AUTO: 12 K/UL (ref 4.8–10.8)

## 2021-06-16 NOTE — RESULT ENCOUNTER NOTE
Released to Conor Escobedo,  Your labs show the triglyceride and platelet levels though still elevated are improving.   Dallas Alvares M.D.

## 2021-07-06 ENCOUNTER — OFFICE VISIT (OUTPATIENT)
Dept: URGENT CARE | Facility: PHYSICIAN GROUP | Age: 71
End: 2021-07-06
Payer: MEDICARE

## 2021-07-06 VITALS
RESPIRATION RATE: 16 BRPM | SYSTOLIC BLOOD PRESSURE: 122 MMHG | OXYGEN SATURATION: 92 % | HEIGHT: 67 IN | HEART RATE: 96 BPM | WEIGHT: 199 LBS | BODY MASS INDEX: 31.23 KG/M2 | DIASTOLIC BLOOD PRESSURE: 76 MMHG | TEMPERATURE: 97.6 F

## 2021-07-06 DIAGNOSIS — N50.89 TESTICLE SWELLING: ICD-10-CM

## 2021-07-06 DIAGNOSIS — N50.89 SCROTUM SWELLING: ICD-10-CM

## 2021-07-06 LAB
APPEARANCE UR: CLEAR
BILIRUB UR STRIP-MCNC: NORMAL MG/DL
COLOR UR AUTO: NORMAL
GLUCOSE UR STRIP.AUTO-MCNC: NEGATIVE MG/DL
KETONES UR STRIP.AUTO-MCNC: NEGATIVE MG/DL
LEUKOCYTE ESTERASE UR QL STRIP.AUTO: NEGATIVE
NITRITE UR QL STRIP.AUTO: NEGATIVE
PH UR STRIP.AUTO: 5.5 [PH] (ref 5–8)
PROT UR QL STRIP: 30 MG/DL
RBC UR QL AUTO: NORMAL
SP GR UR STRIP.AUTO: 1.03
UROBILINOGEN UR STRIP-MCNC: NEGATIVE MG/DL

## 2021-07-06 PROCEDURE — 99213 OFFICE O/P EST LOW 20 MIN: CPT | Performed by: FAMILY MEDICINE

## 2021-07-06 PROCEDURE — 81002 URINALYSIS NONAUTO W/O SCOPE: CPT | Performed by: FAMILY MEDICINE

## 2021-07-06 ASSESSMENT — FIBROSIS 4 INDEX: FIB4 SCORE: 0.52

## 2021-07-06 NOTE — PROGRESS NOTES
Chief Complaint:    Chief Complaint   Patient presents with   • Other     Swollen testicles, x3-4 days        History of Present Illness:    3-4 days ago, he started with redness of scrotum, swelling of scrotum, swelling of testicles, and soreness in these areas. Today, he is actually better compared to yesterday, the swelling is down, there is less redness, and he denies any soreness in these areas today. He is taking Hydroxyurea for Leukocytosis and Thrombocytosis and he read this medication can cause various areas to hurt. He stopped this medication for 2 days and now he reports his lower back pain feels better and since there is improvement in testicles and scrotum, perhaps Hydroxyurea caused the problem. He reports the scrotum was never indurated. He denies any urine symptoms. He reports taking Advil and aspirin daily.      Past Medical History:    Past Medical History:   Diagnosis Date   • Chronic back pain    • Lumbar stress fracture    • PNA (pneumonia)      Past Surgical History:    Past Surgical History:   Procedure Laterality Date   • KNEE ARTHROSCOPY     • OPEN REDUCTION       Social History:    Social History     Socioeconomic History   • Marital status: Single     Spouse name: Not on file   • Number of children: Not on file   • Years of education: Not on file   • Highest education level: Not on file   Occupational History   • Not on file   Tobacco Use   • Smoking status: Former Smoker     Packs/day: 0.00     Years: 35.00     Pack years: 0.00     Types: Cigarettes     Quit date: 2005     Years since quittin.2   • Smokeless tobacco: Never Used   Vaping Use   • Vaping Use: Never used   Substance and Sexual Activity   • Alcohol use: No     Alcohol/week: 0.0 oz   • Drug use: No   • Sexual activity: Not on file   Other Topics Concern   • Not on file   Social History Narrative   • Not on file     Social Determinants of Health     Financial Resource Strain:    • Difficulty of Paying Living Expenses:   "  Food Insecurity:    • Worried About Running Out of Food in the Last Year:    • Ran Out of Food in the Last Year:    Transportation Needs:    • Lack of Transportation (Medical):    • Lack of Transportation (Non-Medical):    Physical Activity:    • Days of Exercise per Week:    • Minutes of Exercise per Session:    Stress:    • Feeling of Stress :    Social Connections:    • Frequency of Communication with Friends and Family:    • Frequency of Social Gatherings with Friends and Family:    • Attends Hinduism Services:    • Active Member of Clubs or Organizations:    • Attends Club or Organization Meetings:    • Marital Status:    Intimate Partner Violence:    • Fear of Current or Ex-Partner:    • Emotionally Abused:    • Physically Abused:    • Sexually Abused:        Family History:    Family History   Problem Relation Age of Onset   • Dementia Mother    • Heart Disease Father    • Heart Attack Father    • Alcohol/Drug Father    • Arthritis Brother    • Cancer Neg Hx    • Diabetes Neg Hx    • Hypertension Neg Hx      Medications:    Current Outpatient Medications on File Prior to Visit   Medication Sig Dispense Refill   • albuterol (PROVENTIL HFA) 108 (90 Base) MCG/ACT Aero Soln inhalation aerosol INHALE 2 PUFFS BY MOUTH EVERY 6 HOURS AS NEEDED FOR SHORTNESS OF BREATH 2 Each 3   • atorvastatin (LIPITOR) 20 MG Tab Take 1 tablet by mouth every day. 90 tablet 3   • hydroxyurea (HYDREA) 500 MG Cap Take 1 Cap by mouth every day. 90 Cap 3     No current facility-administered medications on file prior to visit.     Allergies:    No Known Allergies      Vitals:    Vitals:    07/06/21 0946   BP: 122/76   Pulse: 96   Resp: 16   Temp: 36.4 °C (97.6 °F)   TempSrc: Temporal   SpO2: 92%   Weight: 90.3 kg (199 lb)   Height: 1.702 m (5' 7\")       Physical Exam:    Constitutional: Vital signs reviewed. Appears well-developed and well-nourished. No acute distress.   Eyes: Sclera white, conjunctivae clear.   ENT: External ears " normal. Hearing normal.  Neck: Neck supple.   Pulmonary/Chest: Respirations non-labored.    male: Scrotum has mild diffuse erythema inferior aspect, but scrotum is not tender to palpation and not indurated. Testes appear larger than most people, but they are smooth, no mass, and no tenderness to palpation. Penis without lesions or discharge.   Musculoskeletal: Normal gait. No muscular atrophy or weakness.  Neurological: Alert and oriented to person, place, and time. Muscle tone normal. Coordination normal. Light touch and sensation normal.   Skin: No rashes or lesions. Warm, dry, normal turgor.  Psychiatric: Normal mood and affect. Behavior is normal. Judgment and thought content normal.       Diagnostics:    POCT Urinalysis  Order: [id number]  Status:  Final result   Visible to patient:  Blanche (scheduled for 7/7/2021  8:09 AM) Next appt:  10/05/2021 [facility] 10:[facility] (Dallas Alvares M.D.) Dx:  Testicle swelling; Scrotum swelling   0 Result Notes   Ref Range & Units  9:53 AM 4 yr ago   POC Color Negative BROWN  Brown    POC Appearance Negative CLEAR  Cloudy    POC Leukocyte Esterase Negative NEGATIVE  Negative    POC Nitrites Negative NEGATIVE  Negative    POC Urobiligen Negative (0.2) mg/dL NEGATIVE  Negative    POC Protein Negative mg/dL 30  30    POC Urine PH 5.0 - 8.0 5.5  6.0    POC Blood Negative TRACE-INTACT  Large hemolyzed    POC Specific Gravity <1.005 - >1.030 1.030  1.020    POC Ketones Negative mg/dL NEGATIVE  Negative    POC Bilirubin Negative mg/dL SMALL  Negative    POC Glucose Negative mg/dL NEGATIVE  negative    Resulting [facility]          Specimen Collected: 07/06/21  9:53 AM Last Resulted: 07/06/21 10:09 AM             Assessment / Plan:    1. Testicle swelling  - POCT Urinalysis    2. Scrotum swelling  - POCT Urinalysis      Discussed with him DDX, management options, and risks, benefits, and alternatives to treatment plan agreed upon.    3-4 days ago, he started with  redness of scrotum, swelling of scrotum, swelling of testicles, and soreness in these areas. Today, he is actually better compared to yesterday, the swelling is down, there is less redness, and he denies any soreness in these areas today. He is taking Hydroxyurea for Leukocytosis and Thrombocytosis and he read this medication can cause various areas to hurt. He stopped this medication for 2 days and now he reports his lower back pain feels better and since there is improvement in testicles and scrotum, perhaps Hydroxyurea caused the problem. He reports the scrotum was never indurated. He denies any urine symptoms. He reports taking Advil and aspirin daily.    Since he is better today, there is no tenderness, he did not take any antibiotics to help this, very unlikely due to infection.    Suspect inflammation as cause of symptoms (but he is better today). He reports taking Advil and aspirin daily. Perhaps cause was due to Hydroxyurea medication which he stopped 2 days ago. He says he will restart this medication. Advised if problem worsens upon restarting medication, he should stop the medication and discuss with PCP about possible other medication to replace Hydroxyurea.    He understands and agrees and will return to urgent care if needed.

## 2021-07-08 DIAGNOSIS — M54.50 CHRONIC RIGHT-SIDED LOW BACK PAIN WITHOUT SCIATICA: ICD-10-CM

## 2021-07-08 DIAGNOSIS — G89.29 CHRONIC RIGHT-SIDED LOW BACK PAIN WITHOUT SCIATICA: ICD-10-CM

## 2021-07-08 RX ORDER — CYCLOBENZAPRINE HCL 10 MG
10 TABLET ORAL 3 TIMES DAILY PRN
Qty: 90 TABLET | Refills: 3 | Status: SHIPPED | OUTPATIENT
Start: 2021-07-08 | End: 2022-09-13

## 2021-08-07 NOTE — ASSESSMENT & PLAN NOTE
Improved on hydrea, has no fever,   Has a lymph node that is prominent and rubber anterior to left shoulder.   US ordered for review.    no assistive device

## 2021-10-15 DIAGNOSIS — D75.839 THROMBOCYTOSIS: ICD-10-CM

## 2021-10-15 DIAGNOSIS — D72.829 LEUKOCYTOSIS, UNSPECIFIED TYPE: ICD-10-CM

## 2021-10-15 RX ORDER — HYDROXYUREA 500 MG/1
500 CAPSULE ORAL DAILY
Qty: 90 CAPSULE | Refills: 3 | Status: SHIPPED | OUTPATIENT
Start: 2021-10-15 | End: 2021-11-09 | Stop reason: SDUPTHER

## 2021-10-15 NOTE — TELEPHONE ENCOUNTER
Received request via: Patient    Was the patient seen in the last year in this department? Yes    Does the patient have an active prescription (recently filled or refills available) for medication(s) requested? No    Requested Prescriptions     Pending Prescriptions Disp Refills   • hydroxyurea (HYDREA) 500 MG Cap 90 Capsule 3     Sig: Take 1 Capsule by mouth every day.

## 2021-11-09 ENCOUNTER — OFFICE VISIT (OUTPATIENT)
Dept: MEDICAL GROUP | Facility: PHYSICIAN GROUP | Age: 71
End: 2021-11-09
Payer: MEDICARE

## 2021-11-09 VITALS
SYSTOLIC BLOOD PRESSURE: 130 MMHG | BODY MASS INDEX: 32.8 KG/M2 | OXYGEN SATURATION: 98 % | RESPIRATION RATE: 16 BRPM | HEIGHT: 67 IN | TEMPERATURE: 98.3 F | HEART RATE: 67 BPM | DIASTOLIC BLOOD PRESSURE: 90 MMHG | WEIGHT: 209 LBS

## 2021-11-09 DIAGNOSIS — R03.0 ELEVATED BP WITHOUT DIAGNOSIS OF HYPERTENSION: ICD-10-CM

## 2021-11-09 DIAGNOSIS — E78.5 DYSLIPIDEMIA: ICD-10-CM

## 2021-11-09 DIAGNOSIS — R06.02 SOB (SHORTNESS OF BREATH): ICD-10-CM

## 2021-11-09 DIAGNOSIS — D75.839 THROMBOCYTOSIS: ICD-10-CM

## 2021-11-09 DIAGNOSIS — E66.9 OBESITY (BMI 30-39.9): ICD-10-CM

## 2021-11-09 DIAGNOSIS — Z23 NEED FOR VACCINATION: ICD-10-CM

## 2021-11-09 DIAGNOSIS — D72.829 LEUKOCYTOSIS, UNSPECIFIED TYPE: ICD-10-CM

## 2021-11-09 PROCEDURE — 99214 OFFICE O/P EST MOD 30 MIN: CPT | Mod: 25 | Performed by: FAMILY MEDICINE

## 2021-11-09 PROCEDURE — G0008 ADMIN INFLUENZA VIRUS VAC: HCPCS | Performed by: FAMILY MEDICINE

## 2021-11-09 PROCEDURE — 90662 IIV NO PRSV INCREASED AG IM: CPT | Performed by: FAMILY MEDICINE

## 2021-11-09 RX ORDER — HYDROXYUREA 500 MG/1
500 CAPSULE ORAL DAILY
Qty: 90 CAPSULE | Refills: 3 | Status: SHIPPED | OUTPATIENT
Start: 2021-11-09 | End: 2022-10-26

## 2021-11-09 RX ORDER — ALBUTEROL SULFATE 90 UG/1
AEROSOL, METERED RESPIRATORY (INHALATION)
Qty: 3 EACH | Refills: 3 | Status: SHIPPED | OUTPATIENT
Start: 2021-11-09 | End: 2022-03-23 | Stop reason: SDUPTHER

## 2021-11-09 ASSESSMENT — FIBROSIS 4 INDEX: FIB4 SCORE: 0.53

## 2021-11-09 NOTE — ASSESSMENT & PLAN NOTE
Improved on hydrea. He had an appointment with hematology and was told he is doing well on the hydrea 500mg daily.   [facility].

## 2021-11-09 NOTE — PROGRESS NOTES
"Subjective:   Gregg Martinez is a 71 y.o. male here today for evaluation and management of:     Thrombocytosis (HCC)  Improved on hydrea. He had an appointment with hematology and was told he is doing well on the hydrea 500mg daily.   He requests refill 90 days to [facility]. rx provided.            Current medicines (including changes today)  Current Outpatient Medications   Medication Sig Dispense Refill   • hydroxyurea (HYDREA) 500 MG Cap Take 1 Capsule by mouth every day. 90 Capsule 3   • albuterol (PROVENTIL HFA) 108 (90 Base) MCG/ACT Aero Soln inhalation aerosol INHALE 2 PUFFS BY MOUTH EVERY 6 HOURS AS NEEDED FOR SHORTNESS OF BREATH 3 Each 3   • cyclobenzaprine (FLEXERIL) 10 mg Tab Take 1 tablet by mouth 3 times a day as needed for Muscle Spasms. 90 tablet 3   • atorvastatin (LIPITOR) 20 MG Tab Take 1 tablet by mouth every day. 90 tablet 3     No current facility-administered medications for this visit.     He  has a past medical history of Chronic back pain, Lumbar stress fracture, and PNA (pneumonia). He also has no past medical history of Heart attack (HCC), Heart murmur, Hypertension, Seizure (HCC), Stroke (HCC), Type II or unspecified type diabetes mellitus without mention of complication, not stated as uncontrolled, or Unspecified asthma(493.90).    ROS  No chest pain, no shortness of breath, no abdominal pain       Objective:     /90   Pulse 67   Temp 36.8 °C (98.3 °F) (Temporal)   Resp 16   Ht 1.702 m (5' 7\")   Wt 94.8 kg (209 lb)   SpO2 98%  Body mass index is 32.73 kg/m².   Physical Exam:  Constitutional: Alert, no distress.  Skin: Warm, dry, good turgor, no rashes in visible areas.  Eye: Equal, round and reactive, conjunctiva clear, lids normal.  ENMT: Lips without lesions, good dentition, oropharynx clear.  Neck: Trachea midline, no masses, no thyromegaly. No cervical or supraclavicular lymphadenopathy  Respiratory: Unlabored respiratory effort, lungs clear to auscultation, no wheezes, no " alfonso.  Cardiovascular: Normal S1, S2, no murmur, no edema.  Abdomen: Soft, non-tender, no masses, no hepatosplenomegaly.  Psych: Alert and oriented x3, normal affect and mood.        Assessment and Plan:   The following treatment plan was discussed    1. Need for vaccination    - INFLUENZA VACCINE, HIGH DOSE (65+ ONLY)    2. Thrombocytosis    - hydroxyurea (HYDREA) 500 MG Cap; Take 1 Capsule by mouth every day.  Dispense: 90 Capsule; Refill: 3  - CBC WITH DIFFERENTIAL; Future    3. Leukocytosis, unspecified type    - hydroxyurea (HYDREA) 500 MG Cap; Take 1 Capsule by mouth every day.  Dispense: 90 Capsule; Refill: 3  - CBC WITH DIFFERENTIAL; Future    4. SOB (shortness of breath)    - albuterol (PROVENTIL HFA) 108 (90 Base) MCG/ACT Aero Soln inhalation aerosol; INHALE 2 PUFFS BY MOUTH EVERY 6 HOURS AS NEEDED FOR SHORTNESS OF BREATH  Dispense: 3 Each; Refill: 3    5. Dyslipidemia    - Comp Metabolic Panel; Future  - Lipid Profile; Future      Followup: Return in about 1 year (around 11/9/2022) for review labs, annuall wellness visit.

## 2022-03-23 DIAGNOSIS — R06.02 SOB (SHORTNESS OF BREATH): ICD-10-CM

## 2022-03-23 DIAGNOSIS — E78.5 DYSLIPIDEMIA: ICD-10-CM

## 2022-03-23 RX ORDER — ATORVASTATIN CALCIUM 20 MG/1
TABLET, FILM COATED ORAL
Qty: 90 TABLET | Refills: 3 | Status: SHIPPED | OUTPATIENT
Start: 2022-03-23 | End: 2022-09-28 | Stop reason: SDUPTHER

## 2022-03-23 RX ORDER — ALBUTEROL SULFATE 90 UG/1
AEROSOL, METERED RESPIRATORY (INHALATION)
Qty: 3 EACH | Refills: 3 | Status: SHIPPED | OUTPATIENT
Start: 2022-03-23

## 2022-03-23 NOTE — TELEPHONE ENCOUNTER
Received request via: Patient    Was the patient seen in the last year in this department? Yes  11/9/21    Does the patient have an active prescription (recently filled or refills available) for medication(s) requested? No    Requesting refill for Albuterol Inhaler be sent to:    [facility] in [place], [place]

## 2022-05-24 ENCOUNTER — OFFICE VISIT (OUTPATIENT)
Dept: URGENT CARE | Facility: PHYSICIAN GROUP | Age: 72
End: 2022-05-24
Payer: MEDICARE

## 2022-05-24 VITALS
RESPIRATION RATE: 16 BRPM | BODY MASS INDEX: 32.62 KG/M2 | DIASTOLIC BLOOD PRESSURE: 76 MMHG | TEMPERATURE: 98.8 F | SYSTOLIC BLOOD PRESSURE: 136 MMHG | WEIGHT: 207.8 LBS | HEIGHT: 67 IN | OXYGEN SATURATION: 92 % | HEART RATE: 88 BPM

## 2022-05-24 DIAGNOSIS — R31.9 HEMATURIA, UNSPECIFIED TYPE: ICD-10-CM
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
## 2022-05-24 PROCEDURE — 99213 OFFICE O/P EST LOW 20 MIN: CPT | Performed by: PHYSICIAN ASSISTANT

## 2022-05-24 ASSESSMENT — FIBROSIS 4 INDEX: FIB4 SCORE: 0.53

## 2022-05-24 NOTE — PROGRESS NOTES
"Subjective:   Gregg Martinez is a 71 y.o. male who presents for Groin Injury (Kicked near groin by a lamb x2-3 days ago, urinating blood pt states )      HPI  Patient is a 71-year-old male who presents to clinic with complaints of groin injury onset 2 days ago.  He states he was kicked by a lamb just above his genitals over the bladder.  He states the last couple days he has noticed blood in his urine.  The blood in his urine has significantly improved and reduced.  He complains of no pain.  He complains of no swelling or bruising.  He denies any fever, chills, abdominal pain, nausea, vomiting, urinary urgency or frequency.  He denies any pain or injury to his penis or testicles.         Medications:    • albuterol Aers  • atorvastatin Tabs  • cyclobenzaprine Tabs  • hydroxyurea Caps    Allergies: Patient has no known allergies.    Problem List: Gregg Martinez does not have any pertinent problems on file.    Surgical History:  Past Surgical History:   Procedure Laterality Date   • KNEE ARTHROSCOPY     • OPEN REDUCTION         Past Social Hx: Gregg Martinez  reports that he quit smoking about 17 years ago. His smoking use included cigarettes. He smoked 0.00 packs per day for 35.00 years. He has never used smokeless tobacco. He reports that he does not drink alcohol and does not use drugs.     Past Family Hx:  Gregg Martinez family history includes Alcohol/Drug in his father; Arthritis in his brother; Dementia in his mother; Heart Attack in his father; Heart Disease in his father.     Problem list, medications, and allergies reviewed by myself today in Epic.     Objective:     /76   Pulse 88   Temp 37.1 °C (98.8 °F) (Temporal)   Resp 16   Ht 1.702 m (5' 7\")   Wt 94.3 kg (207 lb 12.8 oz)   SpO2 92%   BMI 32.55 kg/m²     Physical Exam  Vitals reviewed.   Constitutional:       General: He is not in acute distress.     Appearance: Normal appearance. He is not ill-appearing or toxic-appearing.   HENT:      Head: " Normocephalic.      Mouth/Throat:      Mouth: Mucous membranes are moist.      Pharynx: Oropharynx is clear.   Eyes:      Conjunctiva/sclera: Conjunctivae normal.      Pupils: Pupils are equal, round, and reactive to light.   Cardiovascular:      Rate and Rhythm: Normal rate and regular rhythm.      Heart sounds: Normal heart sounds.   Pulmonary:      Effort: Pulmonary effort is normal. No respiratory distress.      Breath sounds: Normal breath sounds. No wheezing, rhonchi or rales.   Abdominal:      General: Abdomen is flat. Bowel sounds are normal. There is no distension.      Palpations: Abdomen is soft. There is no hepatomegaly, splenomegaly or mass.      Tenderness: There is no abdominal tenderness. There is no guarding or rebound.      Comments: Negative ecchymosis or edema.    Musculoskeletal:      Cervical back: Neck supple.   Lymphadenopathy:      Cervical: No cervical adenopathy.   Skin:     General: Skin is warm and dry.   Neurological:      General: No focal deficit present.      Mental Status: He is alert and oriented to person, place, and time.   Psychiatric:         Mood and Affect: Mood normal.         Behavior: Behavior normal.         Diagnosis and associated orders:     1. Hematuria, unspecified type    2. Injury caused by animal, initial encounter       Comments/MDM:     • This is a pleasant 71-year-old male in no acute process who presents to clinic with complaints of hematuria after being kicked by a lamb just above his genitals over his bladder. He denies any pain. The blood in his urine is clearing up.  He is unable to provide a urine sample for us today.  Recommend waiting 10 minutes and drinking fluids and he states he still unable to provide a urine.  He is well-appearing in no acute distress.  He has normal BP.  I have low suspicions for acute emergent intra-abdominal pathology.  Recommend symptomatic supportive care at this time and close monitoring and watchful waiting.  If any  increased hematuria, pain, urinary issues, bruising, swelling or any other concerns, recommend presenting to the ER.       I personally reviewed prior external notes and test results pertinent to today's visit. Supportive care, natural history, differential diagnoses, and indications for immediate follow-up discussed. Patient expresses understanding and agrees to plan. Patient denies any other questions or concerns.     Follow-up with the primary care physician for recheck, reevaluation, and consideration of further management.    Please note that this dictation was created using voice recognition software. I have made a reasonable attempt to correct obvious errors, but I expect that there are errors of grammar and possibly content that I did not discover before finalizing the note.    This note was electronically signed by Wilian Alexandra PA-C

## 2022-09-13 DIAGNOSIS — G89.29 CHRONIC RIGHT-SIDED LOW BACK PAIN WITHOUT SCIATICA: ICD-10-CM

## 2022-09-13 DIAGNOSIS — M54.50 CHRONIC RIGHT-SIDED LOW BACK PAIN WITHOUT SCIATICA: ICD-10-CM

## 2022-09-13 RX ORDER — CYCLOBENZAPRINE HCL 10 MG
TABLET ORAL
Qty: 90 TABLET | Refills: 3 | Status: SHIPPED | OUTPATIENT
Start: 2022-09-13 | End: 2022-10-26

## 2022-09-13 NOTE — TELEPHONE ENCOUNTER
Received request via: Pharmacy    Was the patient seen in the last year in this department? Yes    Does the patient have an active prescription (recently filled or refills available) for medication(s) requested? No    Last OV 11/09/2021  Next OV 11/8/2022

## 2022-09-28 DIAGNOSIS — E78.5 DYSLIPIDEMIA: ICD-10-CM

## 2022-09-28 RX ORDER — ATORVASTATIN CALCIUM 20 MG/1
20 TABLET, FILM COATED ORAL DAILY
Qty: 90 TABLET | Refills: 0 | Status: SHIPPED | OUTPATIENT
Start: 2022-09-28

## 2022-09-28 NOTE — TELEPHONE ENCOUNTER
I refilled them for 3 months but it looks like he canceled his appointment with you due to insurance reasons.  He is due for labs

## 2022-10-26 ENCOUNTER — APPOINTMENT (OUTPATIENT)
Dept: RADIOLOGY | Facility: MEDICAL CENTER | Age: 72
DRG: 668 | End: 2022-10-26
Attending: EMERGENCY MEDICINE
Payer: MEDICARE

## 2022-10-26 ENCOUNTER — APPOINTMENT (OUTPATIENT)
Dept: RADIOLOGY | Facility: MEDICAL CENTER | Age: 72
DRG: 668 | End: 2022-10-26
Attending: STUDENT IN AN ORGANIZED HEALTH CARE EDUCATION/TRAINING PROGRAM
Payer: MEDICARE

## 2022-10-26 ENCOUNTER — HOSPITAL ENCOUNTER (INPATIENT)
Facility: MEDICAL CENTER | Age: 72
LOS: 35 days | DRG: 668 | End: 2022-11-30
Attending: EMERGENCY MEDICINE | Admitting: STUDENT IN AN ORGANIZED HEALTH CARE EDUCATION/TRAINING PROGRAM
Payer: MEDICARE

## 2022-10-26 DIAGNOSIS — C67.9 UROTHELIAL CARCINOMA OF BLADDER WITHOUT INVASION OF MUSCLE (HCC): ICD-10-CM

## 2022-10-26 DIAGNOSIS — D69.6 THROMBOCYTOPENIA (HCC): ICD-10-CM

## 2022-10-26 DIAGNOSIS — D75.839 THROMBOCYTOSIS: ICD-10-CM

## 2022-10-26 DIAGNOSIS — D50.8 IRON DEFICIENCY ANEMIA SECONDARY TO INADEQUATE DIETARY IRON INTAKE: ICD-10-CM

## 2022-10-26 PROBLEM — N39.0 UTI (URINARY TRACT INFECTION): Status: ACTIVE | Noted: 2022-10-26

## 2022-10-26 PROBLEM — N32.89 BLADDER MASS: Status: ACTIVE | Noted: 2022-10-26

## 2022-10-26 PROBLEM — R31.9 HEMATURIA: Status: ACTIVE | Noted: 2022-10-26

## 2022-10-26 PROBLEM — D62 ACUTE BLOOD LOSS ANEMIA: Status: ACTIVE | Noted: 2022-10-26

## 2022-10-26 PROBLEM — D50.9 IRON DEFICIENCY ANEMIA: Status: ACTIVE | Noted: 2022-10-26

## 2022-10-26 LAB
ABO + RH BLD: NORMAL
ABO GROUP BLD: NORMAL
ALBUMIN SERPL BCP-MCNC: 3.7 G/DL (ref 3.2–4.9)
ALBUMIN/GLOB SERPL: 1.5 G/DL
ALP SERPL-CCNC: 120 U/L (ref 30–99)
ALT SERPL-CCNC: 7 U/L (ref 2–50)
ANION GAP SERPL CALC-SCNC: 10 MMOL/L (ref 7–16)
ANISOCYTOSIS BLD QL SMEAR: ABNORMAL
APPEARANCE UR: ABNORMAL
AST SERPL-CCNC: 10 U/L (ref 12–45)
BACTERIA #/AREA URNS HPF: NEGATIVE /HPF
BARCODED ABORH UBTYP: 5100
BARCODED ABORH UBTYP: 5100
BARCODED PRD CODE UBPRD: NORMAL
BARCODED PRD CODE UBPRD: NORMAL
BARCODED UNIT NUM UBUNT: NORMAL
BARCODED UNIT NUM UBUNT: NORMAL
BASOPHILS # BLD AUTO: 1.8 % (ref 0–1.8)
BASOPHILS # BLD: 0.32 K/UL (ref 0–0.12)
BILIRUB SERPL-MCNC: 1 MG/DL (ref 0.1–1.5)
BILIRUB UR QL STRIP.AUTO: ABNORMAL
BLD GP AB SCN SERPL QL: NORMAL
BUN SERPL-MCNC: 14 MG/DL (ref 8–22)
CALCIUM SERPL-MCNC: 8.7 MG/DL (ref 8.5–10.5)
CHLORIDE SERPL-SCNC: 100 MMOL/L (ref 96–112)
CK SERPL-CCNC: 22 U/L (ref 0–154)
CO2 SERPL-SCNC: 26 MMOL/L (ref 20–33)
COLOR UR: ABNORMAL
COMPONENT R 8504R: NORMAL
COMPONENT R 8504R: NORMAL
CORTIS SERPL-MCNC: 12.2 UG/DL (ref 0–23)
CREAT SERPL-MCNC: 0.69 MG/DL (ref 0.5–1.4)
CRP SERPL HS-MCNC: 6.85 MG/DL (ref 0–0.75)
EKG IMPRESSION: NORMAL
EOSINOPHIL # BLD AUTO: 0.32 K/UL (ref 0–0.51)
EOSINOPHIL NFR BLD: 1.8 % (ref 0–6.9)
EPI CELLS #/AREA URNS HPF: NEGATIVE /HPF
ERYTHROCYTE [DISTWIDTH] IN BLOOD BY AUTOMATED COUNT: 58.5 FL (ref 35.9–50)
FERRITIN SERPL-MCNC: 754 NG/ML (ref 22–322)
FOLATE SERPL-MCNC: 15.2 NG/ML
GFR SERPLBLD CREATININE-BSD FMLA CKD-EPI: 99 ML/MIN/1.73 M 2
GLOBULIN SER CALC-MCNC: 2.4 G/DL (ref 1.9–3.5)
GLUCOSE SERPL-MCNC: 102 MG/DL (ref 65–99)
GLUCOSE UR STRIP.AUTO-MCNC: NEGATIVE MG/DL
HCT VFR BLD AUTO: 22.7 % (ref 42–52)
HCT VFR BLD AUTO: 22.9 % (ref 42–52)
HGB BLD-MCNC: 6.5 G/DL (ref 14–18)
HGB BLD-MCNC: 6.6 G/DL (ref 14–18)
HGB RETIC QN AUTO: 22.1 PG/CELL (ref 29–35)
IMM RETICS NFR: 26.9 % (ref 9.3–17.4)
INR PPP: 1.17 (ref 0.87–1.13)
IRON SATN MFR SERPL: 12 % (ref 15–55)
IRON SERPL-MCNC: 35 UG/DL (ref 50–180)
KETONES UR STRIP.AUTO-MCNC: NEGATIVE MG/DL
LACTATE SERPL-SCNC: 1.8 MMOL/L (ref 0.5–2)
LDH SERPL L TO P-CCNC: 1212 U/L (ref 107–266)
LEUKOCYTE ESTERASE UR QL STRIP.AUTO: ABNORMAL
LIPASE SERPL-CCNC: 6 U/L (ref 11–82)
LYMPHOCYTES # BLD AUTO: 6.44 K/UL (ref 1–4.8)
LYMPHOCYTES NFR BLD: 36 % (ref 22–41)
MANUAL DIFF BLD: NORMAL
MCH RBC QN AUTO: 21.5 PG (ref 27–33)
MCHC RBC AUTO-ENTMCNC: 28.4 G/DL (ref 33.7–35.3)
MCV RBC AUTO: 75.6 FL (ref 81.4–97.8)
METAMYELOCYTES NFR BLD MANUAL: 0.9 %
MICRO URNS: ABNORMAL
MICROCYTES BLD QL SMEAR: ABNORMAL
MONOCYTES # BLD AUTO: 1.61 K/UL (ref 0–0.85)
MONOCYTES NFR BLD AUTO: 9 % (ref 0–13.4)
MORPHOLOGY BLD-IMP: NORMAL
MYELOCYTES NFR BLD MANUAL: 6.3 %
NEUTROPHILS # BLD AUTO: 7.91 K/UL (ref 1.82–7.42)
NEUTROPHILS NFR BLD: 44.2 % (ref 44–72)
NITRITE UR QL STRIP.AUTO: NEGATIVE
NRBC # BLD AUTO: 10.55 K/UL
NRBC BLD-RTO: 59.1 /100 WBC
PH UR STRIP.AUTO: 6 [PH] (ref 5–8)
PLATELET # BLD AUTO: 79 K/UL (ref 164–446)
PLATELET BLD QL SMEAR: NORMAL
POIKILOCYTOSIS BLD QL SMEAR: NORMAL
POTASSIUM SERPL-SCNC: 4.4 MMOL/L (ref 3.6–5.5)
PROCALCITONIN SERPL-MCNC: 1.84 NG/ML
PRODUCT TYPE UPROD: NORMAL
PRODUCT TYPE UPROD: NORMAL
PROT SERPL-MCNC: 6.1 G/DL (ref 6–8.2)
PROT UR QL STRIP: 30 MG/DL
PROTHROMBIN TIME: 14.7 SEC (ref 12–14.6)
RBC # BLD AUTO: 3.03 M/UL (ref 4.7–6.1)
RBC # URNS HPF: >150 /HPF
RBC BLD AUTO: PRESENT
RBC UR QL AUTO: ABNORMAL
RETICS # AUTO: 0.06 M/UL (ref 0.04–0.06)
RETICS/RBC NFR: 2.3 % (ref 0.8–2.1)
RH BLD: NORMAL
SODIUM SERPL-SCNC: 136 MMOL/L (ref 135–145)
SP GR UR STRIP.AUTO: 1.02
STOMATOCYTES BLD QL SMEAR: NORMAL
TIBC SERPL-MCNC: 302 UG/DL (ref 250–450)
TRANSFERRIN SERPL-MCNC: 249 MG/DL (ref 200–370)
TROPONIN T SERPL-MCNC: 14 NG/L (ref 6–19)
UIBC SERPL-MCNC: 267 UG/DL (ref 110–370)
UNIT STATUS USTAT: NORMAL
UNIT STATUS USTAT: NORMAL
UROBILINOGEN UR STRIP.AUTO-MCNC: 2 MG/DL
VIT B12 SERPL-MCNC: 1469 PG/ML (ref 211–911)
WBC # BLD AUTO: 17.9 K/UL (ref 4.8–10.8)
WBC #/AREA URNS HPF: ABNORMAL /HPF
YEAST BUDDING URNS QL: PRESENT /HPF

## 2022-10-26 PROCEDURE — 36415 COLL VENOUS BLD VENIPUNCTURE: CPT

## 2022-10-26 PROCEDURE — 30233N1 TRANSFUSION OF NONAUTOLOGOUS RED BLOOD CELLS INTO PERIPHERAL VEIN, PERCUTANEOUS APPROACH: ICD-10-PCS | Performed by: EMERGENCY MEDICINE

## 2022-10-26 PROCEDURE — 770006 HCHG ROOM/CARE - MED/SURG/GYN SEMI*

## 2022-10-26 PROCEDURE — 85007 BL SMEAR W/DIFF WBC COUNT: CPT

## 2022-10-26 PROCEDURE — 84484 ASSAY OF TROPONIN QUANT: CPT

## 2022-10-26 PROCEDURE — 83690 ASSAY OF LIPASE: CPT

## 2022-10-26 PROCEDURE — 86140 C-REACTIVE PROTEIN: CPT

## 2022-10-26 PROCEDURE — A9270 NON-COVERED ITEM OR SERVICE: HCPCS | Performed by: STUDENT IN AN ORGANIZED HEALTH CARE EDUCATION/TRAINING PROGRAM

## 2022-10-26 PROCEDURE — 74177 CT ABD & PELVIS W/CONTRAST: CPT

## 2022-10-26 PROCEDURE — 85046 RETICYTE/HGB CONCENTRATE: CPT

## 2022-10-26 PROCEDURE — 85018 HEMOGLOBIN: CPT

## 2022-10-26 PROCEDURE — A9270 NON-COVERED ITEM OR SERVICE: HCPCS

## 2022-10-26 PROCEDURE — 81001 URINALYSIS AUTO W/SCOPE: CPT

## 2022-10-26 PROCEDURE — 80053 COMPREHEN METABOLIC PANEL: CPT

## 2022-10-26 PROCEDURE — C9113 INJ PANTOPRAZOLE SODIUM, VIA: HCPCS | Performed by: STUDENT IN AN ORGANIZED HEALTH CARE EDUCATION/TRAINING PROGRAM

## 2022-10-26 PROCEDURE — 84466 ASSAY OF TRANSFERRIN: CPT

## 2022-10-26 PROCEDURE — 83550 IRON BINDING TEST: CPT

## 2022-10-26 PROCEDURE — 86850 RBC ANTIBODY SCREEN: CPT

## 2022-10-26 PROCEDURE — 83615 LACTATE (LD) (LDH) ENZYME: CPT

## 2022-10-26 PROCEDURE — 86901 BLOOD TYPING SEROLOGIC RH(D): CPT

## 2022-10-26 PROCEDURE — 86900 BLOOD TYPING SEROLOGIC ABO: CPT

## 2022-10-26 PROCEDURE — 83605 ASSAY OF LACTIC ACID: CPT

## 2022-10-26 PROCEDURE — 82607 VITAMIN B-12: CPT

## 2022-10-26 PROCEDURE — 85610 PROTHROMBIN TIME: CPT

## 2022-10-26 PROCEDURE — 85014 HEMATOCRIT: CPT

## 2022-10-26 PROCEDURE — 82746 ASSAY OF FOLIC ACID SERUM: CPT

## 2022-10-26 PROCEDURE — 93005 ELECTROCARDIOGRAM TRACING: CPT | Performed by: EMERGENCY MEDICINE

## 2022-10-26 PROCEDURE — 82533 TOTAL CORTISOL: CPT

## 2022-10-26 PROCEDURE — 700105 HCHG RX REV CODE 258: Performed by: STUDENT IN AN ORGANIZED HEALTH CARE EDUCATION/TRAINING PROGRAM

## 2022-10-26 PROCEDURE — 36430 TRANSFUSION BLD/BLD COMPNT: CPT

## 2022-10-26 PROCEDURE — 700117 HCHG RX CONTRAST REV CODE 255: Performed by: EMERGENCY MEDICINE

## 2022-10-26 PROCEDURE — 83010 ASSAY OF HAPTOGLOBIN QUANT: CPT

## 2022-10-26 PROCEDURE — 700111 HCHG RX REV CODE 636 W/ 250 OVERRIDE (IP): Performed by: STUDENT IN AN ORGANIZED HEALTH CARE EDUCATION/TRAINING PROGRAM

## 2022-10-26 PROCEDURE — 700102 HCHG RX REV CODE 250 W/ 637 OVERRIDE(OP): Performed by: STUDENT IN AN ORGANIZED HEALTH CARE EDUCATION/TRAINING PROGRAM

## 2022-10-26 PROCEDURE — 71045 X-RAY EXAM CHEST 1 VIEW: CPT

## 2022-10-26 PROCEDURE — 87040 BLOOD CULTURE FOR BACTERIA: CPT

## 2022-10-26 PROCEDURE — 700102 HCHG RX REV CODE 250 W/ 637 OVERRIDE(OP)

## 2022-10-26 PROCEDURE — 87086 URINE CULTURE/COLONY COUNT: CPT

## 2022-10-26 PROCEDURE — 86923 COMPATIBILITY TEST ELECTRIC: CPT | Mod: 91

## 2022-10-26 PROCEDURE — 85025 COMPLETE CBC W/AUTO DIFF WBC: CPT

## 2022-10-26 PROCEDURE — 82728 ASSAY OF FERRITIN: CPT

## 2022-10-26 PROCEDURE — 96365 THER/PROPH/DIAG IV INF INIT: CPT

## 2022-10-26 PROCEDURE — 83540 ASSAY OF IRON: CPT

## 2022-10-26 PROCEDURE — 99223 1ST HOSP IP/OBS HIGH 75: CPT | Mod: AI | Performed by: STUDENT IN AN ORGANIZED HEALTH CARE EDUCATION/TRAINING PROGRAM

## 2022-10-26 PROCEDURE — 84145 PROCALCITONIN (PCT): CPT

## 2022-10-26 PROCEDURE — P9016 RBC LEUKOCYTES REDUCED: HCPCS

## 2022-10-26 PROCEDURE — 99285 EMERGENCY DEPT VISIT HI MDM: CPT

## 2022-10-26 PROCEDURE — 82550 ASSAY OF CK (CPK): CPT

## 2022-10-26 RX ORDER — GUAIFENESIN/DEXTROMETHORPHAN 100-10MG/5
10 SYRUP ORAL EVERY 6 HOURS PRN
Status: DISCONTINUED | OUTPATIENT
Start: 2022-10-26 | End: 2022-11-26

## 2022-10-26 RX ORDER — VITAMIN B COMPLEX
1000 TABLET ORAL DAILY
COMMUNITY

## 2022-10-26 RX ORDER — IBUPROFEN 200 MG
200 TABLET ORAL DAILY
Status: ON HOLD | COMMUNITY
End: 2022-11-30

## 2022-10-26 RX ORDER — PANTOPRAZOLE SODIUM 40 MG/10ML
40 INJECTION, POWDER, LYOPHILIZED, FOR SOLUTION INTRAVENOUS 2 TIMES DAILY
Status: DISCONTINUED | OUTPATIENT
Start: 2022-10-26 | End: 2022-10-28

## 2022-10-26 RX ORDER — LABETALOL HYDROCHLORIDE 5 MG/ML
10 INJECTION, SOLUTION INTRAVENOUS EVERY 4 HOURS PRN
Status: DISCONTINUED | OUTPATIENT
Start: 2022-10-26 | End: 2022-11-26

## 2022-10-26 RX ORDER — ONDANSETRON 2 MG/ML
4 INJECTION INTRAMUSCULAR; INTRAVENOUS EVERY 4 HOURS PRN
Status: DISCONTINUED | OUTPATIENT
Start: 2022-10-26 | End: 2022-11-30 | Stop reason: HOSPADM

## 2022-10-26 RX ORDER — CHOLECALCIFEROL (VITAMIN D3) 125 MCG
5 CAPSULE ORAL ONCE
Status: COMPLETED | OUTPATIENT
Start: 2022-10-26 | End: 2022-10-26

## 2022-10-26 RX ORDER — ATORVASTATIN CALCIUM 20 MG/1
20 TABLET, FILM COATED ORAL EVERY EVENING
Status: DISCONTINUED | OUTPATIENT
Start: 2022-10-26 | End: 2022-11-30 | Stop reason: HOSPADM

## 2022-10-26 RX ORDER — MORPHINE SULFATE 4 MG/ML
4 INJECTION INTRAVENOUS
Status: DISCONTINUED | OUTPATIENT
Start: 2022-10-26 | End: 2022-11-01

## 2022-10-26 RX ORDER — ALBUTEROL SULFATE 90 UG/1
2 AEROSOL, METERED RESPIRATORY (INHALATION) EVERY 6 HOURS PRN
Status: DISCONTINUED | OUTPATIENT
Start: 2022-10-26 | End: 2022-11-26

## 2022-10-26 RX ORDER — GAUZE BANDAGE 2" X 2"
100 BANDAGE TOPICAL 2 TIMES DAILY
Status: DISCONTINUED | OUTPATIENT
Start: 2022-10-26 | End: 2022-11-30 | Stop reason: HOSPADM

## 2022-10-26 RX ORDER — ACETAMINOPHEN 325 MG/1
650 TABLET ORAL EVERY 6 HOURS PRN
Status: DISCONTINUED | OUTPATIENT
Start: 2022-10-26 | End: 2022-11-30 | Stop reason: HOSPADM

## 2022-10-26 RX ORDER — POLYETHYLENE GLYCOL 3350 17 G/17G
1 POWDER, FOR SOLUTION ORAL
Status: DISCONTINUED | OUTPATIENT
Start: 2022-10-26 | End: 2022-11-30 | Stop reason: HOSPADM

## 2022-10-26 RX ORDER — CHOLECALCIFEROL (VITAMIN D3) 125 MCG
1000 CAPSULE ORAL DAILY
Status: DISCONTINUED | OUTPATIENT
Start: 2022-10-26 | End: 2022-11-30 | Stop reason: HOSPADM

## 2022-10-26 RX ORDER — OXYCODONE HYDROCHLORIDE 10 MG/1
10 TABLET ORAL
Status: DISCONTINUED | OUTPATIENT
Start: 2022-10-26 | End: 2022-11-01

## 2022-10-26 RX ORDER — FOLIC ACID 1 MG/1
1 TABLET ORAL DAILY
Status: DISCONTINUED | OUTPATIENT
Start: 2022-10-27 | End: 2022-11-30 | Stop reason: HOSPADM

## 2022-10-26 RX ORDER — AMOXICILLIN 250 MG
2 CAPSULE ORAL 2 TIMES DAILY
Status: DISCONTINUED | OUTPATIENT
Start: 2022-10-26 | End: 2022-11-30 | Stop reason: HOSPADM

## 2022-10-26 RX ORDER — LANOLIN ALCOHOL/MO/W.PET/CERES
400 CREAM (GRAM) TOPICAL DAILY
Status: ON HOLD | COMMUNITY
End: 2022-11-30

## 2022-10-26 RX ORDER — VITAMIN B COMPLEX
2000 TABLET ORAL DAILY
Status: DISCONTINUED | OUTPATIENT
Start: 2022-10-27 | End: 2022-11-30 | Stop reason: HOSPADM

## 2022-10-26 RX ORDER — BISACODYL 10 MG
10 SUPPOSITORY, RECTAL RECTAL
Status: DISCONTINUED | OUTPATIENT
Start: 2022-10-26 | End: 2022-11-30 | Stop reason: HOSPADM

## 2022-10-26 RX ORDER — ONDANSETRON 4 MG/1
4 TABLET, ORALLY DISINTEGRATING ORAL EVERY 4 HOURS PRN
Status: DISCONTINUED | OUTPATIENT
Start: 2022-10-26 | End: 2022-11-30 | Stop reason: HOSPADM

## 2022-10-26 RX ORDER — UREA 10 %
800 LOTION (ML) TOPICAL DAILY
COMMUNITY

## 2022-10-26 RX ORDER — OXYCODONE HYDROCHLORIDE 5 MG/1
5 TABLET ORAL
Status: DISCONTINUED | OUTPATIENT
Start: 2022-10-26 | End: 2022-11-01

## 2022-10-26 RX ADMIN — CEFAZOLIN 2 G: 2 INJECTION, POWDER, FOR SOLUTION INTRAMUSCULAR; INTRAVENOUS at 15:55

## 2022-10-26 RX ADMIN — ATORVASTATIN CALCIUM 20 MG: 20 TABLET, FILM COATED ORAL at 20:01

## 2022-10-26 RX ADMIN — Medication 5 MG: at 21:25

## 2022-10-26 RX ADMIN — THIAMINE HCL TAB 100 MG 100 MG: 100 TAB at 20:01

## 2022-10-26 RX ADMIN — CYANOCOBALAMIN TAB 500 MCG 1000 MCG: 500 TAB at 15:56

## 2022-10-26 RX ADMIN — DOCUSATE SODIUM 50 MG AND SENNOSIDES 8.6 MG 2 TABLET: 8.6; 5 TABLET, FILM COATED ORAL at 20:01

## 2022-10-26 RX ADMIN — PANTOPRAZOLE SODIUM 40 MG: 40 INJECTION, POWDER, FOR SOLUTION INTRAVENOUS at 13:54

## 2022-10-26 RX ADMIN — CEFAZOLIN 2 G: 2 INJECTION, POWDER, FOR SOLUTION INTRAMUSCULAR; INTRAVENOUS at 21:27

## 2022-10-26 RX ADMIN — SODIUM CHLORIDE 250 MG: 9 INJECTION, SOLUTION INTRAVENOUS at 20:00

## 2022-10-26 RX ADMIN — IOHEXOL 100 ML: 350 INJECTION, SOLUTION INTRAVENOUS at 11:35

## 2022-10-26 ASSESSMENT — COGNITIVE AND FUNCTIONAL STATUS - GENERAL
SUGGESTED CMS G CODE MODIFIER MOBILITY: CH
SUGGESTED CMS G CODE MODIFIER DAILY ACTIVITY: CH
DAILY ACTIVITIY SCORE: 24
MOBILITY SCORE: 24

## 2022-10-26 ASSESSMENT — ENCOUNTER SYMPTOMS
HEARTBURN: 0
BLURRED VISION: 0
DEPRESSION: 0
NAUSEA: 0
CHILLS: 0
FEVER: 0
ABDOMINAL PAIN: 0
WEAKNESS: 1
BRUISES/BLEEDS EASILY: 0
DIZZINESS: 0
HEADACHES: 0
COUGH: 0
SHORTNESS OF BREATH: 0
BLOOD IN STOOL: 0
FLANK PAIN: 0
MYALGIAS: 0
VOMITING: 0
WEIGHT LOSS: 1
FOCAL WEAKNESS: 0
DOUBLE VISION: 0

## 2022-10-26 ASSESSMENT — LIFESTYLE VARIABLES
ON A TYPICAL DAY WHEN YOU DRINK ALCOHOL HOW MANY DRINKS DO YOU HAVE: 0
DOES PATIENT WANT TO STOP DRINKING: NO
HAVE YOU EVER FELT YOU SHOULD CUT DOWN ON YOUR DRINKING: NO
TOTAL SCORE: 0
TOTAL SCORE: 0
EVER HAD A DRINK FIRST THING IN THE MORNING TO STEADY YOUR NERVES TO GET RID OF A HANGOVER: NO
HAVE PEOPLE ANNOYED YOU BY CRITICIZING YOUR DRINKING: NO
EVER FELT BAD OR GUILTY ABOUT YOUR DRINKING: NO
TOTAL SCORE: 0
CONSUMPTION TOTAL: NEGATIVE
HOW MANY TIMES IN THE PAST YEAR HAVE YOU HAD 5 OR MORE DRINKS IN A DAY: 0
TOTAL SCORE: 0
DO YOU DRINK ALCOHOL: NO
HAVE YOU EVER FELT YOU SHOULD CUT DOWN ON YOUR DRINKING: NO
ON A TYPICAL DAY WHEN YOU DRINK ALCOHOL HOW MANY DRINKS DO YOU HAVE: 0
EVER HAD A DRINK FIRST THING IN THE MORNING TO STEADY YOUR NERVES TO GET RID OF A HANGOVER: NO
AVERAGE NUMBER OF DAYS PER WEEK YOU HAVE A DRINK CONTAINING ALCOHOL: 0
TOTAL SCORE: 0
AVERAGE NUMBER OF DAYS PER WEEK YOU HAVE A DRINK CONTAINING ALCOHOL: 0
ALCOHOL_USE: NO
CONSUMPTION TOTAL: NEGATIVE
HOW MANY TIMES IN THE PAST YEAR HAVE YOU HAD 5 OR MORE DRINKS IN A DAY: 0
EVER FELT BAD OR GUILTY ABOUT YOUR DRINKING: NO
SUBSTANCE_ABUSE: 0
HAVE PEOPLE ANNOYED YOU BY CRITICIZING YOUR DRINKING: NO
TOTAL SCORE: 0

## 2022-10-26 ASSESSMENT — FIBROSIS 4 INDEX
FIB4 SCORE: 3.4
FIB4 SCORE: 0.53

## 2022-10-26 ASSESSMENT — PATIENT HEALTH QUESTIONNAIRE - PHQ9
2. FEELING DOWN, DEPRESSED, IRRITABLE, OR HOPELESS: NOT AT ALL
1. LITTLE INTEREST OR PLEASURE IN DOING THINGS: NOT AT ALL
SUM OF ALL RESPONSES TO PHQ9 QUESTIONS 1 AND 2: 0

## 2022-10-26 ASSESSMENT — PAIN DESCRIPTION - PAIN TYPE
TYPE: ACUTE PAIN
TYPE: ACUTE PAIN

## 2022-10-26 NOTE — ED TRIAGE NOTES
"Chief Complaint   Patient presents with    Constipation     On and off for 6-8 months    Blood in Urine     X1 week    Abdominal Pain     That is generalized. Pt states the pain has become unbearable       Pt BIB wheelchair for above. Pt has been using miralax to help with constipation. Pt has not seen GI doctor. Pt states feeling weak and having no energy. Pt aox4, gcs 15        /72   Pulse 87   Temp 36.2 °C (97.2 °F) (Temporal)   Resp 16   Ht 1.702 m (5' 7\")   Wt 88.5 kg (195 lb)   SpO2 95%   BMI 30.54 kg/m²     "

## 2022-10-26 NOTE — ED NOTES
Pt medicated per MAR by RN. Tolerated well. Plan of care discussed and pt verbalized understanding.

## 2022-10-26 NOTE — ED NOTES
Med rec updated and complete, per pt   Allergies reviewed, per pt   Pt had RX bottles at bedside, went over RX bottles and returned RX bottles back to pt at bedside.  Pt reports that he is taking FOLIC ACID 800MCG and 400MCG 1 tablet QDAY.

## 2022-10-26 NOTE — H&P
Hospital Medicine History & Physical Note    Date of Service  10/26/2022    Primary Care Physician  Dallas Alvares M.D.    Consultants  Urology (Dr. Trevizo)    Code Status  Full Code    Chief Complaint  Chief Complaint   Patient presents with    Constipation     On and off for 6-8 months    Blood in Urine     X1 week    Abdominal Pain     That is generalized. Pt states the pain has become unbearable       History of Presenting Illness  Gregg Martinez is a 72 y.o. male with history of hyperlipidemia, asthma who presented 10/26/2022 with evaluation for a few weeks of hematuria.  Patient reported having bloody urine output for at least the past 2 weeks.  He reported having approximately 10 pound weight loss unintentional over the past month, as well as fatigue and generalized weakness.  Denies prior known malignancy.  Denies GI bleed.  In ER, found to have hemoglobin of 6.5.  CTAP notable for 2.5 x 2.3 cm bladder wall mass suspicious for neoplasm.  I requested ERP to obtain urology consult.  Admitted to medicine service.    I discussed the plan of care with patient and bedside RN.    Review of Systems  Review of Systems   Constitutional:  Positive for malaise/fatigue and weight loss. Negative for chills and fever.   HENT:  Negative for hearing loss and tinnitus.    Eyes:  Negative for blurred vision and double vision.   Respiratory:  Negative for cough and shortness of breath.    Cardiovascular:  Negative for chest pain and leg swelling.   Gastrointestinal:  Negative for abdominal pain, blood in stool, heartburn, nausea and vomiting.   Genitourinary:  Positive for dysuria, frequency, hematuria and urgency. Negative for flank pain.   Musculoskeletal:  Negative for joint pain and myalgias.   Skin:  Negative for itching and rash.   Neurological:  Positive for weakness. Negative for dizziness, focal weakness and headaches.   Endo/Heme/Allergies:  Negative for environmental allergies. Does not bruise/bleed easily.    Psychiatric/Behavioral:  Negative for depression and substance abuse.    All other systems reviewed and are negative.    Past Medical History   has a past medical history of Chronic back pain, Lumbar stress fracture, and PNA (pneumonia).    Surgical History   has a past surgical history that includes knee arthroscopy and open reduction.     Family History  family history includes Alcohol/Drug in his father; Arthritis in his brother; Dementia in his mother; Heart Attack in his father; Heart Disease in his father.   Family history reviewed with patient. There is family history that is pertinent to the chief complaint.     Social History   reports that he quit smoking about 17 years ago. His smoking use included cigarettes. He has never used smokeless tobacco. He reports that he does not drink alcohol and does not use drugs.    Allergies  No Known Allergies    Medications  Prior to Admission Medications   Prescriptions Last Dose Informant Patient Reported? Taking?   albuterol (PROVENTIL HFA) 108 (90 Base) MCG/ACT Aero Soln inhalation aerosol   No No   Sig: INHALE 2 PUFFS BY MOUTH EVERY 6 HOURS AS NEEDED FOR SHORTNESS OF BREATH   atorvastatin (LIPITOR) 20 MG Tab   No No   Sig: Take 1 Tablet by mouth every day.   cyclobenzaprine (FLEXERIL) 10 mg Tab   No No   Sig: take 1 tablet by mouth three times a day if needed for muscle spasm   hydroxyurea (HYDREA) 500 MG Cap   No No   Sig: Take 1 Capsule by mouth every day.      Facility-Administered Medications: None       Physical Exam  Temp:  [36.2 °C (97.2 °F)-36.9 °C (98.5 °F)] 36.9 °C (98.4 °F)  Pulse:  [80-89] 89  Resp:  [14-19] 17  BP: (113-129)/(58-74) 123/74  SpO2:  [90 %-96 %] 96 %  Blood Pressure : 126/69   Temperature: 36.9 °C (98.5 °F)   Pulse: 81   Respiration: (P) 17   Pulse Oximetry: 92 %       Physical Exam  Vitals and nursing note reviewed.   Constitutional:       General: He is not in acute distress.  HENT:      Head: Normocephalic and atraumatic.      Nose:  Nose normal.      Mouth/Throat:      Mouth: Mucous membranes are dry.      Pharynx: Oropharynx is clear.   Eyes:      General: No scleral icterus.     Extraocular Movements: Extraocular movements intact.   Cardiovascular:      Rate and Rhythm: Normal rate and regular rhythm.      Pulses: Normal pulses.      Heart sounds:     No friction rub.   Pulmonary:      Effort: No respiratory distress.      Breath sounds: No stridor. No wheezing or rales.   Abdominal:      General: There is distension.      Palpations: There is mass.      Tenderness: There is no abdominal tenderness. There is no guarding or rebound.   Musculoskeletal:         General: No swelling or tenderness. Normal range of motion.      Cervical back: Neck supple. No tenderness.   Skin:     General: Skin is dry.      Capillary Refill: Capillary refill takes less than 2 seconds.      Coloration: Skin is pale.   Neurological:      General: No focal deficit present.      Mental Status: He is alert and oriented to person, place, and time.   Psychiatric:         Mood and Affect: Mood normal.       Laboratory:  Recent Labs     10/26/22  0914 10/26/22  1344   WBC 17.9*  --    RBC 3.03*  --    HEMOGLOBIN 6.5* 6.6*   HEMATOCRIT 22.9* 22.7*   MCV 75.6*  --    MCH 21.5*  --    MCHC 28.4*  --    RDW 58.5*  --    PLATELETCT 79*  --      Recent Labs     10/26/22  0914   SODIUM 136   POTASSIUM 4.4   CHLORIDE 100   CO2 26   GLUCOSE 102*   BUN 14   CREATININE 0.69   CALCIUM 8.7     Recent Labs     10/26/22  0914   ALTSGPT 7   ASTSGOT 10*   ALKPHOSPHAT 120*   TBILIRUBIN 1.0   LIPASE 6*   GLUCOSE 102*     Recent Labs     10/26/22  0914   INR 1.17*     No results for input(s): NTPROBNP in the last 72 hours.      Recent Labs     10/26/22  0914   TROPONINT 14       Imaging:  CT-ABDOMEN-PELVIS WITH   Final Result      1.  Enhancing 2.5 x 2.3 cm bladder wall mass suspicious for neoplasm.   2.  Heterogeneous sclerotic change of the pelvis and proximal femurs suspicious for  metastatic disease or other infiltrating marrow process.   3.  Mild cardiomegaly.   4.  Hepatosplenomegaly.   5.  Small subcentimeter hepatic hypodensities which could represent cysts or hemangiomas but are nonspecific. If indicated, these findings could be further evaluated with dedicated liver protocol CT or MRI or ultrasound on a nonemergent basis.   6.  Colonic diverticulosis without evidence of diverticulitis.   7.  Atherosclerosis.      DX-CHEST-PORTABLE (1 VIEW)    (Results Pending)       EKG:  I have personally reviewed the images and compared with prior images.    Assessment/Plan:  Justification for Admission Status  I anticipate this patient will require at least two midnights hospitalization, therefore appropriate for inpatient status.      * Bladder mass  Assessment & Plan  CT AP: Notable enhancing 2.5 x 2.3 cm bladder wall mass suspicious for neoplasm  NPO at [facility] for now  Supportive pain control  Urology consulted - formal evaluation to follow         Hematuria  Assessment & Plan  Likely secondary to above    Acute blood loss anemia- (present on admission)  Assessment & Plan  Hgb 6.5 -- 2units pRBC ordered  PPI, avoid NSAID  Anemia workup  Trend HH, transfuse for Hgb<7 or hemodynamic instability    Iron deficiency anemia  Assessment & Plan  IV iron infusion    UTI (urinary tract infection)  Assessment & Plan  UA mild pyuria  IVF  Abx: Ancef  F/u UCx, BCx    Sepsis (HCC)  Assessment & Plan  This is Sepsis Present on admission  SIRS criteria identified on my evaluation include: Leukocytosis, with WBC greater than 12,000 and Bandemia, greater than 10% bands  Source is   Sepsis protocol initiated  Fluid resuscitation ordered per protocol  Crystalloid Fluid Administration: Fluid resuscitation ordered per standard protocol - 30 mL/kg per current or ideal body weight  IV antibiotics as appropriate for source of sepsis  Reassessment: I have reassessed the patient's hemodynamic status          Dyslipidemia-  (present on admission)  Assessment & Plan  Statin    COPD (chronic obstructive pulmonary disease) (HCC)- (present on admission)  Assessment & Plan  Not in acute exacerbation  PRN duonebs    Obesity (BMI 30-39.9)- (present on admission)  Assessment & Plan  Diet and lifestyle modification  Body mass index is 30.54 kg/m².      Leukocytosis- (present on admission)  Assessment & Plan  IVF, abx  trend        VTE prophylaxis: SCDs/TEDs

## 2022-10-26 NOTE — ED NOTES
Urine collected and sent to lab. Patient wheeled back to room in wheel chair. Ambulated to restroom without assistance. Patient changed into gown and hooked up to monitor.

## 2022-10-26 NOTE — ED PROVIDER NOTES
ED Provider Note    CHIEF COMPLAINT  Chief Complaint   Patient presents with    Constipation     On and off for 6-8 months    Blood in Urine     X1 week    Abdominal Pain     That is generalized. Pt states the pain has become unbearable       HPI  Gregg Martinez is a 72 y.o. male who presents for several complaints including abdominal pain, abdominal distention blood in his urine and general abdominal discomfort.  He has no stated thoracoabdominal surgeries.  He has been battling constipation and taking over-the-counter stool softeners.  He also reported 1 week of the development of dark urine described as bloody.  He does not take any blood thinners.  He reports malaise but no black or bloody stools.  He reports feeling chronically fatigued but no chest pain fevers or productive cough    REVIEW OF SYSTEMS  See HPI for further details.  Positive for abdominal pain hematuria  all other systems are negative.     PAST MEDICAL HISTORY  Past Medical History:   Diagnosis Date    Chronic back pain     Lumbar stress fracture     PNA (pneumonia)        FAMILY HISTORY  Noncontributory    SOCIAL HISTORY  Social History     Socioeconomic History    Marital status: Single   Tobacco Use    Smoking status: Former     Packs/day: 0.00     Years: 35.00     Pack years: 0.00     Types: Cigarettes     Quit date: 2005     Years since quittin.5    Smokeless tobacco: Never   Vaping Use    Vaping Use: Never used   Substance and Sexual Activity    Alcohol use: No     Alcohol/week: 0.0 oz    Drug use: No       SURGICAL HISTORY  Past Surgical History:   Procedure Laterality Date    KNEE ARTHROSCOPY      OPEN REDUCTION         CURRENT MEDICATIONS  Home Medications       Reviewed by Iris Wilkinson R.N. (Registered Nurse) on 10/26/22 at 0910  Med List Status: Partial     Medication Last Dose Status   albuterol (PROVENTIL HFA) 108 (90 Base) MCG/ACT Aero Soln inhalation aerosol  Active   atorvastatin (LIPITOR) 20 MG Tab  Active  "  cyclobenzaprine (FLEXERIL) 10 mg Tab  Active   hydroxyurea (HYDREA) 500 MG Cap  Active                    ALLERGIES  No Known Allergies    PHYSICAL EXAM  VITAL SIGNS: /72   Pulse 87   Temp 36.2 °C (97.2 °F) (Temporal)   Resp 16   Ht 1.702 m (5' 7\")   Wt 88.5 kg (195 lb)   SpO2 95%   BMI 30.54 kg/m²       Constitutional: Pale appears chronically ill.   HENT: Normocephalic, Atraumatic, Bilateral external ears normal, Oropharynx moist, No oral exudates, Nose normal.   Eyes: PERRLA, EOMI, Conjunctiva pale, No discharge.   Neck: Normal range of motion, No tenderness, Supple, No stridor.   Cardiovascular: Normal heart rate, Normal rhythm, No murmurs, No rubs, No gallops.   Thorax & Lungs: Normal breath sounds, No respiratory distress, No wheezing, No chest tenderness.   Abdomen: Bowel sounds normal, Soft, No tenderness, No masses, No pulsatile masses.   Skin: Warm, Dry, No erythema, No rash.   Back: No tenderness, No CVA tenderness.   Rectal: Normal appearance, Normal sphincter tone. No external or internal lesions noted.  Brown stool trace Hemoccult positive   extremities: Intact distal pulses, No edema, No tenderness, No cyanosis, No clubbing.   Musculoskeletal: Good range of motion in all major joints. No tenderness to palpation or major deformities noted.   Neurologic: Alert & oriented x 3, Normal motor function, Normal sensory function, No focal deficits noted.   Psychiatric: Affect normal, Judgment normal, Mood normal.     CT-ABDOMEN-PELVIS WITH   Final Result      1.  Enhancing 2.5 x 2.3 cm bladder wall mass suspicious for neoplasm.   2.  Heterogeneous sclerotic change of the pelvis and proximal femurs suspicious for metastatic disease or other infiltrating marrow process.   3.  Mild cardiomegaly.   4.  Hepatosplenomegaly.   5.  Small subcentimeter hepatic hypodensities which could represent cysts or hemangiomas but are nonspecific. If indicated, these findings could be further evaluated with " dedicated liver protocol CT or MRI or ultrasound on a nonemergent basis.   6.  Colonic diverticulosis without evidence of diverticulitis.   7.  Atherosclerosis.        Results for orders placed or performed during the hospital encounter of 10/26/22   CBC WITH DIFFERENTIAL   Result Value Ref Range    WBC 17.9 (H) 4.8 - 10.8 K/uL    RBC 3.03 (L) 4.70 - 6.10 M/uL    Hemoglobin 6.5 (L) 14.0 - 18.0 g/dL    Hematocrit 22.9 (L) 42.0 - 52.0 %    MCV 75.6 (L) 81.4 - 97.8 fL    MCH 21.5 (L) 27.0 - 33.0 pg    MCHC 28.4 (L) 33.7 - 35.3 g/dL    RDW 58.5 (H) 35.9 - 50.0 fL    Platelet Count 79 (L) 164 - 446 K/uL    Neutrophils-Polys 44.20 44.00 - 72.00 %    Lymphocytes 36.00 22.00 - 41.00 %    Monocytes 9.00 0.00 - 13.40 %    Eosinophils 1.80 0.00 - 6.90 %    Basophils 1.80 0.00 - 1.80 %    Nucleated RBC 59.10 /100 WBC    Neutrophils (Absolute) 7.91 (H) 1.82 - 7.42 K/uL    Lymphs (Absolute) 6.44 (H) 1.00 - 4.80 K/uL    Monos (Absolute) 1.61 (H) 0.00 - 0.85 K/uL    Eos (Absolute) 0.32 0.00 - 0.51 K/uL    Baso (Absolute) 0.32 (H) 0.00 - 0.12 K/uL    NRBC (Absolute) 10.55 K/uL    Anisocytosis 3+ (A)     Microcytosis 3+ (A)    COMP METABOLIC PANEL   Result Value Ref Range    Sodium 136 135 - 145 mmol/L    Potassium 4.4 3.6 - 5.5 mmol/L    Chloride 100 96 - 112 mmol/L    Co2 26 20 - 33 mmol/L    Anion Gap 10.0 7.0 - 16.0    Glucose 102 (H) 65 - 99 mg/dL    Bun 14 8 - 22 mg/dL    Creatinine 0.69 0.50 - 1.40 mg/dL    Calcium 8.7 8.5 - 10.5 mg/dL    AST(SGOT) 10 (L) 12 - 45 U/L    ALT(SGPT) 7 2 - 50 U/L    Alkaline Phosphatase 120 (H) 30 - 99 U/L    Total Bilirubin 1.0 0.1 - 1.5 mg/dL    Albumin 3.7 3.2 - 4.9 g/dL    Total Protein 6.1 6.0 - 8.2 g/dL    Globulin 2.4 1.9 - 3.5 g/dL    A-G Ratio 1.5 g/dL   LIPASE   Result Value Ref Range    Lipase 6 (L) 11 - 82 U/L   URINALYSIS    Specimen: Urine, Clean Catch   Result Value Ref Range    Color Brown (A)     Character Hazy (A)     Specific Gravity 1.020 <1.035    Ph 6.0 5.0 - 8.0    Glucose  Negative Negative mg/dL    Ketones Negative Negative mg/dL    Protein 30 (A) Negative mg/dL    Bilirubin Small (A) Negative    Urobilinogen, Urine 2.0 Negative    Nitrite Negative Negative    Leukocyte Esterase Trace (A) Negative    Occult Blood Large (A) Negative    Micro Urine Req Microscopic    ESTIMATED GFR   Result Value Ref Range    GFR (CKD-EPI) 99 >60 mL/min/1.73 m 2   DIFFERENTIAL MANUAL   Result Value Ref Range    Metamyelocytes 0.90 %    Myelocytes 6.30 %    Manual Diff Status PERFORMED    PERIPHERAL SMEAR REVIEW   Result Value Ref Range    Peripheral Smear Review see below    PLATELET ESTIMATE   Result Value Ref Range    Plt Estimation Decreased    MORPHOLOGY   Result Value Ref Range    RBC Morphology Present     Poikilocytosis 2+     Stomatocytes 2+    URINE MICROSCOPIC (W/UA)   Result Value Ref Range    WBC 2-5 (A) /hpf    RBC >150 (A) /hpf    Bacteria Negative None /hpf    Epithelial Cells Negative /hpf    Budding Yeast Present (A) Absent /hpf   COD - Adult (Type and Screen)   Result Value Ref Range    ABO Grouping Only O     Rh Grouping Only POS     Antibody Screen-Cod NEG     Component R       R4                  Red Blood Cells     [id number]   selected     10/26/22   12:13      Product Type Red Blood Cells LR Pheresis     Dispense Status selected     Unit Number (Barcoded) [id number]     Product Code (Barcoded) F8935L25     Blood Type (Barcoded) 5100    ABO Rh Confirm   Result Value Ref Range    ABO Rh Confirm O POS       COURSE & MEDICAL DECISION MAKING  Pertinent Labs & Imaging studies reviewed. (See chart for details)  Patient arrives here appears chronically ill.  He is pale has diffuse abdominal pain and is elderly.  Extensive evaluation was initiated.  The patient here has profound leukocytosis and significant anemia.  His last known hemoglobin was around 15 years down to 6.9.  This comparison was from over a year ago.  CT scan demonstrates a bladder mass with infiltrative process in  his bone marrow concerning for metastases or primary bone marrow process.  Given his age, anemia and CT scan findings I have ordered admission and a unit of blood.  The patient will be admitted to internal medicine for further treatment    FINAL IMPRESSION  1. New diagnosis of anemia requiring blood transfusion   2.  Bladder mass with possible metastases  3.  Gross hematuria      Electronically signed by: Jake Vasquez M.D., 10/26/2022 10:36 AM

## 2022-10-27 ENCOUNTER — ANESTHESIA EVENT (OUTPATIENT)
Dept: SURGERY | Facility: MEDICAL CENTER | Age: 72
DRG: 668 | End: 2022-10-27
Payer: MEDICARE

## 2022-10-27 ENCOUNTER — ANESTHESIA (OUTPATIENT)
Dept: SURGERY | Facility: MEDICAL CENTER | Age: 72
DRG: 668 | End: 2022-10-27
Payer: MEDICARE

## 2022-10-27 LAB
HCT VFR BLD AUTO: 24.4 % (ref 42–52)
HCT VFR BLD AUTO: 26.6 % (ref 42–52)
HGB BLD-MCNC: 7.4 G/DL (ref 14–18)
HGB BLD-MCNC: 8.1 G/DL (ref 14–18)
LACTATE SERPL-SCNC: 0.9 MMOL/L (ref 0.5–2)
PATHOLOGY CONSULT NOTE: NORMAL

## 2022-10-27 PROCEDURE — 160009 HCHG ANES TIME/MIN: Performed by: UROLOGY

## 2022-10-27 PROCEDURE — 160035 HCHG PACU - 1ST 60 MINS PHASE I: Performed by: UROLOGY

## 2022-10-27 PROCEDURE — 700111 HCHG RX REV CODE 636 W/ 250 OVERRIDE (IP): Performed by: STUDENT IN AN ORGANIZED HEALTH CARE EDUCATION/TRAINING PROGRAM

## 2022-10-27 PROCEDURE — 700111 HCHG RX REV CODE 636 W/ 250 OVERRIDE (IP): Performed by: ANESTHESIOLOGY

## 2022-10-27 PROCEDURE — 160039 HCHG SURGERY MINUTES - EA ADDL 1 MIN LEVEL 3: Performed by: UROLOGY

## 2022-10-27 PROCEDURE — 85014 HEMATOCRIT: CPT

## 2022-10-27 PROCEDURE — 700102 HCHG RX REV CODE 250 W/ 637 OVERRIDE(OP): Performed by: UROLOGY

## 2022-10-27 PROCEDURE — 99233 SBSQ HOSP IP/OBS HIGH 50: CPT | Performed by: HOSPITALIST

## 2022-10-27 PROCEDURE — C9113 INJ PANTOPRAZOLE SODIUM, VIA: HCPCS | Performed by: STUDENT IN AN ORGANIZED HEALTH CARE EDUCATION/TRAINING PROGRAM

## 2022-10-27 PROCEDURE — 160028 HCHG SURGERY MINUTES - 1ST 30 MINS LEVEL 3: Performed by: UROLOGY

## 2022-10-27 PROCEDURE — A9270 NON-COVERED ITEM OR SERVICE: HCPCS | Performed by: UROLOGY

## 2022-10-27 PROCEDURE — 160036 HCHG PACU - EA ADDL 30 MINS PHASE I: Performed by: UROLOGY

## 2022-10-27 PROCEDURE — 88307 TISSUE EXAM BY PATHOLOGIST: CPT

## 2022-10-27 PROCEDURE — 85018 HEMOGLOBIN: CPT | Mod: 91

## 2022-10-27 PROCEDURE — 770006 HCHG ROOM/CARE - MED/SURG/GYN SEMI*

## 2022-10-27 PROCEDURE — 160048 HCHG OR STATISTICAL LEVEL 1-5: Performed by: UROLOGY

## 2022-10-27 PROCEDURE — 36415 COLL VENOUS BLD VENIPUNCTURE: CPT

## 2022-10-27 PROCEDURE — 700105 HCHG RX REV CODE 258: Performed by: ANESTHESIOLOGY

## 2022-10-27 PROCEDURE — 700101 HCHG RX REV CODE 250: Performed by: UROLOGY

## 2022-10-27 PROCEDURE — 700101 HCHG RX REV CODE 250: Performed by: ANESTHESIOLOGY

## 2022-10-27 PROCEDURE — 0TBB8ZZ EXCISION OF BLADDER, VIA NATURAL OR ARTIFICIAL OPENING ENDOSCOPIC: ICD-10-PCS | Performed by: UROLOGY

## 2022-10-27 PROCEDURE — 700102 HCHG RX REV CODE 250 W/ 637 OVERRIDE(OP): Performed by: STUDENT IN AN ORGANIZED HEALTH CARE EDUCATION/TRAINING PROGRAM

## 2022-10-27 PROCEDURE — 160002 HCHG RECOVERY MINUTES (STAT): Performed by: UROLOGY

## 2022-10-27 PROCEDURE — A9270 NON-COVERED ITEM OR SERVICE: HCPCS | Performed by: STUDENT IN AN ORGANIZED HEALTH CARE EDUCATION/TRAINING PROGRAM

## 2022-10-27 PROCEDURE — 00912 ANES TRURL PX RESCJ BLDR TUM: CPT | Performed by: ANESTHESIOLOGY

## 2022-10-27 PROCEDURE — 99100 ANES PT EXTEME AGE<1 YR&>70: CPT | Performed by: ANESTHESIOLOGY

## 2022-10-27 PROCEDURE — 83605 ASSAY OF LACTIC ACID: CPT

## 2022-10-27 PROCEDURE — 0TBC8ZZ EXCISION OF BLADDER NECK, VIA NATURAL OR ARTIFICIAL OPENING ENDOSCOPIC: ICD-10-PCS | Performed by: UROLOGY

## 2022-10-27 PROCEDURE — 700105 HCHG RX REV CODE 258: Performed by: STUDENT IN AN ORGANIZED HEALTH CARE EDUCATION/TRAINING PROGRAM

## 2022-10-27 RX ORDER — DIPHENHYDRAMINE HYDROCHLORIDE 50 MG/ML
12.5 INJECTION INTRAMUSCULAR; INTRAVENOUS
Status: DISCONTINUED | OUTPATIENT
Start: 2022-10-27 | End: 2022-10-27 | Stop reason: HOSPADM

## 2022-10-27 RX ORDER — MIDAZOLAM HYDROCHLORIDE 1 MG/ML
1 INJECTION INTRAMUSCULAR; INTRAVENOUS
Status: DISCONTINUED | OUTPATIENT
Start: 2022-10-27 | End: 2022-10-27 | Stop reason: HOSPADM

## 2022-10-27 RX ORDER — MEPERIDINE HYDROCHLORIDE 25 MG/ML
12.5 INJECTION INTRAMUSCULAR; INTRAVENOUS; SUBCUTANEOUS
Status: DISCONTINUED | OUTPATIENT
Start: 2022-10-27 | End: 2022-10-27 | Stop reason: HOSPADM

## 2022-10-27 RX ORDER — ROCURONIUM BROMIDE 10 MG/ML
INJECTION, SOLUTION INTRAVENOUS PRN
Status: DISCONTINUED | OUTPATIENT
Start: 2022-10-27 | End: 2022-10-27 | Stop reason: SURG

## 2022-10-27 RX ORDER — ATROPA BELLADONNA AND OPIUM 16.2; 3 MG/1; MG/1
SUPPOSITORY RECTAL
Status: DISCONTINUED | OUTPATIENT
Start: 2022-10-27 | End: 2022-10-27 | Stop reason: HOSPADM

## 2022-10-27 RX ORDER — DEXAMETHASONE SODIUM PHOSPHATE 4 MG/ML
INJECTION, SOLUTION INTRA-ARTICULAR; INTRALESIONAL; INTRAMUSCULAR; INTRAVENOUS; SOFT TISSUE PRN
Status: DISCONTINUED | OUTPATIENT
Start: 2022-10-27 | End: 2022-10-27 | Stop reason: SURG

## 2022-10-27 RX ORDER — OXYCODONE HCL 5 MG/5 ML
10 SOLUTION, ORAL ORAL
Status: DISCONTINUED | OUTPATIENT
Start: 2022-10-27 | End: 2022-10-27 | Stop reason: HOSPADM

## 2022-10-27 RX ORDER — HALOPERIDOL 5 MG/ML
1 INJECTION INTRAMUSCULAR
Status: DISCONTINUED | OUTPATIENT
Start: 2022-10-27 | End: 2022-10-27 | Stop reason: HOSPADM

## 2022-10-27 RX ORDER — HYDROMORPHONE HYDROCHLORIDE 1 MG/ML
0.2 INJECTION, SOLUTION INTRAMUSCULAR; INTRAVENOUS; SUBCUTANEOUS
Status: DISCONTINUED | OUTPATIENT
Start: 2022-10-27 | End: 2022-10-27 | Stop reason: HOSPADM

## 2022-10-27 RX ORDER — OXYCODONE HCL 5 MG/5 ML
5 SOLUTION, ORAL ORAL
Status: DISCONTINUED | OUTPATIENT
Start: 2022-10-27 | End: 2022-10-27 | Stop reason: HOSPADM

## 2022-10-27 RX ORDER — ONDANSETRON 2 MG/ML
INJECTION INTRAMUSCULAR; INTRAVENOUS PRN
Status: DISCONTINUED | OUTPATIENT
Start: 2022-10-27 | End: 2022-10-27 | Stop reason: SURG

## 2022-10-27 RX ORDER — MAGNESIUM HYDROXIDE 1200 MG/15ML
LIQUID ORAL
Status: COMPLETED | OUTPATIENT
Start: 2022-10-27 | End: 2022-10-27

## 2022-10-27 RX ORDER — MIDAZOLAM HYDROCHLORIDE 1 MG/ML
INJECTION INTRAMUSCULAR; INTRAVENOUS PRN
Status: DISCONTINUED | OUTPATIENT
Start: 2022-10-27 | End: 2022-10-27 | Stop reason: SURG

## 2022-10-27 RX ORDER — HYDRALAZINE HYDROCHLORIDE 20 MG/ML
5 INJECTION INTRAMUSCULAR; INTRAVENOUS
Status: DISCONTINUED | OUTPATIENT
Start: 2022-10-27 | End: 2022-10-27 | Stop reason: HOSPADM

## 2022-10-27 RX ORDER — HYDROMORPHONE HYDROCHLORIDE 1 MG/ML
0.1 INJECTION, SOLUTION INTRAMUSCULAR; INTRAVENOUS; SUBCUTANEOUS
Status: DISCONTINUED | OUTPATIENT
Start: 2022-10-27 | End: 2022-10-27 | Stop reason: HOSPADM

## 2022-10-27 RX ORDER — SODIUM CHLORIDE 9 MG/ML
INJECTION, SOLUTION INTRAVENOUS CONTINUOUS
Status: ACTIVE | OUTPATIENT
Start: 2022-10-27 | End: 2022-10-27

## 2022-10-27 RX ORDER — SODIUM CHLORIDE, SODIUM LACTATE, POTASSIUM CHLORIDE, CALCIUM CHLORIDE 600; 310; 30; 20 MG/100ML; MG/100ML; MG/100ML; MG/100ML
INJECTION, SOLUTION INTRAVENOUS
Status: DISCONTINUED | OUTPATIENT
Start: 2022-10-27 | End: 2022-10-27 | Stop reason: SURG

## 2022-10-27 RX ORDER — HYDROMORPHONE HYDROCHLORIDE 1 MG/ML
0.4 INJECTION, SOLUTION INTRAMUSCULAR; INTRAVENOUS; SUBCUTANEOUS
Status: DISCONTINUED | OUTPATIENT
Start: 2022-10-27 | End: 2022-10-27 | Stop reason: HOSPADM

## 2022-10-27 RX ORDER — SODIUM CHLORIDE, SODIUM LACTATE, POTASSIUM CHLORIDE, CALCIUM CHLORIDE 600; 310; 30; 20 MG/100ML; MG/100ML; MG/100ML; MG/100ML
INJECTION, SOLUTION INTRAVENOUS CONTINUOUS
Status: DISCONTINUED | OUTPATIENT
Start: 2022-10-27 | End: 2022-10-27 | Stop reason: HOSPADM

## 2022-10-27 RX ORDER — ONDANSETRON 2 MG/ML
4 INJECTION INTRAMUSCULAR; INTRAVENOUS
Status: DISCONTINUED | OUTPATIENT
Start: 2022-10-27 | End: 2022-10-27 | Stop reason: HOSPADM

## 2022-10-27 RX ORDER — METOPROLOL TARTRATE 1 MG/ML
1 INJECTION, SOLUTION INTRAVENOUS
Status: DISCONTINUED | OUTPATIENT
Start: 2022-10-27 | End: 2022-10-27 | Stop reason: HOSPADM

## 2022-10-27 RX ORDER — LIDOCAINE HYDROCHLORIDE 20 MG/ML
INJECTION, SOLUTION EPIDURAL; INFILTRATION; INTRACAUDAL; PERINEURAL PRN
Status: DISCONTINUED | OUTPATIENT
Start: 2022-10-27 | End: 2022-10-27 | Stop reason: SURG

## 2022-10-27 RX ORDER — CEFAZOLIN SODIUM 1 G/3ML
INJECTION, POWDER, FOR SOLUTION INTRAMUSCULAR; INTRAVENOUS PRN
Status: DISCONTINUED | OUTPATIENT
Start: 2022-10-27 | End: 2022-10-27 | Stop reason: SURG

## 2022-10-27 RX ORDER — LABETALOL HYDROCHLORIDE 5 MG/ML
5 INJECTION, SOLUTION INTRAVENOUS
Status: DISCONTINUED | OUTPATIENT
Start: 2022-10-27 | End: 2022-10-27 | Stop reason: HOSPADM

## 2022-10-27 RX ADMIN — ROCURONIUM BROMIDE 10 MG: 10 INJECTION, SOLUTION INTRAVENOUS at 11:32

## 2022-10-27 RX ADMIN — ATORVASTATIN CALCIUM 20 MG: 20 TABLET, FILM COATED ORAL at 16:47

## 2022-10-27 RX ADMIN — ROCURONIUM BROMIDE 20 MG: 10 INJECTION, SOLUTION INTRAVENOUS at 11:47

## 2022-10-27 RX ADMIN — MIDAZOLAM HYDROCHLORIDE 2 MG: 1 INJECTION, SOLUTION INTRAMUSCULAR; INTRAVENOUS at 11:26

## 2022-10-27 RX ADMIN — FOLIC ACID 1 MG: 1 TABLET ORAL at 05:34

## 2022-10-27 RX ADMIN — PANTOPRAZOLE SODIUM 40 MG: 40 INJECTION, POWDER, FOR SOLUTION INTRAVENOUS at 16:47

## 2022-10-27 RX ADMIN — THIAMINE HCL TAB 100 MG 100 MG: 100 TAB at 16:47

## 2022-10-27 RX ADMIN — LIDOCAINE HYDROCHLORIDE 100 MG: 20 INJECTION, SOLUTION EPIDURAL; INFILTRATION; INTRACAUDAL at 11:26

## 2022-10-27 RX ADMIN — ROCURONIUM BROMIDE 10 MG: 10 INJECTION, SOLUTION INTRAVENOUS at 11:56

## 2022-10-27 RX ADMIN — DOCUSATE SODIUM 50 MG AND SENNOSIDES 8.6 MG 2 TABLET: 8.6; 5 TABLET, FILM COATED ORAL at 05:33

## 2022-10-27 RX ADMIN — CYANOCOBALAMIN TAB 500 MCG 1000 MCG: 500 TAB at 05:34

## 2022-10-27 RX ADMIN — Medication 2000 UNITS: at 05:34

## 2022-10-27 RX ADMIN — SODIUM CHLORIDE, POTASSIUM CHLORIDE, SODIUM LACTATE AND CALCIUM CHLORIDE: 600; 310; 30; 20 INJECTION, SOLUTION INTRAVENOUS at 11:25

## 2022-10-27 RX ADMIN — PROPOFOL 100 MG: 10 INJECTION, EMULSION INTRAVENOUS at 11:26

## 2022-10-27 RX ADMIN — FENTANYL CITRATE 100 MCG: 50 INJECTION, SOLUTION INTRAMUSCULAR; INTRAVENOUS at 11:26

## 2022-10-27 RX ADMIN — ROCURONIUM BROMIDE 10 MG: 10 INJECTION, SOLUTION INTRAVENOUS at 12:01

## 2022-10-27 RX ADMIN — FENTANYL CITRATE 50 MCG: 50 INJECTION, SOLUTION INTRAMUSCULAR; INTRAVENOUS at 12:26

## 2022-10-27 RX ADMIN — CEFAZOLIN 2 G: 2 INJECTION, POWDER, FOR SOLUTION INTRAMUSCULAR; INTRAVENOUS at 05:34

## 2022-10-27 RX ADMIN — SODIUM CHLORIDE 250 MG: 9 INJECTION, SOLUTION INTRAVENOUS at 06:14

## 2022-10-27 RX ADMIN — THIAMINE HCL TAB 100 MG 100 MG: 100 TAB at 05:34

## 2022-10-27 RX ADMIN — CEFAZOLIN 2 G: 330 INJECTION, POWDER, FOR SOLUTION INTRAMUSCULAR; INTRAVENOUS at 11:26

## 2022-10-27 RX ADMIN — PANTOPRAZOLE SODIUM 40 MG: 40 INJECTION, POWDER, FOR SOLUTION INTRAVENOUS at 05:34

## 2022-10-27 RX ADMIN — ROCURONIUM BROMIDE 40 MG: 10 INJECTION, SOLUTION INTRAVENOUS at 11:26

## 2022-10-27 RX ADMIN — SODIUM CHLORIDE 250 MG: 9 INJECTION, SOLUTION INTRAVENOUS at 16:50

## 2022-10-27 RX ADMIN — SUGAMMADEX 400 MG: 100 INJECTION, SOLUTION INTRAVENOUS at 12:24

## 2022-10-27 RX ADMIN — DEXAMETHASONE SODIUM PHOSPHATE 8 MG: 4 INJECTION, SOLUTION INTRA-ARTICULAR; INTRALESIONAL; INTRAMUSCULAR; INTRAVENOUS; SOFT TISSUE at 11:26

## 2022-10-27 RX ADMIN — ONDANSETRON 4 MG: 2 INJECTION INTRAMUSCULAR; INTRAVENOUS at 11:26

## 2022-10-27 ASSESSMENT — PAIN SCALES - GENERAL: PAIN_LEVEL: 0

## 2022-10-27 ASSESSMENT — ENCOUNTER SYMPTOMS
ABDOMINAL PAIN: 0
VOMITING: 0
NAUSEA: 0
COUGH: 0
DIZZINESS: 1
FLANK PAIN: 0
PALPITATIONS: 0
HEADACHES: 0
CHILLS: 0
DIZZINESS: 0
FEVER: 0
SHORTNESS OF BREATH: 0

## 2022-10-27 ASSESSMENT — PATIENT HEALTH QUESTIONNAIRE - PHQ9
1. LITTLE INTEREST OR PLEASURE IN DOING THINGS: NOT AT ALL
SUM OF ALL RESPONSES TO PHQ9 QUESTIONS 1 AND 2: 0

## 2022-10-27 ASSESSMENT — PAIN DESCRIPTION - PAIN TYPE: TYPE: ACUTE PAIN

## 2022-10-27 NOTE — CONSULTS
[facility] Consult/H&P Note    Primary Service: Medicine  Attending: Sally Wang M.D.  Patient's Name/MRN: Gregg Martinez, [id number]    Admit Date:10/26/2022  Today's Date: 10/27/2022   Length of stay:  LOS: 1 day   Room #: S532/02      Reason for consult/chief complaint: Gross hematuria  ID/HPI: Gregg Martinez is a 72 y.o. male patient who presented 10/26 c/o GH. In ED, Hb was 6.6, and CT showed enhancing 2.5cm bladder wall mass concerning for neoplasm, as well as heterogeneous sclerotic changes of pelvis and proximal femurs suspicious for metastases.  Cr 0.69. He was given 2 units of pRBCs and urology was consulted.    10/27: seen and examined, lying in bed in NAD. Reports intermittent GH for 2 years, but usually would resolve on its own. GH has never been associated with dysuria, flank pain, or other symptoms. States several days ago after riding in his truck, he developed GH that would not resolve, leading him to present to ED. After transfusion he is feeling better but endorses some dizziness/fatigue. Denies sensation of incomplete emptying, has not seen clots in his urine. Former smoker, quit 20 years ago. No fhx of  malignancy. AFVSS.       Past Medical History:   Past Medical History:   Diagnosis Date    Chronic back pain     Lumbar stress fracture     PNA (pneumonia)         Past Surgical History:   Past Surgical History:   Procedure Laterality Date    KNEE ARTHROSCOPY      OPEN REDUCTION          Family History:   Family History   Problem Relation Age of Onset    Dementia Mother     Heart Disease Father     Heart Attack Father     Alcohol/Drug Father     Arthritis Brother     Cancer Neg Hx     Diabetes Neg Hx     Hypertension Neg Hx          Social History:   Social History     Tobacco Use    Smoking status: Former     Packs/day: 0.00     Years: 35.00     Pack years: 0.00     Types: Cigarettes     Quit date: 2005     Years since quittin.5    Smokeless tobacco: Never   Vaping Use    Vaping Use:  "Never used   Substance Use Topics    Alcohol use: No     Alcohol/week: 0.0 oz    Drug use: No      Social History     Social History Narrative    Not on file        Allergies: he Patient has no known allergies.    Medications:   Medications Prior to Admission   Medication Sig Dispense Refill Last Dose    folic acid (FOLVITE) 400 MCG tablet Take 400 mcg by mouth every day. Pt is also taking over the counter 800MCG   10/26/2022 at 0430    folic acid (FOLVITE) 800 MCG tablet Take 800 mcg by mouth every day. Pt is also taking over the counter 400MCG   10/26/2022 at 0430    vitamin D3 (CHOLECALCIFEROL) 1000 Unit (25 mcg) Tab Take 1,000 Units by mouth every day.   10/26/2022 at 0430    ibuprofen (MOTRIN) 200 MG Tab Take 200 mg by mouth every day. Per pt takes every day   10/26/2022 at 0430    atorvastatin (LIPITOR) 20 MG Tab Take 1 Tablet by mouth every day. 90 Tablet 0 10/26/2022 at 0430    albuterol (PROVENTIL HFA) 108 (90 Base) MCG/ACT Aero Soln inhalation aerosol INHALE 2 PUFFS BY MOUTH EVERY 6 HOURS AS NEEDED FOR SHORTNESS OF BREATH (Patient taking differently: Inhale 2 Puffs every 6 hours as needed for Shortness of Breath. INHALE 2 PUFFS BY MOUTH EVERY 6 HOURS AS NEEDED FOR SHORTNESS OF BREATH) 3 Each 3 10/26/2022 at 0430         Review of Systems  Review of Systems   Constitutional:  Positive for malaise/fatigue. Negative for chills and fever.   Respiratory:  Negative for shortness of breath.    Cardiovascular:  Negative for chest pain.   Gastrointestinal:  Negative for abdominal pain, nausea and vomiting.   Genitourinary:  Positive for hematuria. Negative for dysuria, flank pain, frequency and urgency.   Neurological:  Positive for dizziness.   All other systems reviewed and are negative.     Physical Exam  VITAL SIGNS: /58   Pulse 80   Temp 37.1 °C (98.8 °F) (Temporal)   Resp 18   Ht 1.727 m (5' 8\")   Wt 82.2 kg (181 lb 3.5 oz)   SpO2 92%   BMI 27.55 kg/m²   Physical Exam  Vitals and nursing note " reviewed.   Constitutional:       General: He is not in acute distress.  HENT:      Head: Normocephalic and atraumatic.      Nose: Nose normal.      Mouth/Throat:      Pharynx: Oropharynx is clear.   Eyes:      Extraocular Movements: Extraocular movements intact.      Conjunctiva/sclera: Conjunctivae normal.   Pulmonary:      Effort: Pulmonary effort is normal.   Abdominal:      General: There is no distension.      Palpations: Abdomen is soft.      Tenderness: There is no abdominal tenderness.   Musculoskeletal:         General: Normal range of motion.      Cervical back: Normal range of motion.   Skin:     Coloration: Skin is pale.   Neurological:      General: No focal deficit present.      Mental Status: He is alert and oriented to person, place, and time.   Psychiatric:         Mood and Affect: Mood normal.         Behavior: Behavior normal.         Labs:  Recent Labs     10/26/22  0914 10/26/22  1344 10/27/22  0124   WBC 17.9*  --   --    RBC 3.03*  --   --    HEMOGLOBIN 6.5* 6.6* 7.4*   HEMATOCRIT 22.9* 22.7* 24.4*   MCV 75.6*  --   --    MCH 21.5*  --   --    MCHC 28.4*  --   --    RDW 58.5*  --   --    PLATELETCT 79*  --   --      Recent Labs     10/26/22  0914   SODIUM 136   POTASSIUM 4.4   CHLORIDE 100   CO2 26   GLUCOSE 102*   BUN 14   CREATININE 0.69   CALCIUM 8.7     Recent Labs     10/26/22  0914   INR 1.17*     Glucose:  Recent Labs     10/26/22  0914   GLUCOSE 102*     Coags:  Recent Labs     10/26/22  0914   INR 1.17*         Urinalysis:   Recent Labs     10/26/22  1032   COLORURINE Brown*   CLARITY Hazy*   SPECGRAVITY 1.020   PHURINE 6.0   GLUCOSEUR Negative   KETONES Negative   NITRITE Negative   OCCULTBLOOD Large*   RBCURINE >150*   BACTERIA Negative   EPITHELCELL Negative       Imaging:  DX-CHEST-PORTABLE (1 VIEW)   Final Result         1.  No acute cardiopulmonary disease.   2.  Atherosclerosis      CT-ABDOMEN-PELVIS WITH   Final Result      1.  Enhancing 2.5 x 2.3 cm bladder wall mass  suspicious for neoplasm.   2.  Heterogeneous sclerotic change of the pelvis and proximal femurs suspicious for metastatic disease or other infiltrating marrow process.   3.  Mild cardiomegaly.   4.  Hepatosplenomegaly.   5.  Small subcentimeter hepatic hypodensities which could represent cysts or hemangiomas but are nonspecific. If indicated, these findings could be further evaluated with dedicated liver protocol CT or MRI or ultrasound on a nonemergent basis.   6.  Colonic diverticulosis without evidence of diverticulitis.   7.  Atherosclerosis.          @lastct@     Assessment/Recommendation   72 y.o. M with anemia likely 2/2 GH, with concern for bladder mass    Images reviewed by Dr Castellanos and discussed with patient. Concern for bladder mass and given anemia requiring transfusion, have recommended inpatient TURBT. Risks/benefits discussed with patient and he is amenable to proceed  Consent obtained, will add on for TURBT today  Continue NPO status until OR, transfuse PRN  Urology will continue to follow    Dr Castellanos is aware of consult and has directed this patient's plan of care.       Alyssa Roe, PCHANTELLE.-C.   [id number]  [place] [id number]   [phone]

## 2022-10-27 NOTE — ASSESSMENT & PLAN NOTE
Patient does NOT have sepsis  He has 1 of 4 SIRS criteria   UA not suggestive of infection and pt asymptomatic  Dc abx

## 2022-10-27 NOTE — CARE PLAN
The patient is Stable - Low risk of patient condition declining or worsening    Shift Goals  Clinical Goals: monitor HGB, safety, rest  Patient Goals: Sleep  Family Goals: N/A    Problem: Hemodynamics  Goal: Patient's hemodynamics, fluid balance and neurologic status will be stable or improve  Outcome: Progressing     Problem: Urinary - Renal Perfusion  Goal: Ability to achieve and maintain adequate renal perfusion and functioning will improve  Outcome: Progressing     Problem: Respiratory  Goal: Patient will achieve/maintain optimum respiratory ventilation and gas exchange  Outcome: Progressing     Problem: Knowledge Deficit - Standard  Goal: Patient and family/care givers will demonstrate understanding of plan of care, disease process/condition, diagnostic tests and medications  Outcome: Progressing

## 2022-10-27 NOTE — PROGRESS NOTES
"Pharmacy Chemotherapy calculation:    Pt Name: Gregg Martinez  DX: Bladder Cancer    Regimen: Intravesical Gemcitabine  Gemcitabine 2000 mg in  ml instilled intravesically within 3 hours after TURBT  Jesus CARO, et al. Effect of Intravesical Instillation of Gemcitabine vs Saline Immediately Following Resection of Suspected Low-Grade Non-Muscle-Invasive Bladder Cancer on Tumor Recurrence SWOG  Randomized Clinical Trial. SHIVAM. 2018;319(18):9731-2293       /69   Pulse 83   Temp 36.3 °C (97.3 °F) (Temporal)   Resp 16   Ht 1.727 m (5' 8\")   Wt 82.2 kg (181 lb 3.5 oz)   SpO2 91%   BMI 27.55 kg/m²   Body surface area is 1.99 meters squared.  LABS: not required       Drug Order   (Drug name, dose, route, IV Fluid & volume, frequency, number of doses) Cycle: 1      Previous treatment: N/A     Medication = Gemcitabine   Base Dose= 2000 mg divided into 2 x 1000 mg syringes   Fixed dose no calc required = 1000 mg/syringe x 2  Final Dose = 1000 mg x 2 syringes   Route = IntraVESICALly  Fluid & Volume = NS 50 mL/syringe x 2   Admin Duration = to be administered by MD           Fixed dose; ok to treat with final dose         Christa Tripp, Jean CarlosD    "

## 2022-10-27 NOTE — PROGRESS NOTES
"Pharmacy Chemotherapy Verification  Patient Name: Gregg Martinez  DX: Bladder Cancer     Cycle 1  Previous treatment = N/A     Regimen: Intravesical Gemcitabine  Gemcitabine 2000 mg in  ml instilled intravesically within 3 hours after TURBT  Jesus CARO, et al. Effect of Intravesical Instillation of Gemcitabine vs Saline Immediately Following Resection of Suspected Low-Grade Non-Muscle-Invasive Bladder Cancer on Tumor Recurrence SWOG  Randomized Clinical Trial. SHIVAM. 2018;319(18):1525-7426     Allergies: Patient has no known allergies.  /69   Pulse 83   Temp 36.3 °C (97.3 °F) (Temporal)   Resp 16   Ht 1.727 m (5' 8\")   Wt 82.2 kg (181 lb 3.5 oz)   SpO2 91%   BMI 27.55 kg/m²   Body surface area is 1.99 meters squared.    LABS: not required      Gemcitabine 1000 mg in NS 50 mL intravesically via cath tipped syringe x 2 syringes              Total dose = 2000 mg gemcitabine              Fixed dose, no calculation required. To be administered by MD. Mariah Mckeon, PharmD, BCOP              "

## 2022-10-27 NOTE — OP REPORT
Urologic Surgery Operative Report     Pre-operative Diagnosis: 1. Bladder mass  2.  Likely bladder cancer  3.  Gross hematuria  4.  History of smoking   Post-operative Diagnosis: Same as above   Procedure: Transurethral Resection of Bladder tumor (>5cm in size)  Aborted intravesical instillation of gemcitabine   Surgeon Jayesh Castellanos M.D.    Assistant: None   Anesthesia: General, with paralysis  Anesthesiologist: Eran Cortez M.D.   General   Estimated Blood Loss: minimal   IV fluids Less than 500 cc crystalloid   Specimens: 1.  Multifocal bladder tumor chips sent for surgical pathology   Drains: 1.  22 F three-way cuellar catheter to bag   Complications: None   Condition: Stable, procedure well tolerated    Disposition:  1. PACU, discharge home after recovery, will need bone scan and biopsy of any suspicious bony lesions prior to discharge.  Will also need CT scan of his chest for adequate staging., f/u 2 wks to discuss pathology, but do anticipate muscle invasive bladder cancer   Findings: 1.  The patient was found to have multifocal bladder tumors including two 1 cm tumors near the bladder neck 1 at the left posterior bladder neck 1 at the anterior right bladder neck.  There is 4 additional satellite lesions 1 on the right lateral wall on the left lateral wall 1 on the posterior wall near the dome all about 1 to 2 cm in size.  There is additionally 1 main tumor with some necrotic debris has about 3 cm in size on the left posterior bladder wall likely including the left trigone UO which I did not visualize.  These were all resected down to muscular layers with fairly thin resection margins.  Because of this I opted not leave gemcitabine and leave the patient the catheter for at least a few days of this area heals.  2. Bimanual exam revealed no obvious fixed pelvic mass     INDICATIONS FOR PROCEDURE:  Gregg Martinez is a 72-year-old male who with bladder lesion concerning for bladder cancer. Risks discussed,  but not limited to, were infection, sepsis, bleeding, bladder injury, need for secondary procedure, inability to resect all of tumor, injury to ureters, need for cuellar post op, possible admission post operatively, and the cardiovascular and pulmonary complications of anesthesia/surgery including DVT, Mi, PE, and death    PROCEDURE IN DETAIL:   After informed consent was obtained, the patient was taken to the operating room and given Ancef for preoperative antibiotics.  After satisfactory induction of general anesthesia, he was then placed in the lithotomy position after a time out was called and prepped and draped in the normal sterile fashion.  Cystoscopy was initially performed using 30 degree lens which revealed a somewhat obstructive prostate with mostly lateral lobe obstruction, no trabeculations and a bladder tumor located at multiple areas as noted above findings, as described above.  Urethral sounds were needed to dilate the urethral meatus.      I used the 26F resectoscope to proceed with excision of his tumor(s) using BIpolar cautery. The lesions were resected down to muscular layer focusing on complete resection of the visualized tumor.  This was a fairly tedious process as there is multiple tumors throughout the bladder taking these down by 1 starting with the most anterior type tumors and worked my way backwards towards the posterior wall tumors.  After resection, the bladder fragments were irrigated out with bladder tumor specimens brought out with the [facility] evacuator and sent for surgical pathology.  Hemostasis was assured using coagulation/fulguration over the course of the base of tumor, we also fulgarated a rim of tissue circumferentially to help eliminate any potentially residual tumor.  Because of multiple surface areas I still was having trouble getting it crystal-clear but was fairly light pink and clear. we therefore terminated the case at this time and placed a 22 Nepali three-way cuellar  catheter was placed. The patient was then awakened from anesthesia and brought to recovery in stable condition.        Jayesh Castellanos MD  [facility] [id number]  [place] [id number]  [phone]

## 2022-10-27 NOTE — PROGRESS NOTES
4 Eyes Skin Assessment Completed by ELLA Hill and ELLA Loomis.    Head WDL  Ears WDL  Nose WDL  Mouth WDL  Neck WDL  Breast/Chest WDL  Shoulder Blades WDL  Spine WDL  (R) Arm/Elbow/Hand WDL  (L) Arm/Elbow/Hand WDL  Abdomen WDL  Groin WDL  Scrotum/Coccyx/Buttocks WDL  (R) Leg Swelling  (L) Leg Swelling  (R) Heel/Foot/Toe Swelling  (L) Heel/Foot/Toe Swelling          Devices In Places Pulse Ox      Interventions In Place Pillows and Pressure Redistribution Mattress    Possible Skin Injury No    Pictures Uploaded Into Epic N/A  Wound Consult Placed N/A  RN Wound Prevention Protocol Ordered No

## 2022-10-27 NOTE — HOSPITAL COURSE
72-year-old male with past medical history of DL D and asthma presenting with hematuria.  He was admitted for acute blood loss anemia requiring 2 units of PRBC transfusion.  CT abdomen pelvis showed bladder wall mass suspicious for neoplasm.  Urology was consulted and patient for TURBT on 10/27 which showed multifocal bladder tumors which were all resected. He continued to have hematuria which improved with CBI however he did require additional blood transfusion. Pathology showed low grade urothelial carcinoma. CT chest with contrast without evidence of lung involvement. Bone scan negative for mets. IR bone biopsy 10/31 with no evidence of malignancy.      He remained in the hospital given inability to discontinue CBI given hematuria and clots and intermittently worsening hemoglobin and hematocrit.  Urology took patient to the OR on 11/12/2022 for cystoscopy with clot evacuation from the bladder and fulguration of bleeding bladder tissue and again on 11/14/2022.  Given patient's coagulopathy issues and abnormal lab values, hematology was consulted recommend bone marrow aspiration and testing and every 48 PT/INR.

## 2022-10-27 NOTE — OR NURSING
Pt arrives from OR to PACU at 1236. Pt identification verified by team, pt placed on all monitors with alarms audible, report and care of pt received from Anesthesiologist and RN. Assessment completed, pt changed into hospital gown and provided with warm blankets.     1405- Report called to ELLA nAtunez on S5. Pt placed on transport list.

## 2022-10-27 NOTE — PROGRESS NOTES
Received back from PACU, aaox4, CBI running with clear pinkish UO noted, POc discussed, needs attended.

## 2022-10-27 NOTE — PROGRESS NOTES
Aaox4, blayne, npo at this time, still having hematuria, voids via urinal, will bladder scan as ordered post void, POC discussed, needs attended.

## 2022-10-27 NOTE — ANESTHESIA POSTPROCEDURE EVALUATION
Patient: Gregg Martinez    Procedure Summary     Date: 10/27/22 Room / Location: [place] [address] / SURGERY [facility]    Anesthesia Start: 1123 Anesthesia Stop: 1240    Procedure: TURBT BIPOLAR (TRANSURETHRAL RESECTION OF BLADDER TUMOR) (Bladder) Diagnosis: (Gross hematura and Bladder Tumor)    Surgeons: Jayesh Castellanos M.D. Responsible Provider: Eran Cortez M.D.    Anesthesia Type: general ASA Status: 3          Final Anesthesia Type: general  Last vitals  BP   Blood Pressure : 131/69    Temp   36.3 °C (97.3 °F)    Pulse   83   Resp   16    SpO2   91 %      Anesthesia Post Evaluation    Patient location during evaluation: PACU  Patient participation: complete - patient participated  Level of consciousness: awake and alert  Pain score: 0    Airway patency: patent  Anesthetic complications: no  Cardiovascular status: hemodynamically stable  Respiratory status: acceptable  Hydration status: euvolemic    PONV: none          No notable events documented.     Nurse Pain Score: 0 (NPRS)

## 2022-10-27 NOTE — CARE PLAN
Problem: Hemodynamics  Goal: Patient's hemodynamics, fluid balance and neurologic status will be stable or improve  Note: Monitor H and H, vital signs     Problem: Urinary - Renal Perfusion  Goal: Ability to achieve and maintain adequate renal perfusion and functioning will improve  Note: Will do bladder scan for PVR   The patient is Watcher - Medium risk of patient condition declining or worsening    Shift Goals  Clinical Goals: monitor HGB, safety, rest  Patient Goals: Sleep  Family Goals: N/A    Progress made toward(s) clinical / shift goals:  monitor labs    Patient is not progressing towards the following goals:

## 2022-10-27 NOTE — ANESTHESIA PREPROCEDURE EVALUATION
Case: [id number] Date/Time: 10/27/22 1115    Procedure: TURBT BIPOLAR (TRANSURETHRAL RESECTION OF BLADDER TUMOR) (Bladder)    Anesthesia type: General    Location: [facility] OR  / SURGERY [facility]    Surgeons: Jayesh Castellanos M.D.          Relevant Problems   PULMONARY   (positive) COPD (chronic obstructive pulmonary disease) (HCC)       Physical Exam    Airway   Mallampati: II  TM distance: >3 FB  Neck ROM: full       Cardiovascular - normal exam  Rhythm: regular  Rate: normal  (-) murmur     Dental - normal exam           Pulmonary - normal exam  Breath sounds clear to auscultation     Abdominal    Neurological - normal exam                 Anesthesia Plan    ASA 3   ASA physical status 3 criteria: other (comment)    Plan - general       Airway plan will be LMA          Induction: intravenous    Postoperative Plan: Postoperative administration of opioids is intended.    Pertinent diagnostic labs and testing reviewed    Informed Consent:    Anesthetic plan and risks discussed with patient.    Use of blood products discussed with: patient whom consented to blood products.       ECG: RBBB  HGB 8.1, consider transfusion based on hemodynamics

## 2022-10-27 NOTE — PROGRESS NOTES
Hospital Medicine Daily Progress Note    Date of Service  10/27/2022    Chief Complaint  Gregg Martinez is a 72 y.o. male admitted 10/26/2022 with hematuria     Hospital Course  72-year-old male with past medical history of DL D and asthma presenting with hematuria.  He was admitted for acute blood loss anemia requiring 2 units of PRBC transfusion.  CT abdomen pelvis showed bladder wall mass suspicious for neoplasm.  Urology was consulted and patient for TURBT on 10/27.    Interval Problem Update  SARAH overnight  Hgb has been stable  Pt for TURBT Today  He feels better after transfusion    I have discussed this patient's plan of care and discharge plan at IDT rounds today with Case Management, Nursing, Nursing leadership, and other members of the IDT team.    Consultants/Specialty  urology    Code Status  Full Code    Disposition  Patient is not medically cleared for discharge.   Anticipate discharge to to home with close outpatient follow-up.  I have placed the appropriate orders for post-discharge needs.    Review of Systems  Review of Systems   Constitutional:  Negative for chills and fever.   Respiratory:  Negative for cough and shortness of breath.    Cardiovascular:  Negative for chest pain and palpitations.   Gastrointestinal:  Negative for abdominal pain, nausea and vomiting.   Genitourinary:  Negative for dysuria and urgency.   Neurological:  Negative for dizziness and headaches.   All other systems reviewed and are negative.     Physical Exam  Temp:  [36.3 °C (97.3 °F)-37.1 °C (98.8 °F)] 36.3 °C (97.3 °F)  Pulse:  [80-89] 83  Resp:  [14-19] 16  BP: (101-131)/(57-74) 131/69  SpO2:  [90 %-96 %] 91 %    Physical Exam  Vitals and nursing note reviewed.   Constitutional:       Appearance: Normal appearance.   Cardiovascular:      Rate and Rhythm: Normal rate and regular rhythm.      Heart sounds: No murmur heard.  Pulmonary:      Effort: Pulmonary effort is normal. No respiratory distress.      Breath sounds:  Normal breath sounds.   Neurological:      General: No focal deficit present.      Mental Status: He is alert and oriented to person, place, and time.       Fluids    Intake/Output Summary (Last 24 hours) at 10/27/2022 1239  Last data filed at 10/27/2022 1000  Gross per 24 hour   Intake 1270 ml   Output 1775 ml   Net -505 ml       Laboratory  Recent Labs     10/26/22  0914 10/26/22  1344 10/27/22  0124 10/27/22  0827   WBC 17.9*  --   --   --    RBC 3.03*  --   --   --    HEMOGLOBIN 6.5* 6.6* 7.4* 8.1*   HEMATOCRIT 22.9* 22.7* 24.4* 26.6*   MCV 75.6*  --   --   --    MCH 21.5*  --   --   --    MCHC 28.4*  --   --   --    RDW 58.5*  --   --   --    PLATELETCT 79*  --   --   --      Recent Labs     10/26/22  0914   SODIUM 136   POTASSIUM 4.4   CHLORIDE 100   CO2 26   GLUCOSE 102*   BUN 14   CREATININE 0.69   CALCIUM 8.7     Recent Labs     10/26/22  0914   INR 1.17*               Imaging  DX-CHEST-PORTABLE (1 VIEW)   Final Result         1.  No acute cardiopulmonary disease.   2.  Atherosclerosis      CT-ABDOMEN-PELVIS WITH   Final Result      1.  Enhancing 2.5 x 2.3 cm bladder wall mass suspicious for neoplasm.   2.  Heterogeneous sclerotic change of the pelvis and proximal femurs suspicious for metastatic disease or other infiltrating marrow process.   3.  Mild cardiomegaly.   4.  Hepatosplenomegaly.   5.  Small subcentimeter hepatic hypodensities which could represent cysts or hemangiomas but are nonspecific. If indicated, these findings could be further evaluated with dedicated liver protocol CT or MRI or ultrasound on a nonemergent basis.   6.  Colonic diverticulosis without evidence of diverticulitis.   7.  Atherosclerosis.           Assessment/Plan  * Bladder mass  Assessment & Plan  CT AP: Notable enhancing 2.5 x 2.3 cm bladder wall mass suspicious for neoplasm  Pt for TURBT on 10/27         Hematuria  Assessment & Plan  Likely secondary to above    Iron deficiency anemia  Assessment & Plan  IV iron  infusion    UTI (urinary tract infection)  Assessment & Plan  Pt does NOT have UTI  Dc abx      Acute blood loss anemia- (present on admission)  Assessment & Plan  S/p 2units pRBC ordered  PPI, avoid NSAID  Trend HH, transfuse for Hgb<7 or hemodynamic instability    Obesity (BMI 30-39.9)- (present on admission)  Assessment & Plan  Diet and lifestyle modification  Body mass index is 30.54 kg/m².      Leukocytosis- (present on admission)  Assessment & Plan  IVF, abx  trend    Sepsis (HCC)  Assessment & Plan  Patient does NOT have sepsis  He has 1 of 4 SIRS criteria   UA not suggestive of infection and pt asymptomatic  Dc abx      Dyslipidemia- (present on admission)  Assessment & Plan  Statin    COPD (chronic obstructive pulmonary disease) (HCC)- (present on admission)  Assessment & Plan  Not in acute exacerbation  PRN duonebs       VTE prophylaxis: SCDs/TEDs    I have performed a physical exam and reviewed and updated ROS and Plan today (10/27/2022). In review of yesterday's note (10/26/2022), there are no changes except as documented above.

## 2022-10-27 NOTE — PROGRESS NOTES
4 Eyes Skin Assessment Completed by ELLA Figueroa and ELLA Moralez.    Head WDL  Ears WDL  Nose WDL  Mouth WDL  Neck WDL  Breast/Chest WDL  Shoulder Blades WDL  Spine WDL  (R) Arm/Elbow/Hand WDL  (L) Arm/Elbow/Hand WDL  Abdomen WDL  Groin WDL  Scrotum/Coccyx/Buttocks WDL  (R) Leg WDL  (L) Leg WDL  (R) Heel/Foot/Toe WDL  (L) Heel/Foot/Toe WDL          Devices In Places Pulse Ox      Interventions In Place Pillows    Possible Skin Injury No    Pictures Uploaded Into Epic N/A  Wound Consult Placed N/A  RN Wound Prevention Protocol Ordered No

## 2022-10-27 NOTE — ASSESSMENT & PLAN NOTE
Transurethral Resection of Bladder tumor (>5cm in size)  10/27/2022  Cystoscopy, Clot evacuation, Fulguration, Transurethral resection of bladder tumor 3 cm    11/2/2022  Cystoscopy with clot evacuation from the bladder and fulguration of bleeding bladder tissue   11/12/2022  Cystoscopy with resection of bladder mass (large), Transurethral resection of bladder tumor (small), Evacuation of bladder mass/clot, Fulguration of venous oozing sites and bladder tumor resection base    11/14/2022  CBI with ALUM

## 2022-10-27 NOTE — ASSESSMENT & PLAN NOTE
Likely reactive in setting of malignancy and blood loss anemia  Increased to 24.4 today, discussed with hematology, likely secondary to malignancy  Pro-Nicolas 1.64, repeat 1.81  Chest x-ray consistent with atelectasis versus pneumonia  UA negative  Afebrile  Unasyn (end date 11/18/2022)   Incentive spirometry and ambulate  WBC has normalized

## 2022-10-27 NOTE — ANESTHESIA PROCEDURE NOTES
Airway    Date/Time: 10/27/2022 11:28 AM  Performed by: Eran Cortez M.D.  Authorized by: Eran Cortez M.D.     Location:  OR  Urgency:  Emergent  Consent Cannot be Obtained Due to Urgency: Yes    Indications for Airway Management:  Respiratory failure      Spontaneous Ventilation: absent    Sedation Level:  Deep  Preoxygenated: Yes    Patient Position:  Sniffing  Final Airway Type:  Endotracheal airway  Final Endotracheal Airway:  ETT  Cuffed: Yes    Technique Used for Successful ETT Placement:  Direct laryngoscopy  Devices/Methods Used in Placement:  Cricoid pressure    Insertion Site:  Oral  Blade Type:  [place]  Laryngoscope Blade/Videolaryngoscope Blade Size:  3  ETT Size (mm):  7.5  Measured from:  Teeth  ETT to Teeth (cm):  23  Placement Verified by: auscultation and capnometry    Cormack-Lehane Classification:  Grade I - full view of glottis  Number of Attempts at Approach:  1

## 2022-10-28 ENCOUNTER — APPOINTMENT (OUTPATIENT)
Dept: RADIOLOGY | Facility: MEDICAL CENTER | Age: 72
DRG: 668 | End: 2022-10-28
Attending: HOSPITALIST
Payer: MEDICARE

## 2022-10-28 LAB
ERYTHROCYTE [DISTWIDTH] IN BLOOD BY AUTOMATED COUNT: 63.6 FL (ref 35.9–50)
HAPTOGLOB SERPL-MCNC: 104 MG/DL (ref 30–200)
HCT VFR BLD AUTO: 26 % (ref 42–52)
HGB BLD-MCNC: 8 G/DL (ref 14–18)
MCH RBC QN AUTO: 24 PG (ref 27–33)
MCHC RBC AUTO-ENTMCNC: 30.8 G/DL (ref 33.7–35.3)
MCV RBC AUTO: 78.1 FL (ref 81.4–97.8)
PLATELET # BLD AUTO: 90 K/UL (ref 164–446)
RBC # BLD AUTO: 3.33 M/UL (ref 4.7–6.1)
WBC # BLD AUTO: 17.1 K/UL (ref 4.8–10.8)

## 2022-10-28 PROCEDURE — 700102 HCHG RX REV CODE 250 W/ 637 OVERRIDE(OP): Performed by: STUDENT IN AN ORGANIZED HEALTH CARE EDUCATION/TRAINING PROGRAM

## 2022-10-28 PROCEDURE — 700105 HCHG RX REV CODE 258: Performed by: STUDENT IN AN ORGANIZED HEALTH CARE EDUCATION/TRAINING PROGRAM

## 2022-10-28 PROCEDURE — A9270 NON-COVERED ITEM OR SERVICE: HCPCS | Performed by: STUDENT IN AN ORGANIZED HEALTH CARE EDUCATION/TRAINING PROGRAM

## 2022-10-28 PROCEDURE — 71260 CT THORAX DX C+: CPT

## 2022-10-28 PROCEDURE — 700111 HCHG RX REV CODE 636 W/ 250 OVERRIDE (IP): Performed by: STUDENT IN AN ORGANIZED HEALTH CARE EDUCATION/TRAINING PROGRAM

## 2022-10-28 PROCEDURE — 36415 COLL VENOUS BLD VENIPUNCTURE: CPT

## 2022-10-28 PROCEDURE — C9113 INJ PANTOPRAZOLE SODIUM, VIA: HCPCS | Performed by: STUDENT IN AN ORGANIZED HEALTH CARE EDUCATION/TRAINING PROGRAM

## 2022-10-28 PROCEDURE — 700117 HCHG RX CONTRAST REV CODE 255: Performed by: HOSPITALIST

## 2022-10-28 PROCEDURE — 99233 SBSQ HOSP IP/OBS HIGH 50: CPT | Performed by: HOSPITALIST

## 2022-10-28 PROCEDURE — 85027 COMPLETE CBC AUTOMATED: CPT

## 2022-10-28 PROCEDURE — 770006 HCHG ROOM/CARE - MED/SURG/GYN SEMI*

## 2022-10-28 RX ADMIN — OXYCODONE 5 MG: 5 TABLET ORAL at 13:39

## 2022-10-28 RX ADMIN — DOCUSATE SODIUM 50 MG AND SENNOSIDES 8.6 MG 2 TABLET: 8.6; 5 TABLET, FILM COATED ORAL at 05:33

## 2022-10-28 RX ADMIN — FOLIC ACID 1 MG: 1 TABLET ORAL at 05:33

## 2022-10-28 RX ADMIN — OXYCODONE 5 MG: 5 TABLET ORAL at 22:52

## 2022-10-28 RX ADMIN — ATORVASTATIN CALCIUM 20 MG: 20 TABLET, FILM COATED ORAL at 17:46

## 2022-10-28 RX ADMIN — OXYCODONE 5 MG: 5 TABLET ORAL at 03:06

## 2022-10-28 RX ADMIN — IOHEXOL 75 ML: 350 INJECTION, SOLUTION INTRAVENOUS at 23:00

## 2022-10-28 RX ADMIN — DOCUSATE SODIUM 50 MG AND SENNOSIDES 8.6 MG 2 TABLET: 8.6; 5 TABLET, FILM COATED ORAL at 17:46

## 2022-10-28 RX ADMIN — PANTOPRAZOLE SODIUM 40 MG: 40 INJECTION, POWDER, FOR SOLUTION INTRAVENOUS at 05:32

## 2022-10-28 RX ADMIN — OXYCODONE 5 MG: 5 TABLET ORAL at 17:46

## 2022-10-28 RX ADMIN — CYANOCOBALAMIN TAB 500 MCG 1000 MCG: 500 TAB at 05:33

## 2022-10-28 RX ADMIN — THIAMINE HCL TAB 100 MG 100 MG: 100 TAB at 17:46

## 2022-10-28 RX ADMIN — SODIUM CHLORIDE 250 MG: 9 INJECTION, SOLUTION INTRAVENOUS at 05:46

## 2022-10-28 RX ADMIN — Medication 2000 UNITS: at 05:33

## 2022-10-28 RX ADMIN — THIAMINE HCL TAB 100 MG 100 MG: 100 TAB at 05:33

## 2022-10-28 ASSESSMENT — ENCOUNTER SYMPTOMS
ABDOMINAL PAIN: 0
FEVER: 0
CHILLS: 0
VOMITING: 0
SHORTNESS OF BREATH: 0
DIZZINESS: 0
COUGH: 0
HEADACHES: 0
FLANK PAIN: 0
NAUSEA: 0
ABDOMINAL PAIN: 1
PALPITATIONS: 0

## 2022-10-28 ASSESSMENT — PAIN DESCRIPTION - PAIN TYPE: TYPE: ACUTE PAIN

## 2022-10-28 NOTE — PROGRESS NOTES
Note to reader: this note follows the APSO format rather than the historical SOAP format. Assessment and plan located at the top of the note for ease of use.    Chief Complaint  Gregg Martinez is a 72 y.o. male patient who presented 10/26 c/o GH. In ED, Hb was 6.6, and CT showed enhancing 2.5cm bladder wall mass concerning for neoplasm, as well as heterogeneous sclerotic changes of pelvis and proximal femurs suspicious for metastases.  Cr 0.69. He was given 2 units of pRBCs and urology was consulted.    Procedures:  10/27: TURBT with Dr Castellanos    Assessment/Plan  Interval History   Active Hospital Problems    Diagnosis     Acute blood loss anemia [D62]     UTI (urinary tract infection) [N39.0]     Bladder mass [N32.89]     Iron deficiency anemia [D50.9]     Hematuria [R31.9]     Obesity (BMI 30-39.9) [E66.9]     Leukocytosis [D72.829]     Sepsis (HCC) [A41.9]     Dyslipidemia [E78.5]     COPD (chronic obstructive pulmonary disease) (HCC) [J44.9]      10/28 POD1. NAD, CBI on moderate flow and urine pink in tubing. Pt reports has had multiple clots and intermittent abd pain when cuellar obstructs from clots. H/H stable,       10/27: seen and examined, lying in bed in NAD. Reports intermittent GH for 2 years, but usually would resolve on its own. GH has never been associated with dysuria, flank pain, or other symptoms. States several days ago after riding in his truck, he developed GH that would not resolve, leading him to present to ED. After transfusion he is feeling better but endorses some dizziness/fatigue. Denies sensation of incomplete emptying, has not seen clots in his urine. Former smoker, quit 20 years ago. No fhx of  malignancy. AFVSS.         Plan:  - at bedside, I irrigated cuellar with ~500cc of NS, with return of multiple clots. Ultimately patient began to experience bladder spasms so irrigation was stopped and CBI was reconnected on moderate flow  - Hand irrigate qshift and PRN clot, and titrate CBI to  maintain urine light pink to clear  - Prior to discharge, must ensure that urine remains clear with CBI clamped. Thus far have been unable to wean CBI, so recommend continued admission  - Recommend STAT bone scan, STAT CT chest, referral to IR for bone biopsy, and referral to oncology prior to discharge  -Urology will continue to follow    Discussed with nursing and with Dr Castellanos, who has directed this patient's plan of care.    Review of Systems  Physical Exam   Review of Systems   Constitutional:  Negative for chills and fever.   Respiratory:  Negative for cough.    Cardiovascular:  Negative for chest pain.   Gastrointestinal:  Positive for abdominal pain. Negative for nausea and vomiting.   Genitourinary:  Positive for hematuria. Negative for flank pain.   All other systems reviewed and are negative.  Vitals:    10/27/22 1625 10/27/22 1904 10/28/22 0351 10/28/22 0717   BP: 127/63 120/74  125/69   Pulse: 93 88 89 (!) 104   Resp: 18 16 16 16   Temp: 36.6 °C (97.8 °F) 36.7 °C (98.1 °F) 36.4 °C (97.5 °F) 36.6 °C (97.9 °F)   TempSrc: Temporal Oral Temporal Temporal   SpO2: 95% 95% 93% 93%   Weight:       Height:         Physical Exam  Vitals and nursing note reviewed.   Constitutional:       Appearance: Normal appearance.   HENT:      Head: Normocephalic.      Nose: Nose normal.   Eyes:      Extraocular Movements: Extraocular movements intact.      Conjunctiva/sclera: Conjunctivae normal.   Pulmonary:      Effort: Pulmonary effort is normal.   Abdominal:      Tenderness: There is no abdominal tenderness.   Genitourinary:     Comments: 3 way cuellar in place with CBI running on moderate flow, urine dark watermelon in tubing with small clots  Musculoskeletal:         General: Normal range of motion.      Cervical back: Normal range of motion and neck supple.   Skin:     General: Skin is warm and dry.   Neurological:      General: No focal deficit present.      Mental Status: He is alert and oriented to person, place,  and time.   Psychiatric:         Mood and Affect: Mood normal.         Behavior: Behavior normal.        Hematology Chemistry   Lab Results   Component Value Date/Time    WBC 17.1 (H) 10/28/2022 01:51 AM    HEMOGLOBIN 8.0 (L) 10/28/2022 01:51 AM    HEMATOCRIT 26.0 (L) 10/28/2022 01:51 AM    PLATELETCT 90 (L) 10/28/2022 01:51 AM     Lab Results   Component Value Date/Time    SODIUM 136 10/26/2022 09:14 AM    POTASSIUM 4.4 10/26/2022 09:14 AM    CHLORIDE 100 10/26/2022 09:14 AM    CO2 26 10/26/2022 09:14 AM    GLUCOSE 102 (H) 10/26/2022 09:14 AM    BUN 14 10/26/2022 09:14 AM    CREATININE 0.69 10/26/2022 09:14 AM         Labs not explicitly included in this progress note were reviewed by the author.   Radiology/imaging not explicitly included in this progress note was reviewed by the author.     Radiology images reviewed, Labs reviewed and Medications reviewed

## 2022-10-28 NOTE — CARE PLAN
The patient is Stable - Low risk of patient condition declining or worsening    Shift Goals  Clinical Goals: CBI, pain control, rest  Patient Goals: Rest  Family Goals: N/A      Progress made toward(s) clinical / shift goals:  Patient on Continuous bladder irrigation, draining red to pink drainage. Clots present at times, cleared with irrigation.

## 2022-10-29 PROBLEM — C67.9 UROTHELIAL CARCINOMA OF BLADDER WITHOUT INVASION OF MUSCLE (HCC): Status: ACTIVE | Noted: 2022-10-26

## 2022-10-29 LAB
ANION GAP SERPL CALC-SCNC: 9 MMOL/L (ref 7–16)
BACTERIA UR CULT: NORMAL
BUN SERPL-MCNC: 17 MG/DL (ref 8–22)
CALCIUM SERPL-MCNC: 8.5 MG/DL (ref 8.5–10.5)
CHLORIDE SERPL-SCNC: 101 MMOL/L (ref 96–112)
CO2 SERPL-SCNC: 27 MMOL/L (ref 20–33)
CREAT SERPL-MCNC: 0.77 MG/DL (ref 0.5–1.4)
ERYTHROCYTE [DISTWIDTH] IN BLOOD BY AUTOMATED COUNT: 65.5 FL (ref 35.9–50)
GFR SERPLBLD CREATININE-BSD FMLA CKD-EPI: 96 ML/MIN/1.73 M 2
GLUCOSE SERPL-MCNC: 103 MG/DL (ref 65–99)
HCT VFR BLD AUTO: 23.5 % (ref 42–52)
HGB BLD-MCNC: 7 G/DL (ref 14–18)
MCH RBC QN AUTO: 23.4 PG (ref 27–33)
MCHC RBC AUTO-ENTMCNC: 29.8 G/DL (ref 33.7–35.3)
MCV RBC AUTO: 78.6 FL (ref 81.4–97.8)
PLATELET # BLD AUTO: 118 K/UL (ref 164–446)
POTASSIUM SERPL-SCNC: 4.6 MMOL/L (ref 3.6–5.5)
RBC # BLD AUTO: 2.99 M/UL (ref 4.7–6.1)
SIGNIFICANT IND 70042: NORMAL
SITE SITE: NORMAL
SODIUM SERPL-SCNC: 137 MMOL/L (ref 135–145)
SOURCE SOURCE: NORMAL
WBC # BLD AUTO: 15.9 K/UL (ref 4.8–10.8)

## 2022-10-29 PROCEDURE — 99233 SBSQ HOSP IP/OBS HIGH 50: CPT | Performed by: HOSPITALIST

## 2022-10-29 PROCEDURE — 700102 HCHG RX REV CODE 250 W/ 637 OVERRIDE(OP): Performed by: STUDENT IN AN ORGANIZED HEALTH CARE EDUCATION/TRAINING PROGRAM

## 2022-10-29 PROCEDURE — 80048 BASIC METABOLIC PNL TOTAL CA: CPT

## 2022-10-29 PROCEDURE — 36415 COLL VENOUS BLD VENIPUNCTURE: CPT

## 2022-10-29 PROCEDURE — A9270 NON-COVERED ITEM OR SERVICE: HCPCS | Performed by: STUDENT IN AN ORGANIZED HEALTH CARE EDUCATION/TRAINING PROGRAM

## 2022-10-29 PROCEDURE — 770006 HCHG ROOM/CARE - MED/SURG/GYN SEMI*

## 2022-10-29 PROCEDURE — 85027 COMPLETE CBC AUTOMATED: CPT

## 2022-10-29 RX ADMIN — OXYCODONE HYDROCHLORIDE 10 MG: 10 TABLET ORAL at 04:27

## 2022-10-29 RX ADMIN — OXYCODONE 5 MG: 5 TABLET ORAL at 14:11

## 2022-10-29 RX ADMIN — THIAMINE HCL TAB 100 MG 100 MG: 100 TAB at 17:34

## 2022-10-29 RX ADMIN — Medication 2000 UNITS: at 05:13

## 2022-10-29 RX ADMIN — FOLIC ACID 1 MG: 1 TABLET ORAL at 05:13

## 2022-10-29 RX ADMIN — OXYCODONE 5 MG: 5 TABLET ORAL at 19:56

## 2022-10-29 RX ADMIN — THIAMINE HCL TAB 100 MG 100 MG: 100 TAB at 05:13

## 2022-10-29 RX ADMIN — CYANOCOBALAMIN TAB 500 MCG 1000 MCG: 500 TAB at 05:13

## 2022-10-29 RX ADMIN — ATORVASTATIN CALCIUM 20 MG: 20 TABLET, FILM COATED ORAL at 17:34

## 2022-10-29 RX ADMIN — DOCUSATE SODIUM 50 MG AND SENNOSIDES 8.6 MG 2 TABLET: 8.6; 5 TABLET, FILM COATED ORAL at 17:34

## 2022-10-29 ASSESSMENT — ENCOUNTER SYMPTOMS
ABDOMINAL PAIN: 0
FLANK PAIN: 0
HEADACHES: 0
NAUSEA: 0
DIZZINESS: 0
COUGH: 0
VOMITING: 0
FEVER: 0
PALPITATIONS: 0
CHILLS: 0
SHORTNESS OF BREATH: 0

## 2022-10-29 ASSESSMENT — PAIN DESCRIPTION - PAIN TYPE
TYPE: ACUTE PAIN
TYPE: ACUTE PAIN

## 2022-10-29 ASSESSMENT — COGNITIVE AND FUNCTIONAL STATUS - GENERAL
SUGGESTED CMS G CODE MODIFIER DAILY ACTIVITY: CH
SUGGESTED CMS G CODE MODIFIER MOBILITY: CH
MOBILITY SCORE: 24
DAILY ACTIVITIY SCORE: 24

## 2022-10-29 ASSESSMENT — FIBROSIS 4 INDEX: FIB4 SCORE: 2.27

## 2022-10-29 NOTE — PROGRESS NOTES
Hospital Medicine Daily Progress Note    Date of Service  10/28/2022    Chief Complaint  Gregg Martinez is a 72 y.o. male admitted 10/26/2022 with hematuria     Hospital Course  72-year-old male with past medical history of DL D and asthma presenting with hematuria.  He was admitted for acute blood loss anemia requiring 2 units of PRBC transfusion.  CT abdomen pelvis showed bladder wall mass suspicious for neoplasm.  Urology was consulted and patient for TURBT on 10/27 which showed multifocal bladder tumors which were all resected.    Interval Problem Update  SARAH overnight  Hgb has been stable  Still with hematuria and CBI continued  I discussed procedure findings with him    I have discussed this patient's plan of care and discharge plan at IDT rounds today with Case Management, Nursing, Nursing leadership, and other members of the IDT team.    Consultants/Specialty  urology    Code Status  Full Code    Disposition  Patient is not medically cleared for discharge.   Anticipate discharge to to home with close outpatient follow-up.  I have placed the appropriate orders for post-discharge needs.    Review of Systems  Review of Systems   Constitutional:  Negative for chills and fever.   Respiratory:  Negative for cough and shortness of breath.    Cardiovascular:  Negative for chest pain and palpitations.   Gastrointestinal:  Negative for abdominal pain, nausea and vomiting.   Genitourinary:  Positive for hematuria. Negative for dysuria and urgency.   Neurological:  Negative for dizziness and headaches.   All other systems reviewed and are negative.     Physical Exam  Temp:  [36.4 °C (97.5 °F)-36.7 °C (98.1 °F)] 36.6 °C (97.9 °F)  Pulse:  [] 91  Resp:  [16] 16  BP: (111-125)/(65-74) 111/65  SpO2:  [93 %-96 %] 96 %    Physical Exam  Vitals and nursing note reviewed.   Constitutional:       Appearance: Normal appearance.   Cardiovascular:      Rate and Rhythm: Normal rate and regular rhythm.      Heart sounds: No  murmur heard.  Pulmonary:      Effort: Pulmonary effort is normal. No respiratory distress.      Breath sounds: Normal breath sounds.   Neurological:      General: No focal deficit present.      Mental Status: He is alert and oriented to person, place, and time.       Fluids    Intake/Output Summary (Last 24 hours) at 10/28/2022 1851  Last data filed at 10/28/2022 1800  Gross per 24 hour   Intake 1120 ml   Output -1100 ml   Net 2220 ml       Laboratory  Recent Labs     10/26/22  0914 10/26/22  1344 10/27/22  0124 10/27/22  0827 10/28/22  0151   WBC 17.9*  --   --   --  17.1*   RBC 3.03*  --   --   --  3.33*   HEMOGLOBIN 6.5*   < > 7.4* 8.1* 8.0*   HEMATOCRIT 22.9*   < > 24.4* 26.6* 26.0*   MCV 75.6*  --   --   --  78.1*   MCH 21.5*  --   --   --  24.0*   MCHC 28.4*  --   --   --  30.8*   RDW 58.5*  --   --   --  63.6*   PLATELETCT 79*  --   --   --  90*    < > = values in this interval not displayed.     Recent Labs     10/26/22  0914   SODIUM 136   POTASSIUM 4.4   CHLORIDE 100   CO2 26   GLUCOSE 102*   BUN 14   CREATININE 0.69   CALCIUM 8.7     Recent Labs     10/26/22  0914   INR 1.17*               Imaging  DX-CHEST-PORTABLE (1 VIEW)   Final Result         1.  No acute cardiopulmonary disease.   2.  Atherosclerosis      CT-ABDOMEN-PELVIS WITH   Final Result      1.  Enhancing 2.5 x 2.3 cm bladder wall mass suspicious for neoplasm.   2.  Heterogeneous sclerotic change of the pelvis and proximal femurs suspicious for metastatic disease or other infiltrating marrow process.   3.  Mild cardiomegaly.   4.  Hepatosplenomegaly.   5.  Small subcentimeter hepatic hypodensities which could represent cysts or hemangiomas but are nonspecific. If indicated, these findings could be further evaluated with dedicated liver protocol CT or MRI or ultrasound on a nonemergent basis.   6.  Colonic diverticulosis without evidence of diverticulitis.   7.  Atherosclerosis.           Assessment/Plan  * Bladder mass  Assessment & Plan  CT  AP: Notable enhancing 2.5 x 2.3 cm bladder wall mass suspicious for neoplasms  s/p TURBT on 10/27 with multiple tumors noted  Still with post op hematuria -> cont CBI         Hematuria  Assessment & Plan  Likely secondary to above    Iron deficiency anemia  Assessment & Plan  IV iron infusion    UTI (urinary tract infection)  Assessment & Plan  Pt does NOT have UTI  Dc abx      Acute blood loss anemia- (present on admission)  Assessment & Plan  S/p 2units pRBC ordered  PPI, avoid NSAID  Trend HH, transfuse for Hgb<7 or hemodynamic instability    Obesity (BMI 30-39.9)- (present on admission)  Assessment & Plan  Diet and lifestyle modification  Body mass index is 30.54 kg/m².      Leukocytosis- (present on admission)  Assessment & Plan  IVF, abx  trend    Sepsis (HCC)  Assessment & Plan  Patient does NOT have sepsis  He has 1 of 4 SIRS criteria   UA not suggestive of infection and pt asymptomatic  Dc abx      Dyslipidemia- (present on admission)  Assessment & Plan  Statin    COPD (chronic obstructive pulmonary disease) (HCC)- (present on admission)  Assessment & Plan  Not in acute exacerbation  PRN duonebs       VTE prophylaxis: SCDs/TEDs    I have performed a physical exam and reviewed and updated ROS and Plan today (10/28/2022). In review of yesterday's note (10/27/2022), there are no changes except as documented above.

## 2022-10-29 NOTE — PROGRESS NOTES
Note to reader: this note follows the APSO format rather than the historical SOAP format. Assessment and plan located at the top of the note for ease of use.    Chief Complaint  Gregg Martinez is a 72 y.o. male patient who presented 10/26 c/o GH. In ED, Hb was 6.6, and CT showed enhancing 2.5cm bladder wall mass concerning for neoplasm, as well as heterogeneous sclerotic changes of pelvis and proximal femurs suspicious for metastases.  Cr 0.69. He was given 2 units of pRBCs and urology was consulted.    Procedures:  10/27: TURBT with Dr Castellanos    Assessment/Plan  Interval History   Active Hospital Problems    Diagnosis     Acute blood loss anemia [D62]     UTI (urinary tract infection) [N39.0]     Bladder mass [N32.89]     Iron deficiency anemia [D50.9]     Hematuria [R31.9]     Obesity (BMI 30-39.9) [E66.9]     Leukocytosis [D72.829]     Sepsis (HCC) [A41.9]     Dyslipidemia [E78.5]     COPD (chronic obstructive pulmonary disease) (HCC) [J44.9]      10/29 POD2. Anxious about discharge, transport home to [place]. Pain improved, CBI on moderate flow with urine light pink in tubing.     10/28 POD1. NAD, CBI on moderate flow and urine pink in tubing. Pt reports has had multiple clots and intermittent abd pain when cuellar obstructs from clots. H/H stable,       10/27: seen and examined, lying in bed in NAD. Reports intermittent GH for 2 years, but usually would resolve on its own. GH has never been associated with dysuria, flank pain, or other symptoms. States several days ago after riding in his truck, he developed GH that would not resolve, leading him to present to ED. After transfusion he is feeling better but endorses some dizziness/fatigue. Denies sensation of incomplete emptying, has not seen clots in his urine. Former smoker, quit 20 years ago. No fhx of  malignancy. AFVSS.         Plan:  - at bedside, I irrigated cuellar with ~250cc of sterile saline, and urine remained pink without clots  - CBI was then clamped, but  over the next several minutes his urine became bright cherry colored, so CBI was resumed at low flow  - Prior to discharge, must ensure that urine remains clear with CBI clamped. Thus far have been unable to wean CBI, so recommend continued admission  - Discussed with hospitalist; cannot perform bone scan while inpatient, but IR able to do biopsy of bony lesion either Monday or Tuesday. Urology will arrange outpatient bone scan upon discharge  - Given patient anxiety about transportation and work, can consider social work consult to assist  -Urology will continue to follow    Discussed with nursing, hospitalist, and with Dr Donald, who has directed this patient's plan of care.    Review of Systems  Physical Exam   Review of Systems   Constitutional:  Negative for chills and fever.   Respiratory:  Negative for cough.    Cardiovascular:  Negative for chest pain.   Gastrointestinal:  Negative for abdominal pain, nausea and vomiting.   Genitourinary:  Positive for hematuria. Negative for flank pain.   All other systems reviewed and are negative.  Vitals:    10/28/22 1547 10/28/22 1927 10/29/22 0310 10/29/22 0724   BP: 111/65 122/65 115/63 127/74   Pulse: 91 88 87 84   Resp: 16 17 18 16   Temp: 36.6 °C (97.9 °F) 36.6 °C (97.9 °F) 37 °C (98.6 °F) 36.4 °C (97.5 °F)   TempSrc:  Temporal Temporal Temporal   SpO2: 96% 100% 98% 98%   Weight:       Height:         Physical Exam  Vitals and nursing note reviewed.   Constitutional:       Appearance: Normal appearance.   HENT:      Head: Normocephalic.      Nose: Nose normal.   Eyes:      Extraocular Movements: Extraocular movements intact.      Conjunctiva/sclera: Conjunctivae normal.   Pulmonary:      Effort: Pulmonary effort is normal.   Abdominal:      Tenderness: There is no abdominal tenderness.   Genitourinary:     Comments: 3 way cuellar in place with CBI running on moderate flow, urine dark watermelon in tubing   Musculoskeletal:         General: Normal range of motion.       Cervical back: Normal range of motion and neck supple.   Skin:     General: Skin is warm and dry.   Neurological:      General: No focal deficit present.      Mental Status: He is alert and oriented to person, place, and time.   Psychiatric:         Mood and Affect: Mood normal.         Behavior: Behavior normal.        Hematology Chemistry   Lab Results   Component Value Date/Time    WBC 15.9 (H) 10/29/2022 02:57 AM    HEMOGLOBIN 7.0 (L) 10/29/2022 02:57 AM    HEMATOCRIT 23.5 (L) 10/29/2022 02:57 AM    PLATELETCT 118 (L) 10/29/2022 02:57 AM     Lab Results   Component Value Date/Time    SODIUM 137 10/29/2022 02:57 AM    POTASSIUM 4.6 10/29/2022 02:57 AM    CHLORIDE 101 10/29/2022 02:57 AM    CO2 27 10/29/2022 02:57 AM    GLUCOSE 103 (H) 10/29/2022 02:57 AM    BUN 17 10/29/2022 02:57 AM    CREATININE 0.77 10/29/2022 02:57 AM         Labs not explicitly included in this progress note were reviewed by the author.   Radiology/imaging not explicitly included in this progress note was reviewed by the author.     Radiology images reviewed, Labs reviewed and Medications reviewed

## 2022-10-29 NOTE — PROGRESS NOTES
Bedside report received from ELLA Archibald. Assumed care of pt. Pt A/A/O x 4. Denies pain or discomfort at this time. 3 way cuellar clean dry and intact with CBI infusing as ordered. Drainage dark red at this time. No clots noted. Call light and personal belongings within reach. Bed in low position with wheels locked.

## 2022-10-29 NOTE — PROGRESS NOTES
4 eyes Skin assessment cpmpleted by Cristela RN and Maya RN    Head WDL  Ears WDL  Nose WDL  Mouth WDL  Neck WDL  Breast/chest WDL  Shoulder Blades red rash/burn  Spine Burn top of spine/base of neck   (R) Arm/Elbow/Hand WDL  (L) Arm/Elbow/Hand WDL  Abdomen WDL  Groin WDL  Coccyc/Buttocks WDL       No device in place    Interventions in place.  Q2 turns.  Pillow placement     Possible Skin injury Yes   Pictures uploaded No  Wound Consult placed YES  RN Wound Prevention Protocol ordered NO

## 2022-10-29 NOTE — RESPIRATORY CARE
COPD EDUCATION by COPD CLINICAL EDUCATOR  10/29/2022 at 4:14 PM by Shelly Oakes, RRT     Patient interviewed by COPD education team. Patient denies COPD

## 2022-10-29 NOTE — PROGRESS NOTES
Hospital Medicine Daily Progress Note    Date of Service  10/29/2022    Chief Complaint  Gregg Martinez is a 72 y.o. male admitted 10/26/2022 with hematuria     Hospital Course  72-year-old male with past medical history of DL D and asthma presenting with hematuria.  He was admitted for acute blood loss anemia requiring 2 units of PRBC transfusion.  CT abdomen pelvis showed bladder wall mass suspicious for neoplasm.  Urology was consulted and patient for TURBT on 10/27 which showed multifocal bladder tumors which were all resected.    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    Consultants/Specialty  urology    Code Status  Full Code    Disposition  Patient is not medically cleared for discharge.   Anticipate discharge to to home with close outpatient follow-up.  I have placed the appropriate orders for post-discharge needs.    Review of Systems  Review of Systems   Constitutional:  Negative for chills and fever.   Respiratory:  Negative for cough and shortness of breath.    Cardiovascular:  Negative for chest pain and palpitations.   Gastrointestinal:  Negative for abdominal pain, nausea and vomiting.   Genitourinary:  Positive for hematuria. Negative for dysuria and urgency.   Neurological:  Negative for dizziness and headaches.   All other systems reviewed and are negative.     Physical Exam  Temp:  [36.4 °C (97.5 °F)-37 °C (98.6 °F)] 36.4 °C (97.5 °F)  Pulse:  [84-91] 84  Resp:  [16-18] 16  BP: (111-127)/(63-74) 127/74  SpO2:  [96 %-100 %] 98 %    Physical Exam  Vitals and nursing note reviewed.   Constitutional:       Appearance: Normal appearance.    Cardiovascular:      Rate and Rhythm: Normal rate and regular rhythm.      Heart sounds: No murmur heard.  Pulmonary:      Effort: Pulmonary effort is normal. No respiratory distress.      Breath sounds: Normal breath sounds.   Neurological:      General: No focal deficit present.      Mental Status: He is alert and oriented to person, place, and time.       Fluids    Intake/Output Summary (Last 24 hours) at 10/29/2022 1307  Last data filed at 10/29/2022 1140  Gross per 24 hour   Intake 1120 ml   Output -3900 ml   Net 5020 ml       Laboratory  Recent Labs     10/27/22  0827 10/28/22  0151 10/29/22  0257   WBC  --  17.1* 15.9*   RBC  --  3.33* 2.99*   HEMOGLOBIN 8.1* 8.0* 7.0*   HEMATOCRIT 26.6* 26.0* 23.5*   MCV  --  78.1* 78.6*   MCH  --  24.0* 23.4*   MCHC  --  30.8* 29.8*   RDW  --  63.6* 65.5*   PLATELETCT  --  90* 118*     Recent Labs     10/29/22  0257   SODIUM 137   POTASSIUM 4.6   CHLORIDE 101   CO2 27   GLUCOSE 103*   BUN 17   CREATININE 0.77   CALCIUM 8.5                     Imaging  CT-CHEST (THORAX) WITH   Final Result         1.  Emphysema   2.  Hazy bilateral upper lobe opacities with slight intralobular septal thickening, could represent component of mild pulmonary edema   3.  Linear densities the bilateral lung bases favors scarring or atelectasis.   4.  Atherosclerosis and atherosclerotic coronary artery disease   5.  Hepatomegaly with irregular hepatic contour favoring changes of cirrhosis.   6.  Subcentimeter hepatic lobe lesions, could represent small cyst or hemangioma, otherwise indeterminate.   7.  Mild mediastinal adenopathy, workup and evaluation for causes of adenopathy recommended as clinically appropriate.   8.  Splenomegaly      DX-CHEST-PORTABLE (1 VIEW)   Final Result         1.  No acute cardiopulmonary disease.   2.  Atherosclerosis      CT-ABDOMEN-PELVIS WITH   Final Result      1.  Enhancing 2.5 x 2.3 cm bladder wall mass suspicious for neoplasm.   2.  Heterogeneous sclerotic  change of the pelvis and proximal femurs suspicious for metastatic disease or other infiltrating marrow process.   3.  Mild cardiomegaly.   4.  Hepatosplenomegaly.   5.  Small subcentimeter hepatic hypodensities which could represent cysts or hemangiomas but are nonspecific. If indicated, these findings could be further evaluated with dedicated liver protocol CT or MRI or ultrasound on a nonemergent basis.   6.  Colonic diverticulosis without evidence of diverticulitis.   7.  Atherosclerosis.      NM-BONE/JOINT SCAN WHOLE BODY    (Results Pending)        Assessment/Plan  * Urothelial carcinoma of bladder without invasion of muscle (HCC)  Assessment & Plan  CT AP: Notable enhancing 2.5 x 2.3 cm bladder wall mass suspicious for neoplasms  s/p TURBT on 10/27 with multiple tumors noted  Still with post op hematuria -> cont CBI  Pathology showinglow-grade papillary urothelial carcinoma noninvasive with muscularis propria uninvolved by tumor  CT chest negative for metastasis  Patient will need outpatient bone scan  And will require outpatient follow-up with urology and oncology         Hematuria  Assessment & Plan  Likely secondary to above    Iron deficiency anemia  Assessment & Plan  IV iron infusion    Acute blood loss anemia- (present on admission)  Assessment & Plan  S/p 2units pRBC   Trend HH, transfuse for Hgb<7 or hemodynamic instability  hgb trending down, repeat in am    Obesity (BMI 30-39.9)- (present on admission)  Assessment & Plan  Diet and lifestyle modification  Body mass index is 30.54 kg/m².      Leukocytosis- (present on admission)  Assessment & Plan  Likely reactive insetting of malignancy and blood loss anemia    Sepsis (HCC)  Assessment & Plan  Patient does NOT have sepsis  He has 1 of 4 SIRS criteria   UA not suggestive of infection and pt asymptomatic  Dc abx      Dyslipidemia- (present on admission)  Assessment & Plan  Statin    COPD (chronic obstructive pulmonary disease) (HCC)- (present on  admission)  Assessment & Plan  Not in acute exacerbation  PRN duonebs       VTE prophylaxis: SCDs/TEDs    I have performed a physical exam and reviewed and updated ROS and Plan today (10/29/2022). In review of yesterday's note (10/28/2022), there are no changes except as documented above.

## 2022-10-29 NOTE — PROGRESS NOTES
Pt off unit in bed with 2 transport.  On 2l NC.  CBI running.  Arboleda bag emptied prior and new irrigation bag hung

## 2022-10-29 NOTE — PROGRESS NOTES
Assumed care. Pt resting. CBI flowing pink. Emptied CBI. Pt a/ox4. Pt denies needs at this time. Pt on RA.

## 2022-10-30 ENCOUNTER — APPOINTMENT (OUTPATIENT)
Dept: RADIOLOGY | Facility: MEDICAL CENTER | Age: 72
DRG: 668 | End: 2022-10-30
Payer: MEDICARE

## 2022-10-30 LAB
ABO GROUP BLD: NORMAL
BARCODED ABORH UBTYP: 5100
BARCODED PRD CODE UBPRD: NORMAL
BARCODED UNIT NUM UBUNT: NORMAL
BLD GP AB SCN SERPL QL: NORMAL
COMPONENT R 8504R: NORMAL
ERYTHROCYTE [DISTWIDTH] IN BLOOD BY AUTOMATED COUNT: 68.9 FL (ref 35.9–50)
HCT VFR BLD AUTO: 23.7 % (ref 42–52)
HGB BLD-MCNC: 6.9 G/DL (ref 14–18)
MCH RBC QN AUTO: 23.3 PG (ref 27–33)
MCHC RBC AUTO-ENTMCNC: 29.1 G/DL (ref 33.7–35.3)
MCV RBC AUTO: 80.1 FL (ref 81.4–97.8)
PLATELET # BLD AUTO: 96 K/UL (ref 164–446)
PRODUCT TYPE UPROD: NORMAL
RBC # BLD AUTO: 2.96 M/UL (ref 4.7–6.1)
RH BLD: NORMAL
UNIT STATUS USTAT: NORMAL
WBC # BLD AUTO: 15.6 K/UL (ref 4.8–10.8)

## 2022-10-30 PROCEDURE — P9016 RBC LEUKOCYTES REDUCED: HCPCS

## 2022-10-30 PROCEDURE — 86900 BLOOD TYPING SEROLOGIC ABO: CPT

## 2022-10-30 PROCEDURE — 85027 COMPLETE CBC AUTOMATED: CPT

## 2022-10-30 PROCEDURE — 86901 BLOOD TYPING SEROLOGIC RH(D): CPT

## 2022-10-30 PROCEDURE — 99233 SBSQ HOSP IP/OBS HIGH 50: CPT | Performed by: HOSPITALIST

## 2022-10-30 PROCEDURE — 86923 COMPATIBILITY TEST ELECTRIC: CPT

## 2022-10-30 PROCEDURE — 86850 RBC ANTIBODY SCREEN: CPT

## 2022-10-30 PROCEDURE — 700102 HCHG RX REV CODE 250 W/ 637 OVERRIDE(OP): Performed by: STUDENT IN AN ORGANIZED HEALTH CARE EDUCATION/TRAINING PROGRAM

## 2022-10-30 PROCEDURE — 302118 SHAMPOO,NO RINSE: Performed by: HOSPITALIST

## 2022-10-30 PROCEDURE — 36430 TRANSFUSION BLD/BLD COMPNT: CPT

## 2022-10-30 PROCEDURE — 770006 HCHG ROOM/CARE - MED/SURG/GYN SEMI*

## 2022-10-30 PROCEDURE — 36415 COLL VENOUS BLD VENIPUNCTURE: CPT

## 2022-10-30 PROCEDURE — A9503 TC99M MEDRONATE: HCPCS

## 2022-10-30 PROCEDURE — A9270 NON-COVERED ITEM OR SERVICE: HCPCS | Performed by: STUDENT IN AN ORGANIZED HEALTH CARE EDUCATION/TRAINING PROGRAM

## 2022-10-30 RX ADMIN — Medication 2000 UNITS: at 04:05

## 2022-10-30 RX ADMIN — OXYCODONE 5 MG: 5 TABLET ORAL at 08:37

## 2022-10-30 RX ADMIN — ATORVASTATIN CALCIUM 20 MG: 20 TABLET, FILM COATED ORAL at 17:25

## 2022-10-30 RX ADMIN — CYANOCOBALAMIN TAB 500 MCG 1000 MCG: 500 TAB at 04:05

## 2022-10-30 RX ADMIN — OXYCODONE 5 MG: 5 TABLET ORAL at 11:45

## 2022-10-30 RX ADMIN — THIAMINE HCL TAB 100 MG 100 MG: 100 TAB at 04:05

## 2022-10-30 RX ADMIN — POLYETHYLENE GLYCOL 3350 1 PACKET: 17 POWDER, FOR SOLUTION ORAL at 21:58

## 2022-10-30 RX ADMIN — OXYCODONE 5 MG: 5 TABLET ORAL at 04:51

## 2022-10-30 RX ADMIN — THIAMINE HCL TAB 100 MG 100 MG: 100 TAB at 17:25

## 2022-10-30 RX ADMIN — DOCUSATE SODIUM 50 MG AND SENNOSIDES 8.6 MG 2 TABLET: 8.6; 5 TABLET, FILM COATED ORAL at 04:05

## 2022-10-30 RX ADMIN — OXYCODONE 5 MG: 5 TABLET ORAL at 17:26

## 2022-10-30 RX ADMIN — FOLIC ACID 1 MG: 1 TABLET ORAL at 04:05

## 2022-10-30 RX ADMIN — OXYCODONE 5 MG: 5 TABLET ORAL at 20:35

## 2022-10-30 ASSESSMENT — PAIN DESCRIPTION - PAIN TYPE
TYPE: ACUTE PAIN

## 2022-10-30 ASSESSMENT — ENCOUNTER SYMPTOMS
SHORTNESS OF BREATH: 0
NAUSEA: 0
FLANK PAIN: 0
FEVER: 0
ABDOMINAL PAIN: 0
VOMITING: 0
HEADACHES: 0
COUGH: 0
PALPITATIONS: 0
CHILLS: 0
DIZZINESS: 0

## 2022-10-30 NOTE — PROGRESS NOTES
Assumed pt care at 0700 . Pt is A&Ox 4 . Pt rates pain 6/10, medicated per MAR.  Pt's belongings are nearby, bed is in the lowest position and locked.

## 2022-10-30 NOTE — CARE PLAN
The patient is Stable - Low risk of patient condition declining or worsening    Shift Goals  Clinical Goals: CBI, pain management  Patient Goals: pain control  Family Goals: n/a    Progress made toward(s) clinical / shift goals:    Problem: Hemodynamics  Goal: Patient's hemodynamics, fluid balance and neurologic status will be stable or improve  Outcome: Progressing     Problem: Fluid Volume  Goal: Fluid volume balance will be maintained  Outcome: Progressing     Problem: Urinary - Renal Perfusion  Goal: Ability to achieve and maintain adequate renal perfusion and functioning will improve  Outcome: Progressing     Problem: Respiratory  Goal: Patient will achieve/maintain optimum respiratory ventilation and gas exchange  Outcome: Progressing       Patient is not progressing towards the following goals:

## 2022-10-30 NOTE — PROGRESS NOTES
Hospital Medicine Daily Progress Note    Date of Service  10/30/2022    Chief Complaint  Gregg Martinez is a 72 y.o. male admitted 10/26/2022 with hematuria     Hospital Course  72-year-old male with past medical history of DL D and asthma presenting with hematuria.  He was admitted for acute blood loss anemia requiring 2 units of PRBC transfusion.  CT abdomen pelvis showed bladder wall mass suspicious for neoplasm.  Urology was consulted and patient for TURBT on 10/27 which showed multifocal bladder tumors which were all resected. He continued to have hematuria which improved with CBI however he did require additional blood transfusion. Pathology showed low grade urothelial carcinoma. CT chest with contrast without evidence of lung involvement. Pt pending bone scan and IR bone biopsy of possible metastatic lesions.    Interval Problem Update  SARAH overnight  Hgb 6.9- 1U PRBC ordered  He is feeling ok  Bone scan pending  Pt for bone biopsy likely tommorow    I have discussed this patient's plan of care and discharge plan at IDT rounds today with Case Management, Nursing, Nursing leadership, and other members of the IDT team.    Consultants/Specialty  urology    Code Status  Full Code    Disposition  Patient is not medically cleared for discharge.   Anticipate discharge to to home with close outpatient follow-up.  I have placed the appropriate orders for post-discharge needs.    Review of Systems  Review of Systems   Constitutional:  Negative for chills and fever.   Respiratory:  Negative for cough and shortness of breath.    Cardiovascular:  Negative for chest pain and palpitations.   Gastrointestinal:  Negative for abdominal pain, nausea and vomiting.   Genitourinary:  Positive for hematuria. Negative for dysuria and urgency.   Neurological:  Negative for dizziness and headaches.   All other systems reviewed and are negative.     Physical Exam  Temp:  [36.3 °C (97.4 °F)-36.9 °C (98.5 °F)] 36.3 °C (97.4 °F)  Pulse:   [63-90] 87  Resp:  [16-17] 16  BP: (110-123)/(69-76) 123/72  SpO2:  [90 %-99 %] 99 %    Physical Exam  Vitals and nursing note reviewed.   Constitutional:       Appearance: Normal appearance.   Cardiovascular:      Rate and Rhythm: Normal rate and regular rhythm.      Heart sounds: No murmur heard.  Pulmonary:      Effort: Pulmonary effort is normal. No respiratory distress.      Breath sounds: Normal breath sounds.   Neurological:      General: No focal deficit present.      Mental Status: He is alert and oriented to person, place, and time.       Fluids    Intake/Output Summary (Last 24 hours) at 10/30/2022 1031  Last data filed at 10/30/2022 0900  Gross per 24 hour   Intake 220 ml   Output 3300 ml   Net -3080 ml       Laboratory  Recent Labs     10/28/22  0151 10/29/22  0257 10/30/22  0725   WBC 17.1* 15.9* 15.6*   RBC 3.33* 2.99* 2.96*   HEMOGLOBIN 8.0* 7.0* 6.9*   HEMATOCRIT 26.0* 23.5* 23.7*   MCV 78.1* 78.6* 80.1*   MCH 24.0* 23.4* 23.3*   MCHC 30.8* 29.8* 29.1*   RDW 63.6* 65.5* 68.9*   PLATELETCT 90* 118* 96*     Recent Labs     10/29/22  0257   SODIUM 137   POTASSIUM 4.6   CHLORIDE 101   CO2 27   GLUCOSE 103*   BUN 17   CREATININE 0.77   CALCIUM 8.5                     Imaging  CT-CHEST (THORAX) WITH   Final Result         1.  Emphysema   2.  Hazy bilateral upper lobe opacities with slight intralobular septal thickening, could represent component of mild pulmonary edema   3.  Linear densities the bilateral lung bases favors scarring or atelectasis.   4.  Atherosclerosis and atherosclerotic coronary artery disease   5.  Hepatomegaly with irregular hepatic contour favoring changes of cirrhosis.   6.  Subcentimeter hepatic lobe lesions, could represent small cyst or hemangioma, otherwise indeterminate.   7.  Mild mediastinal adenopathy, workup and evaluation for causes of adenopathy recommended as clinically appropriate.   8.  Splenomegaly      DX-CHEST-PORTABLE (1 VIEW)   Final Result         1.  No acute  cardiopulmonary disease.   2.  Atherosclerosis      CT-ABDOMEN-PELVIS WITH   Final Result      1.  Enhancing 2.5 x 2.3 cm bladder wall mass suspicious for neoplasm.   2.  Heterogeneous sclerotic change of the pelvis and proximal femurs suspicious for metastatic disease or other infiltrating marrow process.   3.  Mild cardiomegaly.   4.  Hepatosplenomegaly.   5.  Small subcentimeter hepatic hypodensities which could represent cysts or hemangiomas but are nonspecific. If indicated, these findings could be further evaluated with dedicated liver protocol CT or MRI or ultrasound on a nonemergent basis.   6.  Colonic diverticulosis without evidence of diverticulitis.   7.  Atherosclerosis.      NM-BONE/JOINT SCAN WHOLE BODY    (Results Pending)   IR-CONSULT AND TREAT    (Results Pending)        Assessment/Plan  * Urothelial carcinoma of bladder without invasion of muscle (HCC)  Assessment & Plan  CT AP: Notable enhancing 2.5 x 2.3 cm bladder wall mass suspicious for neoplasms  s/p TURBT on 10/27 with multiple tumors noted  Still with post op hematuria -> cont CBI  Pathology showinglow-grade papillary urothelial carcinoma noninvasive with muscularis propria uninvolved by tumor  CT chest negative for metastasis  Patient will need outpatient bone scan  And will require outpatient follow-up with urology and oncology         Hematuria  Assessment & Plan  Likely secondary to above    Iron deficiency anemia  Assessment & Plan  IV iron infusion    Acute blood loss anemia- (present on admission)  Assessment & Plan  S/p 2units pRBC   Trend HH, transfuse for Hgb<7 or hemodynamic instability  hgb trending down, repeat in am    Obesity (BMI 30-39.9)- (present on admission)  Assessment & Plan  Diet and lifestyle modification  Body mass index is 30.54 kg/m².      Leukocytosis- (present on admission)  Assessment & Plan  Likely reactive insetting of malignancy and blood loss anemia    Sepsis (HCC)  Assessment & Plan  Patient does NOT have  sepsis  He has 1 of 4 SIRS criteria   UA not suggestive of infection and pt asymptomatic  Dc abx      Dyslipidemia- (present on admission)  Assessment & Plan  Statin    COPD (chronic obstructive pulmonary disease) (HCC)- (present on admission)  Assessment & Plan  Not in acute exacerbation  PRN duonebs       VTE prophylaxis: SCDs/TEDs    I have performed a physical exam and reviewed and updated ROS and Plan today (10/30/2022). In review of yesterday's note (10/29/2022), there are no changes except as documented above.

## 2022-10-30 NOTE — CARE PLAN
The patient is Stable - Low risk of patient condition declining or worsening    Shift Goals  Clinical Goals: CBI, pain control  Patient Goals: Discharge  Family Goals: N/A    Progress made toward(s) clinical / shift goals:    Problem: Hemodynamics  Goal: Patient's hemodynamics, fluid balance and neurologic status will be stable or improve  10/29/2022 1711 by Lory Webb R.N.  Outcome: Progressing  10/29/2022 1710 by Lory Webb R.N.  Outcome: Progressing     Problem: Fluid Volume  Goal: Fluid volume balance will be maintained  10/29/2022 1711 by ABIDA MartellN.  Outcome: Progressing  10/29/2022 1710 by ABIDA MartellN.  Outcome: Progressing     Problem: Urinary - Renal Perfusion  Goal: Ability to achieve and maintain adequate renal perfusion and functioning will improve  10/29/2022 1711 by Lory Webb R.N.  Outcome: Progressing  10/29/2022 1710 by Lory Webb R.N.  Outcome: Progressing     Problem: Respiratory  Goal: Patient will achieve/maintain optimum respiratory ventilation and gas exchange  10/29/2022 1711 by Lory Webb R.N.  Outcome: Progressing  10/29/2022 1710 by ABIDA MartellN.  Outcome: Progressing     Problem: Physical Regulation  Goal: Diagnostic test results will improve  10/29/2022 1711 by Lory Webb R.N.  Outcome: Progressing  10/29/2022 1710 by Lory Webb RRubiaN.  Outcome: Progressing  Goal: Signs and symptoms of infection will decrease  10/29/2022 1711 by Lory Webb R.N.  Outcome: Progressing  10/29/2022 1710 by ABIDA MartellN.  Outcome: Progressing     Problem: Knowledge Deficit - Standard  Goal: Patient and family/care givers will demonstrate understanding of plan of care, disease process/condition, diagnostic tests and medications  10/29/2022 1711 by Lory Webb R.N.  Outcome: Progressing  10/29/2022 1710 by ABIDA MartellN.  Outcome: Progressing     Problem: Pain - Standard  Goal: Alleviation of pain or a  reduction in pain to the patient’s comfort goal  10/29/2022 1711 by Lory Webb, R.N.  Outcome: Progressing  10/29/2022 1710 by Lory Webb, R.N.  Outcome: Progressing       Patient is not progressing towards the following goals:

## 2022-10-30 NOTE — PROGRESS NOTES
Report received Lory patterson RN. Assuming care at this time. Pt is A&OX4. Verbalizes needs. Pt sitting supine with HOB elevated. CBI drainage bag appears light red. Bed lowest position call light within reach.

## 2022-10-30 NOTE — PROGRESS NOTES
Note to reader: this note follows the APSO format rather than the historical SOAP format. Assessment and plan located at the top of the note for ease of use.    Chief Complaint  Gregg Martinez is a 72 y.o. male patient who presented 10/26 c/o GH. In ED, Hb was 6.6, and CT showed enhancing 2.5cm bladder wall mass concerning for neoplasm, as well as heterogeneous sclerotic changes of pelvis and proximal femurs suspicious for metastases.  Cr 0.69. He was given 2 units of pRBCs and urology was consulted.    Procedures:  10/27: TURBT with Dr Castellanos    Assessment/Plan  Interval History   Active Hospital Problems    Diagnosis     Acute blood loss anemia [D62]     Urothelial carcinoma of bladder without invasion of muscle (HCC) [C67.9]     Iron deficiency anemia [D50.9]     Hematuria [R31.9]     Obesity (BMI 30-39.9) [E66.9]     Leukocytosis [D72.829]     Sepsis (HCC) [A41.9]     Dyslipidemia [E78.5]     COPD (chronic obstructive pulmonary disease) (HCC) [J44.9]      10/30 POD3. AFVSS. No clots overnight, urine pale pink and clear in tubing with CBI on low flow. Hb 6.9, undergoing transfusion of 1 unit pRBCs. Pathology shows low grade papillaryTCC, pTa, muscularis propria not involved. Bone scan this am without evidence of mets. Planning on IR bone biopsy tomorrow.      10/29 POD2. Anxious about discharge, transport home to [place]. Pain improved, CBI on moderate flow with urine light pink in tubing.     10/28 POD1. NAD, CBI on moderate flow and urine pink in tubing. Pt reports has had multiple clots and intermittent abd pain when cuellar obstructs from clots. Hb 8.0 after transfusion.      10/27: seen and examined, lying in bed in NAD. Reports intermittent GH for 2 years, but usually would resolve on its own. GH has never been associated with dysuria, flank pain, or other symptoms. States several days ago after riding in his truck, he developed GH that would not resolve, leading him to present to ED. After transfusion he is  feeling better but endorses some dizziness/fatigue. Denies sensation of incomplete emptying, has not seen clots in his urine. Former smoker, quit 20 years ago. No fhx of  malignancy. AFVSS.         Plan:  - Urine looking clear with CBI on low flow. I clamped CBI and returned ~2 hours later to reassess. With irrigation clamped, urine had become bright cherry in tubing without clots.  - CBI restarted on low flow  - Discussed with Dr Donald; as H/H has been drifting down and CBI unable to be weaned, will have patient NPO at midnight and reassess tomorrow am for need for cystoscopy + fulguration in OR  - If patient is taken to IR for bone biopsy prior to urology assessment, please ensure he maintains NPO status until evaluated by urology      Discussed with nursing, hospitalist, and with Dr Donald, who has directed this patient's plan of care.    Review of Systems  Physical Exam   Review of Systems   Constitutional:  Negative for chills and fever.   Respiratory:  Negative for cough.    Cardiovascular:  Negative for chest pain.   Gastrointestinal:  Negative for abdominal pain, nausea and vomiting.   Genitourinary:  Positive for hematuria. Negative for flank pain.   All other systems reviewed and are negative.  Vitals:    10/29/22 0900 10/29/22 2000 10/30/22 0400 10/30/22 0706   BP:  112/76 110/69 123/72   Pulse:  63 90 87   Resp:  17 17 16   Temp:  36.9 °C (98.5 °F) 36.8 °C (98.2 °F) 36.3 °C (97.4 °F)   TempSrc:  Temporal Temporal Temporal   SpO2:  90% 97% 99%   Weight: 79.5 kg (175 lb 4.3 oz)      Height:         Physical Exam  Vitals and nursing note reviewed.   Constitutional:       Appearance: Normal appearance.   HENT:      Head: Normocephalic.      Nose: Nose normal.   Eyes:      Extraocular Movements: Extraocular movements intact.      Conjunctiva/sclera: Conjunctivae normal.   Pulmonary:      Effort: Pulmonary effort is normal.   Abdominal:      Tenderness: There is no abdominal tenderness.    Genitourinary:     Comments: 3 way cuellar in place with CBI running on low flow, urine clear and pink in tubing  Musculoskeletal:         General: Normal range of motion.      Cervical back: Normal range of motion and neck supple.   Skin:     General: Skin is warm and dry.   Neurological:      General: No focal deficit present.      Mental Status: He is alert and oriented to person, place, and time.   Psychiatric:         Mood and Affect: Mood normal.         Behavior: Behavior normal.        Hematology Chemistry   Lab Results   Component Value Date/Time    WBC 15.6 (H) 10/30/2022 07:25 AM    HEMOGLOBIN 6.9 (L) 10/30/2022 07:25 AM    HEMATOCRIT 23.7 (L) 10/30/2022 07:25 AM    PLATELETCT 96 (L) 10/30/2022 07:25 AM     Lab Results   Component Value Date/Time    SODIUM 137 10/29/2022 02:57 AM    POTASSIUM 4.6 10/29/2022 02:57 AM    CHLORIDE 101 10/29/2022 02:57 AM    CO2 27 10/29/2022 02:57 AM    GLUCOSE 103 (H) 10/29/2022 02:57 AM    BUN 17 10/29/2022 02:57 AM    CREATININE 0.77 10/29/2022 02:57 AM         Labs not explicitly included in this progress note were reviewed by the author.   Radiology/imaging not explicitly included in this progress note was reviewed by the author.     Radiology images reviewed, Labs reviewed and Medications reviewed

## 2022-10-30 NOTE — CARE PLAN
The patient is Stable - Low risk of patient condition declining or worsening    Shift Goals  Clinical Goals: CBI CLEARITY AND PAIN CONTROL  Patient Goals: COMFORT AND REST  Family Goals: N/A      Problem: Fluid Volume  Goal: Fluid volume balance will be maintained  Outcome: Progressing     Problem: Respiratory  Goal: Patient will achieve/maintain optimum respiratory ventilation and gas exchange  Outcome: Progressing     Progress made toward(s) clinical / shift goals:  Pt educated on plan of care and all questions answered. Came up with a plan for him to feel secure regarding drainage of CBI output.     Patient is not progressing towards the following goals:

## 2022-10-31 ENCOUNTER — APPOINTMENT (OUTPATIENT)
Dept: RADIOLOGY | Facility: MEDICAL CENTER | Age: 72
DRG: 668 | End: 2022-10-31
Attending: HOSPITALIST
Payer: MEDICARE

## 2022-10-31 LAB
ANION GAP SERPL CALC-SCNC: 8 MMOL/L (ref 7–16)
BACTERIA BLD CULT: NORMAL
BACTERIA BLD CULT: NORMAL
BUN SERPL-MCNC: 13 MG/DL (ref 8–22)
CALCIUM SERPL-MCNC: 8.9 MG/DL (ref 8.5–10.5)
CHLORIDE SERPL-SCNC: 97 MMOL/L (ref 96–112)
CO2 SERPL-SCNC: 27 MMOL/L (ref 20–33)
CREAT SERPL-MCNC: 0.62 MG/DL (ref 0.5–1.4)
ERYTHROCYTE [DISTWIDTH] IN BLOOD BY AUTOMATED COUNT: 64.1 FL (ref 35.9–50)
GFR SERPLBLD CREATININE-BSD FMLA CKD-EPI: 102 ML/MIN/1.73 M 2
GLUCOSE SERPL-MCNC: 111 MG/DL (ref 65–99)
HCT VFR BLD AUTO: 26.2 % (ref 42–52)
HGB BLD-MCNC: 8 G/DL (ref 14–18)
MCH RBC QN AUTO: 23.8 PG (ref 27–33)
MCHC RBC AUTO-ENTMCNC: 30.5 G/DL (ref 33.7–35.3)
MCV RBC AUTO: 78 FL (ref 81.4–97.8)
PATHOLOGY CONSULT NOTE: NORMAL
PLATELET # BLD AUTO: 89 K/UL (ref 164–446)
POTASSIUM SERPL-SCNC: 4.7 MMOL/L (ref 3.6–5.5)
RBC # BLD AUTO: 3.36 M/UL (ref 4.7–6.1)
SIGNIFICANT IND 70042: NORMAL
SIGNIFICANT IND 70042: NORMAL
SITE SITE: NORMAL
SITE SITE: NORMAL
SODIUM SERPL-SCNC: 132 MMOL/L (ref 135–145)
SOURCE SOURCE: NORMAL
SOURCE SOURCE: NORMAL
WBC # BLD AUTO: 16 K/UL (ref 4.8–10.8)

## 2022-10-31 PROCEDURE — 700102 HCHG RX REV CODE 250 W/ 637 OVERRIDE(OP): Performed by: STUDENT IN AN ORGANIZED HEALTH CARE EDUCATION/TRAINING PROGRAM

## 2022-10-31 PROCEDURE — 700102 HCHG RX REV CODE 250 W/ 637 OVERRIDE(OP): Performed by: HOSPITALIST

## 2022-10-31 PROCEDURE — 700111 HCHG RX REV CODE 636 W/ 250 OVERRIDE (IP): Performed by: RADIOLOGY

## 2022-10-31 PROCEDURE — 88307 TISSUE EXAM BY PATHOLOGIST: CPT

## 2022-10-31 PROCEDURE — 88342 IMHCHEM/IMCYTCHM 1ST ANTB: CPT

## 2022-10-31 PROCEDURE — 80048 BASIC METABOLIC PNL TOTAL CA: CPT

## 2022-10-31 PROCEDURE — 94760 N-INVAS EAR/PLS OXIMETRY 1: CPT

## 2022-10-31 PROCEDURE — A9270 NON-COVERED ITEM OR SERVICE: HCPCS | Performed by: HOSPITALIST

## 2022-10-31 PROCEDURE — 20225 BONE BIOPSY TROCAR/NDL DEEP: CPT

## 2022-10-31 PROCEDURE — 0QB33ZX EXCISION OF LEFT PELVIC BONE, PERCUTANEOUS APPROACH, DIAGNOSTIC: ICD-10-PCS | Performed by: RADIOLOGY

## 2022-10-31 PROCEDURE — 85027 COMPLETE CBC AUTOMATED: CPT

## 2022-10-31 PROCEDURE — 88311 DECALCIFY TISSUE: CPT

## 2022-10-31 PROCEDURE — 770006 HCHG ROOM/CARE - MED/SURG/GYN SEMI*

## 2022-10-31 PROCEDURE — 88341 IMHCHEM/IMCYTCHM EA ADD ANTB: CPT

## 2022-10-31 PROCEDURE — 36415 COLL VENOUS BLD VENIPUNCTURE: CPT

## 2022-10-31 PROCEDURE — A9270 NON-COVERED ITEM OR SERVICE: HCPCS | Performed by: STUDENT IN AN ORGANIZED HEALTH CARE EDUCATION/TRAINING PROGRAM

## 2022-10-31 PROCEDURE — 700111 HCHG RX REV CODE 636 W/ 250 OVERRIDE (IP)

## 2022-10-31 PROCEDURE — 99233 SBSQ HOSP IP/OBS HIGH 50: CPT | Performed by: HOSPITALIST

## 2022-10-31 RX ORDER — LIDOCAINE HYDROCHLORIDE 10 MG/ML
INJECTION, SOLUTION EPIDURAL; INFILTRATION; INTRACAUDAL; PERINEURAL
Status: COMPLETED
Start: 2022-10-31 | End: 2022-10-31

## 2022-10-31 RX ORDER — MIDAZOLAM HYDROCHLORIDE 1 MG/ML
.5-2 INJECTION INTRAMUSCULAR; INTRAVENOUS PRN
Status: ACTIVE | OUTPATIENT
Start: 2022-10-31 | End: 2022-10-31

## 2022-10-31 RX ORDER — OXYCODONE HYDROCHLORIDE 5 MG/1
5 TABLET ORAL
Status: DISCONTINUED | OUTPATIENT
Start: 2022-10-31 | End: 2022-10-31

## 2022-10-31 RX ORDER — SODIUM CHLORIDE 9 MG/ML
500 INJECTION, SOLUTION INTRAVENOUS
Status: ACTIVE | OUTPATIENT
Start: 2022-10-31 | End: 2022-10-31

## 2022-10-31 RX ORDER — MIDAZOLAM HYDROCHLORIDE 1 MG/ML
INJECTION INTRAMUSCULAR; INTRAVENOUS
Status: COMPLETED
Start: 2022-10-31 | End: 2022-10-31

## 2022-10-31 RX ORDER — ONDANSETRON 2 MG/ML
4 INJECTION INTRAMUSCULAR; INTRAVENOUS PRN
Status: ACTIVE | OUTPATIENT
Start: 2022-10-31 | End: 2022-10-31

## 2022-10-31 RX ORDER — OXYCODONE HYDROCHLORIDE 10 MG/1
10 TABLET ORAL
Status: DISCONTINUED | OUTPATIENT
Start: 2022-10-31 | End: 2022-10-31

## 2022-10-31 RX ORDER — ALPRAZOLAM 0.25 MG/1
0.25 TABLET ORAL 4 TIMES DAILY PRN
Status: DISCONTINUED | OUTPATIENT
Start: 2022-10-31 | End: 2022-11-14

## 2022-10-31 RX ADMIN — MAGNESIUM HYDROXIDE 30 ML: 400 SUSPENSION ORAL at 05:56

## 2022-10-31 RX ADMIN — Medication 2000 UNITS: at 05:56

## 2022-10-31 RX ADMIN — DOCUSATE SODIUM 50 MG AND SENNOSIDES 8.6 MG 2 TABLET: 8.6; 5 TABLET, FILM COATED ORAL at 17:24

## 2022-10-31 RX ADMIN — ATORVASTATIN CALCIUM 20 MG: 20 TABLET, FILM COATED ORAL at 17:24

## 2022-10-31 RX ADMIN — FENTANYL CITRATE 50 MCG: 50 INJECTION, SOLUTION INTRAMUSCULAR; INTRAVENOUS at 14:12

## 2022-10-31 RX ADMIN — MIDAZOLAM HYDROCHLORIDE 2 MG: 1 INJECTION INTRAMUSCULAR; INTRAVENOUS at 14:12

## 2022-10-31 RX ADMIN — THIAMINE HCL TAB 100 MG 100 MG: 100 TAB at 17:24

## 2022-10-31 RX ADMIN — LIDOCAINE HYDROCHLORIDE: 10 INJECTION, SOLUTION EPIDURAL; INFILTRATION; INTRACAUDAL; PERINEURAL at 14:20

## 2022-10-31 RX ADMIN — THIAMINE HCL TAB 100 MG 100 MG: 100 TAB at 05:56

## 2022-10-31 RX ADMIN — MIDAZOLAM 2 MG: 1 INJECTION, SOLUTION INTRAMUSCULAR; INTRAVENOUS at 14:12

## 2022-10-31 RX ADMIN — CYANOCOBALAMIN TAB 500 MCG 1000 MCG: 500 TAB at 05:56

## 2022-10-31 RX ADMIN — ALPRAZOLAM 0.25 MG: 0.25 TABLET ORAL at 12:00

## 2022-10-31 RX ADMIN — DOCUSATE SODIUM 50 MG AND SENNOSIDES 8.6 MG 2 TABLET: 8.6; 5 TABLET, FILM COATED ORAL at 05:56

## 2022-10-31 RX ADMIN — OXYCODONE 5 MG: 5 TABLET ORAL at 21:41

## 2022-10-31 RX ADMIN — FENTANYL CITRATE 50 MCG: 50 INJECTION, SOLUTION INTRAMUSCULAR; INTRAVENOUS at 14:19

## 2022-10-31 RX ADMIN — ALPRAZOLAM 0.25 MG: 0.25 TABLET ORAL at 17:23

## 2022-10-31 RX ADMIN — FOLIC ACID 1 MG: 1 TABLET ORAL at 05:56

## 2022-10-31 RX ADMIN — LIDOCAINE HYDROCHLORIDE: 10 INJECTION, SOLUTION EPIDURAL; INFILTRATION; INTRACAUDAL; PERINEURAL at 14:14

## 2022-10-31 RX ADMIN — ALPRAZOLAM 0.25 MG: 0.25 TABLET ORAL at 20:39

## 2022-10-31 ASSESSMENT — PAIN DESCRIPTION - PAIN TYPE
TYPE: ACUTE PAIN

## 2022-10-31 ASSESSMENT — ENCOUNTER SYMPTOMS
SHORTNESS OF BREATH: 0
PALPITATIONS: 0
ABDOMINAL PAIN: 0
HEADACHES: 0
NERVOUS/ANXIOUS: 1
COUGH: 0
VOMITING: 0
CHILLS: 0
FEVER: 0
DIZZINESS: 0
NAUSEA: 0

## 2022-10-31 ASSESSMENT — FIBROSIS 4 INDEX: FIB4 SCORE: 2.8

## 2022-10-31 NOTE — PROGRESS NOTES
Pt presents to CT 4 . Pt was consented by MD at bedside, confirmed by this RN and consent at bedside. Pt transferred to CT table in prone position. Patient underwent a deep bone biopsy by Dr. Kohler. Procedure site was marked by MD and verified using imaging guidance. Pt placed on monitor, prepped and draped in a sterile fashion. Vitals were taken every 5 minutes and remained stable during procedure (see doc flow sheet for results). CO2 waveform capnography was monitored and remained WNL throughout procedure. Report called to Jose Luis JARAMILLO. Pt transported by vero with RN to [facility] bed 2. Core Labs X 2 hand delivered to lab.

## 2022-10-31 NOTE — DISCHARGE PLANNING
Agency/Facility Name: [facility]  Plan or Request: CASTRO Carmichael left  for Madhu, asked for a return call.

## 2022-10-31 NOTE — OR SURGEON
Immediate Post- Operative Note        Findings: Sclerotic lesion Left posterior pubic rami      Procedure(s): CT core Bx x 3.      Estimated Blood Loss: Less than 5 ml        Complications: None            10/31/2022     2:27 PM     Andreas Kohler M.D.

## 2022-10-31 NOTE — PROGRESS NOTES
Hospital Medicine Daily Progress Note    Date of Service  10/31/2022    Chief Complaint  Gregg Martinez is a 72 y.o. male admitted 10/26/2022 with hematuria     Hospital Course  72-year-old male with past medical history of DL D and asthma presenting with hematuria.  He was admitted for acute blood loss anemia requiring 2 units of PRBC transfusion.  CT abdomen pelvis showed bladder wall mass suspicious for neoplasm.  Urology was consulted and patient for TURBT on 10/27 which showed multifocal bladder tumors which were all resected. He continued to have hematuria which improved with CBI however he did require additional blood transfusion. Pathology showed low grade urothelial carcinoma. CT chest with contrast without evidence of lung involvement. Bone scan negative for mets. Pending IR bone biopsy of possible metastatic lesions noted on CT pelvis.     Interval Problem Update  SARAH overnight  Hgb stable  Pending bone biopsy  Poss OR with urology for repeat cystoscopy as his hematuria has remained persistent  Bone scan neg for mets    I have discussed this patient's plan of care and discharge plan at IDT rounds today with Case Management, Nursing, Nursing leadership, and other members of the IDT team.    Consultants/Specialty  urology    Code Status  Full Code    Disposition  Patient is not medically cleared for discharge.   Anticipate discharge to to home with close outpatient follow-up.  I have placed the appropriate orders for post-discharge needs.    Review of Systems  Review of Systems   Constitutional:  Negative for chills and fever.   Respiratory:  Negative for cough and shortness of breath.    Cardiovascular:  Negative for chest pain and palpitations.   Gastrointestinal:  Negative for abdominal pain, nausea and vomiting.   Genitourinary:  Positive for hematuria. Negative for dysuria and urgency.   Neurological:  Negative for dizziness and headaches.   Psychiatric/Behavioral:  The patient is nervous/anxious.    All  other systems reviewed and are negative.     Physical Exam  Temp:  [36.2 °C (97.2 °F)-37.2 °C (98.9 °F)] 36.4 °C (97.6 °F)  Pulse:  [65-91] 84  Resp:  [16-18] 18  BP: ()/(59-73) 119/70  SpO2:  [90 %-98 %] 92 %    Physical Exam  Vitals and nursing note reviewed.   Constitutional:       Appearance: Normal appearance.   Cardiovascular:      Rate and Rhythm: Normal rate and regular rhythm.      Heart sounds: No murmur heard.  Pulmonary:      Effort: Pulmonary effort is normal. No respiratory distress.      Breath sounds: Normal breath sounds.   Neurological:      General: No focal deficit present.      Mental Status: He is alert and oriented to person, place, and time.       Fluids    Intake/Output Summary (Last 24 hours) at 10/31/2022 1218  Last data filed at 10/31/2022 1100  Gross per 24 hour   Intake 1121.25 ml   Output 2275 ml   Net -1153.75 ml       Laboratory  Recent Labs     10/29/22  0257 10/30/22  0725 10/31/22  0624   WBC 15.9* 15.6* 16.0*   RBC 2.99* 2.96* 3.36*   HEMOGLOBIN 7.0* 6.9* 8.0*   HEMATOCRIT 23.5* 23.7* 26.2*   MCV 78.6* 80.1* 78.0*   MCH 23.4* 23.3* 23.8*   MCHC 29.8* 29.1* 30.5*   RDW 65.5* 68.9* 64.1*   PLATELETCT 118* 96* 89*     Recent Labs     10/29/22  0257 10/31/22  0624   SODIUM 137 132*   POTASSIUM 4.6 4.7   CHLORIDE 101 97   CO2 27 27   GLUCOSE 103* 111*   BUN 17 13   CREATININE 0.77 0.62   CALCIUM 8.5 8.9                     Imaging  NM-BONE/JOINT SCAN WHOLE BODY   Final Result      No evidence of bony metastatic disease      CT-CHEST (THORAX) WITH   Final Result         1.  Emphysema   2.  Hazy bilateral upper lobe opacities with slight intralobular septal thickening, could represent component of mild pulmonary edema   3.  Linear densities the bilateral lung bases favors scarring or atelectasis.   4.  Atherosclerosis and atherosclerotic coronary artery disease   5.  Hepatomegaly with irregular hepatic contour favoring changes of cirrhosis.   6.  Subcentimeter hepatic lobe  lesions, could represent small cyst or hemangioma, otherwise indeterminate.   7.  Mild mediastinal adenopathy, workup and evaluation for causes of adenopathy recommended as clinically appropriate.   8.  Splenomegaly      DX-CHEST-PORTABLE (1 VIEW)   Final Result         1.  No acute cardiopulmonary disease.   2.  Atherosclerosis      CT-ABDOMEN-PELVIS WITH   Final Result      1.  Enhancing 2.5 x 2.3 cm bladder wall mass suspicious for neoplasm.   2.  Heterogeneous sclerotic change of the pelvis and proximal femurs suspicious for metastatic disease or other infiltrating marrow process.   3.  Mild cardiomegaly.   4.  Hepatosplenomegaly.   5.  Small subcentimeter hepatic hypodensities which could represent cysts or hemangiomas but are nonspecific. If indicated, these findings could be further evaluated with dedicated liver protocol CT or MRI or ultrasound on a nonemergent basis.   6.  Colonic diverticulosis without evidence of diverticulitis.   7.  Atherosclerosis.      CT-NEEDLE BX-DEEP BONE    (Results Pending)        Assessment/Plan  * Urothelial carcinoma of bladder without invasion of muscle (HCC)  Assessment & Plan  CT AP: Notable enhancing 2.5 x 2.3 cm bladder wall mass suspicious for neoplasms  s/p TURBT on 10/27 with multiple tumors noted  Pathology showing low-grade papillary urothelial carcinoma noninvasive with muscularis propria uninvolved by tumor  CT chest negative for metastasis  Bone scan without any evidence of metastasis  Patient pending IR guided bone biopsy on 10/31  Given persistence of hematuria despite several days of CBI patient may return to the OR with urology for repeat cystoscopy and fulguration  will require outpatient follow-up with urology and oncology         Hematuria  Assessment & Plan  Likely secondary to above    Iron deficiency anemia  Assessment & Plan  IV iron infusion    Acute blood loss anemia- (present on admission)  Assessment & Plan  S/p 2units pRBC   Trend HH, transfuse for  Hgb<7 or hemodynamic instability  Received additional unit of PRBCs on 10/30  Hemoglobin stable today    Obesity (BMI 30-39.9)- (present on admission)  Assessment & Plan  Diet and lifestyle modification  Body mass index is 30.54 kg/m².      Leukocytosis- (present on admission)  Assessment & Plan  Likely reactive insetting of malignancy and blood loss anemia    Dyslipidemia- (present on admission)  Assessment & Plan  Statin    COPD (chronic obstructive pulmonary disease) (HCC)- (present on admission)  Assessment & Plan  Not in acute exacerbation  PRN duonebs       VTE prophylaxis: SCDs/TEDs    I have performed a physical exam and reviewed and updated ROS and Plan today (10/31/2022). In review of yesterday's note (10/30/2022), there are no changes except as documented above.

## 2022-10-31 NOTE — CARE PLAN
The patient is Stable - Low risk of patient condition declining or worsening    Shift Goals  Clinical Goals: pain control, CBI  Patient Goals: pain control, monitor CBI  Family Goals: N/A    Progress made toward(s) clinical / shift goals:    Problem: Fluid Volume  Goal: Fluid volume balance will be maintained  Outcome: Progressing     Problem: Pain - Standard  Goal: Alleviation of pain or a reduction in pain to the patient’s comfort goal  Outcome: Progressing       Patient is not progressing towards the following goals:

## 2022-10-31 NOTE — PROGRESS NOTES
Report received from Jose Luis JARAMILLO  Assumed care of patient at 1900.    Pt is A&O x4. On O2 at 1 LPM per nasal cannula.  With complaints of pain 3/10 but declines meds/intervention at the moment.  Arboleda in place with CBI running to pink urine output.  Bed in lowest position and locked.  Review plan of care with patient  Call light and belongings within reach  Hourly rounds in place  All needs met at this time; Instructed to call for assistance whenever needed

## 2022-10-31 NOTE — PROGRESS NOTES
"Urology Progress Note    S/p TURBT 10/27/2022.    S: Seen and examined. Doing well today. Reports output via cuellar has drastically improved with CBI.  Denies fevers, chills, nausea, vomiting. Pending IR biopsy today.      O:   /70   Pulse 84   Temp 36.4 °C (97.6 °F)   Resp 18   Ht 1.727 m (5' 8\")   Wt 77.8 kg (171 lb 8.3 oz)   SpO2 92%   Recent Labs     10/29/22  0257 10/31/22  0624   SODIUM 137 132*   POTASSIUM 4.6 4.7   CHLORIDE 101 97   CO2 27 27   GLUCOSE 103* 111*   BUN 17 13   CREATININE 0.77 0.62   CALCIUM 8.5 8.9     Recent Labs     10/29/22  0257 10/30/22  0725 10/31/22  0624   WBC 15.9* 15.6* 16.0*   RBC 2.99* 2.96* 3.36*   HEMOGLOBIN 7.0* 6.9* 8.0*   HEMATOCRIT 23.5* 23.7* 26.2*   MCV 78.6* 80.1* 78.0*   MCH 23.4* 23.3* 23.8*   MCHC 29.8* 29.1* 30.5*   RDW 65.5* 68.9* 64.1*   PLATELETCT 118* 96* 89*         Intake/Output Summary (Last 24 hours) at 10/31/2022 1207  Last data filed at 10/31/2022 1100  Gross per 24 hour   Intake 1121.25 ml   Output 2275 ml   Net -1153.75 ml       Exam:  Gen: NAD  : no CVAT. Output via cuellar clear very light pink on low flow CBI. No clots.    A/P:    Active Hospital Problems    Diagnosis     Acute blood loss anemia [D62]     Urothelial carcinoma of bladder without invasion of muscle (HCC) [C67.9]     Iron deficiency anemia [D50.9]     Hematuria [R31.9]     Obesity (BMI 30-39.9) [E66.9]     Leukocytosis [D72.829]     Sepsis (HCC) [A41.9]     Dyslipidemia [E78.5]     COPD (chronic obstructive pulmonary disease) (HCC) [J44.9]      Plan:  - monitor for hematuria, blood clots. Perform manual irrigation of catheter as necessary for clots.  - will attempt to titrate down and wean CBI.   - monitor H/H  - urology will follow    "

## 2022-10-31 NOTE — PROGRESS NOTES
Assumed pt care at 0700 . Pt is A&Ox 4 . Pt denies pain. Continuous bladder irrigation in place, output light pink in color. Pt currently NPO.  Pt's belongings are nearby, bed is in the lowest position and locked.

## 2022-11-01 ENCOUNTER — APPOINTMENT (OUTPATIENT)
Dept: RADIOLOGY | Facility: MEDICAL CENTER | Age: 72
DRG: 668 | End: 2022-11-01
Attending: PHYSICIAN ASSISTANT
Payer: MEDICARE

## 2022-11-01 LAB
ERYTHROCYTE [DISTWIDTH] IN BLOOD BY AUTOMATED COUNT: 65.5 FL (ref 35.9–50)
HCT VFR BLD AUTO: 25.2 % (ref 42–52)
HGB BLD-MCNC: 7.7 G/DL (ref 14–18)
MCH RBC QN AUTO: 24 PG (ref 27–33)
MCHC RBC AUTO-ENTMCNC: 30.6 G/DL (ref 33.7–35.3)
MCV RBC AUTO: 78.5 FL (ref 81.4–97.8)
PLATELET # BLD AUTO: 89 K/UL (ref 164–446)
RBC # BLD AUTO: 3.21 M/UL (ref 4.7–6.1)
WBC # BLD AUTO: 16.1 K/UL (ref 4.8–10.8)

## 2022-11-01 PROCEDURE — 85027 COMPLETE CBC AUTOMATED: CPT

## 2022-11-01 PROCEDURE — A9270 NON-COVERED ITEM OR SERVICE: HCPCS | Performed by: STUDENT IN AN ORGANIZED HEALTH CARE EDUCATION/TRAINING PROGRAM

## 2022-11-01 PROCEDURE — 700111 HCHG RX REV CODE 636 W/ 250 OVERRIDE (IP): Performed by: HOSPITALIST

## 2022-11-01 PROCEDURE — 700111 HCHG RX REV CODE 636 W/ 250 OVERRIDE (IP): Performed by: STUDENT IN AN ORGANIZED HEALTH CARE EDUCATION/TRAINING PROGRAM

## 2022-11-01 PROCEDURE — 700102 HCHG RX REV CODE 250 W/ 637 OVERRIDE(OP): Performed by: HOSPITALIST

## 2022-11-01 PROCEDURE — A9270 NON-COVERED ITEM OR SERVICE: HCPCS | Performed by: HOSPITALIST

## 2022-11-01 PROCEDURE — 700102 HCHG RX REV CODE 250 W/ 637 OVERRIDE(OP): Performed by: STUDENT IN AN ORGANIZED HEALTH CARE EDUCATION/TRAINING PROGRAM

## 2022-11-01 PROCEDURE — 36415 COLL VENOUS BLD VENIPUNCTURE: CPT

## 2022-11-01 PROCEDURE — 770006 HCHG ROOM/CARE - MED/SURG/GYN SEMI*

## 2022-11-01 PROCEDURE — 74176 CT ABD & PELVIS W/O CONTRAST: CPT

## 2022-11-01 PROCEDURE — 99232 SBSQ HOSP IP/OBS MODERATE 35: CPT | Performed by: HOSPITALIST

## 2022-11-01 RX ORDER — MORPHINE SULFATE 4 MG/ML
4 INJECTION INTRAVENOUS
Status: DISCONTINUED | OUTPATIENT
Start: 2022-11-01 | End: 2022-11-01

## 2022-11-01 RX ORDER — HYDROMORPHONE HYDROCHLORIDE 1 MG/ML
1 INJECTION, SOLUTION INTRAMUSCULAR; INTRAVENOUS; SUBCUTANEOUS EVERY 4 HOURS PRN
Status: DISCONTINUED | OUTPATIENT
Start: 2022-11-01 | End: 2022-11-30 | Stop reason: HOSPADM

## 2022-11-01 RX ORDER — OXYCODONE HYDROCHLORIDE 10 MG/1
10 TABLET ORAL
Status: DISCONTINUED | OUTPATIENT
Start: 2022-11-01 | End: 2022-11-17

## 2022-11-01 RX ORDER — GABAPENTIN 100 MG/1
200 CAPSULE ORAL 3 TIMES DAILY
Status: DISCONTINUED | OUTPATIENT
Start: 2022-11-01 | End: 2022-11-30 | Stop reason: HOSPADM

## 2022-11-01 RX ADMIN — FOLIC ACID 1 MG: 1 TABLET ORAL at 04:18

## 2022-11-01 RX ADMIN — OXYCODONE 5 MG: 5 TABLET ORAL at 00:41

## 2022-11-01 RX ADMIN — THIAMINE HCL TAB 100 MG 100 MG: 100 TAB at 04:18

## 2022-11-01 RX ADMIN — ALPRAZOLAM 0.25 MG: 0.25 TABLET ORAL at 13:18

## 2022-11-01 RX ADMIN — ATORVASTATIN CALCIUM 20 MG: 20 TABLET, FILM COATED ORAL at 17:51

## 2022-11-01 RX ADMIN — HYDROMORPHONE HYDROCHLORIDE 1 MG: 1 INJECTION, SOLUTION INTRAMUSCULAR; INTRAVENOUS; SUBCUTANEOUS at 10:09

## 2022-11-01 RX ADMIN — GABAPENTIN 200 MG: 100 CAPSULE ORAL at 11:28

## 2022-11-01 RX ADMIN — THIAMINE HCL TAB 100 MG 100 MG: 100 TAB at 17:51

## 2022-11-01 RX ADMIN — Medication 2000 UNITS: at 04:18

## 2022-11-01 RX ADMIN — OXYCODONE 5 MG: 5 TABLET ORAL at 03:48

## 2022-11-01 RX ADMIN — MORPHINE SULFATE 4 MG: 4 INJECTION, SOLUTION INTRAMUSCULAR; INTRAVENOUS at 06:28

## 2022-11-01 RX ADMIN — OXYCODONE HYDROCHLORIDE 10 MG: 10 TABLET ORAL at 08:39

## 2022-11-01 RX ADMIN — ALPRAZOLAM 0.25 MG: 0.25 TABLET ORAL at 21:36

## 2022-11-01 RX ADMIN — ALPRAZOLAM 0.25 MG: 0.25 TABLET ORAL at 05:01

## 2022-11-01 RX ADMIN — GABAPENTIN 200 MG: 100 CAPSULE ORAL at 17:51

## 2022-11-01 RX ADMIN — CYANOCOBALAMIN TAB 500 MCG 1000 MCG: 500 TAB at 04:18

## 2022-11-01 RX ADMIN — DOCUSATE SODIUM 50 MG AND SENNOSIDES 8.6 MG 2 TABLET: 8.6; 5 TABLET, FILM COATED ORAL at 17:51

## 2022-11-01 ASSESSMENT — PAIN DESCRIPTION - PAIN TYPE
TYPE: ACUTE PAIN

## 2022-11-01 ASSESSMENT — ENCOUNTER SYMPTOMS
CHILLS: 0
COUGH: 0
SHORTNESS OF BREATH: 0
NAUSEA: 0
HEADACHES: 0
DIZZINESS: 0
PALPITATIONS: 0
FEVER: 0
VOMITING: 0
ABDOMINAL PAIN: 0
NERVOUS/ANXIOUS: 1

## 2022-11-01 ASSESSMENT — PAIN SCALES - WONG BAKER
WONGBAKER_NUMERICALRESPONSE: HURTS JUST A LITTLE BIT
WONGBAKER_NUMERICALRESPONSE: HURTS A WHOLE LOT

## 2022-11-01 NOTE — PROGRESS NOTES
Hospital Medicine Daily Progress Note    Date of Service  11/1/2022    Chief Complaint  Gregg Martinez is a 72 y.o. male admitted 10/26/2022 with hematuria     Hospital Course  72-year-old male with past medical history of DL D and asthma presenting with hematuria.  He was admitted for acute blood loss anemia requiring 2 units of PRBC transfusion.  CT abdomen pelvis showed bladder wall mass suspicious for neoplasm.  Urology was consulted and patient for TURBT on 10/27 which showed multifocal bladder tumors which were all resected. He continued to have hematuria which improved with CBI however he did require additional blood transfusion. Pathology showed low grade urothelial carcinoma. CT chest with contrast without evidence of lung involvement. Bone scan negative for mets. Pending IR bone biopsy of possible metastatic lesions noted on CT pelvis.     Interval Problem Update    CBI still running his hematuria still present    Patient complaining of left groin pain , sharp, intensity 10 out of 10 constant, no radiation not improved by p.o. oxycodone or iV morphine      Pending bone biopsy results       I have discussed this patient's plan of care and discharge plan at IDT rounds today with Case Management, Nursing, Nursing leadership, and other members of the IDT team.    Consultants/Specialty  urology    Code Status  Full Code    Disposition  Patient is not medically cleared for discharge.   Anticipate discharge to to home with close outpatient follow-up.  I have placed the appropriate orders for post-discharge needs.    Review of Systems  Review of Systems   Constitutional:  Negative for chills and fever.   Respiratory:  Negative for cough and shortness of breath.    Cardiovascular:  Negative for chest pain and palpitations.   Gastrointestinal:  Negative for abdominal pain, nausea and vomiting.   Genitourinary:  Positive for hematuria. Negative for dysuria and urgency.   Musculoskeletal:  Positive for joint pain.    Neurological:  Negative for dizziness and headaches.   Psychiatric/Behavioral:  The patient is nervous/anxious.    All other systems reviewed and are negative.     Physical Exam  Temp:  [36 °C (96.8 °F)-36.8 °C (98.2 °F)] 36.8 °C (98.2 °F)  Pulse:  [73-88] 73  Resp:  [14-23] 18  BP: (108-140)/(66-83) 140/83  SpO2:  [91 %-100 %] 91 %    Physical Exam  Vitals and nursing note reviewed.   Constitutional:       General: He is in acute distress.      Appearance: Normal appearance.   HENT:      Head: Normocephalic.      Nose: Nose normal.      Mouth/Throat:      Mouth: Mucous membranes are moist.   Eyes:      Pupils: Pupils are equal, round, and reactive to light.   Cardiovascular:      Rate and Rhythm: Normal rate and regular rhythm.      Heart sounds: No murmur heard.  Pulmonary:      Effort: Pulmonary effort is normal. No respiratory distress.      Breath sounds: Normal breath sounds.   Abdominal:      General: Abdomen is flat. There is no distension.      Palpations: There is no mass.      Tenderness: There is no abdominal tenderness. There is no rebound.      Hernia: No hernia is present.   Musculoskeletal:         General: No swelling, tenderness, deformity or signs of injury.   Skin:     Coloration: Skin is not jaundiced or pale.      Findings: No bruising, erythema, lesion or rash.   Neurological:      General: No focal deficit present.      Mental Status: He is alert and oriented to person, place, and time.   Psychiatric:         Mood and Affect: Mood is anxious.       Fluids    Intake/Output Summary (Last 24 hours) at 11/1/2022 1044  Last data filed at 11/1/2022 0645  Gross per 24 hour   Intake 800 ml   Output 8100 ml   Net -7300 ml         Laboratory  Recent Labs     10/30/22  0725 10/31/22  0624 11/01/22  0125   WBC 15.6* 16.0* 16.1*   RBC 2.96* 3.36* 3.21*   HEMOGLOBIN 6.9* 8.0* 7.7*   HEMATOCRIT 23.7* 26.2* 25.2*   MCV 80.1* 78.0* 78.5*   MCH 23.3* 23.8* 24.0*   MCHC 29.1* 30.5* 30.6*   RDW 68.9* 64.1*  65.5*   PLATELETCT 96* 89* 89*       Recent Labs     10/31/22  0624   SODIUM 132*   POTASSIUM 4.7   CHLORIDE 97   CO2 27   GLUCOSE 111*   BUN 13   CREATININE 0.62   CALCIUM 8.9                       Imaging  CT-NEEDLE BX-DEEP BONE   Final Result      1.  Successful CT-guided core biopsy of sclerotic lesion in the posterior aspect of left inferior pubic ramus.      NM-BONE/JOINT SCAN WHOLE BODY   Final Result      No evidence of bony metastatic disease      CT-CHEST (THORAX) WITH   Final Result         1.  Emphysema   2.  Hazy bilateral upper lobe opacities with slight intralobular septal thickening, could represent component of mild pulmonary edema   3.  Linear densities the bilateral lung bases favors scarring or atelectasis.   4.  Atherosclerosis and atherosclerotic coronary artery disease   5.  Hepatomegaly with irregular hepatic contour favoring changes of cirrhosis.   6.  Subcentimeter hepatic lobe lesions, could represent small cyst or hemangioma, otherwise indeterminate.   7.  Mild mediastinal adenopathy, workup and evaluation for causes of adenopathy recommended as clinically appropriate.   8.  Splenomegaly      DX-CHEST-PORTABLE (1 VIEW)   Final Result         1.  No acute cardiopulmonary disease.   2.  Atherosclerosis      CT-ABDOMEN-PELVIS WITH   Final Result      1.  Enhancing 2.5 x 2.3 cm bladder wall mass suspicious for neoplasm.   2.  Heterogeneous sclerotic change of the pelvis and proximal femurs suspicious for metastatic disease or other infiltrating marrow process.   3.  Mild cardiomegaly.   4.  Hepatosplenomegaly.   5.  Small subcentimeter hepatic hypodensities which could represent cysts or hemangiomas but are nonspecific. If indicated, these findings could be further evaluated with dedicated liver protocol CT or MRI or ultrasound on a nonemergent basis.   6.  Colonic diverticulosis without evidence of diverticulitis.   7.  Atherosclerosis.             Assessment/Plan  * Urothelial carcinoma of  bladder without invasion of muscle (HCC)  Assessment & Plan  CT AP: Notable enhancing 2.5 x 2.3 cm bladder wall mass suspicious for neoplasms  s/p TURBT on 10/27 with multiple tumors noted  Pathology showing low-grade papillary urothelial carcinoma noninvasive with muscularis propria uninvolved by tumor  CT chest negative for metastasis  Bone scan without any evidence of metastasis  S/p  IR guided bone biopsy on 10/31  Given persistence of hematuria despite several days of CBI patient may return to the OR with urology for repeat cystoscopy and fulguration    will require outpatient follow-up with urology and oncology         Hematuria  Assessment & Plan  Likely secondary to above    Iron deficiency anemia- (present on admission)  Assessment & Plan  IV iron infusion    Acute blood loss anemia- (present on admission)  Assessment & Plan    Trend HH, transfuse for Hgb<7 or hemodynamic instability  Received  Last unit of PRBCs on 10/30( total 3 so far this admits)  Hemoglobin slightly reduced today    Obesity (BMI 30-39.9)- (present on admission)  Assessment & Plan  Diet and lifestyle modification  Body mass index is 30.54 kg/m².      Leukocytosis- (present on admission)  Assessment & Plan  Likely reactive insetting of malignancy and blood loss anemia    Dyslipidemia- (present on admission)  Assessment & Plan  Statin    COPD (chronic obstructive pulmonary disease) (HCC)- (present on admission)  Assessment & Plan  Not in acute exacerbation  PRN duonebs       VTE prophylaxis: SCDs/TEDs    I have performed a physical exam and reviewed and updated ROS and Plan today (11/1/2022). In review of yesterday's note (10/31/2022), there are no changes except as documented above.

## 2022-11-01 NOTE — PROGRESS NOTES
Received report from ELLA Urban. Assumed care at 0715. This pt is A&O x4 . Patient and RN discussed plan of care, all questions answered. Labs noted. Call light in place, personal belongings available bed in lowest position, 3 bedrails up, bed alarm off, patient educated on importance of calling for assistance, hourly rounding in place. No additional needs at this time. VSS  CBI running.

## 2022-11-01 NOTE — DISCHARGE PLANNING
Case Management Discharge Planning    Admission Date: 10/26/2022  GMLOS: 3.7  ALOS: 6    6-Clicks ADL Score: 24  6-Clicks Mobility Score: 24      Anticipated Discharge Dispo: Discharge Disposition: Discharged to home/self care (01)    DME Needed: No    Action(s) Taken: Updated Provider/Nurse on Discharge Plan    Escalations Completed: None    Medically Clear: No    Next Steps: CM left message with Madhu at [facility] to return call regarding whether pt will return there after discharge. No return call as of yet.     Barriers to Discharge: Pending Placement

## 2022-11-01 NOTE — PROGRESS NOTES
Patient is reporting the worst pain in his life. Stating that if he moves at all he feels like he is going to pass out with pain. Patient has been medicated per MAR, and cuellar flushed again by this nurse. No clots. Cuellar bag emptied, and CBI running at a continuous drip. Nurse will notify physician

## 2022-11-01 NOTE — CARE PLAN
The patient is Stable - Low risk of patient condition declining or worsening    Shift Goals  Clinical Goals: pain control, CBI  Patient Goals: pain control, CBI  Family Goals: N/A    Progress made toward(s) clinical / shift goals:    Problem: Knowledge Deficit - Standard  Goal: Patient and family/care givers will demonstrate understanding of plan of care, disease process/condition, diagnostic tests and medications  Outcome: Progressing     Problem: Pain - Standard  Goal: Alleviation of pain or a reduction in pain to the patient’s comfort goal  Outcome: Progressing       Patient is not progressing towards the following goals:

## 2022-11-01 NOTE — PROGRESS NOTES
"Urology Progress Note    S/p TURBT 10/27/2022.    S: Seen and examined. Reports acute left groin pain. CBI still running, output red on mid flow. No clots.  Denies fevers, chills, nausea, vomiting. H/H slight lower today.      O:   BP (!) 140/83   Pulse 73   Temp 36.8 °C (98.2 °F) (Temporal)   Resp 18   Ht 1.727 m (5' 8\")   Wt 77.8 kg (171 lb 8.3 oz)   SpO2 91%   Recent Labs     10/31/22  0624   SODIUM 132*   POTASSIUM 4.7   CHLORIDE 97   CO2 27   GLUCOSE 111*   BUN 13   CREATININE 0.62   CALCIUM 8.9     Recent Labs     10/30/22  0725 10/31/22  0624 11/01/22  0125   WBC 15.6* 16.0* 16.1*   RBC 2.96* 3.36* 3.21*   HEMOGLOBIN 6.9* 8.0* 7.7*   HEMATOCRIT 23.7* 26.2* 25.2*   MCV 80.1* 78.0* 78.5*   MCH 23.3* 23.8* 24.0*   MCHC 29.1* 30.5* 30.6*   RDW 68.9* 64.1* 65.5*   PLATELETCT 96* 89* 89*         Intake/Output Summary (Last 24 hours) at 10/31/2022 1207  Last data filed at 10/31/2022 1100  Gross per 24 hour   Intake 1121.25 ml   Output 2275 ml   Net -1153.75 ml       Exam:  Gen: NAD  : no CVAT. Output via cuellar clear light red on mid flow CBI. No clots.    A/P:    Active Hospital Problems    Diagnosis     Acute blood loss anemia [D62]     Urothelial carcinoma of bladder without invasion of muscle (HCC) [C67.9]     Iron deficiency anemia [D50.9]     Hematuria [R31.9]     Obesity (BMI 30-39.9) [E66.9]     Leukocytosis [D72.829]     Dyslipidemia [E78.5]     COPD (chronic obstructive pulmonary disease) (HCC) [J44.9]      Plan:  - monitor for blood clots. Perform manual irrigation of catheter as necessary for clots.  - monitor H/H  - continue to titrate CBI as necessary for a light pink output  - will discuss case with Dr. Dempsey for ongoing recs  - urology will follow    "

## 2022-11-01 NOTE — DISCHARGE PLANNING
Case Management Discharge Planning    Admission Date: 10/26/2022  GMLOS: 3.7  ALOS: 6    6-Clicks ADL Score: 24  6-Clicks Mobility Score: 24      Anticipated Discharge Dispo: Discharge Disposition: Discharged to home/self care (01)  Discharge Address: [address]  Discharge Contact Phone Number: [phone]    DME Needed: No    Action(s) Taken: Updated Provider/Nurse on Discharge Plan    Escalations Completed: None    Medically Clear: No    Next Steps: CM was at bedside with pt disussing dc planning. Pt reports that [place] in [place], [place] near [place], [place]. Pt wishes to go back home after discharge. Currenly not medically cleared at this time. CM will continue to follow. No other needs at this time.     Barriers to Discharge: Transportation

## 2022-11-01 NOTE — DISCHARGE PLANNING
Case Management Discharge Planning    [facility], [facility] is not listed as resident of his facility. CM was given another number and person to speak to, spoke with Sissy at [phone], she looked this patient in her national data base and pt is not listed in her data base at all. CM will follow up with pt on where he lives.

## 2022-11-01 NOTE — PROGRESS NOTES
Assumed care of patient at 1900.    Pt is A&O x4. No complaints of pain at the moment.  Arboleda in place running CBI.  Bed in lowest position and locked. Call light and belongings within reach.  Review plan of care with patient. Hourly rounds in place. All needs met at this time; Instructed to call for assistance whenever needed.

## 2022-11-02 ENCOUNTER — ANESTHESIA EVENT (OUTPATIENT)
Dept: SURGERY | Facility: MEDICAL CENTER | Age: 72
DRG: 668 | End: 2022-11-02
Payer: MEDICARE

## 2022-11-02 ENCOUNTER — ANESTHESIA (OUTPATIENT)
Dept: SURGERY | Facility: MEDICAL CENTER | Age: 72
DRG: 668 | End: 2022-11-02
Payer: MEDICARE

## 2022-11-02 LAB
ANION GAP SERPL CALC-SCNC: 13 MMOL/L (ref 7–16)
BUN SERPL-MCNC: 25 MG/DL (ref 8–22)
CALCIUM SERPL-MCNC: 8.7 MG/DL (ref 8.5–10.5)
CHLORIDE SERPL-SCNC: 94 MMOL/L (ref 96–112)
CO2 SERPL-SCNC: 25 MMOL/L (ref 20–33)
CREAT SERPL-MCNC: 1.27 MG/DL (ref 0.5–1.4)
ERYTHROCYTE [DISTWIDTH] IN BLOOD BY AUTOMATED COUNT: 69.4 FL (ref 35.9–50)
GFR SERPLBLD CREATININE-BSD FMLA CKD-EPI: 60 ML/MIN/1.73 M 2
GLUCOSE SERPL-MCNC: 109 MG/DL (ref 65–99)
HCT VFR BLD AUTO: 28.2 % (ref 42–52)
HGB BLD-MCNC: 8.1 G/DL (ref 14–18)
MCH RBC QN AUTO: 23.7 PG (ref 27–33)
MCHC RBC AUTO-ENTMCNC: 28.7 G/DL (ref 33.7–35.3)
MCV RBC AUTO: 82.5 FL (ref 81.4–97.8)
PLATELET # BLD AUTO: 109 K/UL (ref 164–446)
POTASSIUM SERPL-SCNC: 5.4 MMOL/L (ref 3.6–5.5)
RBC # BLD AUTO: 3.42 M/UL (ref 4.7–6.1)
SODIUM SERPL-SCNC: 132 MMOL/L (ref 135–145)
WBC # BLD AUTO: 17.6 K/UL (ref 4.8–10.8)

## 2022-11-02 PROCEDURE — 36415 COLL VENOUS BLD VENIPUNCTURE: CPT

## 2022-11-02 PROCEDURE — A9270 NON-COVERED ITEM OR SERVICE: HCPCS | Performed by: STUDENT IN AN ORGANIZED HEALTH CARE EDUCATION/TRAINING PROGRAM

## 2022-11-02 PROCEDURE — 770006 HCHG ROOM/CARE - MED/SURG/GYN SEMI*

## 2022-11-02 PROCEDURE — 700102 HCHG RX REV CODE 250 W/ 637 OVERRIDE(OP): Performed by: HOSPITALIST

## 2022-11-02 PROCEDURE — 160035 HCHG PACU - 1ST 60 MINS PHASE I: Performed by: UROLOGY

## 2022-11-02 PROCEDURE — 160028 HCHG SURGERY MINUTES - 1ST 30 MINS LEVEL 3: Performed by: UROLOGY

## 2022-11-02 PROCEDURE — 700101 HCHG RX REV CODE 250: Performed by: ANESTHESIOLOGY

## 2022-11-02 PROCEDURE — 700111 HCHG RX REV CODE 636 W/ 250 OVERRIDE (IP): Performed by: ANESTHESIOLOGY

## 2022-11-02 PROCEDURE — 0T5B8ZZ DESTRUCTION OF BLADDER, VIA NATURAL OR ARTIFICIAL OPENING ENDOSCOPIC: ICD-10-PCS | Performed by: UROLOGY

## 2022-11-02 PROCEDURE — 80048 BASIC METABOLIC PNL TOTAL CA: CPT

## 2022-11-02 PROCEDURE — A9270 NON-COVERED ITEM OR SERVICE: HCPCS | Performed by: HOSPITALIST

## 2022-11-02 PROCEDURE — 160036 HCHG PACU - EA ADDL 30 MINS PHASE I: Performed by: UROLOGY

## 2022-11-02 PROCEDURE — 700105 HCHG RX REV CODE 258: Performed by: ANESTHESIOLOGY

## 2022-11-02 PROCEDURE — 99100 ANES PT EXTEME AGE<1 YR&>70: CPT | Performed by: ANESTHESIOLOGY

## 2022-11-02 PROCEDURE — 700102 HCHG RX REV CODE 250 W/ 637 OVERRIDE(OP): Performed by: STUDENT IN AN ORGANIZED HEALTH CARE EDUCATION/TRAINING PROGRAM

## 2022-11-02 PROCEDURE — 88307 TISSUE EXAM BY PATHOLOGIST: CPT

## 2022-11-02 PROCEDURE — 160039 HCHG SURGERY MINUTES - EA ADDL 1 MIN LEVEL 3: Performed by: UROLOGY

## 2022-11-02 PROCEDURE — 160009 HCHG ANES TIME/MIN: Performed by: UROLOGY

## 2022-11-02 PROCEDURE — 160002 HCHG RECOVERY MINUTES (STAT): Performed by: UROLOGY

## 2022-11-02 PROCEDURE — 85027 COMPLETE CBC AUTOMATED: CPT

## 2022-11-02 PROCEDURE — 0TBB8ZZ EXCISION OF BLADDER, VIA NATURAL OR ARTIFICIAL OPENING ENDOSCOPIC: ICD-10-PCS | Performed by: UROLOGY

## 2022-11-02 PROCEDURE — 00912 ANES TRURL PX RESCJ BLDR TUM: CPT | Performed by: ANESTHESIOLOGY

## 2022-11-02 PROCEDURE — 99232 SBSQ HOSP IP/OBS MODERATE 35: CPT | Performed by: HOSPITALIST

## 2022-11-02 PROCEDURE — 160048 HCHG OR STATISTICAL LEVEL 1-5: Performed by: UROLOGY

## 2022-11-02 RX ORDER — HYDROMORPHONE HYDROCHLORIDE 1 MG/ML
0.1 INJECTION, SOLUTION INTRAMUSCULAR; INTRAVENOUS; SUBCUTANEOUS
Status: DISCONTINUED | OUTPATIENT
Start: 2022-11-02 | End: 2022-11-02 | Stop reason: HOSPADM

## 2022-11-02 RX ORDER — OXYCODONE HCL 5 MG/5 ML
10 SOLUTION, ORAL ORAL
Status: CANCELLED | OUTPATIENT
Start: 2022-11-02

## 2022-11-02 RX ORDER — HALOPERIDOL 5 MG/ML
1 INJECTION INTRAMUSCULAR
Status: CANCELLED | OUTPATIENT
Start: 2022-11-02

## 2022-11-02 RX ORDER — METOCLOPRAMIDE HYDROCHLORIDE 5 MG/ML
INJECTION INTRAMUSCULAR; INTRAVENOUS PRN
Status: DISCONTINUED | OUTPATIENT
Start: 2022-11-02 | End: 2022-11-02 | Stop reason: SURG

## 2022-11-02 RX ORDER — HYDROMORPHONE HYDROCHLORIDE 1 MG/ML
0.1 INJECTION, SOLUTION INTRAMUSCULAR; INTRAVENOUS; SUBCUTANEOUS
Status: CANCELLED | OUTPATIENT
Start: 2022-11-02

## 2022-11-02 RX ORDER — ONDANSETRON 2 MG/ML
4 INJECTION INTRAMUSCULAR; INTRAVENOUS
Status: CANCELLED | OUTPATIENT
Start: 2022-11-02

## 2022-11-02 RX ORDER — HYDROMORPHONE HYDROCHLORIDE 1 MG/ML
0.4 INJECTION, SOLUTION INTRAMUSCULAR; INTRAVENOUS; SUBCUTANEOUS
Status: DISCONTINUED | OUTPATIENT
Start: 2022-11-02 | End: 2022-11-02 | Stop reason: HOSPADM

## 2022-11-02 RX ORDER — OXYCODONE HCL 5 MG/5 ML
5 SOLUTION, ORAL ORAL
Status: CANCELLED | OUTPATIENT
Start: 2022-11-02

## 2022-11-02 RX ORDER — MIDAZOLAM HYDROCHLORIDE 1 MG/ML
1 INJECTION INTRAMUSCULAR; INTRAVENOUS
Status: CANCELLED | OUTPATIENT
Start: 2022-11-02

## 2022-11-02 RX ORDER — SODIUM CHLORIDE, SODIUM LACTATE, POTASSIUM CHLORIDE, CALCIUM CHLORIDE 600; 310; 30; 20 MG/100ML; MG/100ML; MG/100ML; MG/100ML
INJECTION, SOLUTION INTRAVENOUS
Status: DISCONTINUED | OUTPATIENT
Start: 2022-11-02 | End: 2022-11-02 | Stop reason: SURG

## 2022-11-02 RX ORDER — HALOPERIDOL 5 MG/ML
1 INJECTION INTRAMUSCULAR
Status: DISCONTINUED | OUTPATIENT
Start: 2022-11-02 | End: 2022-11-02 | Stop reason: HOSPADM

## 2022-11-02 RX ORDER — HYDROMORPHONE HYDROCHLORIDE 1 MG/ML
0.4 INJECTION, SOLUTION INTRAMUSCULAR; INTRAVENOUS; SUBCUTANEOUS
Status: CANCELLED | OUTPATIENT
Start: 2022-11-02

## 2022-11-02 RX ORDER — METOPROLOL TARTRATE 1 MG/ML
1 INJECTION, SOLUTION INTRAVENOUS
Status: CANCELLED | OUTPATIENT
Start: 2022-11-02

## 2022-11-02 RX ORDER — HYDRALAZINE HYDROCHLORIDE 20 MG/ML
5 INJECTION INTRAMUSCULAR; INTRAVENOUS
Status: CANCELLED | OUTPATIENT
Start: 2022-11-02

## 2022-11-02 RX ORDER — ONDANSETRON 2 MG/ML
4 INJECTION INTRAMUSCULAR; INTRAVENOUS
Status: DISCONTINUED | OUTPATIENT
Start: 2022-11-02 | End: 2022-11-02 | Stop reason: HOSPADM

## 2022-11-02 RX ORDER — ONDANSETRON 2 MG/ML
INJECTION INTRAMUSCULAR; INTRAVENOUS PRN
Status: DISCONTINUED | OUTPATIENT
Start: 2022-11-02 | End: 2022-11-02 | Stop reason: SURG

## 2022-11-02 RX ORDER — CEFAZOLIN SODIUM 1 G/3ML
INJECTION, POWDER, FOR SOLUTION INTRAMUSCULAR; INTRAVENOUS PRN
Status: DISCONTINUED | OUTPATIENT
Start: 2022-11-02 | End: 2022-11-02 | Stop reason: SURG

## 2022-11-02 RX ORDER — PHENYLEPHRINE HYDROCHLORIDE 10 MG/ML
INJECTION, SOLUTION INTRAMUSCULAR; INTRAVENOUS; SUBCUTANEOUS PRN
Status: DISCONTINUED | OUTPATIENT
Start: 2022-11-02 | End: 2022-11-02 | Stop reason: SURG

## 2022-11-02 RX ORDER — HYDROMORPHONE HYDROCHLORIDE 1 MG/ML
0.2 INJECTION, SOLUTION INTRAMUSCULAR; INTRAVENOUS; SUBCUTANEOUS
Status: DISCONTINUED | OUTPATIENT
Start: 2022-11-02 | End: 2022-11-02 | Stop reason: HOSPADM

## 2022-11-02 RX ORDER — DIPHENHYDRAMINE HYDROCHLORIDE 50 MG/ML
12.5 INJECTION INTRAMUSCULAR; INTRAVENOUS
Status: CANCELLED | OUTPATIENT
Start: 2022-11-02

## 2022-11-02 RX ORDER — ACETAMINOPHEN 500 MG
1000 TABLET ORAL ONCE
Status: CANCELLED | OUTPATIENT
Start: 2022-11-02 | End: 2022-11-02

## 2022-11-02 RX ORDER — LABETALOL HYDROCHLORIDE 5 MG/ML
5 INJECTION, SOLUTION INTRAVENOUS
Status: CANCELLED | OUTPATIENT
Start: 2022-11-02

## 2022-11-02 RX ORDER — HYDROMORPHONE HYDROCHLORIDE 1 MG/ML
0.2 INJECTION, SOLUTION INTRAMUSCULAR; INTRAVENOUS; SUBCUTANEOUS
Status: CANCELLED | OUTPATIENT
Start: 2022-11-02

## 2022-11-02 RX ORDER — LIDOCAINE HYDROCHLORIDE 20 MG/ML
INJECTION, SOLUTION EPIDURAL; INFILTRATION; INTRACAUDAL; PERINEURAL PRN
Status: DISCONTINUED | OUTPATIENT
Start: 2022-11-02 | End: 2022-11-02 | Stop reason: SURG

## 2022-11-02 RX ORDER — SODIUM CHLORIDE, SODIUM LACTATE, POTASSIUM CHLORIDE, CALCIUM CHLORIDE 600; 310; 30; 20 MG/100ML; MG/100ML; MG/100ML; MG/100ML
INJECTION, SOLUTION INTRAVENOUS CONTINUOUS
Status: CANCELLED | OUTPATIENT
Start: 2022-11-02

## 2022-11-02 RX ORDER — DIPHENHYDRAMINE HYDROCHLORIDE 50 MG/ML
12.5 INJECTION INTRAMUSCULAR; INTRAVENOUS
Status: DISCONTINUED | OUTPATIENT
Start: 2022-11-02 | End: 2022-11-02 | Stop reason: HOSPADM

## 2022-11-02 RX ADMIN — GABAPENTIN 200 MG: 100 CAPSULE ORAL at 11:50

## 2022-11-02 RX ADMIN — FOLIC ACID 1 MG: 1 TABLET ORAL at 05:33

## 2022-11-02 RX ADMIN — ALPRAZOLAM 0.25 MG: 0.25 TABLET ORAL at 03:04

## 2022-11-02 RX ADMIN — PHENYLEPHRINE HYDROCHLORIDE 200 MCG: 10 INJECTION INTRAVENOUS at 16:43

## 2022-11-02 RX ADMIN — CEFAZOLIN 1 G: 330 INJECTION, POWDER, FOR SOLUTION INTRAMUSCULAR; INTRAVENOUS at 16:11

## 2022-11-02 RX ADMIN — METOCLOPRAMIDE 20 MG: 5 INJECTION, SOLUTION INTRAMUSCULAR; INTRAVENOUS at 16:51

## 2022-11-02 RX ADMIN — LIDOCAINE HYDROCHLORIDE 100 MG: 20 INJECTION, SOLUTION EPIDURAL; INFILTRATION; INTRACAUDAL at 16:11

## 2022-11-02 RX ADMIN — SODIUM CHLORIDE, POTASSIUM CHLORIDE, SODIUM LACTATE AND CALCIUM CHLORIDE: 600; 310; 30; 20 INJECTION, SOLUTION INTRAVENOUS at 16:05

## 2022-11-02 RX ADMIN — Medication 2000 UNITS: at 05:33

## 2022-11-02 RX ADMIN — GABAPENTIN 200 MG: 100 CAPSULE ORAL at 05:33

## 2022-11-02 RX ADMIN — DOCUSATE SODIUM 50 MG AND SENNOSIDES 8.6 MG 2 TABLET: 8.6; 5 TABLET, FILM COATED ORAL at 05:33

## 2022-11-02 RX ADMIN — ALPRAZOLAM 0.25 MG: 0.25 TABLET ORAL at 19:52

## 2022-11-02 RX ADMIN — PROPOFOL 200 MG: 10 INJECTION, EMULSION INTRAVENOUS at 16:11

## 2022-11-02 RX ADMIN — CYANOCOBALAMIN TAB 500 MCG 1000 MCG: 500 TAB at 05:33

## 2022-11-02 RX ADMIN — ONDANSETRON 4 MG: 2 INJECTION INTRAMUSCULAR; INTRAVENOUS at 17:10

## 2022-11-02 RX ADMIN — THIAMINE HCL TAB 100 MG 100 MG: 100 TAB at 05:33

## 2022-11-02 ASSESSMENT — ENCOUNTER SYMPTOMS
DIZZINESS: 0
ABDOMINAL PAIN: 0
NERVOUS/ANXIOUS: 1
CHILLS: 0
FEVER: 0
SHORTNESS OF BREATH: 0
NAUSEA: 0
HEADACHES: 0
PALPITATIONS: 0
COUGH: 0
VOMITING: 0

## 2022-11-02 ASSESSMENT — PAIN DESCRIPTION - PAIN TYPE
TYPE: SURGICAL PAIN
TYPE: ACUTE PAIN
TYPE: ACUTE PAIN
TYPE: SURGICAL PAIN
TYPE: ACUTE PAIN

## 2022-11-02 NOTE — PROGRESS NOTES
Assumed care of patient at 1900.    Pt is A&O x4. No complaints of pain at the moment.  Arboleda in place with CBI running to light pink drainage.  Bed in lowest position and locked. Bed alarm On. Call light and belongings within reach. Review plan of care with patient. Hourly rounds in place. All needs met at this time; Instructed to call for assistance whenever needed.

## 2022-11-02 NOTE — CARE PLAN
The patient is Stable - Low risk of patient condition declining or worsening    Shift Goals  Clinical Goals: pain control, CBI  Patient Goals: pain control  Family Goals: N/A    Progress made toward(s) clinical / shift goals:    Problem: Fluid Volume  Goal: Fluid volume balance will be maintained  Outcome: Progressing     Problem: Respiratory  Goal: Patient will achieve/maintain optimum respiratory ventilation and gas exchange  Outcome: Progressing       Patient is not progressing towards the following goals:

## 2022-11-02 NOTE — ANESTHESIA PROCEDURE NOTES
Airway    Date/Time: 11/2/2022 4:11 PM  Performed by: Clyde Jauregui M.D.  Authorized by: Clyde Jauregui M.D.     Location:  OR  Urgency:  Elective  Indications for Airway Management:  Anesthesia      Spontaneous Ventilation: absent    Sedation Level:  Deep  Preoxygenated: Yes    Final Airway Type:  Supraglottic airway  Final Supraglottic Airway:  Standard LMA    SGA Size:  4  Number of Attempts at Approach:  1

## 2022-11-02 NOTE — PROGRESS NOTES
Received report and assumed care of patient at change of shift. Patient is A&Ox4, on 3l O2, and reports no pain at this time. Patient assessment completed, bed in lowest position, and call light and personal belongings are within reach. Patient expressed no further needs at this time.

## 2022-11-02 NOTE — PROGRESS NOTES
"Urology Progress Note    S/p TURBT 10/27/2022.    S: Seen and examined. Pain is less today, but admits left groin pain. Output still remaining red on mid flow.   Denies fevers, chills, nausea, vomiting.      O:   /74   Pulse 79   Temp 36.7 °C (98.1 °F) (Temporal)   Resp 18   Ht 1.727 m (5' 8\")   Wt 77.8 kg (171 lb 8.3 oz)   SpO2 98%   Recent Labs     10/31/22  0624 11/02/22  0054   SODIUM 132* 132*   POTASSIUM 4.7 5.4   CHLORIDE 97 94*   CO2 27 25   GLUCOSE 111* 109*   BUN 13 25*   CREATININE 0.62 1.27   CALCIUM 8.9 8.7     Recent Labs     10/31/22  0624 11/01/22  0125 11/02/22 0054   WBC 16.0* 16.1* 17.6*   RBC 3.36* 3.21* 3.42*   HEMOGLOBIN 8.0* 7.7* 8.1*   HEMATOCRIT 26.2* 25.2* 28.2*   MCV 78.0* 78.5* 82.5   MCH 23.8* 24.0* 23.7*   MCHC 30.5* 30.6* 28.7*   RDW 64.1* 65.5* 69.4*   PLATELETCT 89* 89* 109*         Intake/Output Summary (Last 24 hours) at 10/31/2022 1207  Last data filed at 10/31/2022 1100  Gross per 24 hour   Intake 1121.25 ml   Output 2275 ml   Net -1153.75 ml       Exam:  Gen: NAD  : no CVAT. Output via cuellar clear light red on mid flow CBI. No clots.    A/P:    Active Hospital Problems    Diagnosis     Acute blood loss anemia [D62]     Urothelial carcinoma of bladder without invasion of muscle (HCC) [C67.9]     Iron deficiency anemia [D50.9]     Hematuria [R31.9]     Obesity (BMI 30-39.9) [E66.9]     Leukocytosis [D72.829]     Dyslipidemia [E78.5]     COPD (chronic obstructive pulmonary disease) (HCC) [J44.9]      Plan:  - monitor for blood clots. Perform manual irrigation of catheter as necessary for clots.  - monitor H/H  - continue to titrate CBI as necessary for a light pink output  - keep NPO. Anticipating cystoscopy with clot evacuation and possible fulguration today.  - urology will follow    "

## 2022-11-02 NOTE — PROGRESS NOTES
Hospital Medicine Daily Progress Note    Date of Service  11/2/2022    Chief Complaint  Gregg Martinez is a 72 y.o. male admitted 10/26/2022 with hematuria     Hospital Course  72-year-old male with past medical history of DL D and asthma presenting with hematuria.  He was admitted for acute blood loss anemia requiring 2 units of PRBC transfusion.  CT abdomen pelvis showed bladder wall mass suspicious for neoplasm.  Urology was consulted and patient for TURBT on 10/27 which showed multifocal bladder tumors which were all resected. He continued to have hematuria which improved with CBI however he did require additional blood transfusion. Pathology showed low grade urothelial carcinoma. CT chest with contrast without evidence of lung involvement. Bone scan negative for mets. Pending IR bone biopsy of possible metastatic lesions noted on CT pelvis.     Interval Problem Update    CBI still running     his hematuria still present      Case discussed with urology SHAREE Hitchcock      Patient is to go back to the operating room today by urology      Patient's left groin pain is better controlled  left groin pain , sharp, intensity 6 out of 10 constant, no radiation , intermittent      Pending bone biopsy results       I have discussed this patient's plan of care and discharge plan at IDT rounds today with Case Management, Nursing, Nursing leadership, and other members of the IDT team.    Consultants/Specialty  urology    Code Status  Full Code    Disposition  Patient is not medically cleared for discharge.   Anticipate discharge to to home with close outpatient follow-up.  I have placed the appropriate orders for post-discharge needs.    Review of Systems  Review of Systems   Constitutional:  Negative for chills and fever.   Respiratory:  Negative for cough and shortness of breath.    Cardiovascular:  Negative for chest pain and palpitations.   Gastrointestinal:  Negative for abdominal pain, nausea and vomiting.   Genitourinary:   Positive for hematuria. Negative for dysuria and urgency.   Musculoskeletal:  Positive for joint pain.   Neurological:  Negative for dizziness and headaches.   Psychiatric/Behavioral:  The patient is nervous/anxious.    All other systems reviewed and are negative.     Physical Exam  Temp:  [36.1 °C (96.9 °F)-36.7 °C (98.1 °F)] 36.7 °C (98.1 °F)  Pulse:  [79-85] 79  Resp:  [18] 18  BP: (120-140)/(74-76) 120/74  SpO2:  [96 %-98 %] 98 %    Physical Exam  Vitals and nursing note reviewed.   Constitutional:       General: He is in acute distress.      Appearance: Normal appearance.   HENT:      Head: Normocephalic.      Nose: Nose normal.      Mouth/Throat:      Mouth: Mucous membranes are moist.   Eyes:      Pupils: Pupils are equal, round, and reactive to light.   Cardiovascular:      Rate and Rhythm: Normal rate and regular rhythm.      Heart sounds: No murmur heard.  Pulmonary:      Effort: Pulmonary effort is normal. No respiratory distress.      Breath sounds: Normal breath sounds.   Abdominal:      General: Abdomen is flat. There is no distension.      Palpations: There is no mass.      Tenderness: There is no abdominal tenderness. There is no rebound.      Hernia: No hernia is present.   Musculoskeletal:         General: No swelling, tenderness, deformity or signs of injury.   Skin:     Coloration: Skin is not jaundiced or pale.      Findings: No bruising, erythema, lesion or rash.   Neurological:      General: No focal deficit present.      Mental Status: He is alert and oriented to person, place, and time.   Psychiatric:         Mood and Affect: Mood is anxious.       Fluids    Intake/Output Summary (Last 24 hours) at 11/2/2022 1026  Last data filed at 11/2/2022 0645  Gross per 24 hour   Intake 600 ml   Output 2425 ml   Net -1825 ml         Laboratory  Recent Labs     10/31/22  0624 11/01/22  0125 11/02/22  0054   WBC 16.0* 16.1* 17.6*   RBC 3.36* 3.21* 3.42*   HEMOGLOBIN 8.0* 7.7* 8.1*   HEMATOCRIT 26.2* 25.2*  28.2*   MCV 78.0* 78.5* 82.5   MCH 23.8* 24.0* 23.7*   MCHC 30.5* 30.6* 28.7*   RDW 64.1* 65.5* 69.4*   PLATELETCT 89* 89* 109*       Recent Labs     10/31/22  0624 11/02/22  0054   SODIUM 132* 132*   POTASSIUM 4.7 5.4   CHLORIDE 97 94*   CO2 27 25   GLUCOSE 111* 109*   BUN 13 25*   CREATININE 0.62 1.27   CALCIUM 8.9 8.7                       Imaging  CT-ABDOMEN-PELVIS W/O   Final Result      1.  Interval hemorrhage within the partially decompressed urinary bladder. Previously visualized posterior left bladder mass is no longer seen presumably this is due to interval resection      2.  New mild left hydroureteronephrosis with moderate perinephric fat stranding. This is concerning for obstruction perhaps related to hematoma, recent instrumentation or pyelonephritis/ureterectasis.      3.  Increasing colonic stool volume with similar moderate diverticulosis of the colon but no evidence of diverticulitis      4.  Marked splenomegaly      5.  New small effusions with some lower lung zone groundglass suspicious for interstitial edema/mild congestive failure. There is stable mild pericardial fluid      6.  Persistent inhomogeneous medullary spaces with generalized increased density. Recent bone scan showed no focal mass but this could reflect underlying myelofibrosis or metastases with false-negative bone scan      CT-NEEDLE BX-DEEP BONE   Final Result      1.  Successful CT-guided core biopsy of sclerotic lesion in the posterior aspect of left inferior pubic ramus.      NM-BONE/JOINT SCAN WHOLE BODY   Final Result      No evidence of bony metastatic disease      CT-CHEST (THORAX) WITH   Final Result         1.  Emphysema   2.  Hazy bilateral upper lobe opacities with slight intralobular septal thickening, could represent component of mild pulmonary edema   3.  Linear densities the bilateral lung bases favors scarring or atelectasis.   4.  Atherosclerosis and atherosclerotic coronary artery disease   5.  Hepatomegaly with  irregular hepatic contour favoring changes of cirrhosis.   6.  Subcentimeter hepatic lobe lesions, could represent small cyst or hemangioma, otherwise indeterminate.   7.  Mild mediastinal adenopathy, workup and evaluation for causes of adenopathy recommended as clinically appropriate.   8.  Splenomegaly      DX-CHEST-PORTABLE (1 VIEW)   Final Result         1.  No acute cardiopulmonary disease.   2.  Atherosclerosis      CT-ABDOMEN-PELVIS WITH   Final Result      1.  Enhancing 2.5 x 2.3 cm bladder wall mass suspicious for neoplasm.   2.  Heterogeneous sclerotic change of the pelvis and proximal femurs suspicious for metastatic disease or other infiltrating marrow process.   3.  Mild cardiomegaly.   4.  Hepatosplenomegaly.   5.  Small subcentimeter hepatic hypodensities which could represent cysts or hemangiomas but are nonspecific. If indicated, these findings could be further evaluated with dedicated liver protocol CT or MRI or ultrasound on a nonemergent basis.   6.  Colonic diverticulosis without evidence of diverticulitis.   7.  Atherosclerosis.             Assessment/Plan  * Urothelial carcinoma of bladder without invasion of muscle (HCC)  Assessment & Plan  CT AP: Notable enhancing 2.5 x 2.3 cm bladder wall mass suspicious for neoplasms  s/p TURBT on 10/27 with multiple tumors noted  Pathology showing low-grade papillary urothelial carcinoma noninvasive with muscularis propria uninvolved by tumor  CT chest negative for metastasis  Bone scan without any evidence of metastasis  S/p  IR guided bone biopsy on 10/31      To the operating room by urology on 11/2/2022         Hematuria  Assessment & Plan  Likely secondary to above    Iron deficiency anemia- (present on admission)  Assessment & Plan  IV iron infusion    Acute blood loss anemia- (present on admission)  Assessment & Plan    Trend HH, transfuse for Hgb<7 or hemodynamic instability  Received  Last unit of PRBCs on 10/30( total 3 so far this  admits)  Hemoglobin slightly improved to 8 and holding so far    Obesity (BMI 30-39.9)- (present on admission)  Assessment & Plan  Diet and lifestyle modification  Body mass index is 30.54 kg/m².      Leukocytosis- (present on admission)  Assessment & Plan  Likely reactive insetting of malignancy and blood loss anemia    Dyslipidemia- (present on admission)  Assessment & Plan  Statin    COPD (chronic obstructive pulmonary disease) (HCC)- (present on admission)  Assessment & Plan  Not in acute exacerbation  PRN duonebs       VTE prophylaxis: SCDs/TEDs    I have performed a physical exam and reviewed and updated ROS and Plan today (11/2/2022). In review of yesterday's note (11/1/2022), there are no changes except as documented above.

## 2022-11-02 NOTE — ANESTHESIA PREPROCEDURE EVALUATION
Case: [id number] Date/Time: 11/02/22 1519    Procedures:       CYSTOSCOPY FOR CLOT EVACUATION      FULGERATION OF BLEEDING    Anesthesia type: General    Pre-op diagnosis: presenting with hematuria    Location: [facility] OR 18 / SURGERY [facility]    Surgeons: Leonid Dempsey M.D.          Relevant Problems   PULMONARY   (positive) COPD (chronic obstructive pulmonary disease) (HCC)      Other   (positive) Acute blood loss anemia   (positive) Obesity (BMI 30-39.9)   (positive) Urothelial carcinoma of bladder without invasion of muscle (HCC)       Physical Exam    Airway   Mallampati: II  TM distance: >3 FB  Neck ROM: full       Cardiovascular - normal exam  Rhythm: regular  Rate: normal  (-) murmur     Dental - normal exam           Pulmonary - normal exam  Breath sounds clear to auscultation     Abdominal    Neurological - normal exam                 Anesthesia Plan    ASA 2       Plan - general       Airway plan will be LMA          Induction: intravenous      Pertinent diagnostic labs and testing reviewed    Informed Consent:    Anesthetic plan and risks discussed with patient.

## 2022-11-02 NOTE — OP REPORT
[facility] Operative Report    Pre-operative Diagnosis: 1. hematuria  2. Clot urinary retention  3. Blood loss anemia  4. Known urothelial carcinoma of the bladder   Post-operative Diagnosis: Same as above   Procedure: 1. Cystoscopy  2. Clot evacuation  3. Fulguration  4.  Transurethral resection of bladder tumor 3 cm   Surgeon Leonid Dempsey MD   Assistant: None   Anesthesia: LMA, General, MD Juventino   Estimated Blood Loss: minimal       Specimens: 1. Bladder mass at left trigone   Drains: 24 3 way cuellar cuellar catheter with 10cc in balloon   Wound class II clean contaminated   Condition and complications Stable, procedure well tolerated without complications   Disposition:  PACU, return to medicine service, on CBI, will reevaluate and likely do a voiding trial in the next 1 to 2 days pending repeat labs and catheter findings.   Findings: 1. Cystoscopy findings: Large well organized blood clot estimated 500 mL within the bladder.  This blood clot required resection with monopolar loop to break up the rubbery blood clot.  This was adherent to the resection bed at the left posterior site near the trigone.  There is also some mild hematoma anterior bladder neck which was also we resected and then fulgurated.  Most likely bleeding site was within the left posterior previous resection site.  Additional base of the tumor overlying the left ureteral orifice was resected due to the obstructed appearing left ureteral orifice.  After resection the ureteral orifice appeared wide caliber and therefore stent was not placed.     Indications for Procedure:  This patient is a 72-year-old male with recent diagnosis of urothelial carcinoma and after his transurethral resection of bladder tumor on 10/26/2022 by Dr. Stevens he has had continued hematuria requiring blood transfusion as well as continuous bladder irrigation.  He is therefore indicated for the above procedure.     Risks discussed, but not limited to, were infection,  sepsis, bleeding, bladder injury, need for secondary procedure, possible need for cuellar post op, possible admission post operatively, and the cardiovascular and pulmonary complications of anesthesia/surgery including DVT, Mi, PE, and death.      Procedure in Detail:  . In the lithotomy position, he was prepped and draped in the usual sterile fashion, given a gram of intravenous Kefzol.     A 26-Hungarian continuous-flow resectoscope cystoscope with 30-degree  lens was introduced per urethra sterilely.  Please see above findings.    We then used a combination of Dawn syringe,ellik evacuator, as well as TUR of the blood clot to evacuate this large adherent and rubbery blood clot.  Once the blood clot was removed we got down to the base of the tumor and fulgurated the area of active bleeding.  This appeared venous.  The right ureteral orifice was normal and not involved.  The left ureteral orifice was within this resection bed and appeared obstructed therefore additional TUR of the deep left trigone was performed which adequately opened up the ureteral orifice which appeared wide caliber.  We resected the anterior bladder neck additionally and fulgurated that area.  We obtained adequate hemostasis.  Bladder was filled and emptied multiple times assuring is ideal if hemostasis is possible.  24 three-way Cuellar catheter was placed and then we switched over to normal saline and started continuous bladder irrigation.     Blood loss was estimated 100 mL. Final sponge and instrument counts correct x2. The patient tolerated the procedure well.       Leonid Dempsey MD    [phone]

## 2022-11-03 PROBLEM — E86.1 HYPOVOLEMIA: Status: ACTIVE | Noted: 2022-11-03

## 2022-11-03 LAB
ABO GROUP BLD: NORMAL
BLD GP AB SCN SERPL QL: NORMAL
ERYTHROCYTE [DISTWIDTH] IN BLOOD BY AUTOMATED COUNT: 65.2 FL (ref 35.9–50)
HCT VFR BLD AUTO: 19.2 % (ref 42–52)
HGB BLD-MCNC: 5.8 G/DL (ref 14–18)
HGB BLD-MCNC: 6.3 G/DL (ref 14–18)
HGB BLD-MCNC: 7.4 G/DL (ref 14–18)
MCH RBC QN AUTO: 23.3 PG (ref 27–33)
MCHC RBC AUTO-ENTMCNC: 30.2 G/DL (ref 33.7–35.3)
MCV RBC AUTO: 77.1 FL (ref 81.4–97.8)
PATHOLOGY CONSULT NOTE: NORMAL
PLATELET # BLD AUTO: 111 K/UL (ref 164–446)
RBC # BLD AUTO: 2.49 M/UL (ref 4.7–6.1)
RH BLD: NORMAL
WBC # BLD AUTO: 22 K/UL (ref 4.8–10.8)

## 2022-11-03 PROCEDURE — 700102 HCHG RX REV CODE 250 W/ 637 OVERRIDE(OP): Performed by: HOSPITALIST

## 2022-11-03 PROCEDURE — 36430 TRANSFUSION BLD/BLD COMPNT: CPT

## 2022-11-03 PROCEDURE — 700111 HCHG RX REV CODE 636 W/ 250 OVERRIDE (IP): Performed by: STUDENT IN AN ORGANIZED HEALTH CARE EDUCATION/TRAINING PROGRAM

## 2022-11-03 PROCEDURE — 85027 COMPLETE CBC AUTOMATED: CPT

## 2022-11-03 PROCEDURE — A9270 NON-COVERED ITEM OR SERVICE: HCPCS | Performed by: HOSPITALIST

## 2022-11-03 PROCEDURE — 86901 BLOOD TYPING SEROLOGIC RH(D): CPT

## 2022-11-03 PROCEDURE — 86923 COMPATIBILITY TEST ELECTRIC: CPT

## 2022-11-03 PROCEDURE — 700105 HCHG RX REV CODE 258: Performed by: HOSPITALIST

## 2022-11-03 PROCEDURE — 86900 BLOOD TYPING SEROLOGIC ABO: CPT

## 2022-11-03 PROCEDURE — 85018 HEMOGLOBIN: CPT | Mod: 91

## 2022-11-03 PROCEDURE — A9270 NON-COVERED ITEM OR SERVICE: HCPCS | Performed by: STUDENT IN AN ORGANIZED HEALTH CARE EDUCATION/TRAINING PROGRAM

## 2022-11-03 PROCEDURE — 86850 RBC ANTIBODY SCREEN: CPT

## 2022-11-03 PROCEDURE — 700102 HCHG RX REV CODE 250 W/ 637 OVERRIDE(OP): Performed by: STUDENT IN AN ORGANIZED HEALTH CARE EDUCATION/TRAINING PROGRAM

## 2022-11-03 PROCEDURE — P9016 RBC LEUKOCYTES REDUCED: HCPCS

## 2022-11-03 PROCEDURE — 99232 SBSQ HOSP IP/OBS MODERATE 35: CPT | Performed by: HOSPITALIST

## 2022-11-03 PROCEDURE — 770006 HCHG ROOM/CARE - MED/SURG/GYN SEMI*

## 2022-11-03 PROCEDURE — 36415 COLL VENOUS BLD VENIPUNCTURE: CPT

## 2022-11-03 RX ORDER — SODIUM CHLORIDE 9 MG/ML
500 INJECTION, SOLUTION INTRAVENOUS ONCE
Status: COMPLETED | OUTPATIENT
Start: 2022-11-03 | End: 2022-11-03

## 2022-11-03 RX ADMIN — THIAMINE HCL TAB 100 MG 100 MG: 100 TAB at 19:05

## 2022-11-03 RX ADMIN — DOCUSATE SODIUM 50 MG AND SENNOSIDES 8.6 MG 2 TABLET: 8.6; 5 TABLET, FILM COATED ORAL at 19:05

## 2022-11-03 RX ADMIN — ALPRAZOLAM 0.25 MG: 0.25 TABLET ORAL at 01:56

## 2022-11-03 RX ADMIN — ONDANSETRON 4 MG: 4 TABLET, ORALLY DISINTEGRATING ORAL at 07:48

## 2022-11-03 RX ADMIN — GABAPENTIN 200 MG: 100 CAPSULE ORAL at 04:44

## 2022-11-03 RX ADMIN — THIAMINE HCL TAB 100 MG 100 MG: 100 TAB at 04:44

## 2022-11-03 RX ADMIN — GABAPENTIN 200 MG: 100 CAPSULE ORAL at 19:06

## 2022-11-03 RX ADMIN — Medication 2000 UNITS: at 04:44

## 2022-11-03 RX ADMIN — GABAPENTIN 200 MG: 100 CAPSULE ORAL at 12:45

## 2022-11-03 RX ADMIN — FOLIC ACID 1 MG: 1 TABLET ORAL at 04:44

## 2022-11-03 RX ADMIN — ATORVASTATIN CALCIUM 20 MG: 20 TABLET, FILM COATED ORAL at 19:05

## 2022-11-03 RX ADMIN — SODIUM CHLORIDE 500 ML: 9 INJECTION, SOLUTION INTRAVENOUS at 09:00

## 2022-11-03 RX ADMIN — CYANOCOBALAMIN TAB 500 MCG 1000 MCG: 500 TAB at 04:44

## 2022-11-03 ASSESSMENT — PAIN DESCRIPTION - PAIN TYPE
TYPE: ACUTE PAIN

## 2022-11-03 ASSESSMENT — ENCOUNTER SYMPTOMS
FEVER: 0
PALPITATIONS: 0
ABDOMINAL PAIN: 0
DIZZINESS: 1
CHILLS: 0
NAUSEA: 0
NERVOUS/ANXIOUS: 1
VOMITING: 0
COUGH: 0
SHORTNESS OF BREATH: 0
HEADACHES: 0

## 2022-11-03 ASSESSMENT — COGNITIVE AND FUNCTIONAL STATUS - GENERAL
CLIMB 3 TO 5 STEPS WITH RAILING: A LITTLE
STANDING UP FROM CHAIR USING ARMS: A LITTLE
MOVING FROM LYING ON BACK TO SITTING ON SIDE OF FLAT BED: A LITTLE
SUGGESTED CMS G CODE MODIFIER DAILY ACTIVITY: CH
WALKING IN HOSPITAL ROOM: A LITTLE
DAILY ACTIVITIY SCORE: 24
MOVING TO AND FROM BED TO CHAIR: A LITTLE
MOBILITY SCORE: 19
SUGGESTED CMS G CODE MODIFIER MOBILITY: CK

## 2022-11-03 NOTE — CARE PLAN
The patient is Stable - Low risk of patient condition declining or worsening    Shift Goals  Clinical Goals: CBI, surgery  Patient Goals: Pain control  Family Goals: N/A      Problem: Fluid Volume  Goal: Fluid volume balance will be maintained  Outcome: Progressing     Problem: Urinary - Renal Perfusion  Goal: Ability to achieve and maintain adequate renal perfusion and functioning will improve  Outcome: Progressing     Problem: Respiratory  Goal: Patient will achieve/maintain optimum respiratory ventilation and gas exchange  Outcome: Progressing     Problem: Pain - Standard  Goal: Alleviation of pain or a reduction in pain to the patient’s comfort goal  Outcome: Progressing       Progress made toward(s) clinical / shift goals: Intake and Output monitored in flowsheets. Patient transported to surgery.     Patient is not progressing towards the following goals:

## 2022-11-03 NOTE — OR NURSING
Patient recovered well post op. A&Ox4. VSS, 2L NC. Surgical sites CDI. Surgical pain managed. Patient able to drink fluids without Nausea and vomiting. PT belongings in his room. Spouse updated and discussed plan of care. Report called to Vladislav JARAMILLO.

## 2022-11-03 NOTE — PROGRESS NOTES
"Urology Progress Note    S/p TURBT 10/27/2022.  S/p cystoscopy, clot evacuation, fulguration and TURBT 11/03/2022    S: Seen and examined. Feeling better since the procedure. H/H decreased, pending transfusion.  CBI in place and draining well.  Denies fevers, chills.      O:   BP (!) 89/35   Pulse 90   Temp 36.8 °C (98.2 °F) (Temporal)   Resp 17   Ht 1.727 m (5' 8\")   Wt 77.8 kg (171 lb 8.3 oz)   SpO2 94%   Recent Labs     11/02/22  0054   SODIUM 132*   POTASSIUM 5.4   CHLORIDE 94*   CO2 25   GLUCOSE 109*   BUN 25*   CREATININE 1.27   CALCIUM 8.7     Recent Labs     11/01/22  0125 11/02/22  0054 11/03/22  0844   WBC 16.1* 17.6* 22.0*   RBC 3.21* 3.42* 2.49*   HEMOGLOBIN 7.7* 8.1* 5.8*   HEMATOCRIT 25.2* 28.2* 19.2*   MCV 78.5* 82.5 77.1*   MCH 24.0* 23.7* 23.3*   MCHC 30.6* 28.7* 30.2*   RDW 65.5* 69.4* 65.2*   PLATELETCT 89* 109* 111*         Intake/Output Summary (Last 24 hours) at 10/31/2022 1207  Last data filed at 10/31/2022 1100  Gross per 24 hour   Intake 1121.25 ml   Output 2275 ml   Net -1153.75 ml       Exam:  Gen: NAD  : no CVAT. Output via cuellar clear light pink on high flow CBI. No clots.    A/P:    Active Hospital Problems    Diagnosis     Acute blood loss anemia [D62]     Urothelial carcinoma of bladder without invasion of muscle (HCC) [C67.9]     Iron deficiency anemia [D50.9]     Hematuria [R31.9]     Obesity (BMI 30-39.9) [E66.9]     Leukocytosis [D72.829]     Dyslipidemia [E78.5]     COPD (chronic obstructive pulmonary disease) (HCC) [J44.9]      Plan:  - monitor for blood clots. Perform manual irrigation of catheter as necessary for clots.  - monitor H/H. Currently pending transfusion  - continue CBI, will attempt to wean.  - when output remains clears and CBI weaned, will plan for a voiding trial.   - urology will follow    "

## 2022-11-03 NOTE — CARE PLAN
The patient is Watcher - Medium risk of patient condition declining or worsening    Shift Goals  Clinical Goals: monitor CBI; comfort  Patient Goals: comfort  Family Goals: N/A    Progress made toward(s) clinical / shift goals:    Problem: Hemodynamics  Goal: Patient's hemodynamics, fluid balance and neurologic status will be stable or improve  Outcome: Progressing     Problem: Knowledge Deficit - Standard  Goal: Patient and family/care givers will demonstrate understanding of plan of care, disease process/condition, diagnostic tests and medications  Outcome: Progressing       Patient is not progressing towards the following goals:

## 2022-11-03 NOTE — PROGRESS NOTES
Mahad from Lab called with critical result of HgB of 5.8 at 0907. Critical lab result read back to Mahad.   Dr. Farnsworth notified of critical lab result at 0907.  Critical lab result read back by Dr. Farnsworth.

## 2022-11-03 NOTE — PROGRESS NOTES
4 Eyes Skin Assessment Completed by Rajni RN and ELLA Goff.    Head WDL  Ears WDL  Nose WDL  Mouth WDL  Neck WDL  Breast/Chest WDL  Shoulder Blades WDL  Spine WDL  (R) Arm/Elbow/Hand WDL  (L) Arm/Elbow/Hand WDL  Abdomen WDL  Groin WDL  Scrotum/Coccyx/Buttocks - Biopsy site to L hip/buttock  (R) Leg WDL  (L) Leg WDL  (R) Heel/Foot/Toe WDL  (L) Heel/Foot/Toe WDL          Devices In Places Blood Pressure Cuff, Pulse Ox, and Nasal Cannula      Interventions In Place Pressure Redistribution Mattress    Possible Skin Injury No    Pictures Uploaded Into Epic N/A  Wound Consult Placed N/A  RN Wound Prevention Protocol Ordered No

## 2022-11-03 NOTE — PROGRESS NOTES
Hospital Medicine Daily Progress Note    Date of Service  11/3/2022    Chief Complaint  Gregg Martinez is a 72 y.o. male admitted 10/26/2022 with hematuria     Hospital Course  72-year-old male with past medical history of DL D and asthma presenting with hematuria.  He was admitted for acute blood loss anemia requiring 2 units of PRBC transfusion.  CT abdomen pelvis showed bladder wall mass suspicious for neoplasm.  Urology was consulted and patient for TURBT on 10/27 which showed multifocal bladder tumors which were all resected. He continued to have hematuria which improved with CBI however he did require additional blood transfusion. Pathology showed low grade urothelial carcinoma. CT chest with contrast without evidence of lung involvement. Bone scan negative for mets. Pending IR bone biopsy of possible metastatic lesions noted on CT pelvis.     Interval Problem Update    CBI still running     his hematuria still present    Patient was taken to the operating room last night see the operative report.      Case discussed with urology SHAREE Hitchcock  -they feel that with removal of significant clot patient's hematuria should improved.  We are currently trying to wean the patient off the CBI and hopefully remove the Arboleda at some point    Patient complaining of some mild dizziness this morning had possible dizziness systolic of 85.    Stat hemoglobin showed hemoglobin was 5.8      left groin pain , sharp, intensity 5 out of 10 intermittent , no radiation       Pending bone biopsy results       I have discussed this patient's plan of care and discharge plan at IDT rounds today with Case Management, Nursing, Nursing leadership, and other members of the IDT team.    Consultants/Specialty  urology    Code Status  Full Code    Disposition  Patient is not medically cleared for discharge.   Anticipate discharge to to home with close outpatient follow-up.  I have placed the appropriate orders for post-discharge needs.    Review of  Systems  Review of Systems   Constitutional:  Negative for chills and fever.   Respiratory:  Negative for cough and shortness of breath.    Cardiovascular:  Negative for chest pain and palpitations.   Gastrointestinal:  Negative for abdominal pain, nausea and vomiting.   Genitourinary:  Positive for hematuria. Negative for dysuria and urgency.   Musculoskeletal:  Positive for joint pain.   Neurological:  Positive for dizziness. Negative for headaches.   Psychiatric/Behavioral:  The patient is nervous/anxious.    All other systems reviewed and are negative.     Physical Exam  Temp:  [36.4 °C (97.6 °F)-37.1 °C (98.7 °F)] 36.6 °C (97.9 °F)  Pulse:  [58-96] 85  Resp:  [13-20] 17  BP: ()/(35-66) 102/63  SpO2:  [91 %-100 %] 100 %    Physical Exam  Vitals and nursing note reviewed.   Constitutional:       General: He is not in acute distress.     Appearance: Normal appearance.   HENT:      Head: Normocephalic.      Nose: Nose normal.      Mouth/Throat:      Mouth: Mucous membranes are moist.   Eyes:      Pupils: Pupils are equal, round, and reactive to light.   Cardiovascular:      Rate and Rhythm: Normal rate and regular rhythm.      Heart sounds: No murmur heard.  Pulmonary:      Effort: Pulmonary effort is normal. No respiratory distress.      Breath sounds: Normal breath sounds.   Abdominal:      General: Abdomen is flat. There is no distension.      Palpations: There is no mass.      Tenderness: There is no abdominal tenderness. There is no rebound.      Hernia: No hernia is present.   Genitourinary:     Comments: 3 way Arboleda connected to CBI  Musculoskeletal:         General: No swelling, tenderness, deformity or signs of injury.   Skin:     Coloration: Skin is pale. Skin is not jaundiced.      Findings: No bruising, erythema, lesion or rash.   Neurological:      General: No focal deficit present.      Mental Status: He is alert and oriented to person, place, and time.   Psychiatric:         Mood and Affect:  Mood is anxious.       Fluids    Intake/Output Summary (Last 24 hours) at 11/3/2022 1329  Last data filed at 11/3/2022 0645  Gross per 24 hour   Intake 1400 ml   Output 9150 ml   Net -7750 ml         Laboratory  Recent Labs     11/01/22  0125 11/02/22  0054 11/03/22  0844   WBC 16.1* 17.6* 22.0*   RBC 3.21* 3.42* 2.49*   HEMOGLOBIN 7.7* 8.1* 5.8*   HEMATOCRIT 25.2* 28.2* 19.2*   MCV 78.5* 82.5 77.1*   MCH 24.0* 23.7* 23.3*   MCHC 30.6* 28.7* 30.2*   RDW 65.5* 69.4* 65.2*   PLATELETCT 89* 109* 111*       Recent Labs     11/02/22  0054   SODIUM 132*   POTASSIUM 5.4   CHLORIDE 94*   CO2 25   GLUCOSE 109*   BUN 25*   CREATININE 1.27   CALCIUM 8.7                       Imaging  CT-ABDOMEN-PELVIS W/O   Final Result      1.  Interval hemorrhage within the partially decompressed urinary bladder. Previously visualized posterior left bladder mass is no longer seen presumably this is due to interval resection      2.  New mild left hydroureteronephrosis with moderate perinephric fat stranding. This is concerning for obstruction perhaps related to hematoma, recent instrumentation or pyelonephritis/ureterectasis.      3.  Increasing colonic stool volume with similar moderate diverticulosis of the colon but no evidence of diverticulitis      4.  Marked splenomegaly      5.  New small effusions with some lower lung zone groundglass suspicious for interstitial edema/mild congestive failure. There is stable mild pericardial fluid      6.  Persistent inhomogeneous medullary spaces with generalized increased density. Recent bone scan showed no focal mass but this could reflect underlying myelofibrosis or metastases with false-negative bone scan      CT-NEEDLE BX-DEEP BONE   Final Result      1.  Successful CT-guided core biopsy of sclerotic lesion in the posterior aspect of left inferior pubic ramus.      NM-BONE/JOINT SCAN WHOLE BODY   Final Result      No evidence of bony metastatic disease      CT-CHEST (THORAX) WITH   Final Result          1.  Emphysema   2.  Hazy bilateral upper lobe opacities with slight intralobular septal thickening, could represent component of mild pulmonary edema   3.  Linear densities the bilateral lung bases favors scarring or atelectasis.   4.  Atherosclerosis and atherosclerotic coronary artery disease   5.  Hepatomegaly with irregular hepatic contour favoring changes of cirrhosis.   6.  Subcentimeter hepatic lobe lesions, could represent small cyst or hemangioma, otherwise indeterminate.   7.  Mild mediastinal adenopathy, workup and evaluation for causes of adenopathy recommended as clinically appropriate.   8.  Splenomegaly      DX-CHEST-PORTABLE (1 VIEW)   Final Result         1.  No acute cardiopulmonary disease.   2.  Atherosclerosis      CT-ABDOMEN-PELVIS WITH   Final Result      1.  Enhancing 2.5 x 2.3 cm bladder wall mass suspicious for neoplasm.   2.  Heterogeneous sclerotic change of the pelvis and proximal femurs suspicious for metastatic disease or other infiltrating marrow process.   3.  Mild cardiomegaly.   4.  Hepatosplenomegaly.   5.  Small subcentimeter hepatic hypodensities which could represent cysts or hemangiomas but are nonspecific. If indicated, these findings could be further evaluated with dedicated liver protocol CT or MRI or ultrasound on a nonemergent basis.   6.  Colonic diverticulosis without evidence of diverticulitis.   7.  Atherosclerosis.             Assessment/Plan  * Urothelial carcinoma of bladder without invasion of muscle (HCC)  Assessment & Plan  CT AP: Notable enhancing 2.5 x 2.3 cm bladder wall mass suspicious for neoplasms  s/p TURBT on 10/27 with multiple tumors noted  Pathology showing low-grade papillary urothelial carcinoma noninvasive with muscularis propria uninvolved by tumor  CT chest negative for metastasis  Bone scan without any evidence of metastasis  S/p  IR guided bone biopsy on 10/31      S/p  operating room by urology on 11/2/2022    Attempt to wean CBI  starting 11/3/2022         Hypovolemia  Assessment & Plan  Due to acute blood loss anemia    In addition to above PRBC given on 11/3/2022 have also ordered saline bolus of 500 cc x 1    Hematuria- (present on admission)  Assessment & Plan  Likely secondary to above    Iron deficiency anemia- (present on admission)  Assessment & Plan  IV iron infusion    Acute blood loss anemia- (present on admission)  Assessment & Plan    Trend HH, transfuse for Hgb<7 or hemodynamic instability        1 unit of PRBCs ordered on 11/3/2022 PRBC-fourth unit so far this admit    Obesity (BMI 30-39.9)- (present on admission)  Assessment & Plan  Diet and lifestyle modification  Body mass index is 30.54 kg/m².      Leukocytosis- (present on admission)  Assessment & Plan  Likely reactive insetting of malignancy and blood loss anemia    Dyslipidemia- (present on admission)  Assessment & Plan  Statin    COPD (chronic obstructive pulmonary disease) (HCC)- (present on admission)  Assessment & Plan  Not in acute exacerbation  PRN duonebs       VTE prophylaxis: SCDs/TEDs    I have performed a physical exam and reviewed and updated ROS and Plan today (11/3/2022). In review of yesterday's note (11/2/2022), there are no changes except as documented above.

## 2022-11-03 NOTE — ANESTHESIA POSTPROCEDURE EVALUATION
Patient: Gregg Martinez    Procedure Summary     Date: 11/02/22 Room / Location: [place] [address] / SURGERY [facility]    Anesthesia Start: 1605 Anesthesia Stop: 1722    Procedures:       CYSTOSCOPY FOR CLOT EVACUATION (Bladder)      FULGERATION OF BLEEDING (Bladder)      TURBT (TRANSURETHRAL RESECTION OF BLADDER TUMOR) (Bladder) Diagnosis: (presenting with hematuria)    Surgeons: Leonid Dempsey M.D. Responsible Provider: Clyde Jauregui M.D.    Anesthesia Type: general ASA Status: 2          Final Anesthesia Type: general  Last vitals  BP   Blood Pressure : 102/63    Temp   36.6 °C (97.9 °F)    Pulse   85   Resp   17    SpO2   100 %      Anesthesia Post Evaluation    Patient location during evaluation: PACU  Patient participation: complete - patient participated  Level of consciousness: awake and alert    Airway patency: patent  Anesthetic complications: no  Cardiovascular status: hemodynamically stable  Respiratory status: acceptable  Hydration status: euvolemic    PONV: none          There were no known notable events for this encounter.     Nurse Pain Score: 0 (NPRS)

## 2022-11-03 NOTE — CARE PLAN
The patient is Stable - Low risk of patient condition declining or worsening    Shift Goals  Clinical Goals: CBI, cmofort, hemodynamic stability  Patient Goals: comfort  Family Goals: N/A      Problem: Fluid Volume  Goal: Fluid volume balance will be maintained  Outcome: Progressing     Problem: Knowledge Deficit - Standard  Goal: Patient and family/care givers will demonstrate understanding of plan of care, disease process/condition, diagnostic tests and medications  Outcome: Progressing     Problem: Pain - Standard  Goal: Alleviation of pain or a reduction in pain to the patient’s comfort goal  Outcome: Progressing       Progress made toward(s) clinical / shift goals:  Pt CBI is producing clear output. Pt denies bladder discomfort at this time.     Patient is not progressing towards the following goals:

## 2022-11-04 PROBLEM — K59.01 SLOW TRANSIT CONSTIPATION: Status: ACTIVE | Noted: 2022-11-04

## 2022-11-04 LAB
ANION GAP SERPL CALC-SCNC: 7 MMOL/L (ref 7–16)
BARCODED ABORH UBTYP: 5100
BARCODED ABORH UBTYP: 9500
BARCODED ABORH UBTYP: 9500
BARCODED PRD CODE UBPRD: NORMAL
BARCODED UNIT NUM UBUNT: NORMAL
BUN SERPL-MCNC: 34 MG/DL (ref 8–22)
CALCIUM SERPL-MCNC: 8.1 MG/DL (ref 8.5–10.5)
CHLORIDE SERPL-SCNC: 96 MMOL/L (ref 96–112)
CO2 SERPL-SCNC: 27 MMOL/L (ref 20–33)
COMPONENT R 8504R: NORMAL
CREAT SERPL-MCNC: 0.92 MG/DL (ref 0.5–1.4)
GFR SERPLBLD CREATININE-BSD FMLA CKD-EPI: 89 ML/MIN/1.73 M 2
GLUCOSE SERPL-MCNC: 115 MG/DL (ref 65–99)
HGB BLD-MCNC: 6.9 G/DL (ref 14–18)
HGB BLD-MCNC: 8.2 G/DL (ref 14–18)
POTASSIUM SERPL-SCNC: 5.2 MMOL/L (ref 3.6–5.5)
PRODUCT TYPE UPROD: NORMAL
SODIUM SERPL-SCNC: 130 MMOL/L (ref 135–145)
UNIT STATUS USTAT: NORMAL

## 2022-11-04 PROCEDURE — 700102 HCHG RX REV CODE 250 W/ 637 OVERRIDE(OP): Performed by: HOSPITALIST

## 2022-11-04 PROCEDURE — 36415 COLL VENOUS BLD VENIPUNCTURE: CPT

## 2022-11-04 PROCEDURE — 80048 BASIC METABOLIC PNL TOTAL CA: CPT

## 2022-11-04 PROCEDURE — A9270 NON-COVERED ITEM OR SERVICE: HCPCS | Performed by: HOSPITALIST

## 2022-11-04 PROCEDURE — 700102 HCHG RX REV CODE 250 W/ 637 OVERRIDE(OP): Performed by: STUDENT IN AN ORGANIZED HEALTH CARE EDUCATION/TRAINING PROGRAM

## 2022-11-04 PROCEDURE — 51798 US URINE CAPACITY MEASURE: CPT

## 2022-11-04 PROCEDURE — 99232 SBSQ HOSP IP/OBS MODERATE 35: CPT | Performed by: HOSPITALIST

## 2022-11-04 PROCEDURE — 770006 HCHG ROOM/CARE - MED/SURG/GYN SEMI*

## 2022-11-04 PROCEDURE — P9016 RBC LEUKOCYTES REDUCED: HCPCS

## 2022-11-04 PROCEDURE — 85018 HEMOGLOBIN: CPT

## 2022-11-04 PROCEDURE — 86923 COMPATIBILITY TEST ELECTRIC: CPT

## 2022-11-04 PROCEDURE — 36430 TRANSFUSION BLD/BLD COMPNT: CPT

## 2022-11-04 PROCEDURE — A9270 NON-COVERED ITEM OR SERVICE: HCPCS | Performed by: STUDENT IN AN ORGANIZED HEALTH CARE EDUCATION/TRAINING PROGRAM

## 2022-11-04 RX ADMIN — CYANOCOBALAMIN TAB 500 MCG 1000 MCG: 500 TAB at 04:55

## 2022-11-04 RX ADMIN — DOCUSATE SODIUM 50 MG AND SENNOSIDES 8.6 MG 2 TABLET: 8.6; 5 TABLET, FILM COATED ORAL at 04:55

## 2022-11-04 RX ADMIN — GABAPENTIN 200 MG: 100 CAPSULE ORAL at 04:55

## 2022-11-04 RX ADMIN — THIAMINE HCL TAB 100 MG 100 MG: 100 TAB at 17:52

## 2022-11-04 RX ADMIN — GABAPENTIN 200 MG: 100 CAPSULE ORAL at 13:18

## 2022-11-04 RX ADMIN — THIAMINE HCL TAB 100 MG 100 MG: 100 TAB at 04:56

## 2022-11-04 RX ADMIN — Medication 2000 UNITS: at 04:56

## 2022-11-04 RX ADMIN — FOLIC ACID 1 MG: 1 TABLET ORAL at 04:55

## 2022-11-04 RX ADMIN — DOCUSATE SODIUM 50 MG AND SENNOSIDES 8.6 MG 2 TABLET: 8.6; 5 TABLET, FILM COATED ORAL at 17:52

## 2022-11-04 RX ADMIN — ALPRAZOLAM 0.25 MG: 0.25 TABLET ORAL at 09:13

## 2022-11-04 RX ADMIN — GABAPENTIN 200 MG: 100 CAPSULE ORAL at 17:52

## 2022-11-04 RX ADMIN — ATORVASTATIN CALCIUM 20 MG: 20 TABLET, FILM COATED ORAL at 17:52

## 2022-11-04 ASSESSMENT — ENCOUNTER SYMPTOMS
COUGH: 0
NERVOUS/ANXIOUS: 1
CHILLS: 0
DIZZINESS: 1
ABDOMINAL PAIN: 0
HEADACHES: 0
FEVER: 0
NAUSEA: 0
VOMITING: 0
PALPITATIONS: 0
SHORTNESS OF BREATH: 0
CONSTIPATION: 1

## 2022-11-04 ASSESSMENT — PAIN DESCRIPTION - PAIN TYPE
TYPE: ACUTE PAIN

## 2022-11-04 NOTE — PROGRESS NOTES
"Urology Progress Note    S/p TURBT 10/27/2022.  S/p cystoscopy, clot evacuation, fulguration and TURBT 11/03/2022    S: Seen and examined. CBI on high flow, urine clear in tubing, but nursing reports clot development when attempting to wean CBI. Undergoing another transfusion this am for Hb 6.9 despite transfusion yesterday.       O:   /76   Pulse 88   Temp 37.2 °C (98.9 °F) (Temporal)   Resp 17   Ht 1.727 m (5' 8\")   Wt 77.8 kg (171 lb 8.3 oz)   SpO2 97%   Recent Labs     11/02/22  0054 11/04/22  0722   SODIUM 132* 130*   POTASSIUM 5.4 5.2   CHLORIDE 94* 96   CO2 25 27   GLUCOSE 109* 115*   BUN 25* 34*   CREATININE 1.27 0.92   CALCIUM 8.7 8.1*     Recent Labs     11/02/22  0054 11/03/22  0844 11/03/22  1607 11/03/22  2216 11/04/22  0722   WBC 17.6* 22.0*  --   --   --    RBC 3.42* 2.49*  --   --   --    HEMOGLOBIN 8.1* 5.8* 6.3* 7.4* 6.9*   HEMATOCRIT 28.2* 19.2*  --   --   --    MCV 82.5 77.1*  --   --   --    MCH 23.7* 23.3*  --   --   --    MCHC 28.7* 30.2*  --   --   --    RDW 69.4* 65.2*  --   --   --    PLATELETCT 109* 111*  --   --   --          Intake/Output Summary (Last 24 hours) at 10/31/2022 1207  Last data filed at 10/31/2022 1100  Gross per 24 hour   Intake 1121.25 ml   Output 2275 ml   Net -1153.75 ml       Exam:  Gen: NAD  : no CVAT. Output via cuellar clear light pink on high flow CBI. No clots.    A/P:    Active Hospital Problems    Diagnosis     Hypovolemia [E86.1]     Acute blood loss anemia [D62]     Urothelial carcinoma of bladder without invasion of muscle (HCC) [C67.9]     Iron deficiency anemia [D50.9]     Hematuria [R31.9]     Obesity (BMI 30-39.9) [E66.9]     Leukocytosis [D72.829]     Dyslipidemia [E78.5]     COPD (chronic obstructive pulmonary disease) (HCC) [J44.9]      Procedure:  - at bedside I clamped the CBI irrigation and performed manual irrigation of catheter with ~250cc of sterile saline  - I irrigated out several small, brown clots, and upon reconnecting down drain " bag, urine was observed to be light pink and clear in tubing  - I then left the CBI irrigation clamped so that urine color can be monitored without irrigation running      Plan:  - CBI clamped this am, if urine remains light pink to clear, will perform TOV in the afternoon  - monitor for blood clots. Perform manual irrigation of catheter as necessary for clots.  - monitor H/H. Currently undergoing transfusion  - Ultimately will arrange outpatient f/u with Dr Castellanos in 2-3 weeks to discuss pathology   - urology will follow      Case discussed with nursing and with Dr Castellanos, who has directed this patient's plan of care.    Alyssa Roe P.A.-C.

## 2022-11-04 NOTE — PROGRESS NOTES
Hospital Medicine Daily Progress Note    Date of Service  11/4/2022    Chief Complaint  Gregg Martinez is a 72 y.o. male admitted 10/26/2022 with hematuria     Hospital Course  72-year-old male with past medical history of DL D and asthma presenting with hematuria.  He was admitted for acute blood loss anemia requiring 2 units of PRBC transfusion.  CT abdomen pelvis showed bladder wall mass suspicious for neoplasm.  Urology was consulted and patient for TURBT on 10/27 which showed multifocal bladder tumors which were all resected. He continued to have hematuria which improved with CBI however he did require additional blood transfusion. Pathology showed low grade urothelial carcinoma. CT chest with contrast without evidence of lung involvement. Bone scan negative for mets. Pending IR bone biopsy of possible metastatic lesions noted on CT pelvis.     Interval Problem Update    CBI still running     his hematuria still present    Bladder biopsy positive for uroepithelial carcinoma    Hemoglobin dropped to 6.9    Bp is low with sbp 98    Patient denies any dizziness this morning    Bone biopsy negative for malignancy      Patient states that he is constipated has not had a bowel movement in 4 days    I have discussed this patient's plan of care and discharge plan at IDT rounds today with Case Management, Nursing, Nursing leadership, and other members of the IDT team.    Consultants/Specialty  urology    Code Status  Full Code    Disposition  Patient is not medically cleared for discharge.   Anticipate discharge to to home with close outpatient follow-up.  I have placed the appropriate orders for post-discharge needs.    Review of Systems  Review of Systems   Constitutional:  Negative for chills and fever.   Respiratory:  Negative for cough and shortness of breath.    Cardiovascular:  Negative for chest pain and palpitations.   Gastrointestinal:  Positive for constipation. Negative for abdominal pain, nausea and vomiting.    Genitourinary:  Positive for hematuria. Negative for dysuria and urgency.   Musculoskeletal:  Positive for joint pain.   Neurological:  Positive for dizziness. Negative for headaches.   Psychiatric/Behavioral:  The patient is nervous/anxious.    All other systems reviewed and are negative.     Physical Exam  Temp:  [36.2 °C (97.2 °F)-37.1 °C (98.7 °F)] 37.1 °C (98.7 °F)  Pulse:  [82-92] 85  Resp:  [16-20] 16  BP: ()/(51-89) 118/60  SpO2:  [90 %-100 %] 96 %    Physical Exam  Vitals and nursing note reviewed.   Constitutional:       General: He is not in acute distress.     Appearance: Normal appearance.   HENT:      Head: Normocephalic.      Nose: Nose normal.      Mouth/Throat:      Mouth: Mucous membranes are moist.   Eyes:      Pupils: Pupils are equal, round, and reactive to light.   Cardiovascular:      Rate and Rhythm: Normal rate and regular rhythm.      Heart sounds: No murmur heard.  Pulmonary:      Effort: Pulmonary effort is normal. No respiratory distress.      Breath sounds: Normal breath sounds.   Abdominal:      General: Abdomen is flat. There is no distension.      Palpations: There is no mass.      Tenderness: There is no abdominal tenderness. There is no rebound.      Hernia: No hernia is present.   Genitourinary:     Comments: 3 way Arboleda connected to CBI  Musculoskeletal:         General: No swelling, tenderness, deformity or signs of injury.   Skin:     Coloration: Skin is pale. Skin is not jaundiced.      Findings: No bruising, erythema, lesion or rash.   Neurological:      General: No focal deficit present.      Mental Status: He is alert and oriented to person, place, and time.   Psychiatric:         Mood and Affect: Mood is not anxious.       Fluids    Intake/Output Summary (Last 24 hours) at 11/4/2022 0934  Last data filed at 11/4/2022 0612  Gross per 24 hour   Intake 689.58 ml   Output 7150 ml   Net -6460.42 ml         Laboratory  Recent Labs     11/02/22  0054 11/03/22  0853  11/03/22  1607 11/03/22  2216 11/04/22  0722   WBC 17.6* 22.0*  --   --   --    RBC 3.42* 2.49*  --   --   --    HEMOGLOBIN 8.1* 5.8* 6.3* 7.4* 6.9*   HEMATOCRIT 28.2* 19.2*  --   --   --    MCV 82.5 77.1*  --   --   --    MCH 23.7* 23.3*  --   --   --    MCHC 28.7* 30.2*  --   --   --    RDW 69.4* 65.2*  --   --   --    PLATELETCT 109* 111*  --   --   --        Recent Labs     11/02/22  0054 11/04/22 0722   SODIUM 132* 130*   POTASSIUM 5.4 5.2   CHLORIDE 94* 96   CO2 25 27   GLUCOSE 109* 115*   BUN 25* 34*   CREATININE 1.27 0.92   CALCIUM 8.7 8.1*                       Imaging  CT-ABDOMEN-PELVIS W/O   Final Result      1.  Interval hemorrhage within the partially decompressed urinary bladder. Previously visualized posterior left bladder mass is no longer seen presumably this is due to interval resection      2.  New mild left hydroureteronephrosis with moderate perinephric fat stranding. This is concerning for obstruction perhaps related to hematoma, recent instrumentation or pyelonephritis/ureterectasis.      3.  Increasing colonic stool volume with similar moderate diverticulosis of the colon but no evidence of diverticulitis      4.  Marked splenomegaly      5.  New small effusions with some lower lung zone groundglass suspicious for interstitial edema/mild congestive failure. There is stable mild pericardial fluid      6.  Persistent inhomogeneous medullary spaces with generalized increased density. Recent bone scan showed no focal mass but this could reflect underlying myelofibrosis or metastases with false-negative bone scan      CT-NEEDLE BX-DEEP BONE   Final Result      1.  Successful CT-guided core biopsy of sclerotic lesion in the posterior aspect of left inferior pubic ramus.      NM-BONE/JOINT SCAN WHOLE BODY   Final Result      No evidence of bony metastatic disease      CT-CHEST (THORAX) WITH   Final Result         1.  Emphysema   2.  Hazy bilateral upper lobe opacities with slight intralobular  septal thickening, could represent component of mild pulmonary edema   3.  Linear densities the bilateral lung bases favors scarring or atelectasis.   4.  Atherosclerosis and atherosclerotic coronary artery disease   5.  Hepatomegaly with irregular hepatic contour favoring changes of cirrhosis.   6.  Subcentimeter hepatic lobe lesions, could represent small cyst or hemangioma, otherwise indeterminate.   7.  Mild mediastinal adenopathy, workup and evaluation for causes of adenopathy recommended as clinically appropriate.   8.  Splenomegaly      DX-CHEST-PORTABLE (1 VIEW)   Final Result         1.  No acute cardiopulmonary disease.   2.  Atherosclerosis      CT-ABDOMEN-PELVIS WITH   Final Result      1.  Enhancing 2.5 x 2.3 cm bladder wall mass suspicious for neoplasm.   2.  Heterogeneous sclerotic change of the pelvis and proximal femurs suspicious for metastatic disease or other infiltrating marrow process.   3.  Mild cardiomegaly.   4.  Hepatosplenomegaly.   5.  Small subcentimeter hepatic hypodensities which could represent cysts or hemangiomas but are nonspecific. If indicated, these findings could be further evaluated with dedicated liver protocol CT or MRI or ultrasound on a nonemergent basis.   6.  Colonic diverticulosis without evidence of diverticulitis.   7.  Atherosclerosis.             Assessment/Plan  * Urothelial carcinoma of bladder without invasion of muscle (HCC)- (present on admission)  Assessment & Plan  CT AP: Notable enhancing 2.5 x 2.3 cm bladder wall mass suspicious for neoplasms  s/p TURBT on 10/27 with multiple tumors noted  Pathology showing low-grade papillary urothelial carcinoma noninvasive with muscularis propria uninvolved by tumor  CT chest negative for metastasis  Bone scan without any evidence of metastasis  S/p  IR guided bone biopsy on 10/31      S/p  operating room by urology on 11/2/2022    Attempt to wean CBI starting 11/3/2022         Hypovolemia  Assessment & Plan  Due to  acute blood loss anemia        Hematuria- (present on admission)  Assessment & Plan  Likely secondary to above    Iron deficiency anemia- (present on admission)  Assessment & Plan  S/p IV iron infusion repletion    Acute blood loss anemia- (present on admission)  Assessment & Plan    Trend HH, transfuse for Hgb<7 or hemodynamic instability        1 unit of PRBCs ordered on 11/4/2022 PRBC-fifth unit so far this admit    Obesity (BMI 30-39.9)- (present on admission)  Assessment & Plan  Diet and lifestyle modification  Body mass index is 30.54 kg/m².      Leukocytosis- (present on admission)  Assessment & Plan  Likely reactive insetting of malignancy and blood loss anemia    Dyslipidemia- (present on admission)  Assessment & Plan  Statin    COPD (chronic obstructive pulmonary disease) (HCC)- (present on admission)  Assessment & Plan  Not in acute exacerbation  PRN duonebs       VTE prophylaxis: SCDs/TEDs    I have performed a physical exam and reviewed and updated ROS and Plan today (11/4/2022). In review of yesterday's note (11/3/2022), there are no changes except as documented above.

## 2022-11-04 NOTE — CARE PLAN
The patient is Stable - Low risk of patient condition declining or worsening    Shift Goals  Clinical Goals: CBI,comfort, complete transfusion  Patient Goals: complete blood transfusion  Family Goals: N/A    Progress made toward(s) clinical / shift goals: Patient infusiong 1 unit of PRBC, tolerating without problems and vital signs within normal limits.    Patient is not progressing towards the following goals:

## 2022-11-04 NOTE — PROGRESS NOTES
Received patient resting in bed.  Currently has CBI with moderate amount of serousanguineous drainage into drainage bag. Patient also has blood transfusion at this time, PIV patent and intact, no s/s infiltration.  Bed in low position, wheels locked, and call light within reach.

## 2022-11-04 NOTE — PROGRESS NOTES
Received report and assumed care of patient at change of shift. Patient is A&Ox4, on 3L, and reports no pain at this time. Patient assessment completed, bed in lowest position, and call light and personal belongings are within reach. Patient expressed no further needs at this time.

## 2022-11-04 NOTE — CARE PLAN
The patient is Stable - Low risk of patient condition declining or worsening    Shift Goals  Clinical Goals: Catheter removal, void, blood transfusion  Patient Goals: go home  Family Goals: N/A        Problem: Hemodynamics  Goal: Patient's hemodynamics, fluid balance and neurologic status will be stable or improve  Outcome: Progressing     Problem: Fluid Volume  Goal: Fluid volume balance will be maintained  Outcome: Progressing     Problem: Urinary - Renal Perfusion  Goal: Ability to achieve and maintain adequate renal perfusion and functioning will improve  Outcome: Progressing     Problem: Respiratory  Goal: Patient will achieve/maintain optimum respiratory ventilation and gas exchange  Outcome: Progressing       Progress made toward(s) clinical / shift goals:  Catheter removed per order. Patient ambulated the halls and tolerated well. Milk of Magnesia given to patient.patient is currently sleeping.     Patient is not progressing towards the following goals:

## 2022-11-04 NOTE — CARE PLAN
Problem: Urinary - Renal Perfusion  Goal: Ability to achieve and maintain adequate renal perfusion and functioning will improve  11/3/2022 2059 by Gianna Davidson R.N.  Outcome: Progressing  11/3/2022 2057 by Gianna Davidson R.N.  Outcome: Progressing   The patient is Stable - Low risk of patient condition declining or worsening    Shift Goals  Clinical Goals: CBI,comfort, complete transfusion  Patient Goals: complete blood transfusion  Family Goals: N/A    Progress made toward(s) clinical / shift goals: Tolerating continuous bladder irrigation without problems.    Patient is not progressing towards the following goals:

## 2022-11-05 LAB
ANION GAP SERPL CALC-SCNC: 9 MMOL/L (ref 7–16)
BUN SERPL-MCNC: 27 MG/DL (ref 8–22)
CALCIUM SERPL-MCNC: 8.2 MG/DL (ref 8.5–10.5)
CHLORIDE SERPL-SCNC: 98 MMOL/L (ref 96–112)
CO2 SERPL-SCNC: 26 MMOL/L (ref 20–33)
CREAT SERPL-MCNC: 0.9 MG/DL (ref 0.5–1.4)
GFR SERPLBLD CREATININE-BSD FMLA CKD-EPI: 91 ML/MIN/1.73 M 2
GLUCOSE SERPL-MCNC: 110 MG/DL (ref 65–99)
HGB BLD-MCNC: 8.6 G/DL (ref 14–18)
POTASSIUM SERPL-SCNC: 5.1 MMOL/L (ref 3.6–5.5)
SODIUM SERPL-SCNC: 133 MMOL/L (ref 135–145)

## 2022-11-05 PROCEDURE — 700102 HCHG RX REV CODE 250 W/ 637 OVERRIDE(OP): Performed by: HOSPITALIST

## 2022-11-05 PROCEDURE — 700102 HCHG RX REV CODE 250 W/ 637 OVERRIDE(OP): Performed by: STUDENT IN AN ORGANIZED HEALTH CARE EDUCATION/TRAINING PROGRAM

## 2022-11-05 PROCEDURE — 80048 BASIC METABOLIC PNL TOTAL CA: CPT

## 2022-11-05 PROCEDURE — 36415 COLL VENOUS BLD VENIPUNCTURE: CPT

## 2022-11-05 PROCEDURE — 85018 HEMOGLOBIN: CPT

## 2022-11-05 PROCEDURE — 99232 SBSQ HOSP IP/OBS MODERATE 35: CPT | Performed by: HOSPITALIST

## 2022-11-05 PROCEDURE — A9270 NON-COVERED ITEM OR SERVICE: HCPCS | Performed by: HOSPITALIST

## 2022-11-05 PROCEDURE — 770006 HCHG ROOM/CARE - MED/SURG/GYN SEMI*

## 2022-11-05 PROCEDURE — A9270 NON-COVERED ITEM OR SERVICE: HCPCS | Performed by: STUDENT IN AN ORGANIZED HEALTH CARE EDUCATION/TRAINING PROGRAM

## 2022-11-05 RX ADMIN — GABAPENTIN 200 MG: 100 CAPSULE ORAL at 17:23

## 2022-11-05 RX ADMIN — FOLIC ACID 1 MG: 1 TABLET ORAL at 04:27

## 2022-11-05 RX ADMIN — Medication 2000 UNITS: at 04:27

## 2022-11-05 RX ADMIN — THIAMINE HCL TAB 100 MG 100 MG: 100 TAB at 17:23

## 2022-11-05 RX ADMIN — DOCUSATE SODIUM 50 MG AND SENNOSIDES 8.6 MG 2 TABLET: 8.6; 5 TABLET, FILM COATED ORAL at 17:22

## 2022-11-05 RX ADMIN — ALPRAZOLAM 0.25 MG: 0.25 TABLET ORAL at 17:23

## 2022-11-05 RX ADMIN — THIAMINE HCL TAB 100 MG 100 MG: 100 TAB at 04:27

## 2022-11-05 RX ADMIN — OXYCODONE HYDROCHLORIDE 15 MG: 10 TABLET ORAL at 19:38

## 2022-11-05 RX ADMIN — GABAPENTIN 200 MG: 100 CAPSULE ORAL at 13:05

## 2022-11-05 RX ADMIN — GABAPENTIN 200 MG: 100 CAPSULE ORAL at 04:27

## 2022-11-05 RX ADMIN — ATORVASTATIN CALCIUM 20 MG: 20 TABLET, FILM COATED ORAL at 17:23

## 2022-11-05 RX ADMIN — DOCUSATE SODIUM 50 MG AND SENNOSIDES 8.6 MG 2 TABLET: 8.6; 5 TABLET, FILM COATED ORAL at 04:27

## 2022-11-05 RX ADMIN — CYANOCOBALAMIN TAB 500 MCG 1000 MCG: 500 TAB at 04:26

## 2022-11-05 ASSESSMENT — PAIN DESCRIPTION - PAIN TYPE
TYPE: ACUTE PAIN
TYPE: CHRONIC PAIN
TYPE: ACUTE PAIN
TYPE: ACUTE PAIN
TYPE: CHRONIC PAIN

## 2022-11-05 ASSESSMENT — ENCOUNTER SYMPTOMS
VOMITING: 0
DIZZINESS: 1
ABDOMINAL PAIN: 0
FEVER: 0
SHORTNESS OF BREATH: 0
COUGH: 0
HEADACHES: 0
PALPITATIONS: 0
NAUSEA: 0
NERVOUS/ANXIOUS: 1
CHILLS: 0
CONSTIPATION: 1

## 2022-11-05 NOTE — CARE PLAN
Problem: Hemodynamics  Goal: Patient's hemodynamics, fluid balance and neurologic status will be stable or improve  11/4/2022 2121 by Gianna Davidson R.N.  Outcome: Progressing  11/4/2022 2008 by Gianna Davidson R.N.  Outcome: Progressing   The patient is Stable - Low risk of patient condition declining or worsening    Shift Goals  Clinical Goals: void after catheter removal  Patient Goals: go home  Family Goals: N/A    Progress made toward(s) clinical / shift goals: Received 1 unit pRBC and vital sign stable.    Patient is not progressing towards the following goals:

## 2022-11-05 NOTE — CARE PLAN
Problem: Urinary - Renal Perfusion  Goal: Ability to achieve and maintain adequate renal perfusion and functioning will improve  Outcome: Progressing   The patient is Stable - Low risk of patient condition declining or worsening    Shift Goals  Clinical Goals: void after catheter removal  Patient Goals: go home  Family Goals: N/A    Progress made toward(s) clinical / shift goals: Voided post  catheter removal.    Patient is not progressing towards the following goals:

## 2022-11-05 NOTE — PROGRESS NOTES
Hospital Medicine Daily Progress Note    Date of Service  11/5/2022    Chief Complaint  Gregg Martinez is a 72 y.o. male admitted 10/26/2022 with hematuria     Hospital Course  72-year-old male with past medical history of DL D and asthma presenting with hematuria.  He was admitted for acute blood loss anemia requiring 2 units of PRBC transfusion.  CT abdomen pelvis showed bladder wall mass suspicious for neoplasm.  Urology was consulted and patient for TURBT on 10/27 which showed multifocal bladder tumors which were all resected. He continued to have hematuria which improved with CBI however he did require additional blood transfusion. Pathology showed low grade urothelial carcinoma. CT chest with contrast without evidence of lung involvement. Bone scan negative for mets. Pending IR bone biopsy of possible metastatic lesions noted on CT pelvis.     Interval Problem Update    Patient is on oxygen at 3 L satting at 95%    CBI has been stopped and the Arboleda is out    Patient has been urinating    Patient's hemoglobin is 8.6    Patient denies any new complaints    Case discussed with urology SHAREE Aragon-recommended checking a postvoid residual    Patient is aware that we are weaning his oxygen off.  Her monitored patient stable in for to confirm stability.  We are monitoring for postvoid residual to make sure patient does not develop urinary retention    Plan for DC in a.m. if stable    I have discussed this patient's plan of care and discharge plan at IDT rounds today with Case Management, Nursing, Nursing leadership, and other members of the IDT team.    Consultants/Specialty  urology    Code Status  Full Code    Disposition  Patient is not medically cleared for discharge.   Anticipate discharge to to home with close outpatient follow-up.  I have placed the appropriate orders for post-discharge needs.    Review of Systems  Review of Systems   Constitutional:  Negative for chills and fever.   Respiratory:  Negative  for cough and shortness of breath.    Cardiovascular:  Negative for chest pain and palpitations.   Gastrointestinal:  Positive for constipation. Negative for abdominal pain, nausea and vomiting.   Genitourinary:  Positive for hematuria. Negative for dysuria and urgency.   Musculoskeletal:  Positive for joint pain.   Neurological:  Positive for dizziness. Negative for headaches.   Psychiatric/Behavioral:  The patient is nervous/anxious.    All other systems reviewed and are negative.     Physical Exam  Temp:  [36.6 °C (97.8 °F)-37.3 °C (99.1 °F)] 36.6 °C (97.8 °F)  Pulse:  [65-92] 81  Resp:  [18-20] 18  BP: ()/(50-69) 111/69  SpO2:  [90 %-100 %] 90 %    Physical Exam  Vitals and nursing note reviewed.   Constitutional:       General: He is not in acute distress.     Appearance: Normal appearance.   HENT:      Head: Normocephalic.      Nose: Nose normal.      Mouth/Throat:      Mouth: Mucous membranes are moist.   Eyes:      Pupils: Pupils are equal, round, and reactive to light.   Cardiovascular:      Rate and Rhythm: Normal rate and regular rhythm.      Heart sounds: No murmur heard.  Pulmonary:      Effort: Pulmonary effort is normal. No respiratory distress.      Breath sounds: Normal breath sounds.   Abdominal:      General: Abdomen is flat. There is no distension.      Palpations: There is no mass.      Tenderness: There is no abdominal tenderness. There is no rebound.      Hernia: No hernia is present.   Musculoskeletal:         General: No swelling, tenderness, deformity or signs of injury.   Skin:     Coloration: Skin is pale. Skin is not jaundiced.      Findings: No bruising, erythema, lesion or rash.   Neurological:      General: No focal deficit present.      Mental Status: He is alert and oriented to person, place, and time.   Psychiatric:         Mood and Affect: Mood is not anxious.       Fluids    Intake/Output Summary (Last 24 hours) at 11/5/2022 1236  Last data filed at 11/5/2022 1000  Gross  per 24 hour   Intake 1577.08 ml   Output 600 ml   Net 977.08 ml         Laboratory  Recent Labs     11/03/22  0844 11/03/22  1607 11/04/22  0722 11/04/22  2046 11/05/22  0835   WBC 22.0*  --   --   --   --    RBC 2.49*  --   --   --   --    HEMOGLOBIN 5.8*   < > 6.9* 8.2* 8.6*   HEMATOCRIT 19.2*  --   --   --   --    MCV 77.1*  --   --   --   --    MCH 23.3*  --   --   --   --    MCHC 30.2*  --   --   --   --    RDW 65.2*  --   --   --   --    PLATELETCT 111*  --   --   --   --     < > = values in this interval not displayed.       Recent Labs     11/04/22  0722 11/05/22  0049   SODIUM 130* 133*   POTASSIUM 5.2 5.1   CHLORIDE 96 98   CO2 27 26   GLUCOSE 115* 110*   BUN 34* 27*   CREATININE 0.92 0.90   CALCIUM 8.1* 8.2*                       Imaging  CT-ABDOMEN-PELVIS W/O   Final Result      1.  Interval hemorrhage within the partially decompressed urinary bladder. Previously visualized posterior left bladder mass is no longer seen presumably this is due to interval resection      2.  New mild left hydroureteronephrosis with moderate perinephric fat stranding. This is concerning for obstruction perhaps related to hematoma, recent instrumentation or pyelonephritis/ureterectasis.      3.  Increasing colonic stool volume with similar moderate diverticulosis of the colon but no evidence of diverticulitis      4.  Marked splenomegaly      5.  New small effusions with some lower lung zone groundglass suspicious for interstitial edema/mild congestive failure. There is stable mild pericardial fluid      6.  Persistent inhomogeneous medullary spaces with generalized increased density. Recent bone scan showed no focal mass but this could reflect underlying myelofibrosis or metastases with false-negative bone scan      CT-NEEDLE BX-DEEP BONE   Final Result      1.  Successful CT-guided core biopsy of sclerotic lesion in the posterior aspect of left inferior pubic ramus.      NM-BONE/JOINT SCAN WHOLE BODY   Final Result      No  evidence of bony metastatic disease      CT-CHEST (THORAX) WITH   Final Result         1.  Emphysema   2.  Hazy bilateral upper lobe opacities with slight intralobular septal thickening, could represent component of mild pulmonary edema   3.  Linear densities the bilateral lung bases favors scarring or atelectasis.   4.  Atherosclerosis and atherosclerotic coronary artery disease   5.  Hepatomegaly with irregular hepatic contour favoring changes of cirrhosis.   6.  Subcentimeter hepatic lobe lesions, could represent small cyst or hemangioma, otherwise indeterminate.   7.  Mild mediastinal adenopathy, workup and evaluation for causes of adenopathy recommended as clinically appropriate.   8.  Splenomegaly      DX-CHEST-PORTABLE (1 VIEW)   Final Result         1.  No acute cardiopulmonary disease.   2.  Atherosclerosis      CT-ABDOMEN-PELVIS WITH   Final Result      1.  Enhancing 2.5 x 2.3 cm bladder wall mass suspicious for neoplasm.   2.  Heterogeneous sclerotic change of the pelvis and proximal femurs suspicious for metastatic disease or other infiltrating marrow process.   3.  Mild cardiomegaly.   4.  Hepatosplenomegaly.   5.  Small subcentimeter hepatic hypodensities which could represent cysts or hemangiomas but are nonspecific. If indicated, these findings could be further evaluated with dedicated liver protocol CT or MRI or ultrasound on a nonemergent basis.   6.  Colonic diverticulosis without evidence of diverticulitis.   7.  Atherosclerosis.             Assessment/Plan  * Urothelial carcinoma of bladder without invasion of muscle (HCC)- (present on admission)  Assessment & Plan  CT AP: Notable enhancing 2.5 x 2.3 cm bladder wall mass suspicious for neoplasms  s/p TURBT on 10/27 with multiple tumors noted  Pathology showing low-grade papillary urothelial carcinoma noninvasive with muscularis propria uninvolved by tumor  CT chest negative for metastasis  Bone scan without any evidence of metastasis  S/p  IR  guided bone biopsy on 10/31      S/p  operating room by urology on 11/2/2022    Attempt to wean CBI starting 11/3/2022       11/5/2022-CBI is out.  Continue monitor PVR      Slow transit constipation- (present on admission)  Assessment & Plan  Aggressive bowel protocol    Dulcolax suppository    Hypovolemia  Assessment & Plan  Due to acute blood loss anemia        Hematuria- (present on admission)  Assessment & Plan  Likely secondary to above    Iron deficiency anemia- (present on admission)  Assessment & Plan  S/p IV iron infusion repletion    Acute blood loss anemia- (present on admission)  Assessment & Plan    Trend HH, transfuse for Hgb<7 or hemodynamic instability        1 unit of PRBCs ordered on 11/4/2022 PRBC-fifth unit so far this admit    Obesity (BMI 30-39.9)- (present on admission)  Assessment & Plan  Diet and lifestyle modification  Body mass index is 30.54 kg/m².      Leukocytosis- (present on admission)  Assessment & Plan  Likely reactive insetting of malignancy and blood loss anemia    Dyslipidemia- (present on admission)  Assessment & Plan  Statin    COPD (chronic obstructive pulmonary disease) (HCC)- (present on admission)  Assessment & Plan  Not in acute exacerbation  PRN duonebs       VTE prophylaxis: SCDs/TEDs    I have performed a physical exam and reviewed and updated ROS and Plan today (11/5/2022). In review of yesterday's note (11/4/2022), there are no changes except as documented above.

## 2022-11-05 NOTE — PROGRESS NOTES
Received patient resting in bed having dinner.  Minimal discomfort to abdomen at this time.  Has voided post catheter removal.  PIV to LAC, dressing intact and IV patent.  Bed in low position, wheels locked, side rails up x2.

## 2022-11-05 NOTE — CARE PLAN
The patient is Stable - Low risk of patient condition declining or worsening    Shift Goals  Clinical Goals: discharge  Patient Goals: discharge  Family Goals: N/A    Progress made toward(s) clinical / shift goals:  Patient working with IS. RA 90%    Patient is not progressing towards the following goals:

## 2022-11-05 NOTE — CARE PLAN
The patient is Stable - Low risk of patient condition declining or worsening    Shift Goals  Clinical Goals: void after catheter removal  Patient Goals: go home  Family Goals: N/A    Progress made toward(s) clinical / shift goals:  Catheter removed and voiding without problems    Patient is not progressing towards the following goals:

## 2022-11-05 NOTE — PROGRESS NOTES
"Urology Progress Note    S/p TURBT 10/27/2022.  S/p cystoscopy, clot evacuation, fulguration and TURBT 11/03/2022    S: Seen and examined. Arboleda has been removed. Reports he is still voiding well with a pink output.  No blood clots reported.        O:   /69   Pulse 81   Temp 36.6 °C (97.8 °F) (Temporal)   Resp 18   Ht 1.727 m (5' 8\")   Wt 77.8 kg (171 lb 8.3 oz)   SpO2 95%   Recent Labs     11/04/22  0722 11/05/22  0049   SODIUM 130* 133*   POTASSIUM 5.2 5.1   CHLORIDE 96 98   CO2 27 26   GLUCOSE 115* 110*   BUN 34* 27*   CREATININE 0.92 0.90   CALCIUM 8.1* 8.2*     Recent Labs     11/03/22  0844 11/03/22  1607 11/03/22  2216 11/04/22  0722 11/04/22  2046   WBC 22.0*  --   --   --   --    RBC 2.49*  --   --   --   --    HEMOGLOBIN 5.8*   < > 7.4* 6.9* 8.2*   HEMATOCRIT 19.2*  --   --   --   --    MCV 77.1*  --   --   --   --    MCH 23.3*  --   --   --   --    MCHC 30.2*  --   --   --   --    RDW 65.2*  --   --   --   --    PLATELETCT 111*  --   --   --   --     < > = values in this interval not displayed.         Intake/Output Summary (Last 24 hours) at 10/31/2022 1207  Last data filed at 10/31/2022 1100  Gross per 24 hour   Intake 1121.25 ml   Output 2275 ml   Net -1153.75 ml       Exam:  Gen: NAD  : no CVAT.    A/P:    Active Hospital Problems    Diagnosis     Slow transit constipation [K59.01]     Hypovolemia [E86.1]     Acute blood loss anemia [D62]     Urothelial carcinoma of bladder without invasion of muscle (HCC) [C67.9]     Iron deficiency anemia [D50.9]     Hematuria [R31.9]     Obesity (BMI 30-39.9) [E66.9]     Leukocytosis [D72.829]     Dyslipidemia [E78.5]     COPD (chronic obstructive pulmonary disease) (HCC) [J44.9]      Procedure:  - at bedside I clamped the CBI irrigation and performed manual irrigation of catheter with ~250cc of sterile saline  - I irrigated out several small, brown clots, and upon reconnecting down drain bag, urine was observed to be light pink and clear in tubing  - " I then left the CBI irrigation clamped so that urine color can be monitored without irrigation running      Plan:  - continue PVR checks to ensure he is voiding well  - monitor for blood clots.  - monitor H/H.   - Ultimately will arrange outpatient f/u with Dr Castellanos in 2-3 weeks to discuss pathology   - urology will follow      Naldo Gilbert PA-C

## 2022-11-06 LAB
ANION GAP SERPL CALC-SCNC: 10 MMOL/L (ref 7–16)
BUN SERPL-MCNC: 18 MG/DL (ref 8–22)
CALCIUM SERPL-MCNC: 8.4 MG/DL (ref 8.5–10.5)
CHLORIDE SERPL-SCNC: 99 MMOL/L (ref 96–112)
CO2 SERPL-SCNC: 26 MMOL/L (ref 20–33)
CREAT SERPL-MCNC: 0.65 MG/DL (ref 0.5–1.4)
ERYTHROCYTE [DISTWIDTH] IN BLOOD BY AUTOMATED COUNT: 62.6 FL (ref 35.9–50)
GFR SERPLBLD CREATININE-BSD FMLA CKD-EPI: 101 ML/MIN/1.73 M 2
GLUCOSE SERPL-MCNC: 113 MG/DL (ref 65–99)
HCT VFR BLD AUTO: 24.2 % (ref 42–52)
HGB BLD-MCNC: 7.6 G/DL (ref 14–18)
MCH RBC QN AUTO: 25.9 PG (ref 27–33)
MCHC RBC AUTO-ENTMCNC: 31.4 G/DL (ref 33.7–35.3)
MCV RBC AUTO: 82.6 FL (ref 81.4–97.8)
PLATELET # BLD AUTO: 76 K/UL (ref 164–446)
POTASSIUM SERPL-SCNC: 4.6 MMOL/L (ref 3.6–5.5)
RBC # BLD AUTO: 2.93 M/UL (ref 4.7–6.1)
SODIUM SERPL-SCNC: 135 MMOL/L (ref 135–145)
WBC # BLD AUTO: 11.1 K/UL (ref 4.8–10.8)

## 2022-11-06 PROCEDURE — 36415 COLL VENOUS BLD VENIPUNCTURE: CPT

## 2022-11-06 PROCEDURE — A9270 NON-COVERED ITEM OR SERVICE: HCPCS | Performed by: HOSPITALIST

## 2022-11-06 PROCEDURE — 770006 HCHG ROOM/CARE - MED/SURG/GYN SEMI*

## 2022-11-06 PROCEDURE — A9270 NON-COVERED ITEM OR SERVICE: HCPCS | Performed by: STUDENT IN AN ORGANIZED HEALTH CARE EDUCATION/TRAINING PROGRAM

## 2022-11-06 PROCEDURE — 700105 HCHG RX REV CODE 258: Performed by: HOSPITALIST

## 2022-11-06 PROCEDURE — 85027 COMPLETE CBC AUTOMATED: CPT

## 2022-11-06 PROCEDURE — 700102 HCHG RX REV CODE 250 W/ 637 OVERRIDE(OP): Performed by: HOSPITALIST

## 2022-11-06 PROCEDURE — 80048 BASIC METABOLIC PNL TOTAL CA: CPT

## 2022-11-06 PROCEDURE — 700102 HCHG RX REV CODE 250 W/ 637 OVERRIDE(OP): Performed by: STUDENT IN AN ORGANIZED HEALTH CARE EDUCATION/TRAINING PROGRAM

## 2022-11-06 PROCEDURE — 99232 SBSQ HOSP IP/OBS MODERATE 35: CPT | Performed by: HOSPITALIST

## 2022-11-06 RX ORDER — SODIUM CHLORIDE 9 MG/ML
INJECTION, SOLUTION INTRAVENOUS CONTINUOUS
Status: DISCONTINUED | OUTPATIENT
Start: 2022-11-06 | End: 2022-11-06

## 2022-11-06 RX ADMIN — GABAPENTIN 200 MG: 100 CAPSULE ORAL at 12:00

## 2022-11-06 RX ADMIN — ALPRAZOLAM 0.25 MG: 0.25 TABLET ORAL at 23:14

## 2022-11-06 RX ADMIN — OXYCODONE HYDROCHLORIDE 10 MG: 10 TABLET ORAL at 01:08

## 2022-11-06 RX ADMIN — GABAPENTIN 200 MG: 100 CAPSULE ORAL at 04:03

## 2022-11-06 RX ADMIN — THIAMINE HCL TAB 100 MG 100 MG: 100 TAB at 17:45

## 2022-11-06 RX ADMIN — GABAPENTIN 200 MG: 100 CAPSULE ORAL at 17:45

## 2022-11-06 RX ADMIN — DOCUSATE SODIUM 50 MG AND SENNOSIDES 8.6 MG 2 TABLET: 8.6; 5 TABLET, FILM COATED ORAL at 17:44

## 2022-11-06 RX ADMIN — Medication 2000 UNITS: at 04:03

## 2022-11-06 RX ADMIN — ALPRAZOLAM 0.25 MG: 0.25 TABLET ORAL at 01:08

## 2022-11-06 RX ADMIN — CYANOCOBALAMIN TAB 500 MCG 1000 MCG: 500 TAB at 04:03

## 2022-11-06 RX ADMIN — SODIUM CHLORIDE: 9 INJECTION, SOLUTION INTRAVENOUS at 08:18

## 2022-11-06 RX ADMIN — DOCUSATE SODIUM 50 MG AND SENNOSIDES 8.6 MG 2 TABLET: 8.6; 5 TABLET, FILM COATED ORAL at 04:04

## 2022-11-06 RX ADMIN — ATORVASTATIN CALCIUM 20 MG: 20 TABLET, FILM COATED ORAL at 17:45

## 2022-11-06 RX ADMIN — OXYCODONE HYDROCHLORIDE 15 MG: 10 TABLET ORAL at 23:14

## 2022-11-06 RX ADMIN — ALPRAZOLAM 0.25 MG: 0.25 TABLET ORAL at 13:53

## 2022-11-06 RX ADMIN — FOLIC ACID 1 MG: 1 TABLET ORAL at 04:03

## 2022-11-06 RX ADMIN — THIAMINE HCL TAB 100 MG 100 MG: 100 TAB at 04:03

## 2022-11-06 ASSESSMENT — ENCOUNTER SYMPTOMS
HEADACHES: 0
VOMITING: 0
COUGH: 0
DIZZINESS: 1
NAUSEA: 0
PALPITATIONS: 0
CONSTIPATION: 1
NERVOUS/ANXIOUS: 1
ABDOMINAL PAIN: 0
CHILLS: 0
FEVER: 0
SHORTNESS OF BREATH: 0

## 2022-11-06 ASSESSMENT — PAIN DESCRIPTION - PAIN TYPE
TYPE: CHRONIC PAIN

## 2022-11-06 NOTE — PROGRESS NOTES
"Urology Progress Note    S/p TURBT 10/27/2022.  S/p cystoscopy, clot evacuation, fulguration and TURBT 11/03/2022    S: Seen and examined. Still with hematuria. Reports blood clots this morning, but now voiding red urine without clots. Denies straining. H/H today 7.6/24.2      O:   /65   Pulse 83   Temp 36.8 °C (98.3 °F) (Temporal)   Resp 15   Ht 1.727 m (5' 8\")   Wt 77.8 kg (171 lb 8.3 oz)   SpO2 91%   Recent Labs     11/04/22  0722 11/05/22  0049 11/06/22  0104   SODIUM 130* 133* 135   POTASSIUM 5.2 5.1 4.6   CHLORIDE 96 98 99   CO2 27 26 26   GLUCOSE 115* 110* 113*   BUN 34* 27* 18   CREATININE 0.92 0.90 0.65   CALCIUM 8.1* 8.2* 8.4*     Recent Labs     11/04/22 2046 11/05/22  0835 11/06/22  0104   WBC  --   --  11.1*   RBC  --   --  2.93*   HEMOGLOBIN 8.2* 8.6* 7.6*   HEMATOCRIT  --   --  24.2*   MCV  --   --  82.6   MCH  --   --  25.9*   MCHC  --   --  31.4*   RDW  --   --  62.6*   PLATELETCT  --   --  76*         Intake/Output Summary (Last 24 hours) at 10/31/2022 1207  Last data filed at 10/31/2022 1100  Gross per 24 hour   Intake 1121.25 ml   Output 2275 ml   Net -1153.75 ml       Exam:  Gen: NAD   Abd: soft, nondistended, no TTP  : no CVAT.    A/P:    Active Hospital Problems    Diagnosis     Slow transit constipation [K59.01]     Hypovolemia [E86.1]     Acute blood loss anemia [D62]     Urothelial carcinoma of bladder without invasion of muscle (HCC) [C67.9]     Iron deficiency anemia [D50.9]     Hematuria [R31.9]     Obesity (BMI 30-39.9) [E66.9]     Leukocytosis [D72.829]     Dyslipidemia [E78.5]     COPD (chronic obstructive pulmonary disease) (HCC) [J44.9]      Procedure:  - at bedside I clamped the CBI irrigation and performed manual irrigation of catheter with ~250cc of sterile saline  - I irrigated out several small, brown clots, and upon reconnecting down drain bag, urine was observed to be light pink and clear in tubing  - I then left the CBI irrigation clamped so that urine color " can be monitored without irrigation running      Plan:  - continue PVR checks to ensure he is voiding well  - monitor for blood clots.  - Case discussed with Dr. Trevizo.  Recommend to continue to trend H/H. If unable to void and worsening hematuria, ok to place a 22fr 3 way catheter and restart CBI.  No plan for surgical intervention today, but we will make NPO at midnight and eval the potential for surgical intervention in the A.M.  - urology will follow      Naldo Gilbert PA-C

## 2022-11-06 NOTE — CARE PLAN
The patient is Stable - Low risk of patient condition declining or worsening    Shift Goals  Clinical Goals: Monitor Hemaglobin in AM  Patient Goals: comfort and rest       Problem: Fluid Volume  Goal: Fluid volume balance will be maintained  Outcome: Progressing     Problem: Respiratory  Goal: Patient will achieve/maintain optimum respiratory ventilation and gas exchange  Outcome: Progressing     Problem: Pain - Standard  Goal: Alleviation of pain or a reduction in pain to the patient’s comfort goal  Outcome: Progressing     Problem: Fall Risk  Goal: Patient will remain free from falls  Outcome: Progressing     Problem: Fluid Volume  Goal: Fluid volume balance will be maintained  Outcome: Progressing     Problem: Respiratory  Goal: Patient will achieve/maintain optimum respiratory ventilation and gas exchange  Outcome: Progressing     Problem: Pain - Standard  Goal: Alleviation of pain or a reduction in pain to the patient’s comfort goal  Outcome: Progressing     Problem: Fall Risk  Goal: Patient will remain free from falls  Outcome: Progressing     Progress made toward(s) clinical / shift goals:  Pt verbalizes understanding of plan of care and DC plan for tomorrow.

## 2022-11-06 NOTE — CARE PLAN
The patient is Stable - Low risk of patient condition declining or worsening    Shift Goals  Clinical Goals: rest  Patient Goals: rest  Family Goals: N/A    Progress made toward(s) clinical / shift goals:  Patient resting     Patient is not progressing towards the following goals:

## 2022-11-06 NOTE — PROGRESS NOTES
[facility] Medicine Daily Progress Note    Date of Service  11/6/2022    Chief Complaint  Gregg Martinez is a 72 y.o. male admitted 10/26/2022 with hematuria     Hospital Course  72-year-old male with past medical history of DL D and asthma presenting with hematuria.  He was admitted for acute blood loss anemia requiring 2 units of PRBC transfusion.  CT abdomen pelvis showed bladder wall mass suspicious for neoplasm.  Urology was consulted and patient for TURBT on 10/27 which showed multifocal bladder tumors which were all resected. He continued to have hematuria which improved with CBI however he did require additional blood transfusion. Pathology showed low grade urothelial carcinoma. CT chest with contrast without evidence of lung involvement. Bone scan negative for mets. Pending IR bone biopsy of possible metastatic lesions noted on CT pelvis.     Interval Problem Update    Patient is on room air, sat of 91%    Unfortunately last night patient has had some recurrence of his hematuria.    CBI/Arboleda is out    Patient states that he is still able to urinate despite the clots in his urine    Patient hemoglobin dropped by 1 g to 7.6    I discussed case with irlanda TOLLIVER of urology    Patient is made n.p.o. until seen by urology    I have discussed this patient's plan of care and discharge plan at IDT rounds today with Case Management, Nursing, Nursing leadership, and other members of the IDT team.    Consultants/Specialty  urology    Code Status  Full Code    Disposition  Patient is not medically cleared for discharge.   Anticipate discharge to to home with close outpatient follow-up.  I have placed the appropriate orders for post-discharge needs.    Review of Systems  Review of Systems   Constitutional:  Negative for chills and fever.   Respiratory:  Negative for cough and shortness of breath.    Cardiovascular:  Negative for chest pain and palpitations.   Gastrointestinal:  Positive for constipation. Negative for  abdominal pain, nausea and vomiting.   Genitourinary:  Positive for hematuria. Negative for dysuria and urgency.   Musculoskeletal:  Positive for joint pain.   Neurological:  Positive for dizziness. Negative for headaches.   Psychiatric/Behavioral:  The patient is nervous/anxious.    All other systems reviewed and are negative.     Physical Exam  Temp:  [36.8 °C (98.3 °F)-37.3 °C (99.2 °F)] 36.8 °C (98.3 °F)  Pulse:  [80-87] 83  Resp:  [15-20] 15  BP: (106-109)/(65-83) 109/65  SpO2:  [90 %-97 %] 91 %    Physical Exam  Vitals and nursing note reviewed.   Constitutional:       General: He is not in acute distress.     Appearance: Normal appearance.   HENT:      Head: Normocephalic.      Nose: Nose normal.      Mouth/Throat:      Mouth: Mucous membranes are moist.   Eyes:      Pupils: Pupils are equal, round, and reactive to light.   Cardiovascular:      Rate and Rhythm: Normal rate and regular rhythm.      Heart sounds: No murmur heard.  Pulmonary:      Effort: Pulmonary effort is normal. No respiratory distress.      Breath sounds: Normal breath sounds.   Abdominal:      General: Abdomen is flat. There is no distension.      Palpations: There is no mass.      Tenderness: There is no abdominal tenderness. There is no rebound.      Hernia: No hernia is present.   Musculoskeletal:         General: No swelling, tenderness, deformity or signs of injury.   Skin:     Coloration: Skin is pale. Skin is not jaundiced.      Findings: No bruising, erythema, lesion or rash.   Neurological:      General: No focal deficit present.      Mental Status: He is alert and oriented to person, place, and time.   Psychiatric:         Mood and Affect: Mood is not anxious.       Fluids    Intake/Output Summary (Last 24 hours) at 11/6/2022 0930  Last data filed at 11/6/2022 0352  Gross per 24 hour   Intake 1320 ml   Output --   Net 1320 ml         Laboratory  Recent Labs     11/04/22  2046 11/05/22  0835 11/06/22  0104   WBC  --   --  11.1*    RBC  --   --  2.93*   HEMOGLOBIN 8.2* 8.6* 7.6*   HEMATOCRIT  --   --  24.2*   MCV  --   --  82.6   MCH  --   --  25.9*   MCHC  --   --  31.4*   RDW  --   --  62.6*   PLATELETCT  --   --  76*       Recent Labs     11/04/22  0722 11/05/22  0049 11/06/22  0104   SODIUM 130* 133* 135   POTASSIUM 5.2 5.1 4.6   CHLORIDE 96 98 99   CO2 27 26 26   GLUCOSE 115* 110* 113*   BUN 34* 27* 18   CREATININE 0.92 0.90 0.65   CALCIUM 8.1* 8.2* 8.4*                       Imaging  CT-ABDOMEN-PELVIS W/O   Final Result      1.  Interval hemorrhage within the partially decompressed urinary bladder. Previously visualized posterior left bladder mass is no longer seen presumably this is due to interval resection      2.  New mild left hydroureteronephrosis with moderate perinephric fat stranding. This is concerning for obstruction perhaps related to hematoma, recent instrumentation or pyelonephritis/ureterectasis.      3.  Increasing colonic stool volume with similar moderate diverticulosis of the colon but no evidence of diverticulitis      4.  Marked splenomegaly      5.  New small effusions with some lower lung zone groundglass suspicious for interstitial edema/mild congestive failure. There is stable mild pericardial fluid      6.  Persistent inhomogeneous medullary spaces with generalized increased density. Recent bone scan showed no focal mass but this could reflect underlying myelofibrosis or metastases with false-negative bone scan      CT-NEEDLE BX-DEEP BONE   Final Result      1.  Successful CT-guided core biopsy of sclerotic lesion in the posterior aspect of left inferior pubic ramus.      NM-BONE/JOINT SCAN WHOLE BODY   Final Result      No evidence of bony metastatic disease      CT-CHEST (THORAX) WITH   Final Result         1.  Emphysema   2.  Hazy bilateral upper lobe opacities with slight intralobular septal thickening, could represent component of mild pulmonary edema   3.  Linear densities the bilateral lung bases favors  scarring or atelectasis.   4.  Atherosclerosis and atherosclerotic coronary artery disease   5.  Hepatomegaly with irregular hepatic contour favoring changes of cirrhosis.   6.  Subcentimeter hepatic lobe lesions, could represent small cyst or hemangioma, otherwise indeterminate.   7.  Mild mediastinal adenopathy, workup and evaluation for causes of adenopathy recommended as clinically appropriate.   8.  Splenomegaly      DX-CHEST-PORTABLE (1 VIEW)   Final Result         1.  No acute cardiopulmonary disease.   2.  Atherosclerosis      CT-ABDOMEN-PELVIS WITH   Final Result      1.  Enhancing 2.5 x 2.3 cm bladder wall mass suspicious for neoplasm.   2.  Heterogeneous sclerotic change of the pelvis and proximal femurs suspicious for metastatic disease or other infiltrating marrow process.   3.  Mild cardiomegaly.   4.  Hepatosplenomegaly.   5.  Small subcentimeter hepatic hypodensities which could represent cysts or hemangiomas but are nonspecific. If indicated, these findings could be further evaluated with dedicated liver protocol CT or MRI or ultrasound on a nonemergent basis.   6.  Colonic diverticulosis without evidence of diverticulitis.   7.  Atherosclerosis.             Assessment/Plan  * Urothelial carcinoma of bladder without invasion of muscle (HCC)- (present on admission)  Assessment & Plan  CT AP: Notable enhancing 2.5 x 2.3 cm bladder wall mass suspicious for neoplasms  s/p TURBT on 10/27 with multiple tumors noted  Pathology showing low-grade papillary urothelial carcinoma noninvasive with muscularis propria uninvolved by tumor  CT chest negative for metastasis  Bone scan without any evidence of metastasis  S/p  IR guided bone biopsy on 10/31      S/p  operating room by urology on 11/2/2022    Attempt to wean CBI starting 11/3/2022       11/5/2022-CBI is out.  Continue monitor PVR    11/6/22 with hemoglobin drop of 1 g and recurrence of hematuria.  We will keep patient n.p.o. pending urology follow-up  this morning      Slow transit constipation- (present on admission)  Assessment & Plan  Aggressive bowel protocol    Dulcolax suppository    Hypovolemia  Assessment & Plan  Due to acute blood loss anemia        Hematuria- (present on admission)  Assessment & Plan  Likely secondary to above    Iron deficiency anemia- (present on admission)  Assessment & Plan  S/p IV iron infusion repletion    Acute blood loss anemia- (present on admission)  Assessment & Plan    Trend HH, transfuse for Hgb<7 or hemodynamic instability        1 unit of PRBCs ordered on 11/4/2022 PRBC-fifth unit so far this admit    Obesity (BMI 30-39.9)- (present on admission)  Assessment & Plan  Diet and lifestyle modification  Body mass index is 30.54 kg/m².      Leukocytosis- (present on admission)  Assessment & Plan  Likely reactive insetting of malignancy and blood loss anemia    Dyslipidemia- (present on admission)  Assessment & Plan  Statin    COPD (chronic obstructive pulmonary disease) (HCC)- (present on admission)  Assessment & Plan  Not in acute exacerbation  PRN duonebs       VTE prophylaxis: SCDs/TEDs    I have performed a physical exam and reviewed and updated ROS and Plan today (11/6/2022). In review of yesterday's note (11/5/2022), there are no changes except as documented above.

## 2022-11-06 NOTE — PROGRESS NOTES
Report received from ELLA Graham assuming care at this time. Pt is a/0/4 verbalizes needs and answers questions appropriately. Pt reporting pain 8/10 to chronic back pain medicated per MAR. Fall precautions in place, call light within reach, and bed lowest position.

## 2022-11-07 LAB
ANION GAP SERPL CALC-SCNC: 9 MMOL/L (ref 7–16)
BUN SERPL-MCNC: 18 MG/DL (ref 8–22)
CALCIUM SERPL-MCNC: 9.2 MG/DL (ref 8.5–10.5)
CHLORIDE SERPL-SCNC: 97 MMOL/L (ref 96–112)
CO2 SERPL-SCNC: 28 MMOL/L (ref 20–33)
CREAT SERPL-MCNC: 0.75 MG/DL (ref 0.5–1.4)
ERYTHROCYTE [DISTWIDTH] IN BLOOD BY AUTOMATED COUNT: 66.7 FL (ref 35.9–50)
GFR SERPLBLD CREATININE-BSD FMLA CKD-EPI: 96 ML/MIN/1.73 M 2
GLUCOSE SERPL-MCNC: 112 MG/DL (ref 65–99)
HCT VFR BLD AUTO: 27.3 % (ref 42–52)
HGB BLD-MCNC: 8.5 G/DL (ref 14–18)
MCH RBC QN AUTO: 26.2 PG (ref 27–33)
MCHC RBC AUTO-ENTMCNC: 31.1 G/DL (ref 33.7–35.3)
MCV RBC AUTO: 84.3 FL (ref 81.4–97.8)
PLATELET # BLD AUTO: 87 K/UL (ref 164–446)
POTASSIUM SERPL-SCNC: 4.8 MMOL/L (ref 3.6–5.5)
RBC # BLD AUTO: 3.24 M/UL (ref 4.7–6.1)
SODIUM SERPL-SCNC: 134 MMOL/L (ref 135–145)
WBC # BLD AUTO: 18.2 K/UL (ref 4.8–10.8)

## 2022-11-07 PROCEDURE — 87086 URINE CULTURE/COLONY COUNT: CPT

## 2022-11-07 PROCEDURE — 99232 SBSQ HOSP IP/OBS MODERATE 35: CPT | Performed by: HOSPITALIST

## 2022-11-07 PROCEDURE — 80048 BASIC METABOLIC PNL TOTAL CA: CPT

## 2022-11-07 PROCEDURE — A9270 NON-COVERED ITEM OR SERVICE: HCPCS | Performed by: HOSPITALIST

## 2022-11-07 PROCEDURE — 700102 HCHG RX REV CODE 250 W/ 637 OVERRIDE(OP): Performed by: HOSPITALIST

## 2022-11-07 PROCEDURE — 85027 COMPLETE CBC AUTOMATED: CPT

## 2022-11-07 PROCEDURE — 770006 HCHG ROOM/CARE - MED/SURG/GYN SEMI*

## 2022-11-07 PROCEDURE — 700102 HCHG RX REV CODE 250 W/ 637 OVERRIDE(OP): Performed by: STUDENT IN AN ORGANIZED HEALTH CARE EDUCATION/TRAINING PROGRAM

## 2022-11-07 PROCEDURE — A9270 NON-COVERED ITEM OR SERVICE: HCPCS | Performed by: STUDENT IN AN ORGANIZED HEALTH CARE EDUCATION/TRAINING PROGRAM

## 2022-11-07 PROCEDURE — 36415 COLL VENOUS BLD VENIPUNCTURE: CPT

## 2022-11-07 RX ADMIN — MAGNESIUM HYDROXIDE 30 ML: 400 SUSPENSION ORAL at 12:24

## 2022-11-07 RX ADMIN — GABAPENTIN 200 MG: 100 CAPSULE ORAL at 17:42

## 2022-11-07 RX ADMIN — ATORVASTATIN CALCIUM 20 MG: 20 TABLET, FILM COATED ORAL at 17:42

## 2022-11-07 RX ADMIN — THIAMINE HCL TAB 100 MG 100 MG: 100 TAB at 17:42

## 2022-11-07 RX ADMIN — DOCUSATE SODIUM 50 MG AND SENNOSIDES 8.6 MG 2 TABLET: 8.6; 5 TABLET, FILM COATED ORAL at 17:42

## 2022-11-07 RX ADMIN — GABAPENTIN 200 MG: 100 CAPSULE ORAL at 12:18

## 2022-11-07 ASSESSMENT — PAIN DESCRIPTION - PAIN TYPE
TYPE: CHRONIC PAIN

## 2022-11-07 ASSESSMENT — ENCOUNTER SYMPTOMS
DEPRESSION: 0
SHORTNESS OF BREATH: 0
PALPITATIONS: 0
BLURRED VISION: 0
VOMITING: 0
COUGH: 0
FEVER: 0
DOUBLE VISION: 0
ABDOMINAL PAIN: 0
NERVOUS/ANXIOUS: 0
CHILLS: 0
NAUSEA: 0
DIZZINESS: 0
HEADACHES: 0

## 2022-11-07 NOTE — PROGRESS NOTES
Hospital Medicine Daily Progress Note    Date of Service  11/7/2022    Chief Complaint  Gregg Martinez is a 72 y.o. male admitted 10/26/2022 with hematuria     Hospital Course  72-year-old male with past medical history of DL D and asthma presenting with hematuria.  He was admitted for acute blood loss anemia requiring 2 units of PRBC transfusion.  CT abdomen pelvis showed bladder wall mass suspicious for neoplasm.  Urology was consulted and patient for TURBT on 10/27 which showed multifocal bladder tumors which were all resected. He continued to have hematuria which improved with CBI however he did require additional blood transfusion. Pathology showed low grade urothelial carcinoma. CT chest with contrast without evidence of lung involvement. Bone scan negative for mets. Pending IR bone biopsy of possible metastatic lesions noted on CT pelvis.     Interval Problem Update    CBI was replaced on 11/6/2022.  It is running and watermelon color urine is noted    Case discussed with Luz urology PA    Plan to try to wean off the CBI.    Are monitoring patient's hemoglobin currently hemoglobin is 8.5    Was a count is 18 it is consistently been high since his admission most likely related to his bladder cancer however it brittanie from 11 from yesterday    Urine culture has been ordered to assess for possible UTI that may be contributing to patient's hematuria      I have discussed this patient's plan of care and discharge plan at IDT rounds today with Case Management, Nursing, Nursing leadership, and other members of the IDT team.    Consultants/Specialty  urology    Code Status  Full Code    Disposition  Patient is not medically cleared for discharge.   Anticipate discharge to to home with close outpatient follow-up.  I have placed the appropriate orders for post-discharge needs.    Review of Systems  Review of Systems   Constitutional:  Negative for chills and fever.   HENT:  Negative for ear pain and hearing loss.     Eyes:  Negative for blurred vision and double vision.   Respiratory:  Negative for cough and shortness of breath.    Cardiovascular:  Negative for chest pain and palpitations.   Gastrointestinal:  Negative for abdominal pain, nausea and vomiting.   Genitourinary:  Positive for hematuria. Negative for dysuria and urgency.   Musculoskeletal:  Positive for joint pain.   Neurological:  Negative for dizziness and headaches.   Psychiatric/Behavioral:  Negative for depression. The patient is not nervous/anxious.    All other systems reviewed and are negative.     Physical Exam  Temp:  [36.5 °C (97.7 °F)-37.1 °C (98.7 °F)] 36.5 °C (97.7 °F)  Pulse:  [68-97] 82  Resp:  [16-18] 18  BP: (110-128)/(66-88) 128/88  SpO2:  [90 %-95 %] 90 %    Physical Exam  Vitals and nursing note reviewed.   Constitutional:       General: He is not in acute distress.     Appearance: Normal appearance.   HENT:      Head: Normocephalic.      Nose: Nose normal.      Mouth/Throat:      Mouth: Mucous membranes are moist.   Eyes:      Pupils: Pupils are equal, round, and reactive to light.   Cardiovascular:      Rate and Rhythm: Normal rate and regular rhythm.      Heart sounds: No murmur heard.  Pulmonary:      Effort: Pulmonary effort is normal. No respiratory distress.      Breath sounds: Normal breath sounds.   Abdominal:      General: Abdomen is flat. There is no distension.      Palpations: There is no mass.      Tenderness: There is no abdominal tenderness. There is no rebound.      Hernia: No hernia is present.   Musculoskeletal:         General: No swelling, tenderness, deformity or signs of injury.   Skin:     Coloration: Skin is pale. Skin is not jaundiced.      Findings: No bruising, erythema, lesion or rash.   Neurological:      General: No focal deficit present.      Mental Status: He is alert and oriented to person, place, and time.   Psychiatric:         Mood and Affect: Mood is not anxious.       Fluids    Intake/Output Summary (Last  24 hours) at 11/7/2022 1237  Last data filed at 11/7/2022 1215  Gross per 24 hour   Intake 796.1 ml   Output 2325 ml   Net -1528.9 ml         Laboratory  Recent Labs     11/05/22  0835 11/06/22  0104 11/07/22  0533   WBC  --  11.1* 18.2*   RBC  --  2.93* 3.24*   HEMOGLOBIN 8.6* 7.6* 8.5*   HEMATOCRIT  --  24.2* 27.3*   MCV  --  82.6 84.3   MCH  --  25.9* 26.2*   MCHC  --  31.4* 31.1*   RDW  --  62.6* 66.7*   PLATELETCT  --  76* 87*       Recent Labs     11/05/22  0049 11/06/22 0104 11/07/22 0533   SODIUM 133* 135 134*   POTASSIUM 5.1 4.6 4.8   CHLORIDE 98 99 97   CO2 26 26 28   GLUCOSE 110* 113* 112*   BUN 27* 18 18   CREATININE 0.90 0.65 0.75   CALCIUM 8.2* 8.4* 9.2                       Imaging  CT-ABDOMEN-PELVIS W/O   Final Result      1.  Interval hemorrhage within the partially decompressed urinary bladder. Previously visualized posterior left bladder mass is no longer seen presumably this is due to interval resection      2.  New mild left hydroureteronephrosis with moderate perinephric fat stranding. This is concerning for obstruction perhaps related to hematoma, recent instrumentation or pyelonephritis/ureterectasis.      3.  Increasing colonic stool volume with similar moderate diverticulosis of the colon but no evidence of diverticulitis      4.  Marked splenomegaly      5.  New small effusions with some lower lung zone groundglass suspicious for interstitial edema/mild congestive failure. There is stable mild pericardial fluid      6.  Persistent inhomogeneous medullary spaces with generalized increased density. Recent bone scan showed no focal mass but this could reflect underlying myelofibrosis or metastases with false-negative bone scan      CT-NEEDLE BX-DEEP BONE   Final Result      1.  Successful CT-guided core biopsy of sclerotic lesion in the posterior aspect of left inferior pubic ramus.      NM-BONE/JOINT SCAN WHOLE BODY   Final Result      No evidence of bony metastatic disease      CT-CHEST  (THORAX) WITH   Final Result         1.  Emphysema   2.  Hazy bilateral upper lobe opacities with slight intralobular septal thickening, could represent component of mild pulmonary edema   3.  Linear densities the bilateral lung bases favors scarring or atelectasis.   4.  Atherosclerosis and atherosclerotic coronary artery disease   5.  Hepatomegaly with irregular hepatic contour favoring changes of cirrhosis.   6.  Subcentimeter hepatic lobe lesions, could represent small cyst or hemangioma, otherwise indeterminate.   7.  Mild mediastinal adenopathy, workup and evaluation for causes of adenopathy recommended as clinically appropriate.   8.  Splenomegaly      DX-CHEST-PORTABLE (1 VIEW)   Final Result         1.  No acute cardiopulmonary disease.   2.  Atherosclerosis      CT-ABDOMEN-PELVIS WITH   Final Result      1.  Enhancing 2.5 x 2.3 cm bladder wall mass suspicious for neoplasm.   2.  Heterogeneous sclerotic change of the pelvis and proximal femurs suspicious for metastatic disease or other infiltrating marrow process.   3.  Mild cardiomegaly.   4.  Hepatosplenomegaly.   5.  Small subcentimeter hepatic hypodensities which could represent cysts or hemangiomas but are nonspecific. If indicated, these findings could be further evaluated with dedicated liver protocol CT or MRI or ultrasound on a nonemergent basis.   6.  Colonic diverticulosis without evidence of diverticulitis.   7.  Atherosclerosis.             Assessment/Plan  * Urothelial carcinoma of bladder without invasion of muscle (HCC)- (present on admission)  Assessment & Plan  CT AP: Notable enhancing 2.5 x 2.3 cm bladder wall mass suspicious for neoplasms  s/p TURBT on 10/27 with multiple tumors noted  Pathology showing low-grade papillary urothelial carcinoma noninvasive with muscularis propria uninvolved by tumor  CT chest negative for metastasis  Bone scan without any evidence of metastasis  S/p  IR guided bone biopsy on 10/31      S/p  operating room  by urology on 11/2/2022    Attempt to wean CBI starting 11/3/2022       11/5/2022-CBI is out.  Continue monitor PVR    11/6/22 with hemoglobin drop of 1 g and recurrence of hematuria.  CBI restarted    11/7 CBI running.  Trying to wean CBI off.  Continue monitor hemoglobin.  Check urine culture      Slow transit constipation- (present on admission)  Assessment & Plan  Aggressive bowel protocol    Dulcolax suppository    Hypovolemia  Assessment & Plan  Due to acute blood loss anemia        Hematuria- (present on admission)  Assessment & Plan  Likely secondary to above    Iron deficiency anemia- (present on admission)  Assessment & Plan  S/p IV iron infusion repletion    Acute blood loss anemia- (present on admission)  Assessment & Plan    Trend HH, transfuse for Hgb<7 or hemodynamic instability        1 unit of PRBCs ordered on 11/4/2022 PRBC-fifth unit so far this admit    Obesity (BMI 30-39.9)- (present on admission)  Assessment & Plan  Diet and lifestyle modification  Body mass index is 30.54 kg/m².      Leukocytosis- (present on admission)  Assessment & Plan  Likely reactive insetting of malignancy and blood loss anemia    Dyslipidemia- (present on admission)  Assessment & Plan  Statin    COPD (chronic obstructive pulmonary disease) (HCC)- (present on admission)  Assessment & Plan  Not in acute exacerbation  PRN duonebs       VTE prophylaxis: SCDs/TEDs    I have performed a physical exam and reviewed and updated ROS and Plan today (11/7/2022). In review of yesterday's note (11/6/2022), there are no changes except as documented above.

## 2022-11-07 NOTE — CARE PLAN
The patient is Stable - Low risk of patient condition declining or worsening    Shift Goals  Clinical Goals: Rest  Patient Goals: Rest, Comfort  Family Goals: N/A    Progress made toward(s) clinical / shift goals:      Problem: Fluid Volume  Goal: Fluid volume balance will be maintained  Outcome: Progressing     Problem: Urinary - Renal Perfusion  Goal: Ability to achieve and maintain adequate renal perfusion and functioning will improve  Outcome: Progressing       Patient is not progressing towards the following goals:

## 2022-11-07 NOTE — PROGRESS NOTES
Assumed care of pt  at 1900. Report received from Day RN. Pt is A&O x 4. Patient denies pain at this time. Bed in lowest locked position, call light within reach, hourly rounding in place. Labs reviewed, orders reviewed, communication board updated.

## 2022-11-07 NOTE — PROGRESS NOTES
Recieved patient from night RN at 0700. Patient is A&O x 4. CBI running, urine output pink. Labs pending. Denies pain, denies nausea. Bed in lowest position with bed rails up. Call light within reach. Patient ambulating in hallway. Patient belongings near patient. Patient expresses no needs at this time. Hourly rounding in place.

## 2022-11-07 NOTE — PROGRESS NOTES
"Urology Progress Note    S/p TURBT 10/27/2022.  S/p cystoscopy, clot evacuation, fulguration and TURBT 11/03/2022    S:  patient seen and examined.     11/07. Successful TOV yesterday after CBI discontinued. Later with worsening hematuria and clots. 3 way cuellar replaced and restarted on CBI. Weaning this morning and watermelon-colored with low flow CBI. Hand irrigated at bedside, no clots. H/H improved today 8.5/27.3. Patient feels well. Tolerating diet. No pain or dysuria. He is having occasional leakage around the catheter, reports sensation of urgency/pain with this.       O:   /88   Pulse 82   Temp 36.5 °C (97.7 °F) (Temporal)   Resp 18   Ht 1.727 m (5' 8\")   Wt 77.8 kg (171 lb 8.3 oz)   SpO2 90%   Recent Labs     11/05/22  0049 11/06/22  0104 11/07/22  0533   SODIUM 133* 135 134*   POTASSIUM 5.1 4.6 4.8   CHLORIDE 98 99 97   CO2 26 26 28   GLUCOSE 110* 113* 112*   BUN 27* 18 18   CREATININE 0.90 0.65 0.75   CALCIUM 8.2* 8.4* 9.2     Recent Labs     11/05/22  0835 11/06/22  0104 11/07/22  0533   WBC  --  11.1* 18.2*   RBC  --  2.93* 3.24*   HEMOGLOBIN 8.6* 7.6* 8.5*   HEMATOCRIT  --  24.2* 27.3*   MCV  --  82.6 84.3   MCH  --  25.9* 26.2*   MCHC  --  31.4* 31.1*   RDW  --  62.6* 66.7*   PLATELETCT  --  76* 87*         Intake/Output Summary (Last 24 hours) at 10/31/2022 1207  Last data filed at 10/31/2022 1100  Gross per 24 hour   Intake 1121.25 ml   Output 2275 ml   Net -1153.75 ml       Exam:  Gen: NAD   Abd: soft, nondistended, no TTP  : 3 way cuellar in place on low flow CBI with watermelon urine draining in bag.     A/P:    Active Hospital Problems    Diagnosis     Slow transit constipation [K59.01]     Hypovolemia [E86.1]     Acute blood loss anemia [D62]     Urothelial carcinoma of bladder without invasion of muscle (HCC) [C67.9]     Iron deficiency anemia [D50.9]     Hematuria [R31.9]     Obesity (BMI 30-39.9) [E66.9]     Leukocytosis [D72.829]     Dyslipidemia [E78.5]     COPD (chronic " obstructive pulmonary disease) (HCC) [J44.9]      Procedure:  - I hand irrigated bladder without return of clots, patient did have bladder spasm with leakage around the catheter, tolerated well. Urine was observed to be light pink and clear in tubing. CBI restarted on low flow.       Plan:  - Wean CBI as tolerated for light pink/clear urine. If remains light/clear, can trial clamping cuellar  - Case discussed with Dr. Arizmendi.  Recommend to continue to trend H/H. If discharging tomorrow, we will plan for TOV in our office vs TOV here. No plan for surgical intervention today, but we will make NPO at midnight and eval the potential for surgical intervention in the A.M.  -Urology will follow      Roseanne Bautista PA-C  [facility] [place]

## 2022-11-08 LAB
ANION GAP SERPL CALC-SCNC: 10 MMOL/L (ref 7–16)
ANISOCYTOSIS BLD QL SMEAR: ABNORMAL
BASOPHILS # BLD AUTO: 2.5 % (ref 0–1.8)
BASOPHILS # BLD: 0.38 K/UL (ref 0–0.12)
BUN SERPL-MCNC: 14 MG/DL (ref 8–22)
CALCIUM SERPL-MCNC: 8.7 MG/DL (ref 8.5–10.5)
CHLORIDE SERPL-SCNC: 98 MMOL/L (ref 96–112)
CO2 SERPL-SCNC: 27 MMOL/L (ref 20–33)
CREAT SERPL-MCNC: 0.71 MG/DL (ref 0.5–1.4)
EOSINOPHIL # BLD AUTO: 0.14 K/UL (ref 0–0.51)
EOSINOPHIL NFR BLD: 0.9 % (ref 0–6.9)
ERYTHROCYTE [DISTWIDTH] IN BLOOD BY AUTOMATED COUNT: 65.7 FL (ref 35.9–50)
GFR SERPLBLD CREATININE-BSD FMLA CKD-EPI: 98 ML/MIN/1.73 M 2
GLUCOSE SERPL-MCNC: 103 MG/DL (ref 65–99)
HCT VFR BLD AUTO: 23.9 % (ref 42–52)
HCT VFR BLD AUTO: 24.8 % (ref 42–52)
HGB BLD-MCNC: 7.4 G/DL (ref 14–18)
HGB BLD-MCNC: 7.6 G/DL (ref 14–18)
LYMPHOCYTES # BLD AUTO: 4.97 K/UL (ref 1–4.8)
LYMPHOCYTES NFR BLD: 33.1 % (ref 22–41)
MANUAL DIFF BLD: NORMAL
MCH RBC QN AUTO: 26 PG (ref 27–33)
MCHC RBC AUTO-ENTMCNC: 30.6 G/DL (ref 33.7–35.3)
MCV RBC AUTO: 84.9 FL (ref 81.4–97.8)
METAMYELOCYTES NFR BLD MANUAL: 0.8 %
MICROCYTES BLD QL SMEAR: ABNORMAL
MONOCYTES # BLD AUTO: 0.77 K/UL (ref 0–0.85)
MONOCYTES NFR BLD AUTO: 5.1 % (ref 0–13.4)
MORPHOLOGY BLD-IMP: NORMAL
MYELOCYTES NFR BLD MANUAL: 3.4 %
NEUTROPHILS # BLD AUTO: 7.5 K/UL (ref 1.82–7.42)
NEUTROPHILS NFR BLD: 50 % (ref 44–72)
NRBC # BLD AUTO: 8.55 K/UL
NRBC BLD-RTO: 57 /100 WBC
PATH REV: NORMAL
PATH REV: NORMAL
PLATELET # BLD AUTO: 60 K/UL (ref 164–446)
PLATELET BLD QL SMEAR: NORMAL
POTASSIUM SERPL-SCNC: 4.8 MMOL/L (ref 3.6–5.5)
RBC # BLD AUTO: 2.92 M/UL (ref 4.7–6.1)
RBC BLD AUTO: PRESENT
SODIUM SERPL-SCNC: 135 MMOL/L (ref 135–145)
WBC # BLD AUTO: 15 K/UL (ref 4.8–10.8)
WBC OTHER NFR BLD MANUAL: 4.2 %

## 2022-11-08 PROCEDURE — 85014 HEMATOCRIT: CPT

## 2022-11-08 PROCEDURE — 700102 HCHG RX REV CODE 250 W/ 637 OVERRIDE(OP): Performed by: HOSPITALIST

## 2022-11-08 PROCEDURE — A9270 NON-COVERED ITEM OR SERVICE: HCPCS | Performed by: HOSPITALIST

## 2022-11-08 PROCEDURE — 85007 BL SMEAR W/DIFF WBC COUNT: CPT

## 2022-11-08 PROCEDURE — A9270 NON-COVERED ITEM OR SERVICE: HCPCS | Performed by: STUDENT IN AN ORGANIZED HEALTH CARE EDUCATION/TRAINING PROGRAM

## 2022-11-08 PROCEDURE — 770006 HCHG ROOM/CARE - MED/SURG/GYN SEMI*

## 2022-11-08 PROCEDURE — 80048 BASIC METABOLIC PNL TOTAL CA: CPT

## 2022-11-08 PROCEDURE — 36415 COLL VENOUS BLD VENIPUNCTURE: CPT

## 2022-11-08 PROCEDURE — 85018 HEMOGLOBIN: CPT

## 2022-11-08 PROCEDURE — 99233 SBSQ HOSP IP/OBS HIGH 50: CPT | Performed by: NURSE PRACTITIONER

## 2022-11-08 PROCEDURE — 700102 HCHG RX REV CODE 250 W/ 637 OVERRIDE(OP): Performed by: STUDENT IN AN ORGANIZED HEALTH CARE EDUCATION/TRAINING PROGRAM

## 2022-11-08 PROCEDURE — 85025 COMPLETE CBC W/AUTO DIFF WBC: CPT

## 2022-11-08 PROCEDURE — 80503 PATH CLIN CONSLTJ SF 5-20: CPT

## 2022-11-08 RX ADMIN — ALPRAZOLAM 0.25 MG: 0.25 TABLET ORAL at 23:07

## 2022-11-08 RX ADMIN — GABAPENTIN 200 MG: 100 CAPSULE ORAL at 17:05

## 2022-11-08 RX ADMIN — GABAPENTIN 200 MG: 100 CAPSULE ORAL at 12:07

## 2022-11-08 RX ADMIN — DOCUSATE SODIUM 50 MG AND SENNOSIDES 8.6 MG 2 TABLET: 8.6; 5 TABLET, FILM COATED ORAL at 17:05

## 2022-11-08 RX ADMIN — THIAMINE HCL TAB 100 MG 100 MG: 100 TAB at 17:06

## 2022-11-08 RX ADMIN — ATORVASTATIN CALCIUM 20 MG: 20 TABLET, FILM COATED ORAL at 17:06

## 2022-11-08 RX ADMIN — ALPRAZOLAM 0.25 MG: 0.25 TABLET ORAL at 00:35

## 2022-11-08 RX ADMIN — ALPRAZOLAM 0.25 MG: 0.25 TABLET ORAL at 19:56

## 2022-11-08 ASSESSMENT — ENCOUNTER SYMPTOMS
FEVER: 0
WEAKNESS: 0
DEPRESSION: 0
COUGH: 0
CHILLS: 0
HEARTBURN: 0
SHORTNESS OF BREATH: 0
DIZZINESS: 0
ABDOMINAL PAIN: 0
BACK PAIN: 0
BLURRED VISION: 0

## 2022-11-08 ASSESSMENT — PAIN DESCRIPTION - PAIN TYPE
TYPE: CHRONIC PAIN

## 2022-11-08 NOTE — CARE PLAN
The patient is Watcher - Medium risk of patient condition declining or worsening    Shift Goals  Clinical Goals: CBI Management  Patient Goals: Patient comfort and Pain management  Family Goals: N/A    Progress made toward(s) clinical / shift goals:    Problem: Urinary - Renal Perfusion  Goal: Ability to achieve and maintain adequate renal perfusion and functioning will improve  Outcome: Progressing     Problem: Hemodynamics  Goal: Patient's hemodynamics, fluid balance and neurologic status will be stable or improve  Outcome: Progressing     Problem: Fluid Volume  Goal: Fluid volume balance will be maintained  Outcome: Progressing       Patient is not progressing towards the following goals:

## 2022-11-08 NOTE — CARE PLAN
The patient is Stable - Low risk of patient condition declining or worsening    Shift Goals  Clinical Goals: Wean off CBI, NPO  Patient Goals: Comfort  Family Goals: N/A    Progress made toward(s) clinical / shift goals:      Problem: Hemodynamics  Goal: Patient's hemodynamics, fluid balance and neurologic status will be stable or improve  Outcome: Progressing     Problem: Urinary - Renal Perfusion  Goal: Ability to achieve and maintain adequate renal perfusion and functioning will improve  Outcome: Progressing       Patient is not progressing towards the following goals:

## 2022-11-08 NOTE — CARE PLAN
The patient is Watcher - Medium risk of patient condition declining or worsening    Shift Goals  Clinical Goals: wean CBI  Patient Goals: comfort, plan  Family Goals: N/A    Progress made toward(s) clinical / shift goals:      Problem: Hemodynamics  Goal: Patient's hemodynamics, fluid balance and neurologic status will be stable or improve  Outcome: Progressing     Problem: Fluid Volume  Goal: Fluid volume balance will be maintained  Outcome: Progressing     Problem: Urinary - Renal Perfusion  Goal: Ability to achieve and maintain adequate renal perfusion and functioning will improve  Outcome: Progressing     Problem: Knowledge Deficit - Standard  Goal: Patient and family/care givers will demonstrate understanding of plan of care, disease process/condition, diagnostic tests and medications  Outcome: Progressing     Problem: Pain - Standard  Goal: Alleviation of pain or a reduction in pain to the patient’s comfort goal  Outcome: Progressing     Problem: Fall Risk  Goal: Patient will remain free from falls  Outcome: Progressing       Patient is not progressing towards the following goals:    Patient continues with CBI, attempted to wean throughout the day but when running low flow, urine quickly turned more red, although no clots in cuellar output. Patient has been reporting intermittent pain in his bladder with occasional leakage around cuellar at urethral meatus, urology team is aware.

## 2022-11-08 NOTE — PROGRESS NOTES
Hospital Medicine Daily Progress Note    Date of Service  11/8/2022    Chief Complaint  Gregg Martinez is a 72 y.o. male admitted 10/26/2022 with hematuria    Hospital Course  72-year-old male with past medical history of DL D and asthma presenting with hematuria.  He was admitted for acute blood loss anemia requiring 2 units of PRBC transfusion.  CT abdomen pelvis showed bladder wall mass suspicious for neoplasm.  Urology was consulted and patient for TURBT on 10/27 which showed multifocal bladder tumors which were all resected. He continued to have hematuria which improved with CBI however he did require additional blood transfusion. Pathology showed low grade urothelial carcinoma. CT chest with contrast without evidence of lung involvement. Bone scan negative for mets. IR bone biopsy 10/31 with no evidence of malignancy.      Is remained in the hospital given inability to discontinue CBI given hematuria and clots and intermittently worsening hemoglobin and hematocrit.  Urology planning on surgical intervention.    Interval Problem Update  11/8/2022: Patient seen and examined.  Still with watermelon colored urine in Arboleda bag although in Arboleda tube urine is yellow. CBI continues, patient reports leaking is now fixed.  Describes pain when Arboleda is malpositioned.  Last BM today denies blood.    -Vital signs reviewed, hemodynamically stable and afebrile  -Labs reviewed, WBC decreased to 15, hemoglobin down to 7.6 this a.m.  -Leukocytosis likely secondary to malignancy as patient is afebrile, normotensive, with no tachycardia  -Repeat urine culture negative  -Urology following, required reinsertion of CBI yesterday given worsening hematuria and clots  -Considering surgical intervention tomorrow, check H&H now and in the morning, transfuse for hemoglobin less than 7  -N.p.o. at midnight    I have discussed this patient's plan of care and discharge plan at IDT rounds today with Case Management, Nursing, Nursing  leadership, and other members of the IDT team.    Consultants/Specialty  urology    Code Status  Full Code    Disposition  Patient is not medically cleared for discharge.   Anticipate discharge to to home with close outpatient follow-up.  I have placed the appropriate orders for post-discharge needs.    Review of Systems  Review of Systems   Constitutional:  Negative for chills, fever and malaise/fatigue.   HENT:  Negative for hearing loss.    Eyes:  Negative for blurred vision.   Respiratory:  Negative for cough and shortness of breath.    Cardiovascular:  Negative for chest pain and leg swelling.   Gastrointestinal:  Negative for abdominal pain and heartburn.   Genitourinary:  Positive for hematuria.   Musculoskeletal:  Negative for back pain.   Skin:  Negative for rash.   Neurological:  Negative for dizziness and weakness.   Psychiatric/Behavioral:  Negative for depression.    All other systems reviewed and are negative.     Physical Exam  Temp:  [36.6 °C (97.9 °F)-36.9 °C (98.5 °F)] 36.6 °C (97.9 °F)  Pulse:  [78-92] 92  Resp:  [16-19] 16  BP: (102-114)/(61-66) 114/62  SpO2:  [91 %-98 %] 92 %    Physical Exam  Vitals and nursing note reviewed.   Constitutional:       Appearance: Normal appearance. He is normal weight.   HENT:      Head: Normocephalic and atraumatic.      Right Ear: External ear normal.      Left Ear: External ear normal.      Nose: Nose normal.      Mouth/Throat:      Mouth: Mucous membranes are moist.      Pharynx: Oropharynx is clear.   Eyes:      General: No scleral icterus.  Cardiovascular:      Rate and Rhythm: Normal rate and regular rhythm.      Pulses: Normal pulses.      Heart sounds: Normal heart sounds. No murmur heard.  Pulmonary:      Effort: Pulmonary effort is normal. No respiratory distress.      Breath sounds: Normal breath sounds.   Abdominal:      General: Abdomen is flat. Bowel sounds are normal.      Palpations: Abdomen is soft.      Tenderness: There is no abdominal  tenderness.   Genitourinary:     Comments: CBI in place draining clear watermelon colored urine  Musculoskeletal:         General: No swelling. Normal range of motion.      Cervical back: Normal range of motion.   Skin:     General: Skin is warm and dry.      Capillary Refill: Capillary refill takes less than 2 seconds.   Neurological:      Mental Status: He is alert and oriented to person, place, and time.   Psychiatric:         Mood and Affect: Mood normal.       Fluids    Intake/Output Summary (Last 24 hours) at 11/8/2022 1328  Last data filed at 11/8/2022 0600  Gross per 24 hour   Intake 350 ml   Output 2465 ml   Net -2115 ml       Laboratory  Recent Labs     11/06/22  0104 11/07/22  0533 11/08/22  0322   WBC 11.1* 18.2* 15.0*   RBC 2.93* 3.24* 2.92*   HEMOGLOBIN 7.6* 8.5* 7.6*   HEMATOCRIT 24.2* 27.3* 24.8*   MCV 82.6 84.3 84.9   MCH 25.9* 26.2* 26.0*   MCHC 31.4* 31.1* 30.6*   RDW 62.6* 66.7* 65.7*   PLATELETCT 76* 87* 60*     Recent Labs     11/06/22  0104 11/07/22  0533 11/08/22  0322   SODIUM 135 134* 135   POTASSIUM 4.6 4.8 4.8   CHLORIDE 99 97 98   CO2 26 28 27   GLUCOSE 113* 112* 103*   BUN 18 18 14   CREATININE 0.65 0.75 0.71   CALCIUM 8.4* 9.2 8.7                   Imaging  CT-ABDOMEN-PELVIS W/O   Final Result      1.  Interval hemorrhage within the partially decompressed urinary bladder. Previously visualized posterior left bladder mass is no longer seen presumably this is due to interval resection      2.  New mild left hydroureteronephrosis with moderate perinephric fat stranding. This is concerning for obstruction perhaps related to hematoma, recent instrumentation or pyelonephritis/ureterectasis.      3.  Increasing colonic stool volume with similar moderate diverticulosis of the colon but no evidence of diverticulitis      4.  Marked splenomegaly      5.  New small effusions with some lower lung zone groundglass suspicious for interstitial edema/mild congestive failure. There is stable mild  pericardial fluid      6.  Persistent inhomogeneous medullary spaces with generalized increased density. Recent bone scan showed no focal mass but this could reflect underlying myelofibrosis or metastases with false-negative bone scan      CT-NEEDLE BX-DEEP BONE   Final Result      1.  Successful CT-guided core biopsy of sclerotic lesion in the posterior aspect of left inferior pubic ramus.      NM-BONE/JOINT SCAN WHOLE BODY   Final Result      No evidence of bony metastatic disease      CT-CHEST (THORAX) WITH   Final Result         1.  Emphysema   2.  Hazy bilateral upper lobe opacities with slight intralobular septal thickening, could represent component of mild pulmonary edema   3.  Linear densities the bilateral lung bases favors scarring or atelectasis.   4.  Atherosclerosis and atherosclerotic coronary artery disease   5.  Hepatomegaly with irregular hepatic contour favoring changes of cirrhosis.   6.  Subcentimeter hepatic lobe lesions, could represent small cyst or hemangioma, otherwise indeterminate.   7.  Mild mediastinal adenopathy, workup and evaluation for causes of adenopathy recommended as clinically appropriate.   8.  Splenomegaly      DX-CHEST-PORTABLE (1 VIEW)   Final Result         1.  No acute cardiopulmonary disease.   2.  Atherosclerosis      CT-ABDOMEN-PELVIS WITH   Final Result      1.  Enhancing 2.5 x 2.3 cm bladder wall mass suspicious for neoplasm.   2.  Heterogeneous sclerotic change of the pelvis and proximal femurs suspicious for metastatic disease or other infiltrating marrow process.   3.  Mild cardiomegaly.   4.  Hepatosplenomegaly.   5.  Small subcentimeter hepatic hypodensities which could represent cysts or hemangiomas but are nonspecific. If indicated, these findings could be further evaluated with dedicated liver protocol CT or MRI or ultrasound on a nonemergent basis.   6.  Colonic diverticulosis without evidence of diverticulitis.   7.  Atherosclerosis.            Assessment/Plan  * Urothelial carcinoma of bladder without invasion of muscle (HCC)- (present on admission)  Assessment & Plan  CT AP: Notable enhancing 2.5 x 2.3 cm bladder wall mass suspicious for neoplasms  s/p TURBT on 10/27 with multiple tumors noted  Pathology showing low-grade papillary urothelial carcinoma noninvasive with muscularis propria uninvolved by tumor  CT chest negative for metastasis  Bone scan without any evidence of metastasis  IR guided bone biopsy on 10/31 negative for malignancy    11/2: S/p Cystoscopy with clot evacuation, fulguration, and TUBRT  11/3: Attempt to wean CBI   11/5: CBI discontinued, TOVR   11/6: Hemoglobin drop of 1 g and recurrence of hematuria.  CBI restarted  11/7: CBI running, hand irrigated by urology.  Still with leaking around catheter tip, urine culture NGTD  11/8: CBI running, hand irrigated by urology who deferred surgical intervention with cauterization until tomorrow with close monitoring of H&H, 7.6 this morning    Slow transit constipation- (present on admission)  Assessment & Plan  Aggressive bowel protocol    Dulcolax suppository    Hypovolemia  Assessment & Plan  Due to acute blood loss anemia    Hematuria- (present on admission)  Assessment & Plan  Likely secondary to above    Iron deficiency anemia- (present on admission)  Assessment & Plan  S/p IV iron infusion repletion    Acute blood loss anemia- (present on admission)  Assessment & Plan    Trend HH, transfuse for Hgb<7 or hemodynamic instability    1 unit of PRBCs ordered on 11/4/2022 PRBC-fifth unit so far this admit    Obesity (BMI 30-39.9)- (present on admission)  Assessment & Plan  Diet and lifestyle modification  Body mass index is 30.54 kg/m².    Leukocytosis- (present on admission)  Assessment & Plan  Likely reactive in setting of malignancy and blood loss anemia    Dyslipidemia- (present on admission)  Assessment & Plan  Statin    COPD (chronic obstructive pulmonary disease) (HCC)- (present  on admission)  Assessment & Plan  Not in acute exacerbation  PRN duonebs       VTE prophylaxis: SCDs/TEDs    I have performed a physical exam and reviewed and updated ROS and Plan today (11/8/2022). In review of yesterday's note (11/7/2022), there are no changes except as documented above.

## 2022-11-08 NOTE — PROGRESS NOTES
"Urology Progress Note    S/p TURBT 10/27/2022.  S/p cystoscopy, clot evacuation, fulguration and TURBT 11/03/2022    S:    patient seen and examined.     11/08. Patient still with intermittent leakage around catheter. Unfortunately, unable to wean CBI overnight and now with slightly darker hematuria, cherry red on low flow CBI. Hand irrigated at bedside without clots. Restarted CBI on moderate flow. Cuellar to remain for now. H/H took dip, now 7.6/24.2. AFVSS.     11/07. Successful TOV yesterday after CBI discontinued. Later with worsening hematuria and clots. 3 way cuellar replaced and restarted on CBI. Weaning this morning and watermelon-colored with low flow CBI. Hand irrigated at bedside, no clots. H/H improved today 8.5/27.3. Patient feels well. Tolerating diet. No pain or dysuria. He is having occasional leakage around the catheter, reports sensation of urgency/pain with this.       O:   /62   Pulse 92   Temp 36.6 °C (97.9 °F) (Temporal)   Resp 16   Ht 1.727 m (5' 8\")   Wt 77.8 kg (171 lb 8.3 oz)   SpO2 92%   Recent Labs     11/06/22 0104 11/07/22 0533 11/08/22 0322   SODIUM 135 134* 135   POTASSIUM 4.6 4.8 4.8   CHLORIDE 99 97 98   CO2 26 28 27   GLUCOSE 113* 112* 103*   BUN 18 18 14   CREATININE 0.65 0.75 0.71   CALCIUM 8.4* 9.2 8.7     Recent Labs     11/06/22 0104 11/07/22  0533 11/08/22  0322   WBC 11.1* 18.2* 15.0*   RBC 2.93* 3.24* 2.92*   HEMOGLOBIN 7.6* 8.5* 7.6*   HEMATOCRIT 24.2* 27.3* 24.8*   MCV 82.6 84.3 84.9   MCH 25.9* 26.2* 26.0*   MCHC 31.4* 31.1* 30.6*   RDW 62.6* 66.7* 65.7*   PLATELETCT 76* 87* 60*         Intake/Output Summary (Last 24 hours) at 10/31/2022 1207  Last data filed at 10/31/2022 1100  Gross per 24 hour   Intake 1121.25 ml   Output 2275 ml   Net -1153.75 ml       Exam:  Gen: Alert, NAD   Abd: soft, flat, nondistended, no TTP  : 3 way cuellar in place on low flow CBI with cherry urine draining in bag.     A/P:    Active Hospital Problems    Diagnosis     Slow " transit constipation [K59.01]     Hypovolemia [E86.1]     Acute blood loss anemia [D62]     Urothelial carcinoma of bladder without invasion of muscle (HCC) [C67.9]     Iron deficiency anemia [D50.9]     Hematuria [R31.9]     Obesity (BMI 30-39.9) [E66.9]     Leukocytosis [D72.829]     Dyslipidemia [E78.5]     COPD (chronic obstructive pulmonary disease) (HCC) [J44.9]      Procedure:  - I hand irrigated bladder without return of clots, patient tolerated well. Urine was observed to be light pink and clear in tubing. CBI restarted on moderate flow.       Plan:  - Wean CBI as tolerated for light pink/clear urine. If darker, increase CBI.   - Monitor for urgency/bladder spasnms. If CBI not flowing, there could be a blood clot. Stop nad hand irrigate if this occurs.   - Case discussed with Dr. Arizmendi.  Recommend to continue to trend H/H. Will take off NPO but make NPO at midinght.   -Plan for surgical intervention if worsening H/H or persistant/worsening hematuria.   -Urology will follow      Roseanne Bautista PA-C  [facility]

## 2022-11-09 LAB
ANION GAP SERPL CALC-SCNC: 8 MMOL/L (ref 7–16)
BACTERIA UR CULT: NORMAL
BUN SERPL-MCNC: 15 MG/DL (ref 8–22)
CALCIUM SERPL-MCNC: 8.7 MG/DL (ref 8.5–10.5)
CHLORIDE SERPL-SCNC: 97 MMOL/L (ref 96–112)
CO2 SERPL-SCNC: 28 MMOL/L (ref 20–33)
CREAT SERPL-MCNC: 0.86 MG/DL (ref 0.5–1.4)
ERYTHROCYTE [DISTWIDTH] IN BLOOD BY AUTOMATED COUNT: 67.1 FL (ref 35.9–50)
GFR SERPLBLD CREATININE-BSD FMLA CKD-EPI: 93 ML/MIN/1.73 M 2
GLUCOSE SERPL-MCNC: 98 MG/DL (ref 65–99)
HCT VFR BLD AUTO: 24.7 % (ref 42–52)
HGB BLD-MCNC: 7.4 G/DL (ref 14–18)
MAGNESIUM SERPL-MCNC: 2.1 MG/DL (ref 1.5–2.5)
MCH RBC QN AUTO: 25.6 PG (ref 27–33)
MCHC RBC AUTO-ENTMCNC: 30 G/DL (ref 33.7–35.3)
MCV RBC AUTO: 85.5 FL (ref 81.4–97.8)
PHOSPHATE SERPL-MCNC: 5.3 MG/DL (ref 2.5–4.5)
PLATELET # BLD AUTO: 80 K/UL (ref 164–446)
POTASSIUM SERPL-SCNC: 4.4 MMOL/L (ref 3.6–5.5)
RBC # BLD AUTO: 2.89 M/UL (ref 4.7–6.1)
SIGNIFICANT IND 70042: NORMAL
SITE SITE: NORMAL
SODIUM SERPL-SCNC: 133 MMOL/L (ref 135–145)
SOURCE SOURCE: NORMAL
WBC # BLD AUTO: 15.3 K/UL (ref 4.8–10.8)

## 2022-11-09 PROCEDURE — 700102 HCHG RX REV CODE 250 W/ 637 OVERRIDE(OP): Performed by: HOSPITALIST

## 2022-11-09 PROCEDURE — 770006 HCHG ROOM/CARE - MED/SURG/GYN SEMI*

## 2022-11-09 PROCEDURE — 99232 SBSQ HOSP IP/OBS MODERATE 35: CPT | Performed by: NURSE PRACTITIONER

## 2022-11-09 PROCEDURE — 700102 HCHG RX REV CODE 250 W/ 637 OVERRIDE(OP): Performed by: STUDENT IN AN ORGANIZED HEALTH CARE EDUCATION/TRAINING PROGRAM

## 2022-11-09 PROCEDURE — 36415 COLL VENOUS BLD VENIPUNCTURE: CPT

## 2022-11-09 PROCEDURE — 85027 COMPLETE CBC AUTOMATED: CPT

## 2022-11-09 PROCEDURE — A9270 NON-COVERED ITEM OR SERVICE: HCPCS | Performed by: HOSPITALIST

## 2022-11-09 PROCEDURE — 83735 ASSAY OF MAGNESIUM: CPT

## 2022-11-09 PROCEDURE — 80048 BASIC METABOLIC PNL TOTAL CA: CPT

## 2022-11-09 PROCEDURE — A9270 NON-COVERED ITEM OR SERVICE: HCPCS | Performed by: STUDENT IN AN ORGANIZED HEALTH CARE EDUCATION/TRAINING PROGRAM

## 2022-11-09 PROCEDURE — 84100 ASSAY OF PHOSPHORUS: CPT

## 2022-11-09 RX ADMIN — ATORVASTATIN CALCIUM 20 MG: 20 TABLET, FILM COATED ORAL at 17:09

## 2022-11-09 RX ADMIN — THIAMINE HCL TAB 100 MG 100 MG: 100 TAB at 17:09

## 2022-11-09 RX ADMIN — THIAMINE HCL TAB 100 MG 100 MG: 100 TAB at 04:56

## 2022-11-09 RX ADMIN — FOLIC ACID 1 MG: 1 TABLET ORAL at 04:56

## 2022-11-09 RX ADMIN — GABAPENTIN 200 MG: 100 CAPSULE ORAL at 11:32

## 2022-11-09 RX ADMIN — Medication 2000 UNITS: at 04:55

## 2022-11-09 RX ADMIN — GABAPENTIN 200 MG: 100 CAPSULE ORAL at 17:09

## 2022-11-09 RX ADMIN — DOCUSATE SODIUM 50 MG AND SENNOSIDES 8.6 MG 2 TABLET: 8.6; 5 TABLET, FILM COATED ORAL at 04:56

## 2022-11-09 RX ADMIN — ALPRAZOLAM 0.25 MG: 0.25 TABLET ORAL at 20:45

## 2022-11-09 RX ADMIN — GABAPENTIN 200 MG: 100 CAPSULE ORAL at 04:56

## 2022-11-09 RX ADMIN — CYANOCOBALAMIN TAB 500 MCG 1000 MCG: 500 TAB at 04:55

## 2022-11-09 ASSESSMENT — ENCOUNTER SYMPTOMS
WEAKNESS: 0
ORTHOPNEA: 0
INSOMNIA: 0
CONSTIPATION: 0
MYALGIAS: 0
COUGH: 0
SHORTNESS OF BREATH: 0
NAUSEA: 0
FLANK PAIN: 0
PALPITATIONS: 0
VOMITING: 0
DIARRHEA: 0
ABDOMINAL PAIN: 0
NERVOUS/ANXIOUS: 0
DIZZINESS: 0
HEMOPTYSIS: 0
BLOOD IN STOOL: 0
FALLS: 0
SORE THROAT: 0
FEVER: 0
HEADACHES: 0
CHILLS: 0
FOCAL WEAKNESS: 0

## 2022-11-09 ASSESSMENT — PAIN DESCRIPTION - PAIN TYPE
TYPE: CHRONIC PAIN

## 2022-11-09 ASSESSMENT — LIFESTYLE VARIABLES: SUBSTANCE_ABUSE: 0

## 2022-11-09 NOTE — CARE PLAN
The patient is Stable - Low risk of patient condition declining or worsening    Shift Goals  Clinical Goals: CBI management  Patient Goals: Comfort, Surgery  Family Goals: N/A    Progress made toward(s) clinical / shift goals:      Problem: Fluid Volume  Goal: Fluid volume balance will be maintained  Outcome: Progressing     Problem: Urinary - Renal Perfusion  Goal: Ability to achieve and maintain adequate renal perfusion and functioning will improve  Outcome: Progressing       Patient is not progressing towards the following goals:

## 2022-11-09 NOTE — PROGRESS NOTES
Hospital Medicine Daily Progress Note    Date of Service  11/9/2022    Chief Complaint  Gregg Martinez is a 72 y.o. male admitted 10/26/2022 with hematuria    Hospital Course  72-year-old male with past medical history of DL D and asthma presenting with hematuria.  He was admitted for acute blood loss anemia requiring 2 units of PRBC transfusion.  CT abdomen pelvis showed bladder wall mass suspicious for neoplasm.  Urology was consulted and patient for TURBT on 10/27 which showed multifocal bladder tumors which were all resected. He continued to have hematuria which improved with CBI however he did require additional blood transfusion. Pathology showed low grade urothelial carcinoma. CT chest with contrast without evidence of lung involvement. Bone scan negative for mets. IR bone biopsy 10/31 with no evidence of malignancy.      Is remained in the hospital given inability to discontinue CBI given hematuria and clots and intermittently worsening hemoglobin and hematocrit.  Urology planning on surgical intervention.    Interval Problem Update  11/9/2022: AAOx4, no acute overnight events. Patient with gross hematuria via cuellar. Seen by urology this morning- no surgical interventions. Per urology, wean CBI as tolerated for light pink/clear urine. Monitor h/h and plan for surgery if h/h worsens or persistent. Patient states he was previously seen by [facility] in the past. Per records this was for thrombocytosis. He states that he lives in [place], [place] but can take senior bus to [place] for appointments. Patient to follow up with oncology OP.     I have discussed this patient's plan of care and discharge plan at IDT rounds today with Case Management, Nursing, Nursing leadership, and other members of the IDT team.    Consultants/Specialty  urology    Code Status  Full Code    Disposition  Patient is not medically cleared for discharge.   Anticipate discharge to to home with close outpatient follow-up.  I have  placed the appropriate orders for post-discharge needs.    Review of Systems  Review of Systems   Constitutional:  Negative for chills, fever and malaise/fatigue.   HENT:  Negative for congestion and sore throat.    Respiratory:  Negative for cough, hemoptysis and shortness of breath.    Cardiovascular:  Negative for chest pain, palpitations and orthopnea.   Gastrointestinal:  Negative for abdominal pain, blood in stool, constipation, diarrhea, nausea and vomiting.   Genitourinary:  Positive for hematuria. Negative for flank pain.   Musculoskeletal:  Negative for falls and myalgias.   Neurological:  Negative for dizziness, focal weakness, weakness and headaches.   Psychiatric/Behavioral:  Negative for substance abuse. The patient is not nervous/anxious and does not have insomnia.    All other systems reviewed and are negative.     Physical Exam  Temp:  [36.6 °C (97.8 °F)-36.7 °C (98.1 °F)] 36.6 °C (97.8 °F)  Pulse:  [75-82] 82  Resp:  [17] 17  BP: ()/(46-64) 91/46  SpO2:  [97 %-98 %] 97 %    Physical Exam  Vitals and nursing note reviewed.   Constitutional:       General: He is not in acute distress.     Appearance: Normal appearance. He is not ill-appearing or toxic-appearing.   HENT:      Head: Normocephalic.      Nose: Nose normal.      Mouth/Throat:      Mouth: Mucous membranes are moist.      Pharynx: Oropharynx is clear. No oropharyngeal exudate.   Eyes:      General: No scleral icterus.     Extraocular Movements: Extraocular movements intact.      Conjunctiva/sclera: Conjunctivae normal.   Cardiovascular:      Rate and Rhythm: Normal rate and regular rhythm.      Pulses: Normal pulses.      Heart sounds: No murmur heard.    No gallop.   Pulmonary:      Effort: Pulmonary effort is normal. No respiratory distress.      Breath sounds: Normal breath sounds. No wheezing.   Chest:      Chest wall: No tenderness.   Abdominal:      General: Abdomen is flat. Bowel sounds are normal. There is no distension.       Palpations: Abdomen is soft.      Tenderness: There is no abdominal tenderness. There is no guarding.   Genitourinary:     Comments: CBI in place with gross hematuria noted  Musculoskeletal:      Right lower leg: No edema.      Left lower leg: No edema.   Skin:     General: Skin is warm and dry.      Coloration: Skin is not jaundiced.      Findings: No bruising.   Neurological:      General: No focal deficit present.      Mental Status: He is alert and oriented to person, place, and time. Mental status is at baseline.      Motor: No weakness.   Psychiatric:         Mood and Affect: Mood normal.         Behavior: Behavior normal.         Thought Content: Thought content normal.         Judgment: Judgment normal.       Fluids    Intake/Output Summary (Last 24 hours) at 11/9/2022 0738  Last data filed at 11/9/2022 0600  Gross per 24 hour   Intake 600 ml   Output -1675 ml   Net 2275 ml       Laboratory  Recent Labs     11/07/22  0533 11/08/22  0322 11/08/22  1507 11/09/22  0309   WBC 18.2* 15.0*  --  15.3*   RBC 3.24* 2.92*  --  2.89*   HEMOGLOBIN 8.5* 7.6* 7.4* 7.4*   HEMATOCRIT 27.3* 24.8* 23.9* 24.7*   MCV 84.3 84.9  --  85.5   MCH 26.2* 26.0*  --  25.6*   MCHC 31.1* 30.6*  --  30.0*   RDW 66.7* 65.7*  --  67.1*   PLATELETCT 87* 60*  --  80*     Recent Labs     11/07/22  0533 11/08/22  0322 11/09/22  0309   SODIUM 134* 135 133*   POTASSIUM 4.8 4.8 4.4   CHLORIDE 97 98 97   CO2 28 27 28   GLUCOSE 112* 103* 98   BUN 18 14 15   CREATININE 0.75 0.71 0.86   CALCIUM 9.2 8.7 8.7                   Imaging  CT-ABDOMEN-PELVIS W/O   Final Result      1.  Interval hemorrhage within the partially decompressed urinary bladder. Previously visualized posterior left bladder mass is no longer seen presumably this is due to interval resection      2.  New mild left hydroureteronephrosis with moderate perinephric fat stranding. This is concerning for obstruction perhaps related to hematoma, recent instrumentation or  pyelonephritis/ureterectasis.      3.  Increasing colonic stool volume with similar moderate diverticulosis of the colon but no evidence of diverticulitis      4.  Marked splenomegaly      5.  New small effusions with some lower lung zone groundglass suspicious for interstitial edema/mild congestive failure. There is stable mild pericardial fluid      6.  Persistent inhomogeneous medullary spaces with generalized increased density. Recent bone scan showed no focal mass but this could reflect underlying myelofibrosis or metastases with false-negative bone scan      CT-NEEDLE BX-DEEP BONE   Final Result      1.  Successful CT-guided core biopsy of sclerotic lesion in the posterior aspect of left inferior pubic ramus.      NM-BONE/JOINT SCAN WHOLE BODY   Final Result      No evidence of bony metastatic disease      CT-CHEST (THORAX) WITH   Final Result         1.  Emphysema   2.  Hazy bilateral upper lobe opacities with slight intralobular septal thickening, could represent component of mild pulmonary edema   3.  Linear densities the bilateral lung bases favors scarring or atelectasis.   4.  Atherosclerosis and atherosclerotic coronary artery disease   5.  Hepatomegaly with irregular hepatic contour favoring changes of cirrhosis.   6.  Subcentimeter hepatic lobe lesions, could represent small cyst or hemangioma, otherwise indeterminate.   7.  Mild mediastinal adenopathy, workup and evaluation for causes of adenopathy recommended as clinically appropriate.   8.  Splenomegaly      DX-CHEST-PORTABLE (1 VIEW)   Final Result         1.  No acute cardiopulmonary disease.   2.  Atherosclerosis      CT-ABDOMEN-PELVIS WITH   Final Result      1.  Enhancing 2.5 x 2.3 cm bladder wall mass suspicious for neoplasm.   2.  Heterogeneous sclerotic change of the pelvis and proximal femurs suspicious for metastatic disease or other infiltrating marrow process.   3.  Mild cardiomegaly.   4.  Hepatosplenomegaly.   5.  Small subcentimeter  hepatic hypodensities which could represent cysts or hemangiomas but are nonspecific. If indicated, these findings could be further evaluated with dedicated liver protocol CT or MRI or ultrasound on a nonemergent basis.   6.  Colonic diverticulosis without evidence of diverticulitis.   7.  Atherosclerosis.           Assessment/Plan  * Urothelial carcinoma of bladder without invasion of muscle (HCC)- (present on admission)  Assessment & Plan  CT AP: Notable enhancing 2.5 x 2.3 cm bladder wall mass suspicious for neoplasms  s/p TURBT on 10/27 with multiple tumors noted  Pathology showing low-grade papillary urothelial carcinoma noninvasive with muscularis propria uninvolved by tumor  CT chest negative for metastasis  Bone scan without any evidence of metastasis  IR guided bone biopsy on 10/31 negative for malignancy    11/2: S/p Cystoscopy with clot evacuation, fulguration, and TUBRT  11/3: Attempt to wean CBI   11/5: CBI discontinued, TOVR   11/6: Hemoglobin drop of 1 g and recurrence of hematuria.  CBI restarted  11/7: CBI running, hand irrigated by urology.  Still with leaking around catheter tip, urine culture NGTD  11/8: CBI running, hand irrigated by urology who deferred surgical intervention with cauterization until tomorrow with close monitoring of H&H, 7.6 this morning  11/9: Seen by urology- wean CBI as tolerated. Continue to trend H/h. Transfuse PRN. Follow up with urology and oncology OP    Slow transit constipation- (present on admission)  Assessment & Plan  Resolved.   Aggressive bowel protocol  Dulcolax suppository    Hypovolemia  Assessment & Plan  Due to acute blood loss anemia    Hematuria- (present on admission)  Assessment & Plan  Likely secondary to above    Iron deficiency anemia- (present on admission)  Assessment & Plan  S/p IV iron infusion repletion    Acute blood loss anemia- (present on admission)  Assessment & Plan  Secondary to hematuria.   Trend HH, transfuse for Hgb<7 or hemodynamic  instability  1 unit of PRBCs ordered on 11/4/2022 PRBC-fifth unit so far this admit  Continue to monitor        Obesity (BMI 30-39.9)- (present on admission)  Assessment & Plan  Diet and lifestyle modification  Body mass index is 30.54 kg/m².    Leukocytosis- (present on admission)  Assessment & Plan  Likely reactive in setting of malignancy and blood loss anemia  11/9: stable, continue to monitor    Dyslipidemia- (present on admission)  Assessment & Plan  Continue atorvastatin    COPD (chronic obstructive pulmonary disease) (HCC)- (present on admission)  Assessment & Plan  Not in acute exacerbation  PRN duonebs         VTE prophylaxis: SCDs/TEDs and pharmacologic prophylaxis contraindicated due to hematuria    I have performed a physical exam and reviewed and updated ROS and Plan today (11/9/2022). In review of yesterday's note (11/8/2022), there are no changes except as documented above.

## 2022-11-09 NOTE — PROGRESS NOTES
Received report from prior RN. Patient is laying in semi-bailon's position with chest rise present. POC discussed with patient. Bed in low position. Call light and personal items within reach. All needs addressed at this time. Arboleda continuously irrigating.

## 2022-11-09 NOTE — PROGRESS NOTES
Received report from prior RN. Patient is laying in semi-bailon's position with chest rise present. POC discussed with patient. Arboleda unclamped and continuously irrigating. Bed in low position. Call light and personal items within reach. All needs addressed at this time.

## 2022-11-09 NOTE — PROGRESS NOTES
"Urology Progress Note    S/p TURBT 10/27/2022.  S/p cystoscopy, clot evacuation, fulguration and TURBT 11/03/2022    S:    patient seen and examined.     11/09. H/H stable overnight. CBI on low flow, watermelon urine today--improved from yesterday. Patient reports less leakage around cuellar.Currently NPO in case of OR fulguration. Will take off NPO due to improvement overnight.     11/08. Patient still with intermittent leakage around catheter. Unfortunately, unable to wean CBI overnight and now with slightly darker hematuria, cherry red on low flow CBI. Hand irrigated at bedside without clots. Restarted CBI on moderate flow. Cuellar to remain for now. H/H took dip, now 7.6/24.2. AFVSS.     11/07. Successful TOV yesterday after CBI discontinued. Later with worsening hematuria and clots. 3 way cuellar replaced and restarted on CBI. Weaning this morning and watermelon-colored with low flow CBI. Hand irrigated at bedside, no clots. H/H improved today 8.5/27.3. Patient feels well. Tolerating diet. No pain or dysuria. He is having occasional leakage around the catheter, reports sensation of urgency/pain with this.       O:   BP (!) 91/46   Pulse 82   Temp 36.6 °C (97.8 °F) (Temporal)   Resp 17   Ht 1.727 m (5' 8\")   Wt 77.8 kg (171 lb 8.3 oz)   SpO2 97%   Recent Labs     11/07/22  0533 11/08/22  0322 11/09/22  0309   SODIUM 134* 135 133*   POTASSIUM 4.8 4.8 4.4   CHLORIDE 97 98 97   CO2 28 27 28   GLUCOSE 112* 103* 98   BUN 18 14 15   CREATININE 0.75 0.71 0.86   CALCIUM 9.2 8.7 8.7     Recent Labs     11/07/22  0533 11/08/22  0322 11/08/22  1507 11/09/22  0309   WBC 18.2* 15.0*  --  15.3*   RBC 3.24* 2.92*  --  2.89*   HEMOGLOBIN 8.5* 7.6* 7.4* 7.4*   HEMATOCRIT 27.3* 24.8* 23.9* 24.7*   MCV 84.3 84.9  --  85.5   MCH 26.2* 26.0*  --  25.6*   MCHC 31.1* 30.6*  --  30.0*   RDW 66.7* 65.7*  --  67.1*   PLATELETCT 87* 60*  --  80*         Intake/Output Summary (Last 24 hours) at 10/31/2022 1207  Last data filed at " 10/31/2022 1100  Gross per 24 hour   Intake 1121.25 ml   Output 2275 ml   Net -1153.75 ml       Exam:  Gen: Alert, NAD   Abd: soft, flat, nondistended, no TTP  : Uncircumcised, 3 way cuellar in place on low flow CBI with watermelon urine draining in bag.     A/P:    Active Hospital Problems    Diagnosis     Slow transit constipation [K59.01]     Hypovolemia [E86.1]     Acute blood loss anemia [D62]     Urothelial carcinoma of bladder without invasion of muscle (HCC) [C67.9]     Iron deficiency anemia [D50.9]     Hematuria [R31.9]     Obesity (BMI 30-39.9) [E66.9]     Leukocytosis [D72.829]     Dyslipidemia [E78.5]     COPD (chronic obstructive pulmonary disease) (HCC) [J44.9]        Plan:  - Wean CBI as tolerated for light pink/clear urine. If remains clear, can trial clamping and if still light pink/clear, can do voiding trial.   - Monitor for urgency/bladder spasms. If CBI not flowing, there could be a blood clot. Stop and hand irrigate if this occurs.   - Will take off NPO today.  - Continue to monitor H/H  - Plan for surgical intervention if worsening H/H or persistant/worsening hematuria.   - Urology will follow    Case discussed with Urology, Dr. Arizmendi, who has directed this plan of care.     Roseanne Bautista PA-C  [facility] [place]

## 2022-11-10 LAB
ANION GAP SERPL CALC-SCNC: 8 MMOL/L (ref 7–16)
BUN SERPL-MCNC: 20 MG/DL (ref 8–22)
CALCIUM SERPL-MCNC: 8.4 MG/DL (ref 8.5–10.5)
CHLORIDE SERPL-SCNC: 97 MMOL/L (ref 96–112)
CO2 SERPL-SCNC: 27 MMOL/L (ref 20–33)
CREAT SERPL-MCNC: 1 MG/DL (ref 0.5–1.4)
ERYTHROCYTE [DISTWIDTH] IN BLOOD BY AUTOMATED COUNT: 67.2 FL (ref 35.9–50)
GFR SERPLBLD CREATININE-BSD FMLA CKD-EPI: 80 ML/MIN/1.73 M 2
GLUCOSE SERPL-MCNC: 156 MG/DL (ref 65–99)
HCT VFR BLD AUTO: 24.9 % (ref 42–52)
HGB BLD-MCNC: 7.6 G/DL (ref 14–18)
MCH RBC QN AUTO: 26 PG (ref 27–33)
MCHC RBC AUTO-ENTMCNC: 30.5 G/DL (ref 33.7–35.3)
MCV RBC AUTO: 85.3 FL (ref 81.4–97.8)
PHOSPHATE SERPL-MCNC: 4.3 MG/DL (ref 2.5–4.5)
PLATELET # BLD AUTO: 71 K/UL (ref 164–446)
POTASSIUM SERPL-SCNC: 4.4 MMOL/L (ref 3.6–5.5)
RBC # BLD AUTO: 2.92 M/UL (ref 4.7–6.1)
SODIUM SERPL-SCNC: 132 MMOL/L (ref 135–145)
WBC # BLD AUTO: 15.2 K/UL (ref 4.8–10.8)

## 2022-11-10 PROCEDURE — A9270 NON-COVERED ITEM OR SERVICE: HCPCS | Performed by: HOSPITALIST

## 2022-11-10 PROCEDURE — 36415 COLL VENOUS BLD VENIPUNCTURE: CPT

## 2022-11-10 PROCEDURE — 700102 HCHG RX REV CODE 250 W/ 637 OVERRIDE(OP): Performed by: HOSPITALIST

## 2022-11-10 PROCEDURE — A9270 NON-COVERED ITEM OR SERVICE: HCPCS | Performed by: STUDENT IN AN ORGANIZED HEALTH CARE EDUCATION/TRAINING PROGRAM

## 2022-11-10 PROCEDURE — 80048 BASIC METABOLIC PNL TOTAL CA: CPT

## 2022-11-10 PROCEDURE — 99232 SBSQ HOSP IP/OBS MODERATE 35: CPT | Performed by: NURSE PRACTITIONER

## 2022-11-10 PROCEDURE — 700111 HCHG RX REV CODE 636 W/ 250 OVERRIDE (IP): Performed by: HOSPITALIST

## 2022-11-10 PROCEDURE — 84100 ASSAY OF PHOSPHORUS: CPT

## 2022-11-10 PROCEDURE — 700102 HCHG RX REV CODE 250 W/ 637 OVERRIDE(OP): Performed by: STUDENT IN AN ORGANIZED HEALTH CARE EDUCATION/TRAINING PROGRAM

## 2022-11-10 PROCEDURE — 770006 HCHG ROOM/CARE - MED/SURG/GYN SEMI*

## 2022-11-10 PROCEDURE — 85027 COMPLETE CBC AUTOMATED: CPT

## 2022-11-10 RX ORDER — OXYBUTYNIN CHLORIDE 5 MG/1
5 TABLET ORAL 3 TIMES DAILY PRN
Status: DISCONTINUED | OUTPATIENT
Start: 2022-11-10 | End: 2022-11-13

## 2022-11-10 RX ADMIN — THIAMINE HCL TAB 100 MG 100 MG: 100 TAB at 04:54

## 2022-11-10 RX ADMIN — CYANOCOBALAMIN TAB 500 MCG 1000 MCG: 500 TAB at 04:54

## 2022-11-10 RX ADMIN — HYDROMORPHONE HYDROCHLORIDE 1 MG: 1 INJECTION, SOLUTION INTRAMUSCULAR; INTRAVENOUS; SUBCUTANEOUS at 22:58

## 2022-11-10 RX ADMIN — ALPRAZOLAM 0.25 MG: 0.25 TABLET ORAL at 15:15

## 2022-11-10 RX ADMIN — OXYCODONE HYDROCHLORIDE 15 MG: 10 TABLET ORAL at 14:39

## 2022-11-10 RX ADMIN — FOLIC ACID 1 MG: 1 TABLET ORAL at 04:54

## 2022-11-10 RX ADMIN — DOCUSATE SODIUM 50 MG AND SENNOSIDES 8.6 MG 2 TABLET: 8.6; 5 TABLET, FILM COATED ORAL at 18:18

## 2022-11-10 RX ADMIN — ATORVASTATIN CALCIUM 20 MG: 20 TABLET, FILM COATED ORAL at 18:18

## 2022-11-10 RX ADMIN — GABAPENTIN 200 MG: 100 CAPSULE ORAL at 12:51

## 2022-11-10 RX ADMIN — THIAMINE HCL TAB 100 MG 100 MG: 100 TAB at 18:18

## 2022-11-10 RX ADMIN — MAGNESIUM HYDROXIDE 30 ML: 400 SUSPENSION ORAL at 12:51

## 2022-11-10 RX ADMIN — HYDROMORPHONE HYDROCHLORIDE 1 MG: 1 INJECTION, SOLUTION INTRAMUSCULAR; INTRAVENOUS; SUBCUTANEOUS at 18:18

## 2022-11-10 RX ADMIN — GABAPENTIN 200 MG: 100 CAPSULE ORAL at 04:53

## 2022-11-10 RX ADMIN — HYDROMORPHONE HYDROCHLORIDE 1 MG: 1 INJECTION, SOLUTION INTRAMUSCULAR; INTRAVENOUS; SUBCUTANEOUS at 14:38

## 2022-11-10 RX ADMIN — GABAPENTIN 200 MG: 100 CAPSULE ORAL at 18:18

## 2022-11-10 RX ADMIN — Medication 2000 UNITS: at 04:53

## 2022-11-10 RX ADMIN — OXYBUTYNIN CHLORIDE 5 MG: 5 TABLET ORAL at 14:39

## 2022-11-10 ASSESSMENT — ENCOUNTER SYMPTOMS
HEMOPTYSIS: 0
FALLS: 0
COUGH: 0
VOMITING: 0
ORTHOPNEA: 0
NAUSEA: 0
PALPITATIONS: 0
INSOMNIA: 0
ABDOMINAL PAIN: 0
DIZZINESS: 0
SHORTNESS OF BREATH: 0
FEVER: 0
NERVOUS/ANXIOUS: 0
CONSTIPATION: 0
DIARRHEA: 0
FLANK PAIN: 0
MYALGIAS: 0
SORE THROAT: 0
HEADACHES: 0
FOCAL WEAKNESS: 0
BLOOD IN STOOL: 0
CHILLS: 0
WEAKNESS: 0

## 2022-11-10 ASSESSMENT — PAIN DESCRIPTION - PAIN TYPE
TYPE: ACUTE PAIN
TYPE: ACUTE PAIN
TYPE: CHRONIC PAIN
TYPE: ACUTE PAIN

## 2022-11-10 ASSESSMENT — LIFESTYLE VARIABLES: SUBSTANCE_ABUSE: 0

## 2022-11-10 NOTE — CARE PLAN
The patient is Watcher - Medium risk of patient condition declining or worsening    Shift Goals  Clinical Goals: CBI Managment, Surgical Intervention  Patient Goals: Patient comfort  Family Goals: N/A    Progress made toward(s) clinical / shift goals:    Problem: Fluid Volume  Goal: Fluid volume balance will be maintained  Outcome: Progressing     Problem: Respiratory  Goal: Patient will achieve/maintain optimum respiratory ventilation and gas exchange  Outcome: Progressing     Problem: Physical Regulation  Goal: Signs and symptoms of infection will decrease  Outcome: Progressing       Patient is not progressing towards the following goals:

## 2022-11-10 NOTE — PROGRESS NOTES
[facility] Medicine Daily Progress Note    Date of Service  11/10/2022    Chief Complaint  Gregg Martinez is a 72 y.o. male admitted 10/26/2022 with hematuria    Hospital Course  72-year-old male with past medical history of DL D and asthma presenting with hematuria.  He was admitted for acute blood loss anemia requiring 2 units of PRBC transfusion.  CT abdomen pelvis showed bladder wall mass suspicious for neoplasm.  Urology was consulted and patient for TURBT on 10/27 which showed multifocal bladder tumors which were all resected. He continued to have hematuria which improved with CBI however he did require additional blood transfusion. Pathology showed low grade urothelial carcinoma. CT chest with contrast without evidence of lung involvement. Bone scan negative for mets. IR bone biopsy 10/31 with no evidence of malignancy.      Is remained in the hospital given inability to discontinue CBI given hematuria and clots and intermittently worsening hemoglobin and hematocrit.  Urology planning on surgical intervention.    Interval Problem Update  11/9/2022: AAOx4, no acute overnight events. Patient with gross hematuria via cuellar. Seen by urology this morning- no surgical interventions. Per urology, wean CBI as tolerated for light pink/clear urine. Monitor h/h and plan for surgery if h/h worsens or persistent. Patient states he was previously seen by [facility] in the past. Per records this was for thrombocytosis. He states that he lives in [place], [place] but can take senior bus to [place] for appointments. Patient to follow up with oncology OP.     11/10/2022: Patient AAOx4.  Eager for discharge.  Hemoglobin stable.  No hematuria noted via Cuellar.  Patient seen by urology.  CBI clamped and will monitor for gross hematuria and if no worsening, will DC CBI.  Per urology, patient for discharge and follow-up in their clinic in 2 weeks for Cuellar removal.  No worsening of hematuria, anticipate possible discharged home  tomorrow.      I have discussed this patient's plan of care and discharge plan at IDT rounds today with Case Management, Nursing, Nursing leadership, and other members of the IDT team.    Consultants/Specialty  urology    Code Status  Full Code    Disposition  Patient is not medically cleared for discharge.   Anticipate discharge to to home with close outpatient follow-up.  I have placed the appropriate orders for post-discharge needs.    Review of Systems  Review of Systems   Constitutional:  Negative for chills, fever and malaise/fatigue.   HENT:  Negative for congestion and sore throat.    Respiratory:  Negative for cough, hemoptysis and shortness of breath.    Cardiovascular:  Negative for chest pain, palpitations and orthopnea.   Gastrointestinal:  Negative for abdominal pain, blood in stool, constipation, diarrhea, nausea and vomiting.   Genitourinary:  Positive for hematuria. Negative for flank pain.   Musculoskeletal:  Negative for falls and myalgias.   Neurological:  Negative for dizziness, focal weakness, weakness and headaches.   Psychiatric/Behavioral:  Negative for substance abuse. The patient is not nervous/anxious and does not have insomnia.    All other systems reviewed and are negative.     Physical Exam  Temp:  [36.6 °C (97.8 °F)-37.4 °C (99.3 °F)] 36.8 °C (98.3 °F)  Pulse:  [85-88] 85  Resp:  [16-19] 16  BP: (103-124)/(55-66) 103/65  SpO2:  [93 %-98 %] 98 %    Physical Exam  Vitals and nursing note reviewed.   Constitutional:       General: He is not in acute distress.     Appearance: Normal appearance. He is not ill-appearing or toxic-appearing.   HENT:      Head: Normocephalic.      Nose: Nose normal.      Mouth/Throat:      Mouth: Mucous membranes are moist.      Pharynx: Oropharynx is clear. No oropharyngeal exudate.   Eyes:      General: No scleral icterus.     Extraocular Movements: Extraocular movements intact.      Conjunctiva/sclera: Conjunctivae normal.   Cardiovascular:      Rate and  Rhythm: Normal rate and regular rhythm.      Pulses: Normal pulses.      Heart sounds: Normal heart sounds. No murmur heard.    No gallop.   Pulmonary:      Effort: Pulmonary effort is normal. No respiratory distress.      Breath sounds: Normal breath sounds. No wheezing or rhonchi.   Chest:      Chest wall: No tenderness.   Abdominal:      General: Abdomen is flat. Bowel sounds are normal. There is no distension.      Palpations: Abdomen is soft.      Tenderness: There is no abdominal tenderness. There is no guarding.   Genitourinary:     Comments: Arboleda catheter in place with gross hematuria noted  Musculoskeletal:      Right lower leg: No edema.      Left lower leg: No edema.   Skin:     General: Skin is warm and dry.      Capillary Refill: Capillary refill takes less than 2 seconds.      Coloration: Skin is not jaundiced.      Findings: No bruising.   Neurological:      General: No focal deficit present.      Mental Status: He is alert and oriented to person, place, and time. Mental status is at baseline.      Motor: No weakness.   Psychiatric:         Mood and Affect: Mood normal.         Behavior: Behavior normal.         Thought Content: Thought content normal.         Judgment: Judgment normal.       Fluids    Intake/Output Summary (Last 24 hours) at 11/10/2022 1443  Last data filed at 11/10/2022 0600  Gross per 24 hour   Intake --   Output 1250 ml   Net -1250 ml         Laboratory  Recent Labs     11/08/22  0322 11/08/22  1507 11/09/22  0309 11/10/22  0153   WBC 15.0*  --  15.3* 15.2*   RBC 2.92*  --  2.89* 2.92*   HEMOGLOBIN 7.6* 7.4* 7.4* 7.6*   HEMATOCRIT 24.8* 23.9* 24.7* 24.9*   MCV 84.9  --  85.5 85.3   MCH 26.0*  --  25.6* 26.0*   MCHC 30.6*  --  30.0* 30.5*   RDW 65.7*  --  67.1* 67.2*   PLATELETCT 60*  --  80* 71*       Recent Labs     11/08/22  0322 11/09/22  0309 11/10/22  0153   SODIUM 135 133* 132*   POTASSIUM 4.8 4.4 4.4   CHLORIDE 98 97 97   CO2 27 28 27   GLUCOSE 103* 98 156*   BUN 14 15 20    CREATININE 0.71 0.86 1.00   CALCIUM 8.7 8.7 8.4*                     Imaging  CT-ABDOMEN-PELVIS W/O   Final Result      1.  Interval hemorrhage within the partially decompressed urinary bladder. Previously visualized posterior left bladder mass is no longer seen presumably this is due to interval resection      2.  New mild left hydroureteronephrosis with moderate perinephric fat stranding. This is concerning for obstruction perhaps related to hematoma, recent instrumentation or pyelonephritis/ureterectasis.      3.  Increasing colonic stool volume with similar moderate diverticulosis of the colon but no evidence of diverticulitis      4.  Marked splenomegaly      5.  New small effusions with some lower lung zone groundglass suspicious for interstitial edema/mild congestive failure. There is stable mild pericardial fluid      6.  Persistent inhomogeneous medullary spaces with generalized increased density. Recent bone scan showed no focal mass but this could reflect underlying myelofibrosis or metastases with false-negative bone scan      CT-NEEDLE BX-DEEP BONE   Final Result      1.  Successful CT-guided core biopsy of sclerotic lesion in the posterior aspect of left inferior pubic ramus.      NM-BONE/JOINT SCAN WHOLE BODY   Final Result      No evidence of bony metastatic disease      CT-CHEST (THORAX) WITH   Final Result         1.  Emphysema   2.  Hazy bilateral upper lobe opacities with slight intralobular septal thickening, could represent component of mild pulmonary edema   3.  Linear densities the bilateral lung bases favors scarring or atelectasis.   4.  Atherosclerosis and atherosclerotic coronary artery disease   5.  Hepatomegaly with irregular hepatic contour favoring changes of cirrhosis.   6.  Subcentimeter hepatic lobe lesions, could represent small cyst or hemangioma, otherwise indeterminate.   7.  Mild mediastinal adenopathy, workup and evaluation for causes of adenopathy recommended as clinically  appropriate.   8.  Splenomegaly      DX-CHEST-PORTABLE (1 VIEW)   Final Result         1.  No acute cardiopulmonary disease.   2.  Atherosclerosis      CT-ABDOMEN-PELVIS WITH   Final Result      1.  Enhancing 2.5 x 2.3 cm bladder wall mass suspicious for neoplasm.   2.  Heterogeneous sclerotic change of the pelvis and proximal femurs suspicious for metastatic disease or other infiltrating marrow process.   3.  Mild cardiomegaly.   4.  Hepatosplenomegaly.   5.  Small subcentimeter hepatic hypodensities which could represent cysts or hemangiomas but are nonspecific. If indicated, these findings could be further evaluated with dedicated liver protocol CT or MRI or ultrasound on a nonemergent basis.   6.  Colonic diverticulosis without evidence of diverticulitis.   7.  Atherosclerosis.             Assessment/Plan  * Urothelial carcinoma of bladder without invasion of muscle (HCC)- (present on admission)  Assessment & Plan  CT AP: Notable enhancing 2.5 x 2.3 cm bladder wall mass suspicious for neoplasms  s/p TURBT on 10/27 with multiple tumors noted  Pathology showing low-grade papillary urothelial carcinoma noninvasive with muscularis propria uninvolved by tumor  CT chest negative for metastasis  Bone scan without any evidence of metastasis  IR guided bone biopsy on 10/31 negative for malignancy    11/2: S/p Cystoscopy with clot evacuation, fulguration, and TUBRT  11/3: Attempt to wean CBI   11/5: CBI discontinued, TOVR   11/6: Hemoglobin drop of 1 g and recurrence of hematuria.  CBI restarted  11/7: CBI running, hand irrigated by urology.  Still with leaking around catheter tip, urine culture NGTD  11/8: CBI running, hand irrigated by urology who deferred surgical intervention with cauterization until tomorrow with close monitoring of H&H, 7.6 this morning  11/9: Seen by urology- wean CBI as tolerated. Continue to trend H/h. Transfuse PRN. Follow up with urology and oncology OP  11/10: Seen and cleared by urology. Per  their recs:   Clamp CBI. If hematuria not worsening, can d/c CBI.   They will arrange follow up with their office next week for voiding trial to have cuellar removed      Slow transit constipation- (present on admission)  Assessment & Plan  Resolved.   Aggressive bowel protocol  Dulcolax suppository    Hypovolemia  Assessment & Plan  Due to acute blood loss anemia    Hematuria- (present on admission)  Assessment & Plan  Likely secondary to above    Iron deficiency anemia- (present on admission)  Assessment & Plan  S/p IV iron infusion repletion    Acute blood loss anemia- (present on admission)  Assessment & Plan  Secondary to hematuria.   Trend HH, transfuse for Hgb<7 or hemodynamic instability  1 unit of PRBCs ordered on 11/4/2022 PRBC-fifth unit so far this admit  Continue to monitor    11/10: stable at 7.6. clamping CBI and monitoring for worsening hematuria.     Obesity (BMI 30-39.9)- (present on admission)  Assessment & Plan  Diet and lifestyle modification  Body mass index is 30.54 kg/m².    Leukocytosis- (present on admission)  Assessment & Plan  Likely reactive in setting of malignancy and blood loss anemia  11/9: stable, continue to monitor  11/10: stable.     Dyslipidemia- (present on admission)  Assessment & Plan  Continue atorvastatin    COPD (chronic obstructive pulmonary disease) (HCC)- (present on admission)  Assessment & Plan  Not in acute exacerbation  PRN duonebs         VTE prophylaxis: SCDs/TEDs and pharmacologic prophylaxis contraindicated due to hematuria    I have performed a physical exam and reviewed and updated ROS and Plan today (11/10/2022). In review of yesterday's note (11/9/2022), there are no changes except as documented above.

## 2022-11-10 NOTE — PROGRESS NOTES
"Urology Progress Note    S/p TURBT 10/27/2022.  S/p cystoscopy, clot evacuation, fulguration and TURBT 11/03/2022    S:    patient seen and examined.     11/10. H/H slightly improve. Patient sitting on edge of bed. Getting kiran to go home. Feels well. Has not yet ate today. Still on low flow CBI, have been unable to wean. Light cherry red urine in bag. WBC stable at 15.2.     11/09. H/H stable overnight. CBI on low flow, watermelon urine today--improved from yesterday. Patient reports less leakage around cuellar.Currently NPO in case of OR fulguration. Will take off NPO due to improvement overnight.     11/08. Patient still with intermittent leakage around catheter. Unfortunately, unable to wean CBI overnight and now with slightly darker hematuria, cherry red on low flow CBI. Hand irrigated at bedside without clots. Restarted CBI on moderate flow. Cuellar to remain for now. H/H took dip, now 7.6/24.2. AFVSS.     11/07. Successful TOV yesterday after CBI discontinued. Later with worsening hematuria and clots. 3 way cuellar replaced and restarted on CBI. Weaning this morning and watermelon-colored with low flow CBI. Hand irrigated at bedside, no clots. H/H improved today 8.5/27.3. Patient feels well. Tolerating diet. No pain or dysuria. He is having occasional leakage around the catheter, reports sensation of urgency/pain with this.       O:   /65   Pulse 85   Temp 36.8 °C (98.3 °F) (Temporal)   Resp 16   Ht 1.727 m (5' 8\")   Wt 77.8 kg (171 lb 8.3 oz)   SpO2 98%   Recent Labs     11/08/22  0322 11/09/22  0309 11/10/22  0153   SODIUM 135 133* 132*   POTASSIUM 4.8 4.4 4.4   CHLORIDE 98 97 97   CO2 27 28 27   GLUCOSE 103* 98 156*   BUN 14 15 20   CREATININE 0.71 0.86 1.00   CALCIUM 8.7 8.7 8.4*     Recent Labs     11/08/22  0322 11/08/22  1507 11/09/22  0309 11/10/22  0153   WBC 15.0*  --  15.3* 15.2*   RBC 2.92*  --  2.89* 2.92*   HEMOGLOBIN 7.6* 7.4* 7.4* 7.6*   HEMATOCRIT 24.8* 23.9* 24.7* 24.9*   MCV 84.9  " --  85.5 85.3   MCH 26.0*  --  25.6* 26.0*   MCHC 30.6*  --  30.0* 30.5*   RDW 65.7*  --  67.1* 67.2*   PLATELETCT 60*  --  80* 71*         Intake/Output Summary (Last 24 hours) at 10/31/2022 1207  Last data filed at 10/31/2022 1100  Gross per 24 hour   Intake 1121.25 ml   Output 2275 ml   Net -1153.75 ml       Exam:  Gen: Alert, NAD   Abd: soft, flat, nondistended, no TTP  : Uncircumcised, 3 way cuellar in place on low flow CBI with light cherry urine draining in bag.     A/P:    Active Hospital Problems    Diagnosis     Slow transit constipation [K59.01]     Hypovolemia [E86.1]     Acute blood loss anemia [D62]     Urothelial carcinoma of bladder without invasion of muscle (HCC) [C67.9]     Iron deficiency anemia [D50.9]     Hematuria [R31.9]     Obesity (BMI 30-39.9) [E66.9]     Leukocytosis [D72.829]     Dyslipidemia [E78.5]     COPD (chronic obstructive pulmonary disease) (HCC) [J44.9]        Plan:  - Clamp CBI. If hematuria not worsening, can d/c CBI.   -Will arrange follow up with our office next week for voiding trial to have cuellar removed  -From urology standpoint, patient safe for discharge  -Urology to follow until discharged    Case discussed with Urology, Dr. Arizmendi, who has directed this plan of care.     QUINN PerazaC  [facility] [place]

## 2022-11-10 NOTE — DISCHARGE PLANNING
Case Management Discharge Planning    Admission Date: 10/26/2022  GMLOS: 3.5  ALOS: 15    6-Clicks ADL Score: 24  6-Clicks Mobility Score: 19      Anticipated Discharge Dispo: Discharge Disposition: Discharged to home/self care (01)  Discharge Address: [address]  Discharge Contact Phone Number: [phone]    DME Needed: No    Action(s) Taken: Updated Provider/Nurse on Discharge Plan    Escalations Completed: None    Medically Clear: No    Next Steps: Patient discussed during morning IDT rounds with team today. Patient continues to have CBI. Urology wants patient to follow up with their office after discharge to possibly discontinue cuellar catheter after voiding trial. Patient is not medically cleared for discharge home at this time. CM will continue to follow for dc needs.     Barriers to Discharge: Medical clearance

## 2022-11-10 NOTE — CARE PLAN
The patient is Stable - Low risk of patient condition declining or worsening    Shift Goals  Clinical Goals: CBI Management  Patient Goals: Discharge  Family Goals: N/A    Progress made toward(s) clinical / shift goals:      Problem: Hemodynamics  Goal: Patient's hemodynamics, fluid balance and neurologic status will be stable or improve  Outcome: Progressing     Problem: Urinary - Renal Perfusion  Goal: Ability to achieve and maintain adequate renal perfusion and functioning will improve  Outcome: Progressing       Patient is not progressing towards the following goals:

## 2022-11-10 NOTE — PROGRESS NOTES
"Assumed care of pt  at 1900. Report received from Day RN. Pt is A&O x 4. Patient denies pain at this time. Pt stated that he had a bowel movement today and that \"it was like diarrhea\", referring to his bowel movement. Bed in lowest locked position, call light within reach, hourly rounding in place. Labs reviewed, orders reviewed, communication board updated.     "

## 2022-11-11 LAB
ANION GAP SERPL CALC-SCNC: 10 MMOL/L (ref 7–16)
BUN SERPL-MCNC: 21 MG/DL (ref 8–22)
CALCIUM SERPL-MCNC: 9.1 MG/DL (ref 8.5–10.5)
CHLORIDE SERPL-SCNC: 96 MMOL/L (ref 96–112)
CO2 SERPL-SCNC: 26 MMOL/L (ref 20–33)
CREAT SERPL-MCNC: 1.32 MG/DL (ref 0.5–1.4)
ERYTHROCYTE [DISTWIDTH] IN BLOOD BY AUTOMATED COUNT: 65.5 FL (ref 35.9–50)
GFR SERPLBLD CREATININE-BSD FMLA CKD-EPI: 58 ML/MIN/1.73 M 2
GLUCOSE SERPL-MCNC: 117 MG/DL (ref 65–99)
HCT VFR BLD AUTO: 25.7 % (ref 42–52)
HGB BLD-MCNC: 7.8 G/DL (ref 14–18)
MCH RBC QN AUTO: 25.8 PG (ref 27–33)
MCHC RBC AUTO-ENTMCNC: 30.4 G/DL (ref 33.7–35.3)
MCV RBC AUTO: 85.1 FL (ref 81.4–97.8)
PLATELET # BLD AUTO: 60 K/UL (ref 164–446)
POTASSIUM SERPL-SCNC: 5.5 MMOL/L (ref 3.6–5.5)
PROCALCITONIN SERPL-MCNC: 1.64 NG/ML
RBC # BLD AUTO: 3.02 M/UL (ref 4.7–6.1)
SODIUM SERPL-SCNC: 132 MMOL/L (ref 135–145)
WBC # BLD AUTO: 19.8 K/UL (ref 4.8–10.8)

## 2022-11-11 PROCEDURE — 80048 BASIC METABOLIC PNL TOTAL CA: CPT

## 2022-11-11 PROCEDURE — 700102 HCHG RX REV CODE 250 W/ 637 OVERRIDE(OP): Performed by: STUDENT IN AN ORGANIZED HEALTH CARE EDUCATION/TRAINING PROGRAM

## 2022-11-11 PROCEDURE — A9270 NON-COVERED ITEM OR SERVICE: HCPCS | Performed by: STUDENT IN AN ORGANIZED HEALTH CARE EDUCATION/TRAINING PROGRAM

## 2022-11-11 PROCEDURE — 85027 COMPLETE CBC AUTOMATED: CPT

## 2022-11-11 PROCEDURE — 770006 HCHG ROOM/CARE - MED/SURG/GYN SEMI*

## 2022-11-11 PROCEDURE — 84145 PROCALCITONIN (PCT): CPT

## 2022-11-11 PROCEDURE — 700102 HCHG RX REV CODE 250 W/ 637 OVERRIDE(OP): Performed by: HOSPITALIST

## 2022-11-11 PROCEDURE — 700102 HCHG RX REV CODE 250 W/ 637 OVERRIDE(OP): Performed by: NURSE PRACTITIONER

## 2022-11-11 PROCEDURE — A9270 NON-COVERED ITEM OR SERVICE: HCPCS | Performed by: HOSPITALIST

## 2022-11-11 PROCEDURE — 700101 HCHG RX REV CODE 250: Performed by: NURSE PRACTITIONER

## 2022-11-11 PROCEDURE — 36415 COLL VENOUS BLD VENIPUNCTURE: CPT

## 2022-11-11 PROCEDURE — A9270 NON-COVERED ITEM OR SERVICE: HCPCS | Performed by: NURSE PRACTITIONER

## 2022-11-11 PROCEDURE — 700111 HCHG RX REV CODE 636 W/ 250 OVERRIDE (IP): Performed by: HOSPITALIST

## 2022-11-11 PROCEDURE — 99232 SBSQ HOSP IP/OBS MODERATE 35: CPT | Performed by: NURSE PRACTITIONER

## 2022-11-11 RX ADMIN — OXYBUTYNIN CHLORIDE 5 MG: 5 TABLET ORAL at 10:27

## 2022-11-11 RX ADMIN — HYDROMORPHONE HYDROCHLORIDE 1 MG: 1 INJECTION, SOLUTION INTRAMUSCULAR; INTRAVENOUS; SUBCUTANEOUS at 04:21

## 2022-11-11 RX ADMIN — GABAPENTIN 200 MG: 100 CAPSULE ORAL at 11:47

## 2022-11-11 RX ADMIN — CYANOCOBALAMIN TAB 500 MCG 1000 MCG: 500 TAB at 05:25

## 2022-11-11 RX ADMIN — Medication 2000 UNITS: at 05:25

## 2022-11-11 RX ADMIN — MAGNESIUM HYDROXIDE 30 ML: 400 SUSPENSION ORAL at 09:13

## 2022-11-11 RX ADMIN — HYDROMORPHONE HYDROCHLORIDE 1 MG: 1 INJECTION, SOLUTION INTRAMUSCULAR; INTRAVENOUS; SUBCUTANEOUS at 21:28

## 2022-11-11 RX ADMIN — ATORVASTATIN CALCIUM 20 MG: 20 TABLET, FILM COATED ORAL at 17:30

## 2022-11-11 RX ADMIN — OXYCODONE HYDROCHLORIDE 15 MG: 10 TABLET ORAL at 10:28

## 2022-11-11 RX ADMIN — GABAPENTIN 200 MG: 100 CAPSULE ORAL at 05:25

## 2022-11-11 RX ADMIN — POLYETHYLENE GLYCOL 3350 1 PACKET: 17 POWDER, FOR SOLUTION ORAL at 11:48

## 2022-11-11 RX ADMIN — THIAMINE HCL TAB 100 MG 100 MG: 100 TAB at 05:25

## 2022-11-11 RX ADMIN — HYDROMORPHONE HYDROCHLORIDE 1 MG: 1 INJECTION, SOLUTION INTRAMUSCULAR; INTRAVENOUS; SUBCUTANEOUS at 16:52

## 2022-11-11 RX ADMIN — THIAMINE HCL TAB 100 MG 100 MG: 100 TAB at 17:30

## 2022-11-11 RX ADMIN — OXYCODONE HYDROCHLORIDE 15 MG: 10 TABLET ORAL at 16:41

## 2022-11-11 RX ADMIN — GABAPENTIN 200 MG: 100 CAPSULE ORAL at 17:30

## 2022-11-11 RX ADMIN — DOCUSATE SODIUM 50 MG AND SENNOSIDES 8.6 MG 2 TABLET: 8.6; 5 TABLET, FILM COATED ORAL at 17:30

## 2022-11-11 RX ADMIN — Medication 30 G: at 12:45

## 2022-11-11 RX ADMIN — HYDROMORPHONE HYDROCHLORIDE 1 MG: 1 INJECTION, SOLUTION INTRAMUSCULAR; INTRAVENOUS; SUBCUTANEOUS at 11:47

## 2022-11-11 RX ADMIN — DOCUSATE SODIUM 50 MG AND SENNOSIDES 8.6 MG 2 TABLET: 8.6; 5 TABLET, FILM COATED ORAL at 05:25

## 2022-11-11 RX ADMIN — FOLIC ACID 1 MG: 1 TABLET ORAL at 05:25

## 2022-11-11 ASSESSMENT — ENCOUNTER SYMPTOMS
DIZZINESS: 0
PALPITATIONS: 0
MYALGIAS: 0
WEAKNESS: 0
BLOOD IN STOOL: 0
COUGH: 0
FLANK PAIN: 0
ORTHOPNEA: 0
HEADACHES: 0
DIARRHEA: 0
ABDOMINAL PAIN: 0
VOMITING: 0
FALLS: 0
NAUSEA: 0
FEVER: 0
CHILLS: 0
CONSTIPATION: 0
SPUTUM PRODUCTION: 0
INSOMNIA: 0
FOCAL WEAKNESS: 0
SHORTNESS OF BREATH: 0
SORE THROAT: 0
NERVOUS/ANXIOUS: 1

## 2022-11-11 ASSESSMENT — PAIN DESCRIPTION - PAIN TYPE
TYPE: ACUTE PAIN

## 2022-11-11 ASSESSMENT — LIFESTYLE VARIABLES: SUBSTANCE_ABUSE: 0

## 2022-11-11 NOTE — CARE PLAN
The patient is Stable - Low risk of patient condition declining or worsening    Shift Goals  Clinical Goals: CBI monitor and comfort  Patient Goals: pain control  Family Goals: N/A    Progress made toward(s) clinical / shift goals:  Pt medicated per MAR PRN orders for pain and able to rest comfortably.    Problem: Knowledge Deficit - Standard  Goal: Patient and family/care givers will demonstrate understanding of plan of care, disease process/condition, diagnostic tests and medications  Outcome: Progressing     Problem: Pain - Standard  Goal: Alleviation of pain or a reduction in pain to the patient’s comfort goal  Outcome: Progressing     Problem: Fall Risk  Goal: Patient will remain free from falls  Outcome: Progressing       Patient is not progressing towards the following goals:

## 2022-11-11 NOTE — PROGRESS NOTES
Assumed care of patient 0700. Received Report from [facility] nurse. Patient A&Ox 4 , Reporting a pain level of 3. Call light within reach, belongings within reach, Fall precautions in place and bed in lowest position. Patient does not have any other needs at this time.   Discussed plan of care with patient for the day.

## 2022-11-11 NOTE — PROGRESS NOTES
[facility] Medicine Daily Progress Note    Date of Service  11/11/2022    Chief Complaint  Gregg Martinez is a 72 y.o. male admitted 10/26/2022 with hematuria    Hospital Course  72-year-old male with past medical history of DL D and asthma presenting with hematuria.  He was admitted for acute blood loss anemia requiring 2 units of PRBC transfusion.  CT abdomen pelvis showed bladder wall mass suspicious for neoplasm.  Urology was consulted and patient for TURBT on 10/27 which showed multifocal bladder tumors which were all resected. He continued to have hematuria which improved with CBI however he did require additional blood transfusion. Pathology showed low grade urothelial carcinoma. CT chest with contrast without evidence of lung involvement. Bone scan negative for mets. IR bone biopsy 10/31 with no evidence of malignancy.      Is remained in the hospital given inability to discontinue CBI given hematuria and clots and intermittently worsening hemoglobin and hematocrit.  Urology planning on surgical intervention.    Interval Problem Update  11/9/2022: AAOx4, no acute overnight events. Patient with gross hematuria via cuellar. Seen by urology this morning- no surgical interventions. Per urology, wean CBI as tolerated for light pink/clear urine. Monitor h/h and plan for surgery if h/h worsens or persistent. Patient states he was previously seen by [facility] in the past. Per records this was for thrombocytosis. He states that he lives in [place], [place] but can take senior bus to [place] for appointments. Patient to follow up with oncology OP.     11/10/2022: Patient AAOx4.  Eager for discharge.  Hemoglobin stable.  No hematuria noted via Cuellar.  Patient seen by urology.  CBI clamped and will monitor for gross hematuria and if no worsening, will DC CBI.  Per urology, patient for discharge and follow-up in their clinic in 2 weeks for Cuelalr removal.  No worsening of hematuria, anticipate possible discharged home  tomorrow.    11/11/2022: Patient resting quietly, arouses appropriately. Patient failed clamping trial.  Deep red gross hematuria with trial. CBI in place. H/h stable, slowly improving.     I have discussed this patient's plan of care and discharge plan at IDT rounds today with Case Management, Nursing, Nursing leadership, and other members of the IDT team.    Consultants/Specialty  urology    Code Status  Full Code    Disposition  Patient is not medically cleared for discharge.   Anticipate discharge to to home with close outpatient follow-up.  I have placed the appropriate orders for post-discharge needs.    Review of Systems  Review of Systems   Constitutional:  Negative for chills, fever and malaise/fatigue.   HENT:  Negative for congestion and sore throat.    Respiratory:  Negative for cough, sputum production and shortness of breath.    Cardiovascular:  Negative for chest pain, palpitations and orthopnea.   Gastrointestinal:  Negative for abdominal pain, blood in stool, constipation, diarrhea, nausea and vomiting.   Genitourinary:  Positive for hematuria. Negative for dysuria and flank pain.   Musculoskeletal:  Negative for falls and myalgias.   Neurological:  Negative for dizziness, focal weakness, weakness and headaches.   Psychiatric/Behavioral:  Negative for substance abuse. The patient is nervous/anxious (eager for discharge). The patient does not have insomnia.    All other systems reviewed and are negative.     Physical Exam  Temp:  [36.1 °C (96.9 °F)-37.6 °C (99.6 °F)] 36.1 °C (96.9 °F)  Pulse:  [76-96] 76  Resp:  [17-18] 18  BP: (124-133)/(69-78) 127/72  SpO2:  [92 %-100 %] 100 %    Physical Exam  Vitals and nursing note reviewed.   Constitutional:       General: He is not in acute distress.     Appearance: Normal appearance. He is not ill-appearing or toxic-appearing.   HENT:      Head: Normocephalic.      Nose: Nose normal.      Mouth/Throat:      Mouth: Mucous membranes are moist.      Pharynx:  Oropharynx is clear. No oropharyngeal exudate.   Eyes:      General: No scleral icterus.     Extraocular Movements: Extraocular movements intact.      Conjunctiva/sclera: Conjunctivae normal.   Cardiovascular:      Rate and Rhythm: Normal rate and regular rhythm.      Pulses: Normal pulses.      Heart sounds: Normal heart sounds. No murmur heard.    No gallop.   Pulmonary:      Effort: Pulmonary effort is normal. No respiratory distress.      Breath sounds: Normal breath sounds. No wheezing.   Chest:      Chest wall: No tenderness.   Abdominal:      General: Abdomen is flat. Bowel sounds are normal. There is no distension.      Palpations: Abdomen is soft.      Tenderness: There is no abdominal tenderness. There is no guarding.   Genitourinary:     Comments: Arboleda catheter with CBI in place. Deep red gross hematuria seen  Musculoskeletal:      Right lower leg: No edema.      Left lower leg: No edema.   Skin:     General: Skin is warm and dry.      Capillary Refill: Capillary refill takes less than 2 seconds.      Coloration: Skin is not jaundiced.      Findings: No bruising.   Neurological:      General: No focal deficit present.      Mental Status: He is alert and oriented to person, place, and time. Mental status is at baseline.      Motor: No weakness.   Psychiatric:         Mood and Affect: Mood normal.         Behavior: Behavior normal.         Thought Content: Thought content normal.         Judgment: Judgment normal.       Fluids    Intake/Output Summary (Last 24 hours) at 11/11/2022 1030  Last data filed at 11/11/2022 0636  Gross per 24 hour   Intake 240 ml   Output 4475 ml   Net -4235 ml         Laboratory  Recent Labs     11/09/22  0309 11/10/22  0153 11/11/22  0303   WBC 15.3* 15.2* 19.8*   RBC 2.89* 2.92* 3.02*   HEMOGLOBIN 7.4* 7.6* 7.8*   HEMATOCRIT 24.7* 24.9* 25.7*   MCV 85.5 85.3 85.1   MCH 25.6* 26.0* 25.8*   MCHC 30.0* 30.5* 30.4*   RDW 67.1* 67.2* 65.5*   PLATELETCT 80* 71* 60*       Recent Labs      11/09/22  0309 11/10/22  0153 11/11/22  0303   SODIUM 133* 132* 132*   POTASSIUM 4.4 4.4 5.5   CHLORIDE 97 97 96   CO2 28 27 26   GLUCOSE 98 156* 117*   BUN 15 20 21   CREATININE 0.86 1.00 1.32   CALCIUM 8.7 8.4* 9.1                     Imaging  CT-ABDOMEN-PELVIS W/O   Final Result      1.  Interval hemorrhage within the partially decompressed urinary bladder. Previously visualized posterior left bladder mass is no longer seen presumably this is due to interval resection      2.  New mild left hydroureteronephrosis with moderate perinephric fat stranding. This is concerning for obstruction perhaps related to hematoma, recent instrumentation or pyelonephritis/ureterectasis.      3.  Increasing colonic stool volume with similar moderate diverticulosis of the colon but no evidence of diverticulitis      4.  Marked splenomegaly      5.  New small effusions with some lower lung zone groundglass suspicious for interstitial edema/mild congestive failure. There is stable mild pericardial fluid      6.  Persistent inhomogeneous medullary spaces with generalized increased density. Recent bone scan showed no focal mass but this could reflect underlying myelofibrosis or metastases with false-negative bone scan      CT-NEEDLE BX-DEEP BONE   Final Result      1.  Successful CT-guided core biopsy of sclerotic lesion in the posterior aspect of left inferior pubic ramus.      NM-BONE/JOINT SCAN WHOLE BODY   Final Result      No evidence of bony metastatic disease      CT-CHEST (THORAX) WITH   Final Result         1.  Emphysema   2.  Hazy bilateral upper lobe opacities with slight intralobular septal thickening, could represent component of mild pulmonary edema   3.  Linear densities the bilateral lung bases favors scarring or atelectasis.   4.  Atherosclerosis and atherosclerotic coronary artery disease   5.  Hepatomegaly with irregular hepatic contour favoring changes of cirrhosis.   6.  Subcentimeter hepatic lobe lesions,  could represent small cyst or hemangioma, otherwise indeterminate.   7.  Mild mediastinal adenopathy, workup and evaluation for causes of adenopathy recommended as clinically appropriate.   8.  Splenomegaly      DX-CHEST-PORTABLE (1 VIEW)   Final Result         1.  No acute cardiopulmonary disease.   2.  Atherosclerosis      CT-ABDOMEN-PELVIS WITH   Final Result      1.  Enhancing 2.5 x 2.3 cm bladder wall mass suspicious for neoplasm.   2.  Heterogeneous sclerotic change of the pelvis and proximal femurs suspicious for metastatic disease or other infiltrating marrow process.   3.  Mild cardiomegaly.   4.  Hepatosplenomegaly.   5.  Small subcentimeter hepatic hypodensities which could represent cysts or hemangiomas but are nonspecific. If indicated, these findings could be further evaluated with dedicated liver protocol CT or MRI or ultrasound on a nonemergent basis.   6.  Colonic diverticulosis without evidence of diverticulitis.   7.  Atherosclerosis.             Assessment/Plan  * Urothelial carcinoma of bladder without invasion of muscle (HCC)- (present on admission)  Assessment & Plan  CT AP: Notable enhancing 2.5 x 2.3 cm bladder wall mass suspicious for neoplasms  s/p TURBT on 10/27 with multiple tumors noted  Pathology showing low-grade papillary urothelial carcinoma noninvasive with muscularis propria uninvolved by tumor  CT chest negative for metastasis  Bone scan without any evidence of metastasis  IR guided bone biopsy on 10/31 negative for malignancy    11/2: S/p Cystoscopy with clot evacuation, fulguration, and TUBRT  11/3: Attempt to wean CBI   11/5: CBI discontinued, TOVR   11/6: Hemoglobin drop of 1 g and recurrence of hematuria.  CBI restarted  11/7: CBI running, hand irrigated by urology.  Still with leaking around catheter tip, urine culture NGTD  11/8: CBI running, hand irrigated by urology who deferred surgical intervention with cauterization until tomorrow with close monitoring of H&H, 7.6  this morning  11/9: Seen by urology- wean CBI as tolerated. Continue to trend H/h. Transfuse PRN. Follow up with urology and oncology OP  11/10: Seen and cleared by urology. Per their recs:   Clamp CBI. If hematuria not worsening, can d/c CBI.   They will arrange follow up with their office next week for voiding trial to have cuellar removed  11/11:  Failed CBI clamping yesterday. Deep red gross hematuria noted. hgb stable. Continue to monitor    Slow transit constipation- (present on admission)  Assessment & Plan  Resolved.   Aggressive bowel protocol  Dulcolax suppository    Hypovolemia  Assessment & Plan  Due to acute blood loss anemia    Hematuria- (present on admission)  Assessment & Plan  Likely secondary to above    Iron deficiency anemia- (present on admission)  Assessment & Plan  S/p IV iron infusion repletion    Acute blood loss anemia- (present on admission)  Assessment & Plan  Secondary to hematuria.   Trend HH, transfuse for Hgb<7 or hemodynamic instability  1 unit of PRBCs ordered on 11/4/2022 PRBC-fifth unit so far this admit  Continue to monitor    11/10: stable at 7.6. clamping CBI and monitoring for worsening hematuria.   11/11: hgb 7.8 today. Failed CBI clamping yesterday. Deep red gross hematuria noted. Continue to monitor    Obesity (BMI 30-39.9)- (present on admission)  Assessment & Plan  Diet and lifestyle modification  Body mass index is 30.54 kg/m².    Leukocytosis- (present on admission)  Assessment & Plan  Likely reactive in setting of malignancy and blood loss anemia  11/9: stable, continue to monitor  11/10: stable.   11/11: increased to 15.2--> 19.8.  Hemoglobin stable, mildly improved. Vitals stable. Procal ordered.       Dyslipidemia- (present on admission)  Assessment & Plan  Continue atorvastatin    COPD (chronic obstructive pulmonary disease) (HCC)- (present on admission)  Assessment & Plan  Not in acute exacerbation  PRN duonebs         VTE prophylaxis: SCDs/TEDs and pharmacologic  prophylaxis contraindicated due to hematuria    I have performed a physical exam and reviewed and updated ROS and Plan today (11/11/2022). In review of yesterday's note (11/10/2022), there are no changes except as documented above.

## 2022-11-11 NOTE — CARE PLAN
Problem: Knowledge Deficit - Standard  Goal: Patient and family/care givers will demonstrate understanding of plan of care, disease process/condition, diagnostic tests and medications  Outcome: Progressing     Problem: Pain - Standard  Goal: Alleviation of pain or a reduction in pain to the patient’s comfort goal  Outcome: Progressing       The patient is Stable - Low risk of patient condition declining or worsening    Shift Goals  Clinical Goals: Monitor labs, VS, CBI, safety and rest  Patient Goals: Comfort and discharge  Family Goals: N/A    Progress made toward(s) clinical / shift goals:  Discussion of plan of care with patient and verbalizes understanding and pain managed with medications PRN.    Patient is not progressing towards the following goals:

## 2022-11-11 NOTE — PROGRESS NOTES
Rec'd report from day shift RN. Assumed pt care. Pt assessment completed and pt is AA&Ox4. Pt complains of pain to left lower back, however declines intervention. CBI in place and pt on 1L NC. All needs met. Bed locked, bed in lowest position, call light and personal belongings within reach and treaded socks in place for safety. Hourly rounding in place.

## 2022-11-11 NOTE — CARE PLAN
Problem: Urinary - Renal Perfusion  Goal: Ability to achieve and maintain adequate renal perfusion and functioning will improve  Outcome: Progressing     Problem: Pain - Standard  Goal: Alleviation of pain or a reduction in pain to the patient’s comfort goal  Outcome: Progressing       The patient is Watcher - Medium risk of patient condition declining or worsening    Shift Goals  Clinical Goals: Monitor VS, labs, CBI, comfort and discharge  Patient Goals: Discharge and feel better  Family Goals: N/A    Progress made toward(s) clinical / shift goals: Patient continues to have bleeding with cuellar and will continue to monitor. Pain medications as ordered,    Patient is not progressing towards the following goals:

## 2022-11-11 NOTE — PROGRESS NOTES
Urology Progress Note    S/p TURBT 10/27/2022.  S/p cystoscopy, clot evacuation, fulguration and TURBT 11/03/2022    S:   : patient seen and examined    11/11. H/H 7.8/25.7. AFVSS. Urine draining dark cherry red on moderate flow CBI. Reports need for hand irrigation due to clots clogging the catheter. Patient feeling well overall, good appetite. *Returned to bedside this PM, CBI is dry. Notified floor RN who will pass the message to Torrie RN - to be re-started urgently. Discussed with patient possible need for OR take back tomorrow as hematuria is not improving. He is agreeable. I will check on patient tomorrow AM to re-assess, for now NPO at midnight.    11/10. H/H slightly improve. Patient sitting on edge of bed. Getting kiran to go home. Feels well. Has not yet eaten today. Still on low flow CBI, have been unable to wean. Light cherry red urine in bag. WBC stable at 15.2.     11/09. H/H stable overnight. CBI on low flow, watermelon urine today--improved from yesterday. Patient reports less leakage around cuellar.Currently NPO in case of OR fulguration. Will take off NPO due to improvement overnight.     11/08. Patient still with intermittent leakage around catheter. Unfortunately, unable to wean CBI overnight and now with slightly darker hematuria, cherry red on low flow CBI. Hand irrigated at bedside without clots. Restarted CBI on moderate flow. Cuellar to remain for now. H/H took dip, now 7.6/24.2. AFVSS.     11/07. Successful TOV yesterday after CBI discontinued. Later with worsening hematuria and clots. 3 way cuellar replaced and restarted on CBI. Weaning this morning and watermelon-colored with low flow CBI. Hand irrigated at bedside, no clots. H/H improved today 8.5/27.3. Patient feels well. Tolerating diet. No pain or dysuria. He is having occasional leakage around the catheter, reports sensation of urgency/pain with this.       O:   /72   Pulse 76   Temp 36.1 °C (96.9 °F) (Temporal)   Resp 18   Ht  "1.727 m (5' 8\")   Wt 77.8 kg (171 lb 8.3 oz)   SpO2 100%   Recent Labs     11/09/22  0309 11/10/22  0153 11/11/22  0303   SODIUM 133* 132* 132*   POTASSIUM 4.4 4.4 5.5   CHLORIDE 97 97 96   CO2 28 27 26   GLUCOSE 98 156* 117*   BUN 15 20 21   CREATININE 0.86 1.00 1.32   CALCIUM 8.7 8.4* 9.1     Recent Labs     11/09/22  0309 11/10/22  0153 11/11/22  0303   WBC 15.3* 15.2* 19.8*   RBC 2.89* 2.92* 3.02*   HEMOGLOBIN 7.4* 7.6* 7.8*   HEMATOCRIT 24.7* 24.9* 25.7*   MCV 85.5 85.3 85.1   MCH 25.6* 26.0* 25.8*   MCHC 30.0* 30.5* 30.4*   RDW 67.1* 67.2* 65.5*   PLATELETCT 80* 71* 60*         Intake/Output Summary (Last 24 hours) at 10/31/2022 1207  Last data filed at 10/31/2022 1100  Gross per 24 hour   Intake 1121.25 ml   Output 2275 ml   Net -1153.75 ml       Exam:  Gen: Alert, NAD   Abd: soft, flat, nondistended, no TTP  : 3- way cuellar in place on moderate flow CBI with dark cherry urine draining in bag, no clots    A/P:    Active Hospital Problems    Diagnosis     Slow transit constipation [K59.01]     Hypovolemia [E86.1]     Acute blood loss anemia [D62]     Urothelial carcinoma of bladder without invasion of muscle (HCC) [C67.9]     Iron deficiency anemia [D50.9]     Hematuria [R31.9]     Obesity (BMI 30-39.9) [E66.9]     Leukocytosis [D72.829]     Dyslipidemia [E78.5]     COPD (chronic obstructive pulmonary disease) (HCC) [J44.9]        Plan:  - Continue CBI for dark hematuria/clots, titrate CBI and hand irrigate PRN  - NPO at midnight tonight for possible cystoscopy/fulguration with Dr. Boyer tomorrow, will assess urine in the AM  -Ultimately planning for follow up with our office next week for voiding trial to have cuellar removed and pathology discussion with Dr. Castellanos  -Urology will continue to follow    Case discussed with patient and urology - Dr. Boyer, who has directed the plan of care.     SHAREE Owen-C  [facility] [place]   "

## 2022-11-12 ENCOUNTER — APPOINTMENT (OUTPATIENT)
Dept: RADIOLOGY | Facility: MEDICAL CENTER | Age: 72
DRG: 668 | End: 2022-11-12
Attending: UROLOGY
Payer: MEDICARE

## 2022-11-12 ENCOUNTER — ANESTHESIA EVENT (OUTPATIENT)
Dept: SURGERY | Facility: MEDICAL CENTER | Age: 72
DRG: 668 | End: 2022-11-12
Payer: MEDICARE

## 2022-11-12 ENCOUNTER — APPOINTMENT (OUTPATIENT)
Dept: RADIOLOGY | Facility: MEDICAL CENTER | Age: 72
DRG: 668 | End: 2022-11-12
Attending: NURSE PRACTITIONER
Payer: MEDICARE

## 2022-11-12 ENCOUNTER — ANESTHESIA (OUTPATIENT)
Dept: SURGERY | Facility: MEDICAL CENTER | Age: 72
DRG: 668 | End: 2022-11-12
Payer: MEDICARE

## 2022-11-12 PROBLEM — J18.9 PNEUMONIA: Status: ACTIVE | Noted: 2022-11-12

## 2022-11-12 LAB
ABO GROUP BLD: ABNORMAL
ANION GAP SERPL CALC-SCNC: 7 MMOL/L (ref 7–16)
ANION GAP SERPL CALC-SCNC: 8 MMOL/L (ref 7–16)
APPEARANCE UR: CLEAR
BACTERIA #/AREA URNS HPF: ABNORMAL /HPF
BARCODED ABORH UBTYP: 5100
BARCODED PRD CODE UBPRD: ABNORMAL
BARCODED UNIT NUM UBUNT: ABNORMAL
BILIRUB UR QL STRIP.AUTO: NEGATIVE
BLD GP AB SCN SERPL QL: ABNORMAL
BUN SERPL-MCNC: 25 MG/DL (ref 8–22)
BUN SERPL-MCNC: 26 MG/DL (ref 8–22)
CALCIUM SERPL-MCNC: 8.8 MG/DL (ref 8.5–10.5)
CALCIUM SERPL-MCNC: 8.8 MG/DL (ref 8.5–10.5)
CHLORIDE SERPL-SCNC: 94 MMOL/L (ref 96–112)
CHLORIDE SERPL-SCNC: 96 MMOL/L (ref 96–112)
CO2 SERPL-SCNC: 28 MMOL/L (ref 20–33)
CO2 SERPL-SCNC: 30 MMOL/L (ref 20–33)
COLOR UR: ABNORMAL
COMPONENT R 8504R: ABNORMAL
CREAT SERPL-MCNC: 1.42 MG/DL (ref 0.5–1.4)
CREAT SERPL-MCNC: 1.55 MG/DL (ref 0.5–1.4)
EPI CELLS #/AREA URNS HPF: ABNORMAL /HPF
ERYTHROCYTE [DISTWIDTH] IN BLOOD BY AUTOMATED COUNT: 62.3 FL (ref 35.9–50)
ERYTHROCYTE [DISTWIDTH] IN BLOOD BY AUTOMATED COUNT: 64.4 FL (ref 35.9–50)
GFR SERPLBLD CREATININE-BSD FMLA CKD-EPI: 48 ML/MIN/1.73 M 2
GFR SERPLBLD CREATININE-BSD FMLA CKD-EPI: 53 ML/MIN/1.73 M 2
GLUCOSE SERPL-MCNC: 109 MG/DL (ref 65–99)
GLUCOSE SERPL-MCNC: 126 MG/DL (ref 65–99)
GLUCOSE UR STRIP.AUTO-MCNC: NEGATIVE MG/DL
HCT VFR BLD AUTO: 22.6 % (ref 42–52)
HCT VFR BLD AUTO: 23.8 % (ref 42–52)
HCT VFR BLD AUTO: 25.7 % (ref 42–52)
HGB BLD-MCNC: 6.9 G/DL (ref 14–18)
HGB BLD-MCNC: 7.5 G/DL (ref 14–18)
HGB BLD-MCNC: 8 G/DL (ref 14–18)
KETONES UR STRIP.AUTO-MCNC: NEGATIVE MG/DL
LACTATE SERPL-SCNC: 0.7 MMOL/L (ref 0.5–2)
LACTATE SERPL-SCNC: 1.1 MMOL/L (ref 0.5–2)
LACTATE SERPL-SCNC: 1.5 MMOL/L (ref 0.5–2)
LACTATE SERPL-SCNC: 1.6 MMOL/L (ref 0.5–2)
LEUKOCYTE ESTERASE UR QL STRIP.AUTO: NEGATIVE
MCH RBC QN AUTO: 25.5 PG (ref 27–33)
MCH RBC QN AUTO: 26.3 PG (ref 27–33)
MCHC RBC AUTO-ENTMCNC: 30.5 G/DL (ref 33.7–35.3)
MCHC RBC AUTO-ENTMCNC: 31.1 G/DL (ref 33.7–35.3)
MCV RBC AUTO: 83.4 FL (ref 81.4–97.8)
MCV RBC AUTO: 84.5 FL (ref 81.4–97.8)
MICRO URNS: ABNORMAL
NITRITE UR QL STRIP.AUTO: NEGATIVE
PH UR STRIP.AUTO: 6 [PH] (ref 5–8)
PLATELET # BLD AUTO: 66 K/UL (ref 164–446)
PLATELET # BLD AUTO: 85 K/UL (ref 164–446)
POTASSIUM SERPL-SCNC: 4.9 MMOL/L (ref 3.6–5.5)
POTASSIUM SERPL-SCNC: 5.4 MMOL/L (ref 3.6–5.5)
PRODUCT TYPE UPROD: ABNORMAL
PROT UR QL STRIP: NEGATIVE MG/DL
RBC # BLD AUTO: 2.71 M/UL (ref 4.7–6.1)
RBC # BLD AUTO: 3.04 M/UL (ref 4.7–6.1)
RBC # URNS HPF: >150 /HPF
RBC UR QL AUTO: ABNORMAL
RH BLD: ABNORMAL
SODIUM SERPL-SCNC: 131 MMOL/L (ref 135–145)
SODIUM SERPL-SCNC: 132 MMOL/L (ref 135–145)
SP GR UR STRIP.AUTO: 1.01
UNIT STATUS USTAT: ABNORMAL
UROBILINOGEN UR STRIP.AUTO-MCNC: 0.2 MG/DL
WBC # BLD AUTO: 14 K/UL (ref 4.8–10.8)
WBC # BLD AUTO: 15.4 K/UL (ref 4.8–10.8)
WBC #/AREA URNS HPF: ABNORMAL /HPF

## 2022-11-12 PROCEDURE — 160028 HCHG SURGERY MINUTES - 1ST 30 MINS LEVEL 3: Performed by: UROLOGY

## 2022-11-12 PROCEDURE — 00916 ANES TRURL PX RESCJ BLEEDING: CPT | Performed by: ANESTHESIOLOGY

## 2022-11-12 PROCEDURE — 0TCB8ZZ EXTIRPATION OF MATTER FROM BLADDER, VIA NATURAL OR ARTIFICIAL OPENING ENDOSCOPIC: ICD-10-PCS | Performed by: UROLOGY

## 2022-11-12 PROCEDURE — 86923 COMPATIBILITY TEST ELECTRIC: CPT

## 2022-11-12 PROCEDURE — 83605 ASSAY OF LACTIC ACID: CPT | Mod: 91

## 2022-11-12 PROCEDURE — A9270 NON-COVERED ITEM OR SERVICE: HCPCS | Performed by: STUDENT IN AN ORGANIZED HEALTH CARE EDUCATION/TRAINING PROGRAM

## 2022-11-12 PROCEDURE — 770006 HCHG ROOM/CARE - MED/SURG/GYN SEMI*

## 2022-11-12 PROCEDURE — 160035 HCHG PACU - 1ST 60 MINS PHASE I: Performed by: UROLOGY

## 2022-11-12 PROCEDURE — 700111 HCHG RX REV CODE 636 W/ 250 OVERRIDE (IP): Performed by: ANESTHESIOLOGY

## 2022-11-12 PROCEDURE — 0T5B8ZZ DESTRUCTION OF BLADDER, VIA NATURAL OR ARTIFICIAL OPENING ENDOSCOPIC: ICD-10-PCS | Performed by: UROLOGY

## 2022-11-12 PROCEDURE — 700102 HCHG RX REV CODE 250 W/ 637 OVERRIDE(OP): Performed by: ANESTHESIOLOGY

## 2022-11-12 PROCEDURE — 700101 HCHG RX REV CODE 250: Performed by: ANESTHESIOLOGY

## 2022-11-12 PROCEDURE — 36415 COLL VENOUS BLD VENIPUNCTURE: CPT

## 2022-11-12 PROCEDURE — A9270 NON-COVERED ITEM OR SERVICE: HCPCS | Performed by: ANESTHESIOLOGY

## 2022-11-12 PROCEDURE — 700102 HCHG RX REV CODE 250 W/ 637 OVERRIDE(OP): Performed by: STUDENT IN AN ORGANIZED HEALTH CARE EDUCATION/TRAINING PROGRAM

## 2022-11-12 PROCEDURE — P9016 RBC LEUKOCYTES REDUCED: HCPCS

## 2022-11-12 PROCEDURE — 160036 HCHG PACU - EA ADDL 30 MINS PHASE I: Performed by: UROLOGY

## 2022-11-12 PROCEDURE — 36430 TRANSFUSION BLD/BLD COMPNT: CPT

## 2022-11-12 PROCEDURE — 700105 HCHG RX REV CODE 258

## 2022-11-12 PROCEDURE — 86900 BLOOD TYPING SEROLOGIC ABO: CPT

## 2022-11-12 PROCEDURE — 160002 HCHG RECOVERY MINUTES (STAT): Performed by: UROLOGY

## 2022-11-12 PROCEDURE — 700111 HCHG RX REV CODE 636 W/ 250 OVERRIDE (IP): Performed by: NURSE PRACTITIONER

## 2022-11-12 PROCEDURE — 87040 BLOOD CULTURE FOR BACTERIA: CPT | Mod: 91

## 2022-11-12 PROCEDURE — 160039 HCHG SURGERY MINUTES - EA ADDL 1 MIN LEVEL 3: Performed by: UROLOGY

## 2022-11-12 PROCEDURE — A9270 NON-COVERED ITEM OR SERVICE: HCPCS | Performed by: UROLOGY

## 2022-11-12 PROCEDURE — 700102 HCHG RX REV CODE 250 W/ 637 OVERRIDE(OP): Performed by: UROLOGY

## 2022-11-12 PROCEDURE — 99100 ANES PT EXTEME AGE<1 YR&>70: CPT | Performed by: ANESTHESIOLOGY

## 2022-11-12 PROCEDURE — 71045 X-RAY EXAM CHEST 1 VIEW: CPT

## 2022-11-12 PROCEDURE — A9270 NON-COVERED ITEM OR SERVICE: HCPCS | Performed by: HOSPITALIST

## 2022-11-12 PROCEDURE — 86850 RBC ANTIBODY SCREEN: CPT

## 2022-11-12 PROCEDURE — 85018 HEMOGLOBIN: CPT

## 2022-11-12 PROCEDURE — 86901 BLOOD TYPING SEROLOGIC RH(D): CPT

## 2022-11-12 PROCEDURE — 160009 HCHG ANES TIME/MIN: Performed by: UROLOGY

## 2022-11-12 PROCEDURE — 85014 HEMATOCRIT: CPT

## 2022-11-12 PROCEDURE — 85027 COMPLETE CBC AUTOMATED: CPT | Mod: 91

## 2022-11-12 PROCEDURE — 81001 URINALYSIS AUTO W/SCOPE: CPT

## 2022-11-12 PROCEDURE — 80048 BASIC METABOLIC PNL TOTAL CA: CPT | Mod: 91

## 2022-11-12 PROCEDURE — 700111 HCHG RX REV CODE 636 W/ 250 OVERRIDE (IP): Performed by: HOSPITALIST

## 2022-11-12 PROCEDURE — 99233 SBSQ HOSP IP/OBS HIGH 50: CPT | Performed by: NURSE PRACTITIONER

## 2022-11-12 PROCEDURE — 160048 HCHG OR STATISTICAL LEVEL 1-5: Performed by: UROLOGY

## 2022-11-12 PROCEDURE — 700102 HCHG RX REV CODE 250 W/ 637 OVERRIDE(OP): Performed by: HOSPITALIST

## 2022-11-12 PROCEDURE — 700105 HCHG RX REV CODE 258: Performed by: NURSE PRACTITIONER

## 2022-11-12 RX ORDER — SODIUM CHLORIDE 9 MG/ML
INJECTION, SOLUTION INTRAVENOUS CONTINUOUS
Status: ACTIVE | OUTPATIENT
Start: 2022-11-12 | End: 2022-11-12

## 2022-11-12 RX ORDER — ONDANSETRON 2 MG/ML
4 INJECTION INTRAMUSCULAR; INTRAVENOUS
Status: DISCONTINUED | OUTPATIENT
Start: 2022-11-12 | End: 2022-11-12 | Stop reason: HOSPADM

## 2022-11-12 RX ORDER — DIPHENHYDRAMINE HYDROCHLORIDE 50 MG/ML
12.5 INJECTION INTRAMUSCULAR; INTRAVENOUS
Status: DISCONTINUED | OUTPATIENT
Start: 2022-11-12 | End: 2022-11-12 | Stop reason: HOSPADM

## 2022-11-12 RX ORDER — ATROPA BELLADONNA AND OPIUM 16.2; 6 MG/1; MG/1
60 SUPPOSITORY RECTAL ONCE
Status: COMPLETED | OUTPATIENT
Start: 2022-11-12 | End: 2022-11-12

## 2022-11-12 RX ORDER — OXYCODONE HCL 5 MG/5 ML
5 SOLUTION, ORAL ORAL
Status: COMPLETED | OUTPATIENT
Start: 2022-11-12 | End: 2022-11-12

## 2022-11-12 RX ORDER — HYDRALAZINE HYDROCHLORIDE 20 MG/ML
5 INJECTION INTRAMUSCULAR; INTRAVENOUS
Status: DISCONTINUED | OUTPATIENT
Start: 2022-11-12 | End: 2022-11-12 | Stop reason: HOSPADM

## 2022-11-12 RX ORDER — OXYCODONE HCL 5 MG/5 ML
10 SOLUTION, ORAL ORAL
Status: COMPLETED | OUTPATIENT
Start: 2022-11-12 | End: 2022-11-12

## 2022-11-12 RX ORDER — ONDANSETRON 2 MG/ML
INJECTION INTRAMUSCULAR; INTRAVENOUS PRN
Status: DISCONTINUED | OUTPATIENT
Start: 2022-11-12 | End: 2022-11-12 | Stop reason: SURG

## 2022-11-12 RX ORDER — DEXAMETHASONE SODIUM PHOSPHATE 4 MG/ML
INJECTION, SOLUTION INTRA-ARTICULAR; INTRALESIONAL; INTRAMUSCULAR; INTRAVENOUS; SOFT TISSUE PRN
Status: DISCONTINUED | OUTPATIENT
Start: 2022-11-12 | End: 2022-11-12 | Stop reason: SURG

## 2022-11-12 RX ORDER — SODIUM CHLORIDE, SODIUM LACTATE, POTASSIUM CHLORIDE, CALCIUM CHLORIDE 600; 310; 30; 20 MG/100ML; MG/100ML; MG/100ML; MG/100ML
INJECTION, SOLUTION INTRAVENOUS CONTINUOUS
Status: DISCONTINUED | OUTPATIENT
Start: 2022-11-12 | End: 2022-11-13

## 2022-11-12 RX ORDER — LIDOCAINE HYDROCHLORIDE 20 MG/ML
INJECTION, SOLUTION EPIDURAL; INFILTRATION; INTRACAUDAL; PERINEURAL PRN
Status: DISCONTINUED | OUTPATIENT
Start: 2022-11-12 | End: 2022-11-12 | Stop reason: SURG

## 2022-11-12 RX ORDER — HYDROMORPHONE HYDROCHLORIDE 1 MG/ML
0.4 INJECTION, SOLUTION INTRAMUSCULAR; INTRAVENOUS; SUBCUTANEOUS
Status: DISCONTINUED | OUTPATIENT
Start: 2022-11-12 | End: 2022-11-12 | Stop reason: HOSPADM

## 2022-11-12 RX ORDER — SODIUM CHLORIDE, SODIUM LACTATE, POTASSIUM CHLORIDE, CALCIUM CHLORIDE 600; 310; 30; 20 MG/100ML; MG/100ML; MG/100ML; MG/100ML
INJECTION, SOLUTION INTRAVENOUS CONTINUOUS
Status: DISCONTINUED | OUTPATIENT
Start: 2022-11-12 | End: 2022-11-12 | Stop reason: HOSPADM

## 2022-11-12 RX ORDER — LABETALOL HYDROCHLORIDE 5 MG/ML
5 INJECTION, SOLUTION INTRAVENOUS
Status: DISCONTINUED | OUTPATIENT
Start: 2022-11-12 | End: 2022-11-12 | Stop reason: HOSPADM

## 2022-11-12 RX ORDER — HYDROMORPHONE HYDROCHLORIDE 1 MG/ML
0.1 INJECTION, SOLUTION INTRAMUSCULAR; INTRAVENOUS; SUBCUTANEOUS
Status: DISCONTINUED | OUTPATIENT
Start: 2022-11-12 | End: 2022-11-12 | Stop reason: HOSPADM

## 2022-11-12 RX ORDER — CEFAZOLIN SODIUM 1 G/3ML
INJECTION, POWDER, FOR SOLUTION INTRAMUSCULAR; INTRAVENOUS PRN
Status: DISCONTINUED | OUTPATIENT
Start: 2022-11-12 | End: 2022-11-12 | Stop reason: SURG

## 2022-11-12 RX ORDER — HALOPERIDOL 5 MG/ML
1 INJECTION INTRAMUSCULAR
Status: DISCONTINUED | OUTPATIENT
Start: 2022-11-12 | End: 2022-11-12 | Stop reason: HOSPADM

## 2022-11-12 RX ORDER — HYDROMORPHONE HYDROCHLORIDE 1 MG/ML
0.2 INJECTION, SOLUTION INTRAMUSCULAR; INTRAVENOUS; SUBCUTANEOUS
Status: DISCONTINUED | OUTPATIENT
Start: 2022-11-12 | End: 2022-11-12 | Stop reason: HOSPADM

## 2022-11-12 RX ADMIN — CEFTRIAXONE SODIUM 2000 MG: 10 INJECTION, POWDER, FOR SOLUTION INTRAVENOUS at 15:58

## 2022-11-12 RX ADMIN — HYDROMORPHONE HYDROCHLORIDE 0.4 MG: 1 INJECTION, SOLUTION INTRAMUSCULAR; INTRAVENOUS; SUBCUTANEOUS at 14:17

## 2022-11-12 RX ADMIN — GABAPENTIN 200 MG: 100 CAPSULE ORAL at 17:38

## 2022-11-12 RX ADMIN — HYDROMORPHONE HYDROCHLORIDE 0.2 MG: 1 INJECTION, SOLUTION INTRAMUSCULAR; INTRAVENOUS; SUBCUTANEOUS at 14:04

## 2022-11-12 RX ADMIN — ATORVASTATIN CALCIUM 20 MG: 20 TABLET, FILM COATED ORAL at 17:38

## 2022-11-12 RX ADMIN — THIAMINE HCL TAB 100 MG 100 MG: 100 TAB at 05:47

## 2022-11-12 RX ADMIN — LIDOCAINE HYDROCHLORIDE 60 MG: 20 INJECTION, SOLUTION EPIDURAL; INFILTRATION; INTRACAUDAL at 11:53

## 2022-11-12 RX ADMIN — CEFAZOLIN 2 G: 330 INJECTION, POWDER, FOR SOLUTION INTRAMUSCULAR; INTRAVENOUS at 11:53

## 2022-11-12 RX ADMIN — OXYCODONE HYDROCHLORIDE 10 MG: 5 SOLUTION ORAL at 14:27

## 2022-11-12 RX ADMIN — FOLIC ACID 1 MG: 1 TABLET ORAL at 05:47

## 2022-11-12 RX ADMIN — SODIUM CHLORIDE: 9 INJECTION, SOLUTION INTRAVENOUS at 04:15

## 2022-11-12 RX ADMIN — GABAPENTIN 200 MG: 100 CAPSULE ORAL at 05:47

## 2022-11-12 RX ADMIN — DEXAMETHASONE SODIUM PHOSPHATE 4 MG: 4 INJECTION, SOLUTION INTRA-ARTICULAR; INTRALESIONAL; INTRAMUSCULAR; INTRAVENOUS; SOFT TISSUE at 12:03

## 2022-11-12 RX ADMIN — CYANOCOBALAMIN TAB 500 MCG 1000 MCG: 500 TAB at 05:47

## 2022-11-12 RX ADMIN — FENTANYL CITRATE 50 MCG: 50 INJECTION, SOLUTION INTRAMUSCULAR; INTRAVENOUS at 13:03

## 2022-11-12 RX ADMIN — THIAMINE HCL TAB 100 MG 100 MG: 100 TAB at 17:38

## 2022-11-12 RX ADMIN — HYDROMORPHONE HYDROCHLORIDE 1 MG: 1 INJECTION, SOLUTION INTRAMUSCULAR; INTRAVENOUS; SUBCUTANEOUS at 23:59

## 2022-11-12 RX ADMIN — PROPOFOL 120 MG: 10 INJECTION, EMULSION INTRAVENOUS at 11:53

## 2022-11-12 RX ADMIN — ONDANSETRON 4 MG: 2 INJECTION INTRAMUSCULAR; INTRAVENOUS at 12:03

## 2022-11-12 RX ADMIN — SODIUM CHLORIDE, POTASSIUM CHLORIDE, SODIUM LACTATE AND CALCIUM CHLORIDE: 600; 310; 30; 20 INJECTION, SOLUTION INTRAVENOUS at 09:17

## 2022-11-12 RX ADMIN — ATROPA BELLADONNA AND OPIUM 60 MG: 16.2; 6 SUPPOSITORY RECTAL at 13:53

## 2022-11-12 RX ADMIN — SODIUM CHLORIDE, POTASSIUM CHLORIDE, SODIUM LACTATE AND CALCIUM CHLORIDE: 600; 310; 30; 20 INJECTION, SOLUTION INTRAVENOUS at 16:10

## 2022-11-12 RX ADMIN — HYDROMORPHONE HYDROCHLORIDE 0.2 MG: 1 INJECTION, SOLUTION INTRAMUSCULAR; INTRAVENOUS; SUBCUTANEOUS at 13:42

## 2022-11-12 RX ADMIN — FENTANYL CITRATE 50 MCG: 50 INJECTION, SOLUTION INTRAMUSCULAR; INTRAVENOUS at 12:30

## 2022-11-12 RX ADMIN — OXYBUTYNIN CHLORIDE 5 MG: 5 TABLET ORAL at 15:29

## 2022-11-12 RX ADMIN — HYDROMORPHONE HYDROCHLORIDE 0.2 MG: 1 INJECTION, SOLUTION INTRAMUSCULAR; INTRAVENOUS; SUBCUTANEOUS at 13:35

## 2022-11-12 RX ADMIN — Medication 2000 UNITS: at 05:47

## 2022-11-12 RX ADMIN — FENTANYL CITRATE 50 MCG: 50 INJECTION, SOLUTION INTRAMUSCULAR; INTRAVENOUS at 11:53

## 2022-11-12 ASSESSMENT — FIBROSIS 4 INDEX: FIB4 SCORE: 4.07

## 2022-11-12 ASSESSMENT — ENCOUNTER SYMPTOMS
WEAKNESS: 0
SHORTNESS OF BREATH: 0
HEARTBURN: 0
COUGH: 0
DIZZINESS: 0
FEVER: 0
DEPRESSION: 0
BLURRED VISION: 0
CHILLS: 0
BACK PAIN: 0
ABDOMINAL PAIN: 0

## 2022-11-12 ASSESSMENT — PAIN DESCRIPTION - PAIN TYPE
TYPE: ACUTE PAIN;SURGICAL PAIN
TYPE: ACUTE PAIN
TYPE: ACUTE PAIN;SURGICAL PAIN
TYPE: ACUTE PAIN

## 2022-11-12 ASSESSMENT — PAIN SCALES - GENERAL: PAIN_LEVEL: 5

## 2022-11-12 NOTE — PROGRESS NOTES
Urology Pre-op Note:  71 yo M s/p TURBT X 2 in the past 2 weeks - persistent hematuria requiring CBI for 2 weeks and transfusions. Urine still cherry red on CBI with some clots today.    Plan:  OR for cystoscopy and fulgeration of bleeding tissue.

## 2022-11-12 NOTE — PROGRESS NOTES
Assumed care at 0700. Patient is A&O x 4. Stated pain level is 0/10. Denies nausea. Bed in lowest position with bed rails up. Call light within reach. Patient belongings near patient. Patient expresses no needs at this time. Hourly rounding in place.

## 2022-11-12 NOTE — ANESTHESIA PREPROCEDURE EVALUATION
Case: [id number] Anesthesia Start Date/Time: 11/12/22 1147    Procedures:       TURP (TRANSURETHRAL RESECTION OF PROSTATE) - clot evacuation and fulgeration      CYSTOSCOPY    Location: [facility] / SURGERY [facility]    Surgeons: Kamaljit Boyer M.D.        Continued bleeding after TURBT.     Relevant Problems   PULMONARY   (positive) COPD (chronic obstructive pulmonary disease) (HCC)   (positive) Pneumonia      Other   (positive) Hematuria   (positive) Urothelial carcinoma of bladder without invasion of muscle (HCC)       Physical Exam    Airway   Mallampati: II  TM distance: >3 FB  Neck ROM: full       Cardiovascular - normal exam  Rhythm: regular  Rate: normal  (-) murmur     Dental - normal exam           Pulmonary - normal exam  Breath sounds clear to auscultation     Abdominal    Neurological - normal exam                 Anesthesia Plan    ASA 2       Plan - general       Airway plan will be LMA          Induction: intravenous    Postoperative Plan: Postoperative administration of opioids is intended.    Pertinent diagnostic labs and testing reviewed    Informed Consent:    Anesthetic plan and risks discussed with patient.    Use of blood products discussed with: patient whom consented to blood products.

## 2022-11-12 NOTE — OR NURSING
1340 paged Dr Boyer. Pt c/o pressure in bladder. Draining watermelon colored urine. No clots. Dr Boyer ordered 60 mg B and O suppository.    1357 report to [facility]  Bladder clot removal and fulgaration of bleeding in bladder. CBI resumed. Clear no clots. Dilaudid 0.4 mg iv for bladder discomfort.  B and O suppository for same.  VSS 95% 1.5 L NC HR 80. /64. RR 17.

## 2022-11-12 NOTE — OR SURGEON
Immediate Post OP Note    PreOp Diagnosis: clot urinary retention    PostOp Diagnosis: same    Procedure(s):  CYSTOSCOPY WITH CLOT EVACUATION FROM THE BLADDER AND FULGERATION OF BLEEDING BLADDER TISSUE- Wound Class: Clean    Surgeon(s):  Kamaljit Boyer M.D.    Anesthesiologist/Type of Anesthesia:  Anesthesiologist: Eran Duval M.D./General    Surgical Staff:  Circulator: Juany Robison R.N.  Scrub Person: Germán Webb    Specimens removed if any:  * No specimens in log *    Estimated Blood Loss: 500ml old clot; 100ml new bleeding    Findings: 500ml of organized clot; bleeding tissue on L bladder wall    Complications: none    Drain: 22Fr 3-way cuellar    11/12/2022 1:03 PM Kamaljit Boyre M.D.

## 2022-11-12 NOTE — ANESTHESIA PROCEDURE NOTES
Airway    Date/Time: 11/12/2022 11:54 AM  Performed by: Eran Duval M.D.  Authorized by: Eran Duval M.D.     Location:  OR  Urgency:  Elective  Indications for Airway Management:  Anesthesia      Spontaneous Ventilation: absent    Sedation Level:  Deep  Preoxygenated: Yes    Mask Difficulty Assessment:  0 - not attempted  Final Airway Type:  Supraglottic airway  Final Supraglottic Airway:  Standard LMA    SGA Size:  4  Number of Attempts at Approach:  1  Number of Other Approaches Attempted:  0

## 2022-11-12 NOTE — CARE PLAN
The patient is Stable - Low risk of patient condition declining or worsening    Shift Goals  Clinical Goals: NPO at midnight and CBI  Patient Goals: comfort and surgery  Family Goals: N/A    Progress made toward(s) clinical / shift goals:  Pt NPO and medicated per MAR PRN orders for pain.    Problem: Hemodynamics  Goal: Patient's hemodynamics, fluid balance and neurologic status will be stable or improve  Outcome: Progressing     Problem: Knowledge Deficit - Standard  Goal: Patient and family/care givers will demonstrate understanding of plan of care, disease process/condition, diagnostic tests and medications  Outcome: Progressing     Problem: Pain - Standard  Goal: Alleviation of pain or a reduction in pain to the patient’s comfort goal  Outcome: Progressing       Patient is not progressing towards the following goals:

## 2022-11-12 NOTE — PROGRESS NOTES
NOC HOSPITALIST CROSS COVER    Notified by RN regarding hemoglobin of 6.9.  Will order 1 unit PRBC.  Patient also appears to have developing MERCY with a creatinine of 1.55 and a BUN of 26.  Will repeat CBC and BMP at 0700.            A/P:  #Anemia  -Likely secondary to blood loss in the setting of hematuria  -1 unit PRBC  -Repeat CBC and BMP at 07 100        -----------------------------------------------------------------------------------------------------------    Electronically signed by:  POLINA De La Cruz PA-C  Hospitalist Services

## 2022-11-12 NOTE — PROGRESS NOTES
Hospital Medicine Daily Progress Note    Date of Service  11/12/2022    Chief Complaint  Gregg Martinez is a 72 y.o. male admitted 10/26/2022 with hematuria    Hospital Course  72-year-old male with past medical history of DL D and asthma presenting with hematuria.  He was admitted for acute blood loss anemia requiring 2 units of PRBC transfusion.  CT abdomen pelvis showed bladder wall mass suspicious for neoplasm.  Urology was consulted and patient for TURBT on 10/27 which showed multifocal bladder tumors which were all resected. He continued to have hematuria which improved with CBI however he did require additional blood transfusion. Pathology showed low grade urothelial carcinoma. CT chest with contrast without evidence of lung involvement. Bone scan negative for mets. IR bone biopsy 10/31 with no evidence of malignancy.      He remained in the hospital given inability to discontinue CBI given hematuria and clots and intermittently worsening hemoglobin and hematocrit.  Urology took patient to the OR on 11/12/2022 for cystoscopy with clot evacuation from the bladder and fulguration of bleeding bladder tissue.    Interval Problem Update  11/12/2022: Patient seen and examined prior to procedure.  Continues to complain of pain from catheter site with CBI draining dark red urine.  Reports feeling tired.  Rales on exam in left lung base.    -Vital signs reviewed, afebrile and hemodynamically stable  -Labs reviewed, hemoglobin 6.9 with overnight labs, platelets 85, sodium 131, creatinine 1.55  -Required 1 unit PRBC overnight  -Leukocytosis persists, Pro-Nicolas 1.64, chest x-ray remarkable for left bibasilar atelectasis or pneumonia   -Given rales on exam, positive Pro-Nicolas, and leukocytosis started on IV ceftriaxone x5 days    I have discussed this patient's plan of care and discharge plan at IDT rounds today with Case Management, Nursing, Nursing leadership, and other members of the IDT  team.    Consultants/Specialty  urology    Code Status  Full Code    Disposition  Patient is not medically cleared for discharge.   Anticipate discharge to to home with close outpatient follow-up.  I have placed the appropriate orders for post-discharge needs.    Review of Systems  Review of Systems   Constitutional:  Positive for malaise/fatigue. Negative for chills and fever.   HENT:  Negative for hearing loss.    Eyes:  Negative for blurred vision.   Respiratory:  Negative for cough and shortness of breath.    Cardiovascular:  Negative for chest pain and leg swelling.   Gastrointestinal:  Negative for abdominal pain and heartburn.   Genitourinary:  Positive for hematuria.   Musculoskeletal:  Negative for back pain.   Skin:  Negative for rash.   Neurological:  Negative for dizziness and weakness.   Psychiatric/Behavioral:  Negative for depression.    All other systems reviewed and are negative.     Physical Exam  Temp:  [36.4 °C (97.6 °F)-37.1 °C (98.7 °F)] 37.1 °C (98.7 °F)  Pulse:  [74-84] 79  Resp:  [14-18] 16  BP: (106-137)/(63-76) 137/73  SpO2:  [92 %-100 %] 94 %    Physical Exam  Vitals and nursing note reviewed.   Constitutional:       Appearance: Normal appearance. He is normal weight.   HENT:      Head: Normocephalic and atraumatic.      Right Ear: External ear normal.      Left Ear: External ear normal.      Nose: Nose normal.      Mouth/Throat:      Mouth: Mucous membranes are moist.      Pharynx: Oropharynx is clear.   Eyes:      General: No scleral icterus.  Cardiovascular:      Rate and Rhythm: Normal rate and regular rhythm.      Pulses: Normal pulses.      Heart sounds: Normal heart sounds. No murmur heard.  Pulmonary:      Effort: Pulmonary effort is normal. No respiratory distress.      Breath sounds: Rales (Left lung base) present.   Abdominal:      General: Abdomen is flat. Bowel sounds are normal.      Palpations: Abdomen is soft.      Tenderness: There is no abdominal tenderness.    Genitourinary:     Comments: CBI in place draining clear red colored urine  Musculoskeletal:         General: No swelling. Normal range of motion.      Cervical back: Normal range of motion.   Skin:     General: Skin is warm and dry.      Capillary Refill: Capillary refill takes less than 2 seconds.      Coloration: Skin is pale.   Neurological:      Mental Status: He is alert and oriented to person, place, and time.   Psychiatric:         Mood and Affect: Mood normal.       Fluids    Intake/Output Summary (Last 24 hours) at 11/12/2022 0823  Last data filed at 11/12/2022 0703  Gross per 24 hour   Intake 532 ml   Output 9125 ml   Net -8593 ml         Laboratory  Recent Labs     11/10/22  0153 11/11/22  0303 11/12/22  0110   WBC 15.2* 19.8* 15.4*   RBC 2.92* 3.02* 2.71*   HEMOGLOBIN 7.6* 7.8* 6.9*   HEMATOCRIT 24.9* 25.7* 22.6*   MCV 85.3 85.1 83.4   MCH 26.0* 25.8* 25.5*   MCHC 30.5* 30.4* 30.5*   RDW 67.2* 65.5* 64.4*   PLATELETCT 71* 60* 85*       Recent Labs     11/10/22  0153 11/11/22  0303 11/12/22  0110   SODIUM 132* 132* 131*   POTASSIUM 4.4 5.5 4.9   CHLORIDE 97 96 94*   CO2 27 26 30   GLUCOSE 156* 117* 126*   BUN 20 21 26*   CREATININE 1.00 1.32 1.55*   CALCIUM 8.4* 9.1 8.8                     Imaging  CT-ABDOMEN-PELVIS W/O   Final Result      1.  Interval hemorrhage within the partially decompressed urinary bladder. Previously visualized posterior left bladder mass is no longer seen presumably this is due to interval resection      2.  New mild left hydroureteronephrosis with moderate perinephric fat stranding. This is concerning for obstruction perhaps related to hematoma, recent instrumentation or pyelonephritis/ureterectasis.      3.  Increasing colonic stool volume with similar moderate diverticulosis of the colon but no evidence of diverticulitis      4.  Marked splenomegaly      5.  New small effusions with some lower lung zone groundglass suspicious for interstitial edema/mild congestive failure.  There is stable mild pericardial fluid      6.  Persistent inhomogeneous medullary spaces with generalized increased density. Recent bone scan showed no focal mass but this could reflect underlying myelofibrosis or metastases with false-negative bone scan      CT-NEEDLE BX-DEEP BONE   Final Result      1.  Successful CT-guided core biopsy of sclerotic lesion in the posterior aspect of left inferior pubic ramus.      NM-BONE/JOINT SCAN WHOLE BODY   Final Result      No evidence of bony metastatic disease      CT-CHEST (THORAX) WITH   Final Result         1.  Emphysema   2.  Hazy bilateral upper lobe opacities with slight intralobular septal thickening, could represent component of mild pulmonary edema   3.  Linear densities the bilateral lung bases favors scarring or atelectasis.   4.  Atherosclerosis and atherosclerotic coronary artery disease   5.  Hepatomegaly with irregular hepatic contour favoring changes of cirrhosis.   6.  Subcentimeter hepatic lobe lesions, could represent small cyst or hemangioma, otherwise indeterminate.   7.  Mild mediastinal adenopathy, workup and evaluation for causes of adenopathy recommended as clinically appropriate.   8.  Splenomegaly      DX-CHEST-PORTABLE (1 VIEW)   Final Result         1.  No acute cardiopulmonary disease.   2.  Atherosclerosis      CT-ABDOMEN-PELVIS WITH   Final Result      1.  Enhancing 2.5 x 2.3 cm bladder wall mass suspicious for neoplasm.   2.  Heterogeneous sclerotic change of the pelvis and proximal femurs suspicious for metastatic disease or other infiltrating marrow process.   3.  Mild cardiomegaly.   4.  Hepatosplenomegaly.   5.  Small subcentimeter hepatic hypodensities which could represent cysts or hemangiomas but are nonspecific. If indicated, these findings could be further evaluated with dedicated liver protocol CT or MRI or ultrasound on a nonemergent basis.   6.  Colonic diverticulosis without evidence of diverticulitis.   7.  Atherosclerosis.              Assessment/Plan  * Urothelial carcinoma of bladder without invasion of muscle (HCC)- (present on admission)  Assessment & Plan  CT AP: Notable enhancing 2.5 x 2.3 cm bladder wall mass suspicious for neoplasms  s/p TURBT on 10/27 with multiple tumors noted  Pathology showing low-grade papillary urothelial carcinoma noninvasive with muscularis propria uninvolved by tumor  CT chest negative for metastasis  Bone scan without any evidence of metastasis  IR guided bone biopsy on 10/31 negative for malignancy    11/2: S/p Cystoscopy with clot evacuation, fulguration, and TUBRT  11/3: Attempt to wean CBI   11/5: CBI discontinued, TOVR   11/6: Hemoglobin drop of 1 g and recurrence of hematuria.  CBI restarted  11/7: CBI running, hand irrigated by urology.  Still with leaking around catheter tip, urine culture NGTD  11/8: CBI running, hand irrigated by urology who deferred surgical intervention with cauterization until tomorrow with close monitoring of H&H, 7.6 this morning  11/9: Seen by urology- wean CBI as tolerated. Continue to trend H/h. Transfuse PRN. Follow up with urology and oncology OP  11/10: Seen and cleared by urology. Per their recs:   Clamp CBI. If hematuria not worsening, can d/c CBI.   They will arrange follow up with their office next week for voiding trial to have cuellar removed  11/11:  Failed CBI clamping yesterday. Deep red gross hematuria noted. hgb stable. Continue to monitor  11/12: Urology took to the OR today for cystoscopy with clot evacuation and fulguration of bleeding bladder tissue    Pneumonia  Assessment & Plan  Leukocytosis continuing  Diminished lung sounds with left bibasilar rales on exam  No tachycardia or fever  Pro-Nicolas 1.64  Chest x-ray consistent with left basilar pneumonia  Ceftriaxone IV x5 days  Blood cultures pending    Acute blood loss anemia- (present on admission)  Assessment & Plan  Secondary to hematuria.   Required 7 units of PRBCs this admission   Continues to fail  CBI clamping with gross hematuria noted  Underwent cystoscopy today with Dr. Boyer who performed clot evaluation and fulguration of bleeding bladder tissue    Trend HH, transfuse for Hgb<7 or hemodynamic instability    Slow transit constipation- (present on admission)  Assessment & Plan  Continues to complain of intermittent constipation  Aggressive bowel protocol  Dulcolax suppository    Hypovolemia  Assessment & Plan  Due to acute blood loss anemia    Hematuria- (present on admission)  Assessment & Plan  Likely secondary to above    Iron deficiency anemia- (present on admission)  Assessment & Plan  S/p IV iron infusion repletion    Obesity (BMI 30-39.9)- (present on admission)  Assessment & Plan  Diet and lifestyle modification  Body mass index is 26.08 kg/m².    Leukocytosis- (present on admission)  Assessment & Plan  Likely reactive in setting of malignancy and blood loss anemia  Continues to remain elevated, Pro-Nicolas 1.64  Chest x-ray consistent with atelectasis versus pneumonia  Ceftriaxone x5 days    Dyslipidemia- (present on admission)  Assessment & Plan  Continue atorvastatin    COPD (chronic obstructive pulmonary disease) (HCC)- (present on admission)  Assessment & Plan  Not in acute exacerbation  PRN duonebs       VTE prophylaxis: SCDs/TEDs    I have performed a physical exam and reviewed and updated ROS and Plan today (11/12/2022). In review of yesterday's note (11/11/2022), there are no changes except as documented above.

## 2022-11-12 NOTE — PROGRESS NOTES
Rec'd report from day shift RN. Assumed pt care. Pt assessment completed and pt is AA&Ox4. Pt complains of pain to left lower back and will medicate per MAR PRN orders. CBI in place and pt on 2L NC. All needs met. Bed locked, bed in lowest position, call light and personal belongings within reach and treaded socks in place for safety. Hourly rounding in place.

## 2022-11-12 NOTE — ANESTHESIA POSTPROCEDURE EVALUATION
Patient: Gregg Martinez    Procedure Summary     Date: 11/12/22 Room / Location: [place] [address] / SURGERY [facility]    Anesthesia Start: 1147 Anesthesia Stop: 1311    Procedures:       TURP (TRANSURETHRAL RESECTION OF PROSTATE) (Bladder)      CYSTOSCOPY (Bladder) Diagnosis: (clot urinary retention)    Surgeons: Kamaljit Boyer M.D. Responsible Provider: Eran Duval M.D.    Anesthesia Type: general ASA Status: 2          Final Anesthesia Type: general  Last vitals  BP   Blood Pressure : 136/64    Temp   36.3 °C (97.3 °F)    Pulse   81   Resp   19    SpO2   94 %      Anesthesia Post Evaluation    Patient location during evaluation: PACU  Patient participation: complete - patient participated  Level of consciousness: awake and alert  Pain score: 5    Airway patency: patent  Anesthetic complications: no  Cardiovascular status: hemodynamically stable  Respiratory status: acceptable  Hydration status: euvolemic    PONV: none          There were no known notable events for this encounter.     Nurse Pain Score: 7 (NPRS)

## 2022-11-13 PROBLEM — E87.1 HYPONATREMIA: Status: ACTIVE | Noted: 2022-11-13

## 2022-11-13 PROBLEM — D69.6 THROMBOCYTOPENIA (HCC): Status: ACTIVE | Noted: 2022-11-13

## 2022-11-13 LAB
ANION GAP SERPL CALC-SCNC: 8 MMOL/L (ref 7–16)
BARCODED ABORH UBTYP: 6200
BARCODED PRD CODE UBPRD: NORMAL
BARCODED UNIT NUM UBUNT: NORMAL
BUN SERPL-MCNC: 23 MG/DL (ref 8–22)
CALCIUM SERPL-MCNC: 8.3 MG/DL (ref 8.5–10.5)
CFT BLD TEG: 7.2 MIN (ref 4.6–9.1)
CFT P HPASE BLD TEG: 6.2 MIN (ref 4.3–8.3)
CHLORIDE SERPL-SCNC: 93 MMOL/L (ref 96–112)
CLOT ANGLE BLD TEG: 69.8 DEGREES (ref 63–78)
CLOT LYSIS 30M P MA LENFR BLD TEG: 0 % (ref 0–2.6)
CO2 SERPL-SCNC: 28 MMOL/L (ref 20–33)
COMPONENT P 8504P: NORMAL
CREAT SERPL-MCNC: 1.4 MG/DL (ref 0.5–1.4)
CT.EXTRINSIC BLD ROTEM: 1.7 MIN (ref 0.8–2.1)
ERYTHROCYTE [DISTWIDTH] IN BLOOD BY AUTOMATED COUNT: 59.4 FL (ref 35.9–50)
ERYTHROCYTE [DISTWIDTH] IN BLOOD BY AUTOMATED COUNT: 62.3 FL (ref 35.9–50)
GFR SERPLBLD CREATININE-BSD FMLA CKD-EPI: 54 ML/MIN/1.73 M 2
GLUCOSE SERPL-MCNC: 106 MG/DL (ref 65–99)
HCT VFR BLD AUTO: 19.6 % (ref 42–52)
HCT VFR BLD AUTO: 22.9 % (ref 42–52)
HCT VFR BLD AUTO: 23.6 % (ref 42–52)
HCT VFR BLD AUTO: 37.4 % (ref 42–52)
HGB BLD-MCNC: 11.9 G/DL (ref 14–18)
HGB BLD-MCNC: 6.3 G/DL (ref 14–18)
HGB BLD-MCNC: 7.1 G/DL (ref 14–18)
HGB BLD-MCNC: 7.5 G/DL (ref 14–18)
MAGNESIUM SERPL-MCNC: 2.3 MG/DL (ref 1.5–2.5)
MCF BLD TEG: 60.9 MM (ref 52–69)
MCF.PLATELET INHIB BLD ROTEM: 26 MM (ref 15–32)
MCH RBC QN AUTO: 26.1 PG (ref 27–33)
MCH RBC QN AUTO: 27 PG (ref 27–33)
MCHC RBC AUTO-ENTMCNC: 31 G/DL (ref 33.7–35.3)
MCHC RBC AUTO-ENTMCNC: 32.1 G/DL (ref 33.7–35.3)
MCV RBC AUTO: 84.1 FL (ref 81.4–97.8)
MCV RBC AUTO: 84.2 FL (ref 81.4–97.8)
OSMOLALITY SERPL: 277 MOSM/KG H2O (ref 278–298)
PA AA BLD-ACNC: 29.4 % (ref 0–11)
PA ADP BLD-ACNC: 97 % (ref 0–17)
PHOSPHATE SERPL-MCNC: 5.2 MG/DL (ref 2.5–4.5)
PLATELET # BLD AUTO: 77 K/UL (ref 164–446)
PLATELET # BLD AUTO: 82 K/UL (ref 164–446)
PMV BLD AUTO: 8.9 FL (ref 9–12.9)
PMV BLD AUTO: 9.4 FL (ref 9–12.9)
POTASSIUM SERPL-SCNC: 5.2 MMOL/L (ref 3.6–5.5)
PRODUCT TYPE UPROD: NORMAL
RBC # BLD AUTO: 2.33 M/UL (ref 4.7–6.1)
RBC # BLD AUTO: 2.72 M/UL (ref 4.7–6.1)
SODIUM SERPL-SCNC: 129 MMOL/L (ref 135–145)
TEG ALGORITHM TGALG: ABNORMAL
UNIT STATUS USTAT: NORMAL
WBC # BLD AUTO: 11 K/UL (ref 4.8–10.8)
WBC # BLD AUTO: 13.8 K/UL (ref 4.8–10.8)

## 2022-11-13 PROCEDURE — A9270 NON-COVERED ITEM OR SERVICE: HCPCS | Performed by: STUDENT IN AN ORGANIZED HEALTH CARE EDUCATION/TRAINING PROGRAM

## 2022-11-13 PROCEDURE — 83735 ASSAY OF MAGNESIUM: CPT

## 2022-11-13 PROCEDURE — A9270 NON-COVERED ITEM OR SERVICE: HCPCS | Performed by: NURSE PRACTITIONER

## 2022-11-13 PROCEDURE — 700105 HCHG RX REV CODE 258: Performed by: NURSE PRACTITIONER

## 2022-11-13 PROCEDURE — P9034 PLATELETS, PHERESIS: HCPCS

## 2022-11-13 PROCEDURE — 85018 HEMOGLOBIN: CPT | Mod: 91

## 2022-11-13 PROCEDURE — 700111 HCHG RX REV CODE 636 W/ 250 OVERRIDE (IP): Performed by: HOSPITALIST

## 2022-11-13 PROCEDURE — 700102 HCHG RX REV CODE 250 W/ 637 OVERRIDE(OP): Performed by: STUDENT IN AN ORGANIZED HEALTH CARE EDUCATION/TRAINING PROGRAM

## 2022-11-13 PROCEDURE — 700105 HCHG RX REV CODE 258

## 2022-11-13 PROCEDURE — 85027 COMPLETE CBC AUTOMATED: CPT

## 2022-11-13 PROCEDURE — A9270 NON-COVERED ITEM OR SERVICE: HCPCS | Performed by: HOSPITALIST

## 2022-11-13 PROCEDURE — 700102 HCHG RX REV CODE 250 W/ 637 OVERRIDE(OP): Performed by: HOSPITALIST

## 2022-11-13 PROCEDURE — 99233 SBSQ HOSP IP/OBS HIGH 50: CPT | Performed by: NURSE PRACTITIONER

## 2022-11-13 PROCEDURE — 700101 HCHG RX REV CODE 250: Performed by: NURSE PRACTITIONER

## 2022-11-13 PROCEDURE — 83930 ASSAY OF BLOOD OSMOLALITY: CPT

## 2022-11-13 PROCEDURE — 85384 FIBRINOGEN ACTIVITY: CPT

## 2022-11-13 PROCEDURE — 700111 HCHG RX REV CODE 636 W/ 250 OVERRIDE (IP): Performed by: NURSE PRACTITIONER

## 2022-11-13 PROCEDURE — P9016 RBC LEUKOCYTES REDUCED: HCPCS | Mod: 91

## 2022-11-13 PROCEDURE — 84100 ASSAY OF PHOSPHORUS: CPT

## 2022-11-13 PROCEDURE — 700102 HCHG RX REV CODE 250 W/ 637 OVERRIDE(OP): Performed by: NURSE PRACTITIONER

## 2022-11-13 PROCEDURE — 770020 HCHG ROOM/CARE - TELE (206)

## 2022-11-13 PROCEDURE — 80048 BASIC METABOLIC PNL TOTAL CA: CPT

## 2022-11-13 PROCEDURE — 86923 COMPATIBILITY TEST ELECTRIC: CPT

## 2022-11-13 PROCEDURE — 36430 TRANSFUSION BLD/BLD COMPNT: CPT

## 2022-11-13 PROCEDURE — 85347 COAGULATION TIME ACTIVATED: CPT

## 2022-11-13 PROCEDURE — 36415 COLL VENOUS BLD VENIPUNCTURE: CPT

## 2022-11-13 PROCEDURE — 85576 BLOOD PLATELET AGGREGATION: CPT

## 2022-11-13 PROCEDURE — 85014 HEMATOCRIT: CPT | Mod: 91

## 2022-11-13 PROCEDURE — 30233R1 TRANSFUSION OF NONAUTOLOGOUS PLATELETS INTO PERIPHERAL VEIN, PERCUTANEOUS APPROACH: ICD-10-PCS | Performed by: HOSPITALIST

## 2022-11-13 RX ORDER — OXYBUTYNIN CHLORIDE 5 MG/1
5 TABLET ORAL 3 TIMES DAILY
Status: DISCONTINUED | OUTPATIENT
Start: 2022-11-14 | End: 2022-11-30 | Stop reason: HOSPADM

## 2022-11-13 RX ORDER — SODIUM CHLORIDE 9 MG/ML
INJECTION, SOLUTION INTRAVENOUS CONTINUOUS
Status: DISCONTINUED | OUTPATIENT
Start: 2022-11-13 | End: 2022-11-17

## 2022-11-13 RX ORDER — AZITHROMYCIN 250 MG/1
500 TABLET, FILM COATED ORAL DAILY
Status: COMPLETED | OUTPATIENT
Start: 2022-11-13 | End: 2022-11-15

## 2022-11-13 RX ORDER — ATROPA BELLADONNA AND OPIUM 16.2; 3 MG/1; MG/1
15 SUPPOSITORY RECTAL EVERY 8 HOURS PRN
Status: DISCONTINUED | OUTPATIENT
Start: 2022-11-13 | End: 2022-11-13

## 2022-11-13 RX ADMIN — SODIUM CHLORIDE: 9 INJECTION, SOLUTION INTRAVENOUS at 23:37

## 2022-11-13 RX ADMIN — THIAMINE HCL TAB 100 MG 100 MG: 100 TAB at 04:54

## 2022-11-13 RX ADMIN — AZITHROMYCIN MONOHYDRATE 500 MG: 250 TABLET ORAL at 17:18

## 2022-11-13 RX ADMIN — GABAPENTIN 200 MG: 100 CAPSULE ORAL at 17:19

## 2022-11-13 RX ADMIN — Medication 30 G: at 17:29

## 2022-11-13 RX ADMIN — AMPICILLIN AND SULBACTAM 3 G: 1; 2 INJECTION, POWDER, FOR SOLUTION INTRAMUSCULAR; INTRAVENOUS at 17:27

## 2022-11-13 RX ADMIN — SODIUM CHLORIDE: 9 INJECTION, SOLUTION INTRAVENOUS at 13:27

## 2022-11-13 RX ADMIN — CYANOCOBALAMIN TAB 500 MCG 1000 MCG: 500 TAB at 04:54

## 2022-11-13 RX ADMIN — FOLIC ACID 1 MG: 1 TABLET ORAL at 04:55

## 2022-11-13 RX ADMIN — Medication 2000 UNITS: at 04:54

## 2022-11-13 RX ADMIN — THIAMINE HCL TAB 100 MG 100 MG: 100 TAB at 17:18

## 2022-11-13 RX ADMIN — GABAPENTIN 200 MG: 100 CAPSULE ORAL at 11:44

## 2022-11-13 RX ADMIN — OXYBUTYNIN CHLORIDE 5 MG: 5 TABLET ORAL at 17:19

## 2022-11-13 RX ADMIN — OXYBUTYNIN CHLORIDE 5 MG: 5 TABLET ORAL at 05:05

## 2022-11-13 RX ADMIN — SODIUM CHLORIDE, POTASSIUM CHLORIDE, SODIUM LACTATE AND CALCIUM CHLORIDE: 600; 310; 30; 20 INJECTION, SOLUTION INTRAVENOUS at 01:28

## 2022-11-13 RX ADMIN — GABAPENTIN 200 MG: 100 CAPSULE ORAL at 04:54

## 2022-11-13 RX ADMIN — Medication 30 G: at 23:03

## 2022-11-13 RX ADMIN — HYDROMORPHONE HYDROCHLORIDE 1 MG: 1 INJECTION, SOLUTION INTRAMUSCULAR; INTRAVENOUS; SUBCUTANEOUS at 09:23

## 2022-11-13 RX ADMIN — CEFTRIAXONE SODIUM 2000 MG: 10 INJECTION, POWDER, FOR SOLUTION INTRAVENOUS at 04:55

## 2022-11-13 RX ADMIN — SODIUM CHLORIDE: 9 INJECTION, SOLUTION INTRAVENOUS at 04:18

## 2022-11-13 RX ADMIN — Medication 30 G: at 17:30

## 2022-11-13 RX ADMIN — AMPICILLIN AND SULBACTAM 3 G: 1; 2 INJECTION, POWDER, FOR SOLUTION INTRAMUSCULAR; INTRAVENOUS at 23:54

## 2022-11-13 RX ADMIN — ATORVASTATIN CALCIUM 20 MG: 20 TABLET, FILM COATED ORAL at 17:18

## 2022-11-13 ASSESSMENT — ENCOUNTER SYMPTOMS
CHILLS: 0
BACK PAIN: 0
DEPRESSION: 0
COUGH: 0
SHORTNESS OF BREATH: 0
HEARTBURN: 0
FEVER: 0
DIZZINESS: 0
ABDOMINAL PAIN: 0
BLURRED VISION: 0
WEAKNESS: 0

## 2022-11-13 ASSESSMENT — PAIN DESCRIPTION - PAIN TYPE
TYPE: ACUTE PAIN

## 2022-11-13 NOTE — PROGRESS NOTES
Assumed care at 0700. Patient is A&O x 4. Denies pain. Denies nausea. Bed in lowest position with bed rails up. Call light within reach. Patient belongings near patient. Patient expresses no needs at this time. Hourly rounding in place.     CBI continues at moderate flow with cherry red urine with many clots. Clot is clogging the drainage bag spout and unable to empty the bag, ordered new one. Urine is still flowing from patient into drainage bag.

## 2022-11-13 NOTE — CARE PLAN
The patient is Stable - Low risk of patient condition declining or worsening    Shift Goals  Clinical Goals: NPO at midnight and CBI  Patient Goals: comfort and surgery  Family Goals: N/A    Progress made toward(s) clinical / shift goals:      Problem: Hemodynamics  Goal: Patient's hemodynamics, fluid balance and neurologic status will be stable or improve  Outcome: Progressing     Problem: Urinary - Renal Perfusion  Goal: Ability to achieve and maintain adequate renal perfusion and functioning will improve  Outcome: Progressing     Problem: Respiratory  Goal: Patient will achieve/maintain optimum respiratory ventilation and gas exchange  Outcome: Progressing     Problem: Pain - Standard  Goal: Alleviation of pain or a reduction in pain to the patient’s comfort goal  Outcome: Progressing       Patient is not progressing towards the following goals:

## 2022-11-13 NOTE — PROGRESS NOTES
Patient had visible clot in catheter tubing at the catheter and tubing connection site. Hand irrigated and clot dislodged and urine flow restarted. Patient continues to complain of spasms and cramping to abdomen and lower back. Also continues with leakage around catheter at urethral meatus. CBI maintaining moderate flow.     Notified urology. Continue moderate flow on CBI and wean as tolerated, hand irrigate as needed. Urology will see first in the morning.

## 2022-11-13 NOTE — ASSESSMENT & PLAN NOTE
Transfused platelets  Follow CBC  Pathology results - preliminary Myelofibrosis?  Follow up with Oncology as outpatient-repeat bone marrow biospy 2-3 weeks after discharge

## 2022-11-13 NOTE — PROGRESS NOTES
Rec'd report from day shift RN. Assumed pt care. Pt assessment completed and pt is AA&Ox4. No complaints of pain or discomfort at this time. All needs met. Bed locked, bed in lowest position, call light and personal belongings within reach and treaded socks in place for safety. Hourly rounding in place.

## 2022-11-13 NOTE — PROGRESS NOTES
Urology Progress Note    S/p TURBT 10/27/2022  S/p cystoscopy, clot evacuation, fulguration and TURBT 11/03/2022  S/p cystoscopy with clot evacuation from the bladder and fulguration of bleeding bladder tissue 11/12/22    S:   : patient seen and examined    11/13. POD#1 s/p repeat cysto/clot evac. Urine persistently dark cherry at bedside on moderate flow CBI, no clots appreciated, discussed with RN Oliva who hand irrigated clots earlier this shift - clots irrigated overnight as well. Patient c/o bladder spasms, leakage around catheter. Discussed with Miranda GUZMAN. Ordered B&O q8hrs PRN. Updated Dr. Boyer, recommends continuing CBI at this time. Patient received blood this AM for downtrending H/H, now 11.9/37.4.    11/12. Patient taken back to OR by Dr. Boyer for cystoscopy and clot evacuation - blood loss of 500mL of old clot and 100mL of fresh bleeding    11/11. H/H 7.8/25.7. AFVSS. Urine draining dark cherry red on moderate flow CBI. Reports need for hand irrigation due to clots clogging the catheter. Patient feeling well overall, good appetite. *Returned to bedside this PM, CBI is dry. Notified floor RN who will pass the message to Torrie JARAMILLO - to be re-started urgently. Discussed with patient possible need for OR take back tomorrow as hematuria is not improving. He is agreeable. I will check on patient tomorrow AM to re-assess, for now NPO at midnight.    11/10. H/H slightly improve. Patient sitting on edge of bed. Getting kiran to go home. Feels well. Has not yet eaten today. Still on low flow CBI, have been unable to wean. Light cherry red urine in bag. WBC stable at 15.2.     11/09. H/H stable overnight. CBI on low flow, watermelon urine today--improved from yesterday. Patient reports less leakage around cuellar.Currently NPO in case of OR fulguration. Will take off NPO due to improvement overnight.     11/08. Patient still with intermittent leakage around catheter. Unfortunately, unable to wean CBI  "overnight and now with slightly darker hematuria, cherry red on low flow CBI. Hand irrigated at bedside without clots. Restarted CBI on moderate flow. Cuellar to remain for now. H/H took dip, now 7.6/24.2. AFVSS.     11/07. Successful TOV yesterday after CBI discontinued. Later with worsening hematuria and clots. 3 way cuellar replaced and restarted on CBI. Weaning this morning and watermelon-colored with low flow CBI. Hand irrigated at bedside, no clots. H/H improved today 8.5/27.3. Patient feels well. Tolerating diet. No pain or dysuria. He is having occasional leakage around the catheter, reports sensation of urgency/pain with this.       O:   /66   Pulse 80   Temp 36.8 °C (98.2 °F) (Temporal)   Resp 18   Ht 1.727 m (5' 8\")   Wt 77.8 kg (171 lb 8.3 oz)   SpO2 100%   Recent Labs     11/12/22  0110 11/12/22  0811 11/13/22  0226   SODIUM 131* 132* 129*   POTASSIUM 4.9 5.4 5.2   CHLORIDE 94* 96 93*   CO2 30 28 28   GLUCOSE 126* 109* 106*   BUN 26* 25* 23*   CREATININE 1.55* 1.42* 1.40   CALCIUM 8.8 8.8 8.3*     Recent Labs     11/12/22  0110 11/12/22  0911 11/12/22  1942 11/13/22  0226 11/13/22  0830   WBC 15.4* 14.0*  --  13.8*  --    RBC 2.71* 3.04*  --  2.72*  --    HEMOGLOBIN 6.9* 8.0* 7.5* 7.1* 11.9*   HEMATOCRIT 22.6* 25.7* 23.8* 22.9* 37.4*   MCV 83.4 84.5  --  84.2  --    MCH 25.5* 26.3*  --  26.1*  --    MCHC 30.5* 31.1*  --  31.0*  --    RDW 64.4* 62.3*  --  62.3*  --    PLATELETCT 85* 66*  --  82*  --    MPV  --   --   --  8.9*  --          Intake/Output Summary (Last 24 hours) at 10/31/2022 1207  Last data filed at 10/31/2022 1100  Gross per 24 hour   Intake 1121.25 ml   Output 2275 ml   Net -1153.75 ml       Exam:  Gen: Alert, NAD   Abd: soft, flat, nondistended, no TTP  : 3- way cuellar in place on moderate flow CBI with dark cherry urine draining in bag, no clots    A/P:    Active Hospital Problems    Diagnosis     Thrombocytopenia (HCC) [D69.6]     Pneumonia [J18.9]     Slow transit " constipation [K59.01]     Hypovolemia [E86.1]     Acute blood loss anemia [D62]     Urothelial carcinoma of bladder without invasion of muscle (HCC) [C67.9]     Iron deficiency anemia [D50.9]     Hematuria [R31.9]     Obesity (BMI 30-39.9) [E66.9]     Leukocytosis [D72.829]     Dyslipidemia [E78.5]     COPD (chronic obstructive pulmonary disease) (HCC) [J44.9]      H/H 11.9/37.4 (7.1/22.9), received blood this AM per primary team  Hyponatremic  AFVSS, BPs drifting down but overall stable    Plan:  - Continue CBI for dark hematuria/clots, titrate CBI and hand irrigate PRN  - B&O suppositories q8hrs PRN for bladder spasms/leakage per urethra  - Ultimately planning for follow up with our office for voiding trial to have cuellar removed and pathology discussion with Dr. Castellanos  - Urology will continue to follow    Case discussed with patient, RN, hospital team APRN, and urology - Dr. Boyer, who has directed the plan of care.     Shakira Amos PA-C  [facility] [place]

## 2022-11-13 NOTE — CARE PLAN
The patient is Watcher - Medium risk of patient condition declining or worsening    Shift Goals  Clinical Goals: CBI and monitor hgb values  Patient Goals: comfort and go home  Family Goals: N/A    Progress made toward(s) clinical / shift goals:      Problem: Hemodynamics  Goal: Patient's hemodynamics, fluid balance and neurologic status will be stable or improve  Outcome: Progressing     Problem: Fluid Volume  Goal: Fluid volume balance will be maintained  Outcome: Progressing     Problem: Urinary - Renal Perfusion  Goal: Ability to achieve and maintain adequate renal perfusion and functioning will improve  Outcome: Progressing     Problem: Pain - Standard  Goal: Alleviation of pain or a reduction in pain to the patient’s comfort goal  Outcome: Progressing     Problem: Fall Risk  Goal: Patient will remain free from falls  Outcome: Progressing     Patient is not progressing towards the following goals:

## 2022-11-13 NOTE — PROGRESS NOTES
Patient continues with clots in tubing, as well as spasms and increased leakage from urethral meatus around cuellar, passing clots from that site. Hand irrigating cuellar as needed, have done 5 times today so far. Cuellar bag has clogged from clots and have been unable to drain so bag has been replaced twice.

## 2022-11-13 NOTE — PROGRESS NOTES
Hospital Medicine Daily Progress Note    Date of Service  11/13/2022    Chief Complaint  Gregg Martinez is a 72 y.o. male admitted 10/26/2022 with hematuria    Hospital Course  72-year-old male with past medical history of DL D and asthma presenting with hematuria.  He was admitted for acute blood loss anemia requiring 2 units of PRBC transfusion.  CT abdomen pelvis showed bladder wall mass suspicious for neoplasm.  Urology was consulted and patient for TURBT on 10/27 which showed multifocal bladder tumors which were all resected. He continued to have hematuria which improved with CBI however he did require additional blood transfusion. Pathology showed low grade urothelial carcinoma. CT chest with contrast without evidence of lung involvement. Bone scan negative for mets. IR bone biopsy 10/31 with no evidence of malignancy.      He remained in the hospital given inability to discontinue CBI given hematuria and clots and intermittently worsening hemoglobin and hematocrit.  Urology took patient to the OR on 11/12/2022 for cystoscopy with clot evacuation from the bladder and fulguration of bleeding bladder tissue.    Interval Problem Update  11/13/2022: Patient seen and examined.  Continues to have significant clotting which requires continual hand irrigation by nursing.  Carroll colored urine in Arboleda bag.  Patient complaining of leaking around Arboleda.      -Vital signs reviewed, noted to be hypotensive   -Labs reviewed, hemoglobin 7.1 last night with platelets of 82. Will  transfuse 1 unit given hypotension and MERCY  -Repeat H/H post transfusion 7.5/23.6, BP improved to 119/73  -Sodium 129, serum osmolality 277, increased NS to 125 ml/hr given continued MERCY  -TEG pending  -Continue ceftriaxone, leukocytosis persists, BC NGTD  -Discussed with urology PA, stated she would discuss with Dr. Boyer but would consider medication for bladder spasms  -Continue to trend H&H and transfuse for hgb <7    I have discussed this  patient's plan of care and discharge plan at IDT rounds today with Case Management, Nursing, Nursing leadership, and other members of the IDT team.    Consultants/Specialty  urology    Code Status  Full Code    Disposition  Patient is not medically cleared for discharge.   Anticipate discharge to to home with close outpatient follow-up.  I have placed the appropriate orders for post-discharge needs.    Review of Systems  Review of Systems   Constitutional:  Positive for malaise/fatigue. Negative for chills and fever.   HENT:  Negative for hearing loss.    Eyes:  Negative for blurred vision.   Respiratory:  Negative for cough and shortness of breath.    Cardiovascular:  Negative for chest pain and leg swelling.   Gastrointestinal:  Negative for abdominal pain and heartburn.   Genitourinary:  Positive for hematuria.   Musculoskeletal:  Negative for back pain.   Skin:  Negative for rash.   Neurological:  Negative for dizziness and weakness.   Psychiatric/Behavioral:  Negative for depression.    All other systems reviewed and are negative.     Physical Exam  Temp:  [36.1 °C (97 °F)-36.9 °C (98.5 °F)] 36.6 °C (97.9 °F)  Pulse:  [76-86] 86  Resp:  [13-20] 18  BP: ()/(52-68) 97/56  SpO2:  [90 %-100 %] 91 %    Physical Exam  Vitals and nursing note reviewed.   Constitutional:       Appearance: Normal appearance. He is normal weight.   HENT:      Head: Normocephalic and atraumatic.      Right Ear: External ear normal.      Left Ear: External ear normal.      Nose: Nose normal.      Mouth/Throat:      Mouth: Mucous membranes are moist.      Pharynx: Oropharynx is clear.   Eyes:      General: No scleral icterus.  Cardiovascular:      Rate and Rhythm: Normal rate and regular rhythm.      Pulses: Normal pulses.      Heart sounds: Normal heart sounds. No murmur heard.  Pulmonary:      Effort: Pulmonary effort is normal. No respiratory distress.      Breath sounds: Rales (Left lung base) present.   Abdominal:      General:  Abdomen is flat. Bowel sounds are normal.      Palpations: Abdomen is soft.      Tenderness: There is no abdominal tenderness.   Genitourinary:     Comments: CBI in place draining red colored urine with intermittent clots  Musculoskeletal:         General: No swelling. Normal range of motion.      Cervical back: Normal range of motion.   Skin:     General: Skin is warm and dry.      Capillary Refill: Capillary refill takes less than 2 seconds.      Coloration: Skin is pale.   Neurological:      Mental Status: He is alert and oriented to person, place, and time.   Psychiatric:         Mood and Affect: Mood normal.       Fluids    Intake/Output Summary (Last 24 hours) at 11/13/2022 0735  Last data filed at 11/13/2022 0730  Gross per 24 hour   Intake 2424.03 ml   Output -2295 ml   Net 4719.03 ml         Laboratory  Recent Labs     11/12/22  0110 11/12/22  0911 11/12/22 1942 11/13/22 0226   WBC 15.4* 14.0*  --  13.8*   RBC 2.71* 3.04*  --  2.72*   HEMOGLOBIN 6.9* 8.0* 7.5* 7.1*   HEMATOCRIT 22.6* 25.7* 23.8* 22.9*   MCV 83.4 84.5  --  84.2   MCH 25.5* 26.3*  --  26.1*   MCHC 30.5* 31.1*  --  31.0*   RDW 64.4* 62.3*  --  62.3*   PLATELETCT 85* 66*  --  82*   MPV  --   --   --  8.9*       Recent Labs     11/12/22  0110 11/12/22  0811 11/13/22 0226   SODIUM 131* 132* 129*   POTASSIUM 4.9 5.4 5.2   CHLORIDE 94* 96 93*   CO2 30 28 28   GLUCOSE 126* 109* 106*   BUN 26* 25* 23*   CREATININE 1.55* 1.42* 1.40   CALCIUM 8.8 8.8 8.3*                     Imaging  DX-CHEST-PORTABLE (1 VIEW)   Final Result      Increased LEFT basilar atelectasis or pneumonia      CT-ABDOMEN-PELVIS W/O   Final Result      1.  Interval hemorrhage within the partially decompressed urinary bladder. Previously visualized posterior left bladder mass is no longer seen presumably this is due to interval resection      2.  New mild left hydroureteronephrosis with moderate perinephric fat stranding. This is concerning for obstruction perhaps related to  hematoma, recent instrumentation or pyelonephritis/ureterectasis.      3.  Increasing colonic stool volume with similar moderate diverticulosis of the colon but no evidence of diverticulitis      4.  Marked splenomegaly      5.  New small effusions with some lower lung zone groundglass suspicious for interstitial edema/mild congestive failure. There is stable mild pericardial fluid      6.  Persistent inhomogeneous medullary spaces with generalized increased density. Recent bone scan showed no focal mass but this could reflect underlying myelofibrosis or metastases with false-negative bone scan      CT-NEEDLE BX-DEEP BONE   Final Result      1.  Successful CT-guided core biopsy of sclerotic lesion in the posterior aspect of left inferior pubic ramus.      NM-BONE/JOINT SCAN WHOLE BODY   Final Result      No evidence of bony metastatic disease      CT-CHEST (THORAX) WITH   Final Result         1.  Emphysema   2.  Hazy bilateral upper lobe opacities with slight intralobular septal thickening, could represent component of mild pulmonary edema   3.  Linear densities the bilateral lung bases favors scarring or atelectasis.   4.  Atherosclerosis and atherosclerotic coronary artery disease   5.  Hepatomegaly with irregular hepatic contour favoring changes of cirrhosis.   6.  Subcentimeter hepatic lobe lesions, could represent small cyst or hemangioma, otherwise indeterminate.   7.  Mild mediastinal adenopathy, workup and evaluation for causes of adenopathy recommended as clinically appropriate.   8.  Splenomegaly      DX-CHEST-PORTABLE (1 VIEW)   Final Result         1.  No acute cardiopulmonary disease.   2.  Atherosclerosis      CT-ABDOMEN-PELVIS WITH   Final Result      1.  Enhancing 2.5 x 2.3 cm bladder wall mass suspicious for neoplasm.   2.  Heterogeneous sclerotic change of the pelvis and proximal femurs suspicious for metastatic disease or other infiltrating marrow process.   3.  Mild cardiomegaly.   4.   Hepatosplenomegaly.   5.  Small subcentimeter hepatic hypodensities which could represent cysts or hemangiomas but are nonspecific. If indicated, these findings could be further evaluated with dedicated liver protocol CT or MRI or ultrasound on a nonemergent basis.   6.  Colonic diverticulosis without evidence of diverticulitis.   7.  Atherosclerosis.             Assessment/Plan  * Urothelial carcinoma of bladder without invasion of muscle (HCC)- (present on admission)  Assessment & Plan  CT AP: Notable enhancing 2.5 x 2.3 cm bladder wall mass suspicious for neoplasms  s/p TURBT on 10/27 with multiple tumors noted  Pathology showing low-grade papillary urothelial carcinoma noninvasive with muscularis propria uninvolved by tumor  CT chest negative for metastasis  Bone scan without any evidence of metastasis  IR guided bone biopsy on 10/31 negative for malignancy    11/2: S/p Cystoscopy with clot evacuation, fulguration, and TUBRT  11/3: Attempt to wean CBI   11/5: CBI discontinued, TOVR   11/6: Hemoglobin drop of 1 g and recurrence of hematuria.  CBI restarted  11/7: CBI running, hand irrigated by urology.  Still with leaking around catheter tip, urine culture NGTD  11/8: CBI running, hand irrigated by urology who deferred surgical intervention with cauterization until tomorrow with close monitoring of H&H, 7.6 this morning  11/9: Seen by urology- wean CBI as tolerated. Continue to trend H/h. Transfuse PRN. Follow up with urology and oncology OP  11/10: Seen and cleared by urology. Per their recs:   Clamp CBI. If hematuria not worsening, can d/c CBI.   They will arrange follow up with their office next week for voiding trial to have cuellar removed  11/11:  Failed CBI clamping yesterday. Deep red gross hematuria noted. hgb stable. Continue to monitor  11/12: Urology took to the OR today for cystoscopy with clot evacuation and fulguration of bleeding bladder tissue  11/13: Continues with cherry red urine and significant  clotting requiring hand irrigation. 1 unit PRBC today. Discussed with Shakira Amos, urology aware, ordered B&O.    Pneumonia  Assessment & Plan  Leukocytosis continuing  Diminished lung sounds with left bibasilar rales on exam  No tachycardia or fever  Pro-Nicolas 1.64  Chest x-ray consistent with left basilar pneumonia  Ceftriaxone IV x5 days  Blood cultures pending    Acute blood loss anemia- (present on admission)  Assessment & Plan  Secondary to hematuria.   Required 8 units of PRBCs this admission   Continues to fail CBI clamping with gross hematuria noted  Underwent cystoscopy 11/12 with Dr. Boyer who performed clot evaluation and fulguration of bleeding bladder tissue    Trend HH, transfuse for Hgb<7 or hemodynamic instability    Hyponatremia  Assessment & Plan  Sodium 129 this morning  Given MERCY and hyponatremia, increased NS to 125 mL an hour  Will recheck with a.m. 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    Leukocytosis- (present on admission)  Assessment & Plan  Likely reactive in setting of malignancy and blood loss anemia  Continues to remain elevated, Pro-Nicolas 1.64  Chest x-ray consistent with atelectasis versus pneumonia  Ceftriaxone x5 days  Incentive spirometry and ambulate    Dyslipidemia- (present on admission)  Assessment & Plan  Continue atorvastatin    COPD  (chronic obstructive pulmonary disease) (HCC)- (present on admission)  Assessment & Plan  Not in acute exacerbation  PRN duonebs       VTE prophylaxis: SCDs/TEDs    I have performed a physical exam and reviewed and updated ROS and Plan today (11/13/2022). In review of yesterday's note (11/12/2022), there are no changes except as documented above.

## 2022-11-13 NOTE — CARE PLAN
The patient is Stable - Low risk of patient condition declining or worsening    Shift Goals  Clinical Goals: CBI and monitor hgb values  Patient Goals: comfort and go home  Family Goals: N/A    Progress made toward(s) clinical / shift goals:  Pt receiving IV fluids and medicated per MAR PRN orders for pain and able to rest.    Problem: Hemodynamics  Goal: Patient's hemodynamics, fluid balance and neurologic status will be stable or improve  Outcome: Progressing     Problem: Fluid Volume  Goal: Fluid volume balance will be maintained  Outcome: Progressing     Problem: Fall Risk  Goal: Patient will remain free from falls  Outcome: Progressing       Patient is not progressing towards the following goals:

## 2022-11-13 NOTE — PROGRESS NOTES
Noah from Lab called with critical result of hemoglobin at 7.5 and hematocrit at 23.6. Critical lab result read back to Noah.   Dr. Wooten notified of critical lab result at 1305. Critical lab result read back by Dr. Wooten.

## 2022-11-14 ENCOUNTER — APPOINTMENT (OUTPATIENT)
Dept: RADIOLOGY | Facility: MEDICAL CENTER | Age: 72
DRG: 668 | End: 2022-11-14
Attending: NURSE PRACTITIONER
Payer: MEDICARE

## 2022-11-14 ENCOUNTER — ANESTHESIA EVENT (OUTPATIENT)
Dept: SURGERY | Facility: MEDICAL CENTER | Age: 72
DRG: 668 | End: 2022-11-14
Payer: MEDICARE

## 2022-11-14 ENCOUNTER — ANESTHESIA (OUTPATIENT)
Dept: SURGERY | Facility: MEDICAL CENTER | Age: 72
DRG: 668 | End: 2022-11-14
Payer: MEDICARE

## 2022-11-14 PROBLEM — R79.89 ELEVATED D-DIMER: Status: ACTIVE | Noted: 2022-11-14

## 2022-11-14 PROBLEM — D68.9 COAGULATION DEFECT (HCC): Status: ACTIVE | Noted: 2022-11-14

## 2022-11-14 PROBLEM — N17.9 ACUTE KIDNEY INJURY (HCC): Status: ACTIVE | Noted: 2022-11-14

## 2022-11-14 LAB
ALBUMIN SERPL BCP-MCNC: 3.1 G/DL (ref 3.2–4.9)
ALBUMIN/GLOB SERPL: 1.7 G/DL
ALP SERPL-CCNC: 80 U/L (ref 30–99)
ALT SERPL-CCNC: 5 U/L (ref 2–50)
ANION GAP SERPL CALC-SCNC: 9 MMOL/L (ref 7–16)
ANISOCYTOSIS BLD QL SMEAR: ABNORMAL
AST SERPL-CCNC: 13 U/L (ref 12–45)
BASOPHILS # BLD AUTO: 1.6 % (ref 0–1.8)
BASOPHILS # BLD: 0.15 K/UL (ref 0–0.12)
BILIRUB SERPL-MCNC: 0.9 MG/DL (ref 0.1–1.5)
BLASTS NFR BLD MANUAL: 8.6 %
BUN SERPL-MCNC: 23 MG/DL (ref 8–22)
CALCIUM SERPL-MCNC: 8.1 MG/DL (ref 8.5–10.5)
CHLORIDE SERPL-SCNC: 98 MMOL/L (ref 96–112)
CO2 SERPL-SCNC: 25 MMOL/L (ref 20–33)
CREAT SERPL-MCNC: 1.37 MG/DL (ref 0.5–1.4)
D DIMER PPP IA.FEU-MCNC: 2.06 UG/ML (FEU) (ref 0–0.5)
EOSINOPHIL # BLD AUTO: 0.08 K/UL (ref 0–0.51)
EOSINOPHIL NFR BLD: 0.8 % (ref 0–6.9)
ERYTHROCYTE [DISTWIDTH] IN BLOOD BY AUTOMATED COUNT: 56.6 FL (ref 35.9–50)
ERYTHROCYTE [DISTWIDTH] IN BLOOD BY AUTOMATED COUNT: 58.5 FL (ref 35.9–50)
FERRITIN SERPL-MCNC: 1048 NG/ML (ref 22–322)
FIBRINOGEN PPP-MCNC: 534 MG/DL (ref 215–460)
GFR SERPLBLD CREATININE-BSD FMLA CKD-EPI: 55 ML/MIN/1.73 M 2
GLOBULIN SER CALC-MCNC: 1.8 G/DL (ref 1.9–3.5)
GLUCOSE SERPL-MCNC: 104 MG/DL (ref 65–99)
HCT VFR BLD AUTO: 22 % (ref 42–52)
HCT VFR BLD AUTO: 27.9 % (ref 42–52)
HGB BLD-MCNC: 7.1 G/DL (ref 14–18)
HGB BLD-MCNC: 8.9 G/DL (ref 14–18)
HGB RETIC QN AUTO: 25.7 PG/CELL (ref 29–35)
IMM RETICS NFR: 40.4 % (ref 9.3–17.4)
INR PPP: 1.11 (ref 0.87–1.13)
IRON SATN MFR SERPL: 51 % (ref 15–55)
IRON SERPL-MCNC: 129 UG/DL (ref 50–180)
LYMPHOCYTES # BLD AUTO: 3.33 K/UL (ref 1–4.8)
LYMPHOCYTES NFR BLD: 35.1 % (ref 22–41)
MAGNESIUM SERPL-MCNC: 2.1 MG/DL (ref 1.5–2.5)
MANUAL DIFF BLD: ABNORMAL
MCH RBC QN AUTO: 27.1 PG (ref 27–33)
MCH RBC QN AUTO: 27.5 PG (ref 27–33)
MCHC RBC AUTO-ENTMCNC: 31.9 G/DL (ref 33.7–35.3)
MCHC RBC AUTO-ENTMCNC: 32.3 G/DL (ref 33.7–35.3)
MCV RBC AUTO: 84.8 FL (ref 81.4–97.8)
MCV RBC AUTO: 85.3 FL (ref 81.4–97.8)
MICROCYTES BLD QL SMEAR: ABNORMAL
MONOCYTES # BLD AUTO: 1.04 K/UL (ref 0–0.85)
MONOCYTES NFR BLD AUTO: 10.9 % (ref 0–13.4)
MORPHOLOGY BLD-IMP: NORMAL
MYELOCYTES NFR BLD MANUAL: 1.6 %
NEUTROPHILS # BLD AUTO: 3.93 K/UL (ref 1.82–7.42)
NEUTROPHILS NFR BLD: 41.4 % (ref 44–72)
NRBC # BLD AUTO: 3.44 K/UL
NRBC BLD-RTO: 36.1 /100 WBC
PHOSPHATE SERPL-MCNC: 5.1 MG/DL (ref 2.5–4.5)
PLATELET # BLD AUTO: 69 K/UL (ref 164–446)
PLATELET # BLD AUTO: 88 K/UL (ref 164–446)
PLATELET BLD QL SMEAR: NORMAL
PMV BLD AUTO: 10.7 FL (ref 9–12.9)
PMV BLD AUTO: 9 FL (ref 9–12.9)
POLYCHROMASIA BLD QL SMEAR: NORMAL
POTASSIUM SERPL-SCNC: 5.1 MMOL/L (ref 3.6–5.5)
PROCALCITONIN SERPL-MCNC: 1.81 NG/ML
PROT SERPL-MCNC: 4.9 G/DL (ref 6–8.2)
PROTHROMBIN TIME: 14.1 SEC (ref 12–14.6)
RBC # BLD AUTO: 2.58 M/UL (ref 4.7–6.1)
RBC # BLD AUTO: 3.29 M/UL (ref 4.7–6.1)
RBC BLD AUTO: PRESENT
RETICS # AUTO: 0.22 M/UL (ref 0.04–0.06)
RETICS/RBC NFR: 6.8 % (ref 0.8–2.1)
SODIUM SERPL-SCNC: 132 MMOL/L (ref 135–145)
TIBC SERPL-MCNC: 251 UG/DL (ref 250–450)
UIBC SERPL-MCNC: 122 UG/DL (ref 110–370)
WBC # BLD AUTO: 24.4 K/UL (ref 4.8–10.8)
WBC # BLD AUTO: 9.5 K/UL (ref 4.8–10.8)

## 2022-11-14 PROCEDURE — A9270 NON-COVERED ITEM OR SERVICE: HCPCS | Performed by: STUDENT IN AN ORGANIZED HEALTH CARE EDUCATION/TRAINING PROGRAM

## 2022-11-14 PROCEDURE — 80053 COMPREHEN METABOLIC PANEL: CPT

## 2022-11-14 PROCEDURE — 700101 HCHG RX REV CODE 250: Performed by: ANESTHESIOLOGY

## 2022-11-14 PROCEDURE — 700111 HCHG RX REV CODE 636 W/ 250 OVERRIDE (IP): Performed by: HOSPITALIST

## 2022-11-14 PROCEDURE — 81270 JAK2 GENE: CPT

## 2022-11-14 PROCEDURE — 160035 HCHG PACU - 1ST 60 MINS PHASE I: Performed by: UROLOGY

## 2022-11-14 PROCEDURE — 700101 HCHG RX REV CODE 250: Performed by: UROLOGY

## 2022-11-14 PROCEDURE — 84100 ASSAY OF PHOSPHORUS: CPT

## 2022-11-14 PROCEDURE — 85046 RETICYTE/HGB CONCENTRATE: CPT

## 2022-11-14 PROCEDURE — 83540 ASSAY OF IRON: CPT

## 2022-11-14 PROCEDURE — 160009 HCHG ANES TIME/MIN: Performed by: UROLOGY

## 2022-11-14 PROCEDURE — 700102 HCHG RX REV CODE 250 W/ 637 OVERRIDE(OP): Performed by: STUDENT IN AN ORGANIZED HEALTH CARE EDUCATION/TRAINING PROGRAM

## 2022-11-14 PROCEDURE — 700105 HCHG RX REV CODE 258: Performed by: NURSE PRACTITIONER

## 2022-11-14 PROCEDURE — 160028 HCHG SURGERY MINUTES - 1ST 30 MINS LEVEL 3: Performed by: UROLOGY

## 2022-11-14 PROCEDURE — 160048 HCHG OR STATISTICAL LEVEL 1-5: Performed by: UROLOGY

## 2022-11-14 PROCEDURE — 85379 FIBRIN DEGRADATION QUANT: CPT

## 2022-11-14 PROCEDURE — A9270 NON-COVERED ITEM OR SERVICE: HCPCS | Performed by: NURSE PRACTITIONER

## 2022-11-14 PROCEDURE — 700105 HCHG RX REV CODE 258: Performed by: ANESTHESIOLOGY

## 2022-11-14 PROCEDURE — 160002 HCHG RECOVERY MINUTES (STAT): Performed by: UROLOGY

## 2022-11-14 PROCEDURE — 700111 HCHG RX REV CODE 636 W/ 250 OVERRIDE (IP): Performed by: NURSE PRACTITIONER

## 2022-11-14 PROCEDURE — 770020 HCHG ROOM/CARE - TELE (206)

## 2022-11-14 PROCEDURE — 0TCB8ZZ EXTIRPATION OF MATTER FROM BLADDER, VIA NATURAL OR ARTIFICIAL OPENING ENDOSCOPIC: ICD-10-PCS | Performed by: UROLOGY

## 2022-11-14 PROCEDURE — 160036 HCHG PACU - EA ADDL 30 MINS PHASE I: Performed by: UROLOGY

## 2022-11-14 PROCEDURE — 700102 HCHG RX REV CODE 250 W/ 637 OVERRIDE(OP): Performed by: NURSE PRACTITIONER

## 2022-11-14 PROCEDURE — 700102 HCHG RX REV CODE 250 W/ 637 OVERRIDE(OP): Performed by: HOSPITALIST

## 2022-11-14 PROCEDURE — A9270 NON-COVERED ITEM OR SERVICE: HCPCS | Performed by: HOSPITALIST

## 2022-11-14 PROCEDURE — 700101 HCHG RX REV CODE 250: Performed by: NURSE PRACTITIONER

## 2022-11-14 PROCEDURE — 700111 HCHG RX REV CODE 636 W/ 250 OVERRIDE (IP): Performed by: ANESTHESIOLOGY

## 2022-11-14 PROCEDURE — 85007 BL SMEAR W/DIFF WBC COUNT: CPT

## 2022-11-14 PROCEDURE — 85025 COMPLETE CBC W/AUTO DIFF WBC: CPT

## 2022-11-14 PROCEDURE — 82668 ASSAY OF ERYTHROPOIETIN: CPT

## 2022-11-14 PROCEDURE — 93970 EXTREMITY STUDY: CPT

## 2022-11-14 PROCEDURE — 93970 EXTREMITY STUDY: CPT | Mod: 26 | Performed by: INTERNAL MEDICINE

## 2022-11-14 PROCEDURE — 85027 COMPLETE CBC AUTOMATED: CPT

## 2022-11-14 PROCEDURE — 85610 PROTHROMBIN TIME: CPT

## 2022-11-14 PROCEDURE — 83735 ASSAY OF MAGNESIUM: CPT

## 2022-11-14 PROCEDURE — 82728 ASSAY OF FERRITIN: CPT

## 2022-11-14 PROCEDURE — 99233 SBSQ HOSP IP/OBS HIGH 50: CPT | Performed by: NURSE PRACTITIONER

## 2022-11-14 PROCEDURE — 88307 TISSUE EXAM BY PATHOLOGIST: CPT

## 2022-11-14 PROCEDURE — 85384 FIBRINOGEN ACTIVITY: CPT

## 2022-11-14 PROCEDURE — 0TBB8ZZ EXCISION OF BLADDER, VIA NATURAL OR ARTIFICIAL OPENING ENDOSCOPIC: ICD-10-PCS | Performed by: UROLOGY

## 2022-11-14 PROCEDURE — 160039 HCHG SURGERY MINUTES - EA ADDL 1 MIN LEVEL 3: Performed by: UROLOGY

## 2022-11-14 PROCEDURE — 00912 ANES TRURL PX RESCJ BLDR TUM: CPT | Performed by: ANESTHESIOLOGY

## 2022-11-14 PROCEDURE — 84145 PROCALCITONIN (PCT): CPT

## 2022-11-14 PROCEDURE — 83550 IRON BINDING TEST: CPT

## 2022-11-14 PROCEDURE — 36415 COLL VENOUS BLD VENIPUNCTURE: CPT

## 2022-11-14 PROCEDURE — 99100 ANES PT EXTEME AGE<1 YR&>70: CPT | Performed by: ANESTHESIOLOGY

## 2022-11-14 RX ORDER — HYDROMORPHONE HYDROCHLORIDE 1 MG/ML
1 INJECTION, SOLUTION INTRAMUSCULAR; INTRAVENOUS; SUBCUTANEOUS ONCE
Status: COMPLETED | OUTPATIENT
Start: 2022-11-14 | End: 2022-11-14

## 2022-11-14 RX ORDER — CEFAZOLIN SODIUM 1 G/3ML
INJECTION, POWDER, FOR SOLUTION INTRAMUSCULAR; INTRAVENOUS PRN
Status: DISCONTINUED | OUTPATIENT
Start: 2022-11-14 | End: 2022-11-14 | Stop reason: SURG

## 2022-11-14 RX ORDER — MEPERIDINE HYDROCHLORIDE 25 MG/ML
12.5 INJECTION INTRAMUSCULAR; INTRAVENOUS; SUBCUTANEOUS
Status: DISCONTINUED | OUTPATIENT
Start: 2022-11-14 | End: 2022-11-15 | Stop reason: HOSPADM

## 2022-11-14 RX ORDER — SODIUM CHLORIDE, SODIUM LACTATE, POTASSIUM CHLORIDE, CALCIUM CHLORIDE 600; 310; 30; 20 MG/100ML; MG/100ML; MG/100ML; MG/100ML
INJECTION, SOLUTION INTRAVENOUS
Status: DISCONTINUED | OUTPATIENT
Start: 2022-11-14 | End: 2022-11-14 | Stop reason: SURG

## 2022-11-14 RX ORDER — HYDROMORPHONE HYDROCHLORIDE 1 MG/ML
0.4 INJECTION, SOLUTION INTRAMUSCULAR; INTRAVENOUS; SUBCUTANEOUS
Status: DISCONTINUED | OUTPATIENT
Start: 2022-11-14 | End: 2022-11-15 | Stop reason: HOSPADM

## 2022-11-14 RX ORDER — HYDROMORPHONE HYDROCHLORIDE 1 MG/ML
0.2 INJECTION, SOLUTION INTRAMUSCULAR; INTRAVENOUS; SUBCUTANEOUS
Status: DISCONTINUED | OUTPATIENT
Start: 2022-11-14 | End: 2022-11-15 | Stop reason: HOSPADM

## 2022-11-14 RX ORDER — LABETALOL HYDROCHLORIDE 5 MG/ML
5 INJECTION, SOLUTION INTRAVENOUS
Status: DISCONTINUED | OUTPATIENT
Start: 2022-11-14 | End: 2022-11-15 | Stop reason: HOSPADM

## 2022-11-14 RX ORDER — MIDAZOLAM HYDROCHLORIDE 1 MG/ML
INJECTION INTRAMUSCULAR; INTRAVENOUS PRN
Status: DISCONTINUED | OUTPATIENT
Start: 2022-11-14 | End: 2022-11-14 | Stop reason: SURG

## 2022-11-14 RX ORDER — HYDROMORPHONE HYDROCHLORIDE 1 MG/ML
0.6 INJECTION, SOLUTION INTRAMUSCULAR; INTRAVENOUS; SUBCUTANEOUS
Status: DISCONTINUED | OUTPATIENT
Start: 2022-11-14 | End: 2022-11-15 | Stop reason: HOSPADM

## 2022-11-14 RX ORDER — ONDANSETRON 2 MG/ML
4 INJECTION INTRAMUSCULAR; INTRAVENOUS
Status: DISCONTINUED | OUTPATIENT
Start: 2022-11-14 | End: 2022-11-15 | Stop reason: HOSPADM

## 2022-11-14 RX ORDER — PHENYLEPHRINE HCL IN 0.9% NACL 0.5 MG/5ML
SYRINGE (ML) INTRAVENOUS PRN
Status: DISCONTINUED | OUTPATIENT
Start: 2022-11-14 | End: 2022-11-14 | Stop reason: SURG

## 2022-11-14 RX ORDER — ALPRAZOLAM 0.25 MG/1
0.25 TABLET ORAL 4 TIMES DAILY
Status: DISCONTINUED | OUTPATIENT
Start: 2022-11-14 | End: 2022-11-24

## 2022-11-14 RX ORDER — MAGNESIUM HYDROXIDE 1200 MG/15ML
LIQUID ORAL
Status: COMPLETED | OUTPATIENT
Start: 2022-11-14 | End: 2022-11-14

## 2022-11-14 RX ORDER — OXYCODONE HCL 5 MG/5 ML
5 SOLUTION, ORAL ORAL
Status: DISCONTINUED | OUTPATIENT
Start: 2022-11-14 | End: 2022-11-15 | Stop reason: HOSPADM

## 2022-11-14 RX ORDER — SODIUM CHLORIDE, SODIUM GLUCONATE, SODIUM ACETATE, POTASSIUM CHLORIDE AND MAGNESIUM CHLORIDE 526; 502; 368; 37; 30 MG/100ML; MG/100ML; MG/100ML; MG/100ML; MG/100ML
500 INJECTION, SOLUTION INTRAVENOUS CONTINUOUS
Status: DISCONTINUED | OUTPATIENT
Start: 2022-11-14 | End: 2022-11-15 | Stop reason: HOSPADM

## 2022-11-14 RX ORDER — HALOPERIDOL 5 MG/ML
1 INJECTION INTRAMUSCULAR
Status: DISCONTINUED | OUTPATIENT
Start: 2022-11-14 | End: 2022-11-15 | Stop reason: HOSPADM

## 2022-11-14 RX ORDER — DIPHENHYDRAMINE HYDROCHLORIDE 50 MG/ML
12.5 INJECTION INTRAMUSCULAR; INTRAVENOUS
Status: DISCONTINUED | OUTPATIENT
Start: 2022-11-14 | End: 2022-11-15 | Stop reason: HOSPADM

## 2022-11-14 RX ORDER — OXYCODONE HCL 5 MG/5 ML
10 SOLUTION, ORAL ORAL
Status: DISCONTINUED | OUTPATIENT
Start: 2022-11-14 | End: 2022-11-15 | Stop reason: HOSPADM

## 2022-11-14 RX ORDER — HYDRALAZINE HYDROCHLORIDE 20 MG/ML
5 INJECTION INTRAMUSCULAR; INTRAVENOUS
Status: DISCONTINUED | OUTPATIENT
Start: 2022-11-14 | End: 2022-11-15 | Stop reason: HOSPADM

## 2022-11-14 RX ADMIN — Medication 100 MCG: at 18:26

## 2022-11-14 RX ADMIN — CEFAZOLIN 2 G: 330 INJECTION, POWDER, FOR SOLUTION INTRAMUSCULAR; INTRAVENOUS at 18:19

## 2022-11-14 RX ADMIN — GABAPENTIN 200 MG: 100 CAPSULE ORAL at 05:03

## 2022-11-14 RX ADMIN — GABAPENTIN 200 MG: 100 CAPSULE ORAL at 12:09

## 2022-11-14 RX ADMIN — Medication 2000 UNITS: at 05:03

## 2022-11-14 RX ADMIN — OXYCODONE HYDROCHLORIDE 10 MG: 10 TABLET ORAL at 00:45

## 2022-11-14 RX ADMIN — FOLIC ACID 1 MG: 1 TABLET ORAL at 05:03

## 2022-11-14 RX ADMIN — AMPICILLIN AND SULBACTAM 3 G: 1; 2 INJECTION, POWDER, FOR SOLUTION INTRAMUSCULAR; INTRAVENOUS at 12:13

## 2022-11-14 RX ADMIN — OXYCODONE HYDROCHLORIDE 10 MG: 10 TABLET ORAL at 05:03

## 2022-11-14 RX ADMIN — SODIUM CHLORIDE, POTASSIUM CHLORIDE, SODIUM LACTATE AND CALCIUM CHLORIDE: 600; 310; 30; 20 INJECTION, SOLUTION INTRAVENOUS at 18:10

## 2022-11-14 RX ADMIN — PROPOFOL 150 MG: 10 INJECTION, EMULSION INTRAVENOUS at 18:13

## 2022-11-14 RX ADMIN — ALPRAZOLAM 0.25 MG: 0.25 TABLET ORAL at 12:10

## 2022-11-14 RX ADMIN — FENTANYL CITRATE 50 MCG: 50 INJECTION, SOLUTION INTRAMUSCULAR; INTRAVENOUS at 20:19

## 2022-11-14 RX ADMIN — HYDROMORPHONE HYDROCHLORIDE 1 MG: 1 INJECTION, SOLUTION INTRAMUSCULAR; INTRAVENOUS; SUBCUTANEOUS at 07:25

## 2022-11-14 RX ADMIN — OXYBUTYNIN CHLORIDE 5 MG: 5 TABLET ORAL at 00:46

## 2022-11-14 RX ADMIN — OXYBUTYNIN CHLORIDE 5 MG: 5 TABLET ORAL at 12:10

## 2022-11-14 RX ADMIN — AMPICILLIN AND SULBACTAM 3 G: 1; 2 INJECTION, POWDER, FOR SOLUTION INTRAMUSCULAR; INTRAVENOUS at 05:02

## 2022-11-14 RX ADMIN — EPHEDRINE SULFATE 10 MG: 50 INJECTION, SOLUTION INTRAVENOUS at 18:44

## 2022-11-14 RX ADMIN — OXYBUTYNIN CHLORIDE 5 MG: 5 TABLET ORAL at 05:03

## 2022-11-14 RX ADMIN — THIAMINE HCL TAB 100 MG 100 MG: 100 TAB at 05:03

## 2022-11-14 RX ADMIN — FENTANYL CITRATE 50 MCG: 50 INJECTION, SOLUTION INTRAMUSCULAR; INTRAVENOUS at 18:12

## 2022-11-14 RX ADMIN — HYDROMORPHONE HYDROCHLORIDE 1 MG: 1 INJECTION, SOLUTION INTRAMUSCULAR; INTRAVENOUS; SUBCUTANEOUS at 15:45

## 2022-11-14 RX ADMIN — FENTANYL CITRATE 25 MCG: 50 INJECTION, SOLUTION INTRAMUSCULAR; INTRAVENOUS at 19:31

## 2022-11-14 RX ADMIN — FENTANYL CITRATE 50 MCG: 50 INJECTION, SOLUTION INTRAMUSCULAR; INTRAVENOUS at 19:08

## 2022-11-14 RX ADMIN — HYDROMORPHONE HYDROCHLORIDE 1 MG: 1 INJECTION, SOLUTION INTRAMUSCULAR; INTRAVENOUS; SUBCUTANEOUS at 01:51

## 2022-11-14 RX ADMIN — AZITHROMYCIN MONOHYDRATE 500 MG: 250 TABLET ORAL at 05:03

## 2022-11-14 RX ADMIN — FENTANYL CITRATE 50 MCG: 50 INJECTION, SOLUTION INTRAMUSCULAR; INTRAVENOUS at 18:23

## 2022-11-14 RX ADMIN — Medication 30 G: at 03:21

## 2022-11-14 RX ADMIN — Medication 100 MCG: at 18:19

## 2022-11-14 RX ADMIN — FENTANYL CITRATE 25 MCG: 50 INJECTION, SOLUTION INTRAMUSCULAR; INTRAVENOUS at 19:23

## 2022-11-14 RX ADMIN — HYDROMORPHONE HYDROCHLORIDE 1 MG: 1 INJECTION, SOLUTION INTRAMUSCULAR; INTRAVENOUS; SUBCUTANEOUS at 09:03

## 2022-11-14 RX ADMIN — MIDAZOLAM HYDROCHLORIDE 2 MG: 1 INJECTION, SOLUTION INTRAMUSCULAR; INTRAVENOUS at 18:10

## 2022-11-14 RX ADMIN — CYANOCOBALAMIN TAB 500 MCG 1000 MCG: 500 TAB at 05:03

## 2022-11-14 ASSESSMENT — ENCOUNTER SYMPTOMS
WEAKNESS: 0
NAUSEA: 0
ABDOMINAL PAIN: 0
BACK PAIN: 0
HEARTBURN: 0
SHORTNESS OF BREATH: 0
VOMITING: 0
FEVER: 0
ABDOMINAL PAIN: 1
DIZZINESS: 0
COUGH: 0
DEPRESSION: 0
CHILLS: 0
BLURRED VISION: 0

## 2022-11-14 ASSESSMENT — COGNITIVE AND FUNCTIONAL STATUS - GENERAL
DRESSING REGULAR LOWER BODY CLOTHING: A LITTLE
SUGGESTED CMS G CODE MODIFIER MOBILITY: CK
DAILY ACTIVITIY SCORE: 18
MOBILITY SCORE: 18
TOILETING: A LITTLE
WALKING IN HOSPITAL ROOM: A LITTLE
SUGGESTED CMS G CODE MODIFIER DAILY ACTIVITY: CK
MOVING TO AND FROM BED TO CHAIR: A LITTLE
PERSONAL GROOMING: A LITTLE
MOVING FROM LYING ON BACK TO SITTING ON SIDE OF FLAT BED: A LITTLE
HELP NEEDED FOR BATHING: A LITTLE
EATING MEALS: A LITTLE
DRESSING REGULAR UPPER BODY CLOTHING: A LITTLE
TURNING FROM BACK TO SIDE WHILE IN FLAT BAD: A LITTLE
STANDING UP FROM CHAIR USING ARMS: A LITTLE
CLIMB 3 TO 5 STEPS WITH RAILING: A LITTLE

## 2022-11-14 ASSESSMENT — PAIN SCALES - WONG BAKER: WONGBAKER_NUMERICALRESPONSE: HURTS AS MUCH AS POSSIBLE

## 2022-11-14 ASSESSMENT — FIBROSIS 4 INDEX: FIB4 SCORE: 5.36

## 2022-11-14 ASSESSMENT — PAIN SCALES - GENERAL: PAIN_LEVEL: 1

## 2022-11-14 NOTE — PROGRESS NOTES
4 Eyes Skin Assessment Completed by ELLA Joseph and ELLA Doll.    Head WDL  Ears WDL  Nose WDL  Mouth WDL  Neck WDL  Breast/Chest WDL  Shoulder Blades WDL  Spine WDL  (R) Arm/Elbow/Hand Bruising  (L) Arm/Elbow/Hand Bruising  Abdomen WDL  Groin Redness to penis  Scrotum/Coccyx/Buttocks Redness and Blanching  (R) Leg WDL  (L) Leg WDL  (R) Heel/Foot/Toe WDL  (L) Heel/Foot/Toe WDL          Devices In Places Tele Box, Blood Pressure Cuff, Pulse Ox, Arboleda, and Nasal Cannula      Interventions In Place NC W/Ear Foams and Pillows    Possible Skin Injury No    Pictures Uploaded Into Epic N/A  Wound Consult Placed N/A  RN Wound Prevention Protocol Ordered No

## 2022-11-14 NOTE — PROGRESS NOTES
NOC HOSPITALIST CROSS COVER    Notified by RN regarding patient having frequent clots with his CBI.  The nurses are reporting that they are having to manually irrigate his CBI on an hourly basis.  S5 charge RN stated that the patient was inappropriate to stay on the unit due to his hourly intervention requirements.  Charge RN requesting that the patient be transferred to IM.  Patient does not meet criteria for higher level of care to [facility] at this time.  He will be transferred to telemetry for higher level of care given his frequent needs.  Was later notified of his hemoglobin of 6.3.      Vitals:    11/14/22 0100   BP: 102/51   Pulse:    Resp:    Temp:    SpO2:           Plan:  #Hematuria  -Transfuse 1 unit PRBC once now  -Vital signs per nursing policy  -CBC posttransfusion  -Bedside RN had stat paged urology, who stated that they will be taking the patient for repeat cystoscopy today and to have the patient n.p.o. at midnight.        -----------------------------------------------------------------------------------------------------------    Electronically signed by:  THOMAS Austin AGACNP-BC  Hospitalist Services

## 2022-11-14 NOTE — PROGRESS NOTES
Patient transferred to [facility] floor, room 735-2. All belongings sent with patient. Report given.

## 2022-11-14 NOTE — PROGRESS NOTES
Per Malgorzata PEACOCK In pharmacy, pt is going through ordered CBI bag faster than bags can be made. NP Regis contacted per pharmacy's request to see if plain sterile water CBI can be used while waiting on pharmacy to prepare ordered bags to prevent another delay in therapy that occurred earlier. NP okay with plain sterile water to keep irrigation flowing. RN unable to obtain sterile water at this time. Pharmacy states it is okay to use NS CBI.

## 2022-11-14 NOTE — PROGRESS NOTES
"Since transfer to floor at 2010, pt has required frequent manual bladder irrigation q20-30 mins to restore urine flow. On one occasion, multiple small clots noted in urine along other sediments. Pt also experienced multiple episodes of blood and clot leakage from meatus. Arboleda bag changed once to ensure patency. Pt able to achieve pink urine x 3 times, but turns dark red when drip rate is decreased to keep urine pink. Pt is more uncomfortable this morning at 0500 despite Arboleda patency, feels like there is a \"big clot\" in bladder, requesting for doctors to come in early. Arboleda remains patent at this time. Pain addressed.   "

## 2022-11-14 NOTE — CARE PLAN
The patient is Watcher - Medium risk of patient condition declining or worsening    Shift Goals  Clinical Goals: Maintain patent Arboleda with CBI, MAP > 60  Patient Goals: Pain management  Family Goals: AZIZA      Problem: Hemodynamics  Goal: Patient's hemodynamics, fluid balance and neurologic status will be stable or improve  Outcome: Progressing     Problem: Fluid Volume  Goal: Fluid volume balance will be maintained  Outcome: Progressing     Problem: Urinary - Renal Perfusion  Goal: Ability to achieve and maintain adequate renal perfusion and functioning will improve  Outcome: Progressing     Problem: Respiratory  Goal: Patient will achieve/maintain optimum respiratory ventilation and gas exchange  Outcome: Progressing     Problem: Pain - Standard  Goal: Alleviation of pain or a reduction in pain to the patient’s comfort goal  Outcome: Progressing     Problem: Fall Risk  Goal: Patient will remain free from falls  Outcome: Progressing       Progress made toward(s) clinical / shift goals:  Pt requires frequent manual bladder irrigation/flushing to maintain Arboleda patency. BP remains stable s/p blood transfusion    Patient is not progressing towards the following goals:

## 2022-11-14 NOTE — CONSULTS
Consult Note: Hematology/Oncology    Date of consultation: 11/14/2022 12:49 PM    Referring provider:  Miranda GZUMAN    Reason for consultation: persistent anemia      History of presenting illness:   Thank you very much for allowing me to see  Gregg Martinez, he  is a 72 y.o. year old man w/ PMHx of dyslipidemia, asthma who was admitted on 10/26/2022 for hematuria persisting x2 wks prior to admission.  On admission he was found to have a hemoglobin of 6.5.  CT abdomen/pelvis showing a bladder mass.  TURBT was performed on 10/27 with bladder tumor resection, pathology showing noninvasive low grade papillary urothelial carcinoma.  A bone biopsy was performed on 10/31 of the pubic rami which showed no malignancy.  Another cystoscopy was performed on 11/2, which showed a large blood clot within the bladder, requiring resection, which was adherent to the resection bed near the left trigone, another section of tumor was removed from the left ureteral orifice.  He was taken for cystoscopy again on 11/12 for clot evacuation with removal of about 500 ml old clot.  He has been on varying degrees of CBI since 11/7, and was trialed without CBI on 11/6 but failed due to clot obstruction.  During hospitalization he has required 9 total units of pRBCs and 1u platelets.    He has not taken any aspirin/NSAID since admission, does not take anticoagulation at  home.  He has never had any issues with bleeding in the past, hasn't required blood transfusions to his knowledge.  No known family history of bleeding disorders or blood cancers.  Pt denies any recent fevers, chills, night sweats.  Does report some weight loss.  Reporting current diffuse lowe abdominal pain.  Denies any nausea, vomiting.  Denies melena.  Pt is planned for cystoscopy this afternoon.      Past Medical History:    Past Medical History:   Diagnosis Date    Chronic back pain     Lumbar stress fracture     PNA (pneumonia)        Past surgical history:    Past  Surgical History:   Procedure Laterality Date    AR TRANSURETHRAL ELEC-SURG PROSTATECTOM N/A 11/12/2022    Procedure: TURP (TRANSURETHRAL RESECTION OF PROSTATE);  Surgeon: Kamaljit Boyer M.D.;  Location: SURGERY [facility];  Service: Urology    AR CYSTOURETHROSCOPY N/A 11/12/2022    Procedure: CYSTOSCOPY;  Surgeon: Kamaljit Boyer M.D.;  Location: SURGERY [facility];  Service: Urology    AR CYSTOURETHROSCOPY N/A 11/2/2022    Procedure: CYSTOSCOPY FOR CLOT EVACUATION;  Surgeon: Leonid Dempsey M.D.;  Location: SURGERY [facility];  Service: Urology    TURP-VAPOR N/A 11/2/2022    Procedure: FULGERATION OF BLEEDING;  Surgeon: Leonid Dempsey M.D.;  Location: SURGERY [facility];  Service: Urology    TURBT (TRANSURETHRAL RESECTION OF BLADDER TUMOR) N/A 11/2/2022    Procedure: TURBT (TRANSURETHRAL RESECTION OF BLADDER TUMOR);  Surgeon: Leonid Dempsey M.D.;  Location: SURGERY [facility];  Service: Urology    TURBT (TRANSURETHRAL RESECTION OF BLADDER TUMOR) N/A 10/27/2022    Procedure: TURBT BIPOLAR (TRANSURETHRAL RESECTION OF BLADDER TUMOR);  Surgeon: Jayesh Castellanos M.D.;  Location: [facility];  Service: Urology    KNEE ARTHROSCOPY      OPEN REDUCTION         Allergies:  Patient has no known allergies.    Medications:    Current Facility-Administered Medications   Medication Dose Route Frequency Provider Last Rate Last Admin    ALPRAZolam (XANAX) tablet 0.25 mg  0.25 mg Oral 4X/DAY Miranda Wooten, A.P.R.N.   0.25 mg at 11/14/22 1210    NS infusion   Intravenous Continuous Miranda Wooten, A.P.R.N. 125 mL/hr at 11/13/22 2337 New Bag at 11/13/22 2337    ampicillin/sulbactam (UNASYN) 3 g in  mL IVPB  3 g Intravenous Q6HRS Miranda Wooten, A.P.R.N.   Stopped at 11/14/22 1243    azithromycin (ZITHROMAX) tablet 500 mg  500 mg Oral DAILY Miranda Wooten A.P.R.NRubia   500 mg at 11/14/22 0502    oxybutynin (DITROPAN) tablet 5 mg  5 mg Oral TID Shakira Amos P.A.-C.   5 mg at 11/14/22 1210     Respiratory Therapy Consult   Nebulization Continuous RT BRITTON Broussard        alum, ammonium (ALUM) 30 g in sterile water 3,000 mL bladder irrigation  30 g Irrigation Continuous Jayesh Castellanos M.D.   30 g at 11/14/22 0321    oxyCODONE immediate release (ROXICODONE) tablet 10 mg  10 mg Oral Q3HRS PRN Delbert Farnsworth M.D.   10 mg at 11/14/22 0503    Or    oxyCODONE immediate-release (ROXICODONE) tablet 15 mg  15 mg Oral Q3HRS PRN Delbert Farnsworth M.D.   15 mg at 11/11/22 1641    HYDROmorphone (Dilaudid) injection 1 mg  1 mg Intravenous Q4HRS PRKALPANA Farnsworth M.D.   1 mg at 11/14/22 0725    gabapentin (NEURONTIN) capsule 200 mg  200 mg Oral TID Delbert Farnsworth M.D.   200 mg at 11/14/22 1209    senna-docusate (PERICOLACE or SENOKOT S) 8.6-50 MG per tablet 2 Tablet  2 Tablet Oral BID Azar Maldonado M.D.   2 Tablet at 11/11/22 1730    And    polyethylene glycol/lytes (MIRALAX) PACKET 1 Packet  1 Packet Oral QDAY PRN Azar Maldonado M.D.   1 Packet at 11/11/22 1148    And    magnesium hydroxide (MILK OF MAGNESIA) suspension 30 mL  30 mL Oral QDAY JORGE Maldonado M.D.   30 mL at 11/11/22 0913    And    bisacodyl (DULCOLAX) suppository 10 mg  10 mg Rectal QDAY PRKALPANA Maldonado M.D.        acetaminophen (Tylenol) tablet 650 mg  650 mg Oral Q6HRS PRKALPANA Maldonado M.D.        labetalol (NORMODYNE/TRANDATE) injection 10 mg  10 mg Intravenous Q4HRS PRKALPANA Maldonado M.D.        ondansetron (ZOFRAN) syringe/vial injection 4 mg  4 mg Intravenous Q4HRS PRKALPANA Maldonado M.D.        ondansetron (ZOFRAN ODT) dispertab 4 mg  4 mg Oral Q4HRS PRKALPANA Maldonado M.D.   4 mg at 11/03/22 0748    guaiFENesin dextromethorphan (ROBITUSSIN DM) 100-10 MG/5ML syrup 10 mL  10 mL Oral Q6HRS PRN Azar Maldonado M.D.        Pharmacy Consult Request ...Pain Management Review 1 Each  1 Each Other PHARMACY TO DOSE Azar Maldonado M.D.        albuterol inhaler 2 Puff  2 Puff Inhalation Q6HRS PRN Azar Maldonado M.D.         atorvastatin (LIPITOR) tablet 20 mg  20 mg Oral Q EVENING Azar Maldonado M.D.   20 mg at 22 1718    folic acid (FOLVITE) tablet 1 mg  1 mg Oral DAILY Azar Maldonado M.D.   1 mg at 22 0503    vitamin D3 (cholecalciferol) tablet 2,000 Units  2,000 Units Oral DAILY Azar Maldonado M.D.   2,000 Units at 22 0503    thiamine (Vitamin B-1) tablet 100 mg  100 mg Oral BID Azar Maldonado M.D.   100 mg at 22 0503    cyanocobalamin (VITAMIN B-12) tablet 1,000 mcg  1,000 mcg Oral DAILY Azar Maldonado M.D.   1,000 mcg at 22 0503       Social History:     Social History     Socioeconomic History    Marital status: Single     Spouse name: Not on file    Number of children: Not on file    Years of education: Not on file    Highest education level: Not on file   Occupational History    Not on file   Tobacco Use    Smoking status: Former     Packs/day: 0.00     Years: 35.00     Pack years: 0.00     Types: Cigarettes     Quit date: 2005     Years since quittin.6    Smokeless tobacco: Never   Vaping Use    Vaping Use: Never used   Substance and Sexual Activity    Alcohol use: No     Alcohol/week: 0.0 oz    Drug use: No    Sexual activity: Not on file   Other Topics Concern    Not on file   Social History Narrative    Not on file     Social Determinants of Health     Financial Resource Strain: Not on file   Food Insecurity: Not on file   Transportation Needs: Not on file   Physical Activity: Not on file   Stress: Not on file   Social Connections: Not on file   Intimate Partner Violence: Not on file   Housing Stability: Not on file       Family History:     No known blood disorders or blood cancerns.      Review of Systems:   All other review of systems are negative except what was mentioned above in the HPI.    Constitutional: No fever, chills, malaise/fatigue.  Reports weight loss.  HEENT: No new auditory or visual complaints. No sore throat and neck pain.     Respiratory:No new cough, sputum production,  "shortness of breath and wheezing.    Cardiovascular: No new chest pain, palpitations, orthopnea and leg swelling.    Gastrointestinal: No heartburn, nausea, vomiting, hematochezia or melena     Musculoskeletal: No new arthralgias or myalgias   Skin: Negative for rash and itching.    Neurological: Negative for focal weakness or headaches.     Psychiatric/Behavioral: No new depression/anxiety.    Physical Exam:   Vitals:   BP (!) 151/108 Comment: RN notified  Pulse (!) 106   Temp 36.7 °C (98 °F) (Temporal)   Resp 19   Ht 1.727 m (5' 8\")   Wt 78.2 kg (172 lb 6.4 oz)   SpO2 93%   BMI 26.21 kg/m²     General: Not in acute distress although appears uncomfortable, alert and oriented x 3  HEENT: No pallor, icterus. Oropharynx clear.   Neck: Supple, no palpable masses.  Lymph nodes: No palpable cervical, supraclavicular, axillary or inguinal lymphadenopathy.    CVS: regular rate and rhythm, no rubs or gallops  RESP: Clear to auscultate bilaterally, no wheezing or crackles.   ABD: Soft, mildly distended, tenderness to palpation in lower abdomen. positive bowel sounds.  Raboleda draining very dark urine.  EXT: No edema or cyanosis  CNS: Alert and oriented x3, No focal deficits.  Skin- No rash      Labs:   Recent Labs     11/13/22 2024 11/14/22  0151 11/14/22  1013   RBC 2.33* 2.58* 3.29*   HEMOGLOBIN 6.3* 7.1* 8.9*   HEMATOCRIT 19.6* 22.0* 27.9*   PLATELETCT 77* 69* 88*   PROTHROMBTM  --   --  14.1   INR  --   --  1.11   IRON  --  129  --    FERRITIN  --  1048.0*  --    TOTIRONBC  --  251  --      Lab Results   Component Value Date/Time    SODIUM 132 (L) 11/14/2022 01:51 AM    POTASSIUM 5.1 11/14/2022 01:51 AM    CHLORIDE 98 11/14/2022 01:51 AM    CO2 25 11/14/2022 01:51 AM    GLUCOSE 104 (H) 11/14/2022 01:51 AM    BUN 23 (H) 11/14/2022 01:51 AM    CREATININE 1.37 11/14/2022 01:51 AM        Assessment and Plan:  Persistent Anemia  -has had multiple cystoscopies following TURBT which have been finding large clots " present.  -has required 9u pRBCs since admission  -has appropriate reticulocyte response, TEG is normal, INR is unremarkable, LDH largely elevated on admission, but t. Bili is normal, making hemolysis less likely.  Fibrinogen high, unlikely to be DIC.    -platelets have been stably low since admission, and about a year ago were up in the 600-700 range.    -Etiology of persistent anemia would include acute blood loss anemia due to continuous bleeding from bladder, but cannot rule out myeloproliferative or myeloneoplasms at this time.    Recommendations:  -sending EPO and Jak2 levels  -continue following PT/INR q48h  -will obtain bone marrow biopsy when pt stable and appropriate  -continue monitoring hemoglobin, transfuse for Hgb <8 due to active bleeding concerns and platelets <50K.  -avoid NSAIDs, anticoagulation at this time    Low grade papillary urothelial carcinoma  -hematuria prior to admission, TURBT performed on 10/27 found low grade papillary urothelial carcinoma which was noninvasive with muscularis propria present on resection.    -may need further assessment by urology when appropriate, if mass has not been fully resected.      He agreed and verbalized his agreement and understanding with the current plan.  I answered all questions and concerns he has at this time.  Thank you for allowing me to participate in his care.      SIGNATURES:  Trell Arauz D.O.

## 2022-11-14 NOTE — PROGRESS NOTES
Hospital Medicine Daily Progress Note    Date of Service  11/14/2022    Chief Complaint  Gregg Martinez is a 72 y.o. male admitted 10/26/2022 with hematuria    Hospital Course  72-year-old male with past medical history of DL D and asthma presenting with hematuria.  He was admitted for acute blood loss anemia requiring 2 units of PRBC transfusion.  CT abdomen pelvis showed bladder wall mass suspicious for neoplasm.  Urology was consulted and patient for TURBT on 10/27 which showed multifocal bladder tumors which were all resected. He continued to have hematuria which improved with CBI however he did require additional blood transfusion. Pathology showed low grade urothelial carcinoma. CT chest with contrast without evidence of lung involvement. Bone scan negative for mets. IR bone biopsy 10/31 with no evidence of malignancy.      He remained in the hospital given inability to discontinue CBI given hematuria and clots and intermittently worsening hemoglobin and hematocrit.  Urology took patient to the OR on 11/12/2022 for cystoscopy with clot evacuation from the bladder and fulguration of bleeding bladder tissue and again on 11/14/2022.  Given patient's coagulopathy issues and abnormal lab values, hematology was consulted recommend bone marrow aspiration and testing and every 48 PT/INR.    Interval Problem Update  11/14/2022: Patient seen and examined.  Complaining of 10/10 pain secondary to clogged CBI. Arboleda bag with rust colored urine.     -Required transfer to higher level of care overnight given q1 hour CBI irrigation  -Paged on-call urology given continued clogging of catheter. Discussed with urology PA who was able to unclog CBI and scheduled patient for OR today to manage bleeding  -TEG results yesterday consistent with inability of platelets to clot, 1 unit of platelets transfused yesterday  -Again had hemoglobin overnight of 6.3 requiring 1 unit PRBC  -Intermittently hypotensive with MAP of 64  -Hemoglobin  this morning 7.1 with platelets of 69  -Note presence of blasts in differential with elevated fibrinogen and positive d-dimer, reticulocyte response appropriate, consulted hematology  -Na improved to 132  -Cr 1.37, BUN 23  -Iron studies do not indicate iron deficiency  -Procal remains elevated, continue Unasyn and azithromycin  -NPO for OR with urology today    Discussed case with Dr. Reyes. He feels that patient may have a myeloproliferative disorder and requires a bone marrow aspiration for further testing. Patient is declining at this time given consistent issues with his CBI and numerous OR trips. Will need scheduled when patient decides to move forward.     Hematology recommendations:  -Continue to trend H&H and transfuse for hgb <8, platelets <50K  -Follow PT/INR q 48 hours  -OK to transfuse 2 units FFP now    I have discussed this patient's plan of care and discharge plan at IDT rounds today with Case Management, Nursing, Nursing leadership, and other members of the IDT team.    Consultants/Specialty  urology    Code Status  Full Code    Disposition  Patient is not medically cleared for discharge.   Anticipate discharge to to home with close outpatient follow-up.  I have placed the appropriate orders for post-discharge needs.    Review of Systems  Review of Systems   Constitutional:  Positive for malaise/fatigue. Negative for chills and fever.   HENT:  Negative for hearing loss.    Eyes:  Negative for blurred vision.   Respiratory:  Negative for cough and shortness of breath.    Cardiovascular:  Negative for chest pain and leg swelling.   Gastrointestinal:  Negative for abdominal pain and heartburn.   Genitourinary:  Positive for hematuria.   Musculoskeletal:  Negative for back pain.   Skin:  Negative for rash.   Neurological:  Negative for dizziness and weakness.   Psychiatric/Behavioral:  Negative for depression.    All other systems reviewed and are negative.     Physical Exam  Temp:  [36.2 °C (97.2  °F)-37 °C (98.6 °F)] 37 °C (98.6 °F)  Pulse:  [73-90] 90  Resp:  [12-20] 20  BP: ()/(38-73) 115/64  SpO2:  [92 %-100 %] 96 %    Physical Exam  Vitals and nursing note reviewed.   Constitutional:       Appearance: Normal appearance. He is normal weight.   HENT:      Head: Normocephalic and atraumatic.      Right Ear: External ear normal.      Left Ear: External ear normal.      Nose: Nose normal.      Mouth/Throat:      Mouth: Mucous membranes are moist.      Pharynx: Oropharynx is clear.   Eyes:      General: No scleral icterus.  Cardiovascular:      Rate and Rhythm: Normal rate and regular rhythm.      Pulses: Normal pulses.      Heart sounds: Normal heart sounds. No murmur heard.  Pulmonary:      Effort: Pulmonary effort is normal. No respiratory distress.      Breath sounds: Rales (Left lung base) present.   Abdominal:      General: Abdomen is flat. Bowel sounds are normal.      Palpations: Abdomen is soft.      Tenderness: There is no abdominal tenderness.   Genitourinary:     Comments: CBI in place draining red colored urine with intermittent clots  Musculoskeletal:         General: No swelling. Normal range of motion.      Cervical back: Normal range of motion.   Skin:     General: Skin is warm and dry.      Capillary Refill: Capillary refill takes less than 2 seconds.      Coloration: Skin is pale.   Neurological:      Mental Status: He is alert and oriented to person, place, and time.   Psychiatric:         Mood and Affect: Mood normal.       Fluids    Intake/Output Summary (Last 24 hours) at 11/14/2022 0734  Last data filed at 11/14/2022 0600  Gross per 24 hour   Intake 3690.2 ml   Output 3820 ml   Net -129.8 ml         Laboratory  Recent Labs     11/13/22  0226 11/13/22  0830 11/13/22  1229 11/13/22 2024 11/14/22  0151   WBC 13.8*  --   --  11.0* 9.5   RBC 2.72*  --   --  2.33* 2.58*   HEMOGLOBIN 7.1*   < > 7.5* 6.3* 7.1*   HEMATOCRIT 22.9*   < > 23.6* 19.6* 22.0*   MCV 84.2  --   --  84.1 85.3    MCH 26.1*  --   --  27.0 27.5   MCHC 31.0*  --   --  32.1* 32.3*   RDW 62.3*  --   --  59.4* 56.6*   PLATELETCT 82*  --   --  77* 69*   MPV 8.9*  --   --  9.4 10.7    < > = values in this interval not displayed.       Recent Labs     11/12/22  0811 11/13/22  0226 11/14/22  0151   SODIUM 132* 129* 132*   POTASSIUM 5.4 5.2 5.1   CHLORIDE 96 93* 98   CO2 28 28 25   GLUCOSE 109* 106* 104*   BUN 25* 23* 23*   CREATININE 1.42* 1.40 1.37   CALCIUM 8.8 8.3* 8.1*                     Imaging  DX-CHEST-PORTABLE (1 VIEW)   Final Result      Increased LEFT basilar atelectasis or pneumonia      CT-ABDOMEN-PELVIS W/O   Final Result      1.  Interval hemorrhage within the partially decompressed urinary bladder. Previously visualized posterior left bladder mass is no longer seen presumably this is due to interval resection      2.  New mild left hydroureteronephrosis with moderate perinephric fat stranding. This is concerning for obstruction perhaps related to hematoma, recent instrumentation or pyelonephritis/ureterectasis.      3.  Increasing colonic stool volume with similar moderate diverticulosis of the colon but no evidence of diverticulitis      4.  Marked splenomegaly      5.  New small effusions with some lower lung zone groundglass suspicious for interstitial edema/mild congestive failure. There is stable mild pericardial fluid      6.  Persistent inhomogeneous medullary spaces with generalized increased density. Recent bone scan showed no focal mass but this could reflect underlying myelofibrosis or metastases with false-negative bone scan      CT-NEEDLE BX-DEEP BONE   Final Result      1.  Successful CT-guided core biopsy of sclerotic lesion in the posterior aspect of left inferior pubic ramus.      NM-BONE/JOINT SCAN WHOLE BODY   Final Result      No evidence of bony metastatic disease      CT-CHEST (THORAX) WITH   Final Result         1.  Emphysema   2.  Hazy bilateral upper lobe opacities with slight intralobular  septal thickening, could represent component of mild pulmonary edema   3.  Linear densities the bilateral lung bases favors scarring or atelectasis.   4.  Atherosclerosis and atherosclerotic coronary artery disease   5.  Hepatomegaly with irregular hepatic contour favoring changes of cirrhosis.   6.  Subcentimeter hepatic lobe lesions, could represent small cyst or hemangioma, otherwise indeterminate.   7.  Mild mediastinal adenopathy, workup and evaluation for causes of adenopathy recommended as clinically appropriate.   8.  Splenomegaly      DX-CHEST-PORTABLE (1 VIEW)   Final Result         1.  No acute cardiopulmonary disease.   2.  Atherosclerosis      CT-ABDOMEN-PELVIS WITH   Final Result      1.  Enhancing 2.5 x 2.3 cm bladder wall mass suspicious for neoplasm.   2.  Heterogeneous sclerotic change of the pelvis and proximal femurs suspicious for metastatic disease or other infiltrating marrow process.   3.  Mild cardiomegaly.   4.  Hepatosplenomegaly.   5.  Small subcentimeter hepatic hypodensities which could represent cysts or hemangiomas but are nonspecific. If indicated, these findings could be further evaluated with dedicated liver protocol CT or MRI or ultrasound on a nonemergent basis.   6.  Colonic diverticulosis without evidence of diverticulitis.   7.  Atherosclerosis.             Assessment/Plan  * Urothelial carcinoma of bladder without invasion of muscle (HCC)- (present on admission)  Assessment & Plan  CT AP: Notable enhancing 2.5 x 2.3 cm bladder wall mass suspicious for neoplasms  s/p TURBT on 10/27 with multiple tumors noted  Pathology showing low-grade papillary urothelial carcinoma noninvasive with muscularis propria uninvolved by tumor  CT chest negative for metastasis  Bone scan without any evidence of metastasis  IR guided bone biopsy on 10/31 negative for malignancy    11/2: S/p Cystoscopy with clot evacuation, fulguration, and TUBRT  11/3: Attempt to wean CBI   11/5: CBI discontinued,  TOVR   11/6: Hemoglobin drop of 1 g and recurrence of hematuria.  CBI restarted  11/7: CBI running, hand irrigated by urology.  Still with leaking around catheter tip, urine culture NGTD  11/8: CBI running, hand irrigated by urology who deferred surgical intervention with cauterization until tomorrow with close monitoring of H&H, 7.6 this morning  11/9: Seen by urology- wean CBI as tolerated. Continue to trend H/h. Transfuse PRN. Follow up with urology and oncology OP  11/10: Seen and cleared by urology. Per their recs:   Clamp CBI. If hematuria not worsening, can d/c CBI.   They will arrange follow up with their office next week for voiding trial to have cuellar removed  11/11:  Failed CBI clamping yesterday. Deep red gross hematuria noted. hgb stable. Continue to monitor  11/12: Urology took to the OR today for cystoscopy with clot evacuation and fulguration of bleeding bladder tissue  11/13: Continues with cherry red urine and significant clotting requiring hand irrigation. 1 unit PRBC today. Discussed with Shakira Amos, urology aware, ordered B&O.  11/14: Catheter clogged twice past 24 hours, required upgrade to higher level of care given frequency of irrigation. D/W Urology, will take patient back to the OR today    Pneumonia  Assessment & Plan  Leukocytosis continuing  Diminished lung sounds with left bibasilar rales on exam  No tachycardia or fever  Pro-Nicolas 1.64, repeat 11/14 1.81  Chest x-ray consistent with left basilar pneumonia  Unasyn and azithromycin  Blood cultures NGTD    Acute blood loss anemia- (present on admission)  Assessment & Plan  Secondary to hematuria.   Required 8 units of PRBCs this admission   Continues to fail CBI clamping with gross hematuria noted  Underwent cystoscopy 11/12 with Dr. Boyer who performed clot evaluation and fulguration of bleeding bladder tissue  Additional cystoscopy 11/14 given continued hematuria, clogging of catheter, and requiring blood transfusions  Hematology  consulted and recommend transfusing for Hgb<8 or hemodynamic instability    Acute kidney injury (HCC)  Assessment & Plan  Secondary to hypoperfusion given hypovolemia  NS at 125 ml/hr  Improving   Transfuse for H/H < 8 (per hematology recommendations)    Coagulation defect (HCC)  Assessment & Plan  Patient has acute blood loss anemia secondary to hematuria in the setting of newly diagnosed bladder cancer  Is required 9 units of PRBCs since admission  TEG abnormal 11/13, 1 unit platelets transfused  Hematology consulted, appreciate recommendations  Transfuse 2 units FFP today  Hematology will follow, recommending bone marrow aspiration and testing for myeloproliferative disorder  JAK2 and erythropoetin pending    Elevated d-dimer  Assessment & Plan  D-dimer 2.06  Patient having coagulopathy issues, hematology consulted  Will check bilateral lower extremity ultrasound    Hyponatremia  Assessment & Plan  Sodium improved to 132  Continue NS at 125 mL an hour  Will recheck with a.m. labs    Thrombocytopenia (HCC)  Assessment & Plan  Consistently low since admission  TEG abnormal  1 unit platelets transfused 11/13  Hematology consulted, appreciate recomendations    Slow transit constipation- (present on admission)  Assessment & Plan  Continues to complain of intermittent constipation  Aggressive bowel protocol    Hypovolemia  Assessment & Plan  Due to acute blood loss anemia    Hematuria- (present on admission)  Assessment & Plan  Likely secondary to above    Iron deficiency anemia- (present on admission)  Assessment & Plan  S/p IV iron infusion repletion  Repeat iron labs WNL    Obesity (BMI 30-39.9)- (present on admission)  Assessment & Plan  Diet and lifestyle modification  Body mass index is 26.21 kg/m².    Leukocytosis- (present on admission)  Assessment & Plan  Likely reactive in setting of malignancy and blood loss anemia  Increased to 24.4 today, discussed with hematology, likely secondary to malignancy  Pro-Nicolas  1.64, repeat 1.81  Chest x-ray consistent with atelectasis versus pneumonia  UA negative  Afebrile  Unasyn and azithromycin   Incentive spirometry and ambulate    Dyslipidemia- (present on admission)  Assessment & Plan  Continue atorvastatin    COPD (chronic obstructive pulmonary disease) (HCC)- (present on admission)  Assessment & Plan  Not in acute exacerbation  PRN duonebs       VTE prophylaxis: SCDs/TEDs    I have performed a physical exam and reviewed and updated ROS and Plan today (11/14/2022). In review of yesterday's note (11/13/2022), there are no changes except as documented above.

## 2022-11-14 NOTE — ASSESSMENT & PLAN NOTE
D-dimer 2.06  Patient having coagulopathy issues, hematology consulted  Will check bilateral lower extremity ultrasound    11/15: US lower extremities negative for DVT.  D-dimer is nonspecific likely attributed to malignancy or generalized inflammation secondary to acute illness at this time.  We will continue to monitor for signs and symptoms of coagulopathy.

## 2022-11-14 NOTE — PROGRESS NOTES
Patient continues with bloody urine output with many clots and tissue pieces. This RN has irrigated the catheter at a minimum of once every hour, sometimes multiple times an hour, removing clots and restoring urine flow. CBI continues on moderate flow. Urology and APRN notified. Continue with current interventions, make NPO at midnight in preparation to bring back to OR tomorrow.

## 2022-11-15 LAB
ABO GROUP BLD: NORMAL
ALBUMIN SERPL BCP-MCNC: 2.5 G/DL (ref 3.2–4.9)
ALBUMIN SERPL BCP-MCNC: 2.6 G/DL (ref 3.2–4.9)
ALBUMIN/GLOB SERPL: 1.3 G/DL
ALBUMIN/GLOB SERPL: 1.4 G/DL
ALP SERPL-CCNC: 71 U/L (ref 30–99)
ALP SERPL-CCNC: 72 U/L (ref 30–99)
ALT SERPL-CCNC: 5 U/L (ref 2–50)
ALT SERPL-CCNC: 6 U/L (ref 2–50)
ANION GAP SERPL CALC-SCNC: 8 MMOL/L (ref 7–16)
ANION GAP SERPL CALC-SCNC: 9 MMOL/L (ref 7–16)
AST SERPL-CCNC: 11 U/L (ref 12–45)
AST SERPL-CCNC: 11 U/L (ref 12–45)
BARCODED ABORH UBTYP: 5100
BARCODED ABORH UBTYP: 5100
BARCODED PRD CODE UBPRD: NORMAL
BARCODED PRD CODE UBPRD: NORMAL
BARCODED UNIT NUM UBUNT: NORMAL
BARCODED UNIT NUM UBUNT: NORMAL
BILIRUB SERPL-MCNC: 0.4 MG/DL (ref 0.1–1.5)
BILIRUB SERPL-MCNC: 0.5 MG/DL (ref 0.1–1.5)
BLD GP AB SCN SERPL QL: NORMAL
BUN SERPL-MCNC: 22 MG/DL (ref 8–22)
BUN SERPL-MCNC: 24 MG/DL (ref 8–22)
CALCIUM SERPL-MCNC: 7.9 MG/DL (ref 8.5–10.5)
CALCIUM SERPL-MCNC: 8 MG/DL (ref 8.5–10.5)
CHLORIDE SERPL-SCNC: 100 MMOL/L (ref 96–112)
CHLORIDE SERPL-SCNC: 99 MMOL/L (ref 96–112)
CO2 SERPL-SCNC: 24 MMOL/L (ref 20–33)
CO2 SERPL-SCNC: 24 MMOL/L (ref 20–33)
COMPONENT R 8504R: NORMAL
COMPONENT R 8504R: NORMAL
CREAT SERPL-MCNC: 1.34 MG/DL (ref 0.5–1.4)
CREAT SERPL-MCNC: 1.47 MG/DL (ref 0.5–1.4)
ERYTHROCYTE [DISTWIDTH] IN BLOOD BY AUTOMATED COUNT: 55.8 FL (ref 35.9–50)
ERYTHROCYTE [DISTWIDTH] IN BLOOD BY AUTOMATED COUNT: 56 FL (ref 35.9–50)
ERYTHROCYTE [DISTWIDTH] IN BLOOD BY AUTOMATED COUNT: 60.7 FL (ref 35.9–50)
GFR SERPLBLD CREATININE-BSD FMLA CKD-EPI: 51 ML/MIN/1.73 M 2
GFR SERPLBLD CREATININE-BSD FMLA CKD-EPI: 57 ML/MIN/1.73 M 2
GLOBULIN SER CALC-MCNC: 1.9 G/DL (ref 1.9–3.5)
GLOBULIN SER CALC-MCNC: 2 G/DL (ref 1.9–3.5)
GLUCOSE SERPL-MCNC: 117 MG/DL (ref 65–99)
GLUCOSE SERPL-MCNC: 131 MG/DL (ref 65–99)
HCT VFR BLD AUTO: 17.5 % (ref 42–52)
HCT VFR BLD AUTO: 21 % (ref 42–52)
HCT VFR BLD AUTO: 21.8 % (ref 42–52)
HCT VFR BLD AUTO: 22.1 % (ref 42–52)
HGB BLD-MCNC: 5.3 G/DL (ref 14–18)
HGB BLD-MCNC: 6.9 G/DL (ref 14–18)
HGB BLD-MCNC: 7.2 G/DL (ref 14–18)
HGB BLD-MCNC: 7.2 G/DL (ref 14–18)
INR PPP: 1.2 (ref 0.87–1.13)
MAGNESIUM SERPL-MCNC: 1.9 MG/DL (ref 1.5–2.5)
MCH RBC QN AUTO: 26.2 PG (ref 27–33)
MCH RBC QN AUTO: 27.9 PG (ref 27–33)
MCH RBC QN AUTO: 28.2 PG (ref 27–33)
MCHC RBC AUTO-ENTMCNC: 30.3 G/DL (ref 33.7–35.3)
MCHC RBC AUTO-ENTMCNC: 32.6 G/DL (ref 33.7–35.3)
MCHC RBC AUTO-ENTMCNC: 33 G/DL (ref 33.7–35.3)
MCV RBC AUTO: 85.5 FL (ref 81.4–97.8)
MCV RBC AUTO: 85.7 FL (ref 81.4–97.8)
MCV RBC AUTO: 86.6 FL (ref 81.4–97.8)
PATHOLOGY CONSULT NOTE: NORMAL
PHOSPHATE SERPL-MCNC: 5.8 MG/DL (ref 2.5–4.5)
PLATELET # BLD AUTO: 48 K/UL (ref 164–446)
PLATELET # BLD AUTO: 66 K/UL (ref 164–446)
PLATELET # BLD AUTO: 71 K/UL (ref 164–446)
PMV BLD AUTO: 9.4 FL (ref 9–12.9)
PMV BLD AUTO: 9.8 FL (ref 9–12.9)
POTASSIUM SERPL-SCNC: 5.1 MMOL/L (ref 3.6–5.5)
POTASSIUM SERPL-SCNC: 5.4 MMOL/L (ref 3.6–5.5)
PRODUCT TYPE UPROD: NORMAL
PRODUCT TYPE UPROD: NORMAL
PROT SERPL-MCNC: 4.5 G/DL (ref 6–8.2)
PROT SERPL-MCNC: 4.5 G/DL (ref 6–8.2)
PROTHROMBIN TIME: 15 SEC (ref 12–14.6)
RBC # BLD AUTO: 2.02 M/UL (ref 4.7–6.1)
RBC # BLD AUTO: 2.55 M/UL (ref 4.7–6.1)
RBC # BLD AUTO: 2.58 M/UL (ref 4.7–6.1)
RH BLD: NORMAL
SODIUM SERPL-SCNC: 131 MMOL/L (ref 135–145)
SODIUM SERPL-SCNC: 133 MMOL/L (ref 135–145)
UNIT STATUS USTAT: NORMAL
UNIT STATUS USTAT: NORMAL
WBC # BLD AUTO: 12.1 K/UL (ref 4.8–10.8)
WBC # BLD AUTO: 13.3 K/UL (ref 4.8–10.8)
WBC # BLD AUTO: 13.5 K/UL (ref 4.8–10.8)

## 2022-11-15 PROCEDURE — 86850 RBC ANTIBODY SCREEN: CPT

## 2022-11-15 PROCEDURE — 86900 BLOOD TYPING SEROLOGIC ABO: CPT

## 2022-11-15 PROCEDURE — 85027 COMPLETE CBC AUTOMATED: CPT | Mod: 91

## 2022-11-15 PROCEDURE — 700105 HCHG RX REV CODE 258: Performed by: NURSE PRACTITIONER

## 2022-11-15 PROCEDURE — 86923 COMPATIBILITY TEST ELECTRIC: CPT | Mod: 91

## 2022-11-15 PROCEDURE — 86901 BLOOD TYPING SEROLOGIC RH(D): CPT

## 2022-11-15 PROCEDURE — A9270 NON-COVERED ITEM OR SERVICE: HCPCS | Performed by: HOSPITALIST

## 2022-11-15 PROCEDURE — 36415 COLL VENOUS BLD VENIPUNCTURE: CPT

## 2022-11-15 PROCEDURE — 770001 HCHG ROOM/CARE - MED/SURG/GYN PRIV*

## 2022-11-15 PROCEDURE — 80053 COMPREHEN METABOLIC PANEL: CPT | Mod: 91

## 2022-11-15 PROCEDURE — A9270 NON-COVERED ITEM OR SERVICE: HCPCS | Performed by: STUDENT IN AN ORGANIZED HEALTH CARE EDUCATION/TRAINING PROGRAM

## 2022-11-15 PROCEDURE — 85610 PROTHROMBIN TIME: CPT

## 2022-11-15 PROCEDURE — 700102 HCHG RX REV CODE 250 W/ 637 OVERRIDE(OP): Performed by: NURSE PRACTITIONER

## 2022-11-15 PROCEDURE — 700111 HCHG RX REV CODE 636 W/ 250 OVERRIDE (IP): Performed by: HOSPITALIST

## 2022-11-15 PROCEDURE — 83735 ASSAY OF MAGNESIUM: CPT

## 2022-11-15 PROCEDURE — 700102 HCHG RX REV CODE 250 W/ 637 OVERRIDE(OP): Performed by: HOSPITALIST

## 2022-11-15 PROCEDURE — 84100 ASSAY OF PHOSPHORUS: CPT

## 2022-11-15 PROCEDURE — 700102 HCHG RX REV CODE 250 W/ 637 OVERRIDE(OP): Performed by: STUDENT IN AN ORGANIZED HEALTH CARE EDUCATION/TRAINING PROGRAM

## 2022-11-15 PROCEDURE — 85018 HEMOGLOBIN: CPT

## 2022-11-15 PROCEDURE — 85014 HEMATOCRIT: CPT

## 2022-11-15 PROCEDURE — 36430 TRANSFUSION BLD/BLD COMPNT: CPT

## 2022-11-15 PROCEDURE — P9016 RBC LEUKOCYTES REDUCED: HCPCS

## 2022-11-15 PROCEDURE — 99233 SBSQ HOSP IP/OBS HIGH 50: CPT | Performed by: NURSE PRACTITIONER

## 2022-11-15 PROCEDURE — A9270 NON-COVERED ITEM OR SERVICE: HCPCS | Performed by: NURSE PRACTITIONER

## 2022-11-15 PROCEDURE — 700111 HCHG RX REV CODE 636 W/ 250 OVERRIDE (IP): Performed by: NURSE PRACTITIONER

## 2022-11-15 RX ORDER — CARBOXYMETHYLCELLULOSE SODIUM 5 MG/ML
1 SOLUTION/ DROPS OPHTHALMIC PRN
Status: DISCONTINUED | OUTPATIENT
Start: 2022-11-15 | End: 2022-11-30 | Stop reason: HOSPADM

## 2022-11-15 RX ORDER — ECHINACEA PURPUREA EXTRACT 125 MG
2 TABLET ORAL
Status: DISCONTINUED | OUTPATIENT
Start: 2022-11-15 | End: 2022-11-30 | Stop reason: HOSPADM

## 2022-11-15 RX ADMIN — ATORVASTATIN CALCIUM 20 MG: 20 TABLET, FILM COATED ORAL at 17:18

## 2022-11-15 RX ADMIN — ALPRAZOLAM 0.25 MG: 0.25 TABLET ORAL at 17:18

## 2022-11-15 RX ADMIN — THIAMINE HCL TAB 100 MG 100 MG: 100 TAB at 17:18

## 2022-11-15 RX ADMIN — SODIUM CHLORIDE: 9 INJECTION, SOLUTION INTRAVENOUS at 01:05

## 2022-11-15 RX ADMIN — AMPICILLIN AND SULBACTAM 3 G: 1; 2 INJECTION, POWDER, FOR SOLUTION INTRAMUSCULAR; INTRAVENOUS at 01:44

## 2022-11-15 RX ADMIN — Medication 1 APPLICATOR: at 12:24

## 2022-11-15 RX ADMIN — CYANOCOBALAMIN TAB 500 MCG 1000 MCG: 500 TAB at 05:41

## 2022-11-15 RX ADMIN — GABAPENTIN 200 MG: 100 CAPSULE ORAL at 05:42

## 2022-11-15 RX ADMIN — OXYBUTYNIN CHLORIDE 5 MG: 5 TABLET ORAL at 11:35

## 2022-11-15 RX ADMIN — AMPICILLIN AND SULBACTAM 3 G: 1; 2 INJECTION, POWDER, FOR SOLUTION INTRAMUSCULAR; INTRAVENOUS at 17:21

## 2022-11-15 RX ADMIN — THIAMINE HCL TAB 100 MG 100 MG: 100 TAB at 05:43

## 2022-11-15 RX ADMIN — OXYCODONE HYDROCHLORIDE 15 MG: 10 TABLET ORAL at 08:47

## 2022-11-15 RX ADMIN — OXYBUTYNIN CHLORIDE 5 MG: 5 TABLET ORAL at 17:18

## 2022-11-15 RX ADMIN — GABAPENTIN 200 MG: 100 CAPSULE ORAL at 17:18

## 2022-11-15 RX ADMIN — OXYBUTYNIN CHLORIDE 5 MG: 5 TABLET ORAL at 05:43

## 2022-11-15 RX ADMIN — AMPICILLIN AND SULBACTAM 3 G: 1; 2 INJECTION, POWDER, FOR SOLUTION INTRAMUSCULAR; INTRAVENOUS at 05:26

## 2022-11-15 RX ADMIN — MAGNESIUM HYDROXIDE 30 ML: 400 SUSPENSION ORAL at 11:34

## 2022-11-15 RX ADMIN — HYDROMORPHONE HYDROCHLORIDE 1 MG: 1 INJECTION, SOLUTION INTRAMUSCULAR; INTRAVENOUS; SUBCUTANEOUS at 17:18

## 2022-11-15 RX ADMIN — ACETAMINOPHEN 650 MG: 325 TABLET, FILM COATED ORAL at 08:47

## 2022-11-15 RX ADMIN — FOLIC ACID 1 MG: 1 TABLET ORAL at 05:41

## 2022-11-15 RX ADMIN — GABAPENTIN 200 MG: 100 CAPSULE ORAL at 11:34

## 2022-11-15 RX ADMIN — SODIUM CHLORIDE: 9 INJECTION, SOLUTION INTRAVENOUS at 13:40

## 2022-11-15 RX ADMIN — Medication 2000 UNITS: at 05:43

## 2022-11-15 RX ADMIN — ALPRAZOLAM 0.25 MG: 0.25 TABLET ORAL at 13:39

## 2022-11-15 RX ADMIN — HYDROMORPHONE HYDROCHLORIDE 1 MG: 1 INJECTION, SOLUTION INTRAMUSCULAR; INTRAVENOUS; SUBCUTANEOUS at 05:42

## 2022-11-15 RX ADMIN — AMPICILLIN AND SULBACTAM 3 G: 1; 2 INJECTION, POWDER, FOR SOLUTION INTRAMUSCULAR; INTRAVENOUS at 11:38

## 2022-11-15 RX ADMIN — ALPRAZOLAM 0.25 MG: 0.25 TABLET ORAL at 08:45

## 2022-11-15 RX ADMIN — AZITHROMYCIN MONOHYDRATE 500 MG: 250 TABLET ORAL at 05:40

## 2022-11-15 RX ADMIN — ALPRAZOLAM 0.25 MG: 0.25 TABLET ORAL at 20:53

## 2022-11-15 RX ADMIN — SODIUM CHLORIDE: 9 INJECTION, SOLUTION INTRAVENOUS at 22:20

## 2022-11-15 ASSESSMENT — ENCOUNTER SYMPTOMS
BLOOD IN STOOL: 0
FEVER: 0
SORE THROAT: 0
BACK PAIN: 1
VOMITING: 0
ORTHOPNEA: 0
SHORTNESS OF BREATH: 0
CONSTIPATION: 1
COUGH: 0
NAUSEA: 0
ABDOMINAL PAIN: 0
CLAUDICATION: 0
DIARRHEA: 0
MYALGIAS: 0
NERVOUS/ANXIOUS: 0
DIZZINESS: 0
FLANK PAIN: 0
PALPITATIONS: 0
HEADACHES: 0
SPUTUM PRODUCTION: 0
CHILLS: 0
SINUS PAIN: 0
WEAKNESS: 0

## 2022-11-15 ASSESSMENT — PAIN DESCRIPTION - PAIN TYPE
TYPE: CHRONIC PAIN
TYPE: SURGICAL PAIN
TYPE: CHRONIC PAIN
TYPE: SURGICAL PAIN
TYPE: CHRONIC PAIN
TYPE: SURGICAL PAIN
TYPE: CHRONIC PAIN
TYPE: SURGICAL PAIN
TYPE: CHRONIC PAIN

## 2022-11-15 NOTE — OR NURSING
Pt's VSS. Pt on 2L NC. Pt has moderate pain treated with pain medication. Pt is A&O x4. Urine color light pink with CBI.

## 2022-11-15 NOTE — PROGRESS NOTES
Hospital Medicine Daily Progress Note    Date of Service  11/15/2022    Chief Complaint  Gregg Martinez is a 72 y.o. male admitted 10/26/2022 with hematuria    Hospital Course  72-year-old male with past medical history of DL D and asthma presenting with hematuria.  He was admitted for acute blood loss anemia requiring 2 units of PRBC transfusion.  CT abdomen pelvis showed bladder wall mass suspicious for neoplasm.  Urology was consulted and patient for TURBT on 10/27 which showed multifocal bladder tumors which were all resected. He continued to have hematuria which improved with CBI however he did require additional blood transfusion. Pathology showed low grade urothelial carcinoma. CT chest with contrast without evidence of lung involvement. Bone scan negative for mets. IR bone biopsy 10/31 with no evidence of malignancy.      He remained in the hospital given inability to discontinue CBI given hematuria and clots and intermittently worsening hemoglobin and hematocrit.  Urology took patient to the OR on 11/12/2022 for cystoscopy with clot evacuation from the bladder and fulguration of bleeding bladder tissue and again on 11/14/2022.  Given patient's coagulopathy issues and abnormal lab values, hematology was consulted recommend bone marrow aspiration and testing and every 48 PT/INR.    Interval Problem Update  11/15: POD 1 s/p cystoscopy with clot evacuation from bladder and fulguration of bleeding bladder tissue on 11/14/2022.  CBI in process with light red-tinged urine.  Hemoglobin stable this morning at 7.2-we will trend.  Patient seen by oncology with plans for bone marrow aspiration and testing tentatively today or tomorrow.    PM update: Globin 6.9 transfuse 1 unit PRBCs.    I have discussed this patient's plan of care and discharge plan at IDT rounds today with Case Management, Nursing, Nursing leadership, and other members of the IDT team.    Consultants/Specialty  oncology and urology    Code Status  Full  Code    Disposition  Patient is not medically cleared for discharge.   Anticipate discharge to to home with close outpatient follow-up.  I have placed the appropriate orders for post-discharge needs.    Review of Systems  Review of Systems   Constitutional:  Negative for chills, fever and malaise/fatigue.   HENT:  Negative for nosebleeds, sinus pain and sore throat.    Respiratory:  Negative for cough, sputum production and shortness of breath.    Cardiovascular:  Negative for chest pain, palpitations, orthopnea, claudication and leg swelling.   Gastrointestinal:  Positive for constipation. Negative for abdominal pain, blood in stool, diarrhea, nausea and vomiting.   Genitourinary:  Positive for hematuria. Negative for dysuria, flank pain (Resolved), frequency and urgency.   Musculoskeletal:  Positive for back pain (Chronic). Negative for myalgias.   Neurological:  Negative for dizziness, weakness and headaches.   Psychiatric/Behavioral:  Negative for suicidal ideas. The patient is not nervous/anxious.    All other systems reviewed and are negative.     Physical Exam  Temp:  [35.8 °C (96.5 °F)-37.2 °C (98.9 °F)] 36.5 °C (97.7 °F)  Pulse:  [] 89  Resp:  [11-25] 18  BP: ()/(49-80) 107/61  SpO2:  [92 %-100 %] 98 %    Physical Exam  Vitals and nursing note reviewed.   Constitutional:       Appearance: Normal appearance. He is overweight.   HENT:      Head: Normocephalic.      Nose: Nose normal.      Mouth/Throat:      Mouth: Mucous membranes are moist.      Pharynx: Oropharynx is clear. No oropharyngeal exudate.   Eyes:      General: No scleral icterus.     Extraocular Movements: Extraocular movements intact.      Conjunctiva/sclera: Conjunctivae normal.      Pupils: Pupils are equal, round, and reactive to light.   Cardiovascular:      Rate and Rhythm: Normal rate and regular rhythm.      Pulses: Normal pulses.      Heart sounds: Normal heart sounds. No murmur heard.  Pulmonary:      Effort: Pulmonary  effort is normal. No respiratory distress.      Breath sounds: Normal breath sounds. No wheezing or rhonchi.   Chest:      Chest wall: No tenderness.   Abdominal:      General: Abdomen is flat.      Palpations: Abdomen is soft.      Comments: Abdomen rounded/full.  Hypoactive bowel sounds heard throughout.  Nontender with palpation   Genitourinary:     Comments: CBI in progress.  Patient with light red urine output.  Musculoskeletal:      Right lower leg: No edema.      Left lower leg: No edema.   Skin:     General: Skin is warm and dry.      Capillary Refill: Capillary refill takes less than 2 seconds.      Coloration: Skin is pale.   Neurological:      General: No focal deficit present.      Mental Status: He is alert and oriented to person, place, and time. Mental status is at baseline.      Motor: No weakness.   Psychiatric:         Mood and Affect: Mood normal.         Behavior: Behavior normal.         Thought Content: Thought content normal.         Judgment: Judgment normal.       Fluids    Intake/Output Summary (Last 24 hours) at 11/15/2022 1535  Last data filed at 11/15/2022 1000  Gross per 24 hour   Intake 3168.75 ml   Output 1725 ml   Net 1443.75 ml       Laboratory  Recent Labs     11/14/22  1013 11/15/22  0031 11/15/22  0904   WBC 24.4* 12.1* 13.3*  13.5*   RBC 3.29* 2.02* 2.55*  2.58*   HEMOGLOBIN 8.9* 5.3* 7.2*  7.2*   HEMATOCRIT 27.9* 17.5* 21.8*  22.1*   MCV 84.8 86.6 85.5  85.7   MCH 27.1 26.2* 28.2  27.9   MCHC 31.9* 30.3* 33.0*  32.6*   RDW 58.5* 60.7* 56.0*  55.8*   PLATELETCT 88* 71* 48*  66*   MPV 9.0 9.4 9.8     Recent Labs     11/14/22  0151 11/15/22  0031 11/15/22  0904   SODIUM 132* 131* 133*   POTASSIUM 5.1 5.1 5.4   CHLORIDE 98 99 100   CO2 25 24 24   GLUCOSE 104* 131* 117*   BUN 23* 24* 22   CREATININE 1.37 1.47* 1.34   CALCIUM 8.1* 8.0* 7.9*     Recent Labs     11/14/22  1013 11/15/22  0031   INR 1.11 1.20*               Imaging  US-EXTREMITY VENOUS LOWER BILAT   Final Result       DX-CHEST-PORTABLE (1 VIEW)   Final Result      Increased LEFT basilar atelectasis or pneumonia      CT-ABDOMEN-PELVIS W/O   Final Result      1.  Interval hemorrhage within the partially decompressed urinary bladder. Previously visualized posterior left bladder mass is no longer seen presumably this is due to interval resection      2.  New mild left hydroureteronephrosis with moderate perinephric fat stranding. This is concerning for obstruction perhaps related to hematoma, recent instrumentation or pyelonephritis/ureterectasis.      3.  Increasing colonic stool volume with similar moderate diverticulosis of the colon but no evidence of diverticulitis      4.  Marked splenomegaly      5.  New small effusions with some lower lung zone groundglass suspicious for interstitial edema/mild congestive failure. There is stable mild pericardial fluid      6.  Persistent inhomogeneous medullary spaces with generalized increased density. Recent bone scan showed no focal mass but this could reflect underlying myelofibrosis or metastases with false-negative bone scan      CT-NEEDLE BX-DEEP BONE   Final Result      1.  Successful CT-guided core biopsy of sclerotic lesion in the posterior aspect of left inferior pubic ramus.      NM-BONE/JOINT SCAN WHOLE BODY   Final Result      No evidence of bony metastatic disease      CT-CHEST (THORAX) WITH   Final Result         1.  Emphysema   2.  Hazy bilateral upper lobe opacities with slight intralobular septal thickening, could represent component of mild pulmonary edema   3.  Linear densities the bilateral lung bases favors scarring or atelectasis.   4.  Atherosclerosis and atherosclerotic coronary artery disease   5.  Hepatomegaly with irregular hepatic contour favoring changes of cirrhosis.   6.  Subcentimeter hepatic lobe lesions, could represent small cyst or hemangioma, otherwise indeterminate.   7.  Mild mediastinal adenopathy, workup and evaluation for causes of adenopathy  recommended as clinically appropriate.   8.  Splenomegaly      DX-CHEST-PORTABLE (1 VIEW)   Final Result         1.  No acute cardiopulmonary disease.   2.  Atherosclerosis      CT-ABDOMEN-PELVIS WITH   Final Result      1.  Enhancing 2.5 x 2.3 cm bladder wall mass suspicious for neoplasm.   2.  Heterogeneous sclerotic change of the pelvis and proximal femurs suspicious for metastatic disease or other infiltrating marrow process.   3.  Mild cardiomegaly.   4.  Hepatosplenomegaly.   5.  Small subcentimeter hepatic hypodensities which could represent cysts or hemangiomas but are nonspecific. If indicated, these findings could be further evaluated with dedicated liver protocol CT or MRI or ultrasound on a nonemergent basis.   6.  Colonic diverticulosis without evidence of diverticulitis.   7.  Atherosclerosis.           Assessment/Plan  * Urothelial carcinoma of bladder without invasion of muscle (HCC)- (present on admission)  Assessment & Plan  CT AP: Notable enhancing 2.5 x 2.3 cm bladder wall mass suspicious for neoplasms  s/p TURBT on 10/27 with multiple tumors noted  Pathology showing low-grade papillary urothelial carcinoma noninvasive with muscularis propria uninvolved by tumor  CT chest negative for metastasis  Bone scan without any evidence of metastasis  IR guided bone biopsy on 10/31 negative for malignancy    11/2: S/p Cystoscopy with clot evacuation, fulguration, and TUBRT  11/3: Attempt to wean CBI   11/5: CBI discontinued, TOVR   11/6: Hemoglobin drop of 1 g and recurrence of hematuria.  CBI restarted  11/7: CBI running, hand irrigated by urology.  Still with leaking around catheter tip, urine culture NGTD  11/8: CBI running, hand irrigated by urology who deferred surgical intervention with cauterization until tomorrow with close monitoring of H&H, 7.6 this morning  11/9: Seen by urology- wean CBI as tolerated. Continue to trend H/h. Transfuse PRN. Follow up with urology and oncology OP  11/10: Seen and  cleared by urology. Per their recs:   Clamp CBI. If hematuria not worsening, can d/c CBI.   They will arrange follow up with their office next week for voiding trial to have cuellar removed  11/11:  Failed CBI clamping yesterday. Deep red gross hematuria noted. hgb stable. Continue to monitor  11/12: Urology took to the OR today for cystoscopy with clot evacuation and fulguration of bleeding bladder tissue  11/13: Continues with cherry red urine and significant clotting requiring hand irrigation. 1 unit PRBC today. Discussed with Shakira Amos, urology aware, ordered B&O.  11/14: Catheter clogged twice past 24 hours, required upgrade to higher level of care given frequency of irrigation. D/W Urology, will take patient back to the OR today  11/15: POD #1 s/p cystoscopy with clot evacuation and fulguration of bleeding bladder tissue.  Hgb 7.2 this morning.  We will continue to trend and transfuse as needed.  Heme-onc has seen patient with plans for bone marrow biopsy tentatively today or tomorrow.      Acute kidney injury (HCC)  Assessment & Plan  Secondary to hypoperfusion given hypovolemia  NS at 125 ml/hr  Improving   Transfuse for H/H < 7    Coagulation defect (HCC)  Assessment & Plan  Patient has acute blood loss anemia secondary to hematuria in the setting of newly diagnosed bladder cancer  Is required 9 units of PRBCs since admission  TEG abnormal 11/13, 1 unit platelets transfused  Hematology consulted, appreciate recommendations  Transfuse 2 units FFP today  Hematology will follow, recommending bone marrow aspiration and testing for myeloproliferative disorder  JAK2 and erythropoetin pending    Elevated d-dimer  Assessment & Plan  D-dimer 2.06  Patient having coagulopathy issues, hematology consulted  Will check bilateral lower extremity ultrasound    11/15: US lower extremities negative for DVT.  D-dimer is nonspecific likely attributed to malignancy or generalized inflammation secondary to acute illness at this  time.  We will continue to monitor for signs and symptoms of coagulopathy.    Hyponatremia  Assessment & Plan  Sodium improved to 133  Asymptomatic  Continue NS at 125 mL an hour  Will recheck with a.m. labs    Thrombocytopenia (HCC)  Assessment & Plan  Consistently low since admission  TEG abnormal  1 unit platelets transfused 11/13  Hematology following, appreciate recommendations  Plan for bone marrow biopsy tentatively today or tomorrow    Pneumonia  Assessment & Plan  Leukocytosis continuing  Diminished lung sounds with left bibasilar rales on exam  No tachycardia or fever  Pro-Nicolas 1.64, repeat 11/14 1.81  Chest x-ray consistent with left basilar pneumonia  Unasyn and azithromycin (end date 11/18/2022)  Blood cultures NGTD    Slow transit constipation- (present on admission)  Assessment & Plan  Continues to complain of intermittent constipation  Aggressive bowel protocol  11/15: Patient reports constipation unrelieved by docusate.  Requesting milk of magnesia.  One-time order for milk of magnesia ordered.    Hypovolemia  Assessment & Plan  Due to acute blood loss anemia    Hematuria- (present on admission)  Assessment & Plan  Likely secondary to above    Iron deficiency anemia- (present on admission)  Assessment & Plan  S/p IV iron infusion repletion  Repeat iron labs WNL    Acute blood loss anemia- (present on admission)  Assessment & Plan  Secondary to hematuria.   Required 8 units of PRBCs this admission   Continues to fail CBI clamping with gross hematuria noted  Underwent cystoscopy 11/12 with Dr. Boyer who performed clot evaluation and fulguration of bleeding bladder tissue  Additional cystoscopy 11/14 given continued hematuria, clogging of catheter, and requiring blood transfusions  Hematology consulted and recommend transfusing for Hgb<8 or hemodynamic instability    11/15/2022: Hemoglobin 7.2 this morning.  Repeat H/H ordered.  Hgb 6.9-transfuse 1 unit PRBC    Obesity (BMI 30-39.9)- (present on  admission)  Assessment & Plan  Diet and lifestyle modification  Body mass index is 26.21 kg/m².    Leukocytosis- (present on admission)  Assessment & Plan  Likely reactive in setting of malignancy and blood loss anemia  Increased to 24.4 today, discussed with hematology, likely secondary to malignancy  Pro-Nicolas 1.64, repeat 1.81  Chest x-ray consistent with atelectasis versus pneumonia  UA negative  Afebrile  Unasyn (end date 11/18/2022) and azithromycin   Incentive spirometry and ambulate    Dyslipidemia- (present on admission)  Assessment & Plan  Continue atorvastatin    COPD (chronic obstructive pulmonary disease) (HCC)- (present on admission)  Assessment & Plan  Not in acute exacerbation  PRN duonebs       VTE prophylaxis: SCDs/TEDs and pharmacologic prophylaxis contraindicated due to hematuria    I have performed a physical exam and reviewed and updated ROS and Plan today (11/15/2022). In review of yesterday's note (11/14/2022), there are no changes except as documented above.

## 2022-11-15 NOTE — PROGRESS NOTES
NOC HOSPITALIST CROSS COVER    Notified by RN regarding hemoglobin of 5.3 and hematocrit of 17.5 postop following a cystoscopy with resection of the bladder mass and evacuation of clots during dayshift.  Was also updated that the urologist Dr. Kumar was requesting that the patient be placed on GSU only.      Vitals:    11/15/22 0345   BP: (!) 97/54   Pulse: 73   Resp: 12   Temp:    SpO2: 97%          Plan:  #Acute blood loss anemia  -Transfuse 2 units PRBCs  -Vital signs per nursing policy  -Repeat CBC posttransfusion  -Order placed for GSU transfer per surgery's request with remote telemetry monitoring        -----------------------------------------------------------------------------------------------------------    Electronically signed by:  THOMAS Austin AGACN-BC  Hospitalist Services

## 2022-11-15 NOTE — OR NURSING
0730 assumed care of pt. Awake alert, oriented clear speech, follows commands. 2L nc, even non labored breathing, lungs clear airway patent. SR. Skin pink warm dry. PIV x2 patent.  NS gtt, blood gtt (2nd unit). 3 way cuellar patent, CBI, urine pink clear.    0732 eating breakfast.     0745 glasses on pt. Playing on personal smart phone.     0815 Nirav REYES with urology at bedside     0830 Tq2, mepilex to sacrum    0847, c/o pain treated per mar    0848 phlebotomy at bedside     0927 resting comfortably with eyes closed, even non labored breathing.     0945 report given to Suki JARAMILLO, T422. Awake alert, denies pain and nausea, even non labored breathing. CBI urine light pink clear

## 2022-11-15 NOTE — ANESTHESIA PROCEDURE NOTES
Airway    Date/Time: 11/14/2022 6:14 PM  Performed by: De Dunlap M.D.  Authorized by: De Dunlap M.D.     Location:  OR  Urgency:  Elective  Indications for Airway Management:  Anesthesia      Spontaneous Ventilation: absent    Sedation Level:  Deep  Preoxygenated: Yes    Final Airway Type:  Supraglottic airway  Final Supraglottic Airway:  Standard LMA    SGA Size:  4  Number of Attempts at Approach:  1

## 2022-11-15 NOTE — PROGRESS NOTES
"Pt Gregg Martinez admitted to room T422 via transport in bed from PACU at 1045.  Pt Blood Pressure (Abnormal) 85/63   Pulse 79   Temperature 36.6 °C (97.9 °F) (Temporal)   Respiration 18   Height 1.727 m (5' 8\")   Weight 78.2 kg (172 lb 6.4 oz)   Oxygen Saturation 98%   Body Mass Index 26.21 kg/m²    .vs pain reported at 0 on a scale of 0-10. Oriented to room call light and smoking policy.  Reviewed plan of care (equipment, incentive spirometer, sequential compression devices, medications, activity, diet, fall precautions, skin care, and pain) with patient and family. Welcome packet given and reviewed with patient , all questions answered. Education provided on oral hygiene program.      CBI infusing at a slow rate with clear/pink output, no clots present, patient denies pain.   "

## 2022-11-15 NOTE — ANESTHESIA PREPROCEDURE EVALUATION
Case: [id number] Date/Time: 11/14/22 1708    Procedure: CYSTOSCOPY-CLOT EVACUATION WITH FULGURATION    Anesthesia type: general    Location: [facility] OR  / SURGERY [facility]    Surgeons: Black Garcia M.D.          Relevant Problems   No relevant active problems       Physical Exam    Airway   Mallampati: II  TM distance: >3 FB  Neck ROM: full       Cardiovascular - normal exam  Rhythm: regular  Rate: normal  (-) murmur     Dental - normal exam           Pulmonary - normal exam  Breath sounds clear to auscultation     Abdominal    Neurological - normal exam                 Anesthesia Plan    ASA 2- EMERGENT   ASA physical status emergent criteria: acute ischemia (limb, body part, tissue), acutely contaminated wound or identified infection source and acute hemorrhage    Plan - general       Airway plan will be LMA          Induction: intravenous    Postoperative Plan: Postoperative administration of opioids is intended.    Pertinent diagnostic labs and testing reviewed    Informed Consent:    Anesthetic plan and risks discussed with patient.    Use of blood products discussed with: patient whom consented to blood products.

## 2022-11-15 NOTE — OR NURSING
Pt's H and H 5.3 and 17.5.  APRN Darlene Shea notified. 2 units of blood ordered. CBC to be recollected after transfusion.

## 2022-11-15 NOTE — PROGRESS NOTES
Note to reader: this note follows the APSO format rather than the historical SOAP format. Assessment and plan located at the top of the note for ease of use.    Chief Complaint  72 y.o. year old male here with bladder mass and sclerotic lesions.     Assessment/Plan  Interval History   TCC bladder s/p TURBT 10/27  Hematuria and clot obstruction s/p clot evacuation and fulguration x2 11/2 and 11/12 and 11/14  Anemia   Thrombocytopenia      Continue CBI at rate to keep urine clear to light pink  Hand irrigate cuellar prn clot obstruction  Monitor H/H and transfuse prn        Case discussed with patient, RN, and Urology-Dr. Jasmin PETERSON  Patient seen and examined    11/15. Repeat clot evac and fulguration with TURBT yesterday. Urine now pink and no clot obstruction. Abdominal pains and spasms resolved. H/H declined further and received 2 units PRBCs today    11/14. Hematuria continues with difficult time maintaining flow via cuellar. RN unable to irrigate and bladder scan with 500 cc in bladder. Patient in pain in SP region. Receiving PRBCs daily.            Review of Systems  Physical Exam   Review of Systems   Constitutional:  Negative for chills and fever.   Gastrointestinal:  Negative for abdominal pain, nausea and vomiting.   Genitourinary:  Negative for hematuria.   Vitals:    11/15/22 0915 11/15/22 0930 11/15/22 0945 11/15/22 1000   BP: (!) 98/53 (!) 98/52 (!) 97/56 (!) 95/54   Pulse: 80 83 73 76   Resp: 20 (!) 21 17 17   Temp:  37.1 °C (98.7 °F)     TempSrc:  Temporal     SpO2: 98% 98% 98%    Weight:       Height:         Physical Exam  Vitals and nursing note reviewed.   Constitutional:       Appearance: Normal appearance.   HENT:      Head: Normocephalic and atraumatic.      Nose: Nose normal.      Mouth/Throat:      Mouth: Mucous membranes are moist.   Eyes:      Pupils: Pupils are equal, round, and reactive to light.   Pulmonary:      Effort: Pulmonary effort is normal.   Abdominal:      General: Abdomen is  flat. There is no distension.      Palpations: Abdomen is soft.      Tenderness: There is no abdominal tenderness.   Genitourinary:     Comments: 3 way cuellar in place with CBI on slow rate. Urine light pink  Skin:     General: Skin is warm and dry.   Neurological:      General: No focal deficit present.      Mental Status: He is alert and oriented to person, place, and time.   Psychiatric:         Mood and Affect: Mood normal.         Behavior: Behavior normal.        Hematology Chemistry   Lab Results   Component Value Date/Time    WBC 13.3 (H) 11/15/2022 09:04 AM    WBC 13.5 (H) 11/15/2022 09:04 AM    HEMOGLOBIN 7.2 (L) 11/15/2022 09:04 AM    HEMOGLOBIN 7.2 (L) 11/15/2022 09:04 AM    HEMATOCRIT 21.8 (L) 11/15/2022 09:04 AM    HEMATOCRIT 22.1 (L) 11/15/2022 09:04 AM    PLATELETCT 48 (L) 11/15/2022 09:04 AM    PLATELETCT 66 (L) 11/15/2022 09:04 AM     Lab Results   Component Value Date/Time    SODIUM 133 (L) 11/15/2022 09:04 AM    POTASSIUM 5.4 11/15/2022 09:04 AM    CHLORIDE 100 11/15/2022 09:04 AM    CO2 24 11/15/2022 09:04 AM    GLUCOSE 117 (H) 11/15/2022 09:04 AM    BUN 22 11/15/2022 09:04 AM    CREATININE 1.34 11/15/2022 09:04 AM         Labs not explicitly included in this progress note were reviewed by the author.   Radiology/imaging not explicitly included in this progress note was reviewed by the author.     Labs reviewed and Medications reviewed

## 2022-11-15 NOTE — OR NURSING
Dr Kumar at bedside; patient sleeping  CBI continuous; urine pink to clear  Patient on O2 @ 6 l/m via mask  Patient responds to commands; sleeping & snoring otherwise    2300   Report to Emmanuelle JARAMILLO

## 2022-11-15 NOTE — PROGRESS NOTES
"Urology consult note, follow up  Reason for ongoing consultation: Gross hematuria    Overnight Events: recurrent gross hematuria with clot obstruction of catheter    S:complains of suprapubic discomfort    O:   /80   Pulse (!) 103   Temp 35.8 °C (96.5 °F) (Temporal)   Resp 18   Ht 1.727 m (5' 8\")   Wt 78.2 kg (172 lb 6.4 oz)   SpO2 92%   Recent Labs     11/12/22  0811 11/13/22  0226 11/14/22  0151   SODIUM 132* 129* 132*   POTASSIUM 5.4 5.2 5.1   CHLORIDE 96 93* 98   CO2 28 28 25   GLUCOSE 109* 106* 104*   BUN 25* 23* 23*   CREATININE 1.42* 1.40 1.37   CALCIUM 8.8 8.3* 8.1*     Recent Labs     11/13/22 2024 11/14/22  0151 11/14/22  1013   WBC 11.0* 9.5 24.4*   RBC 2.33* 2.58* 3.29*   HEMOGLOBIN 6.3* 7.1* 8.9*   HEMATOCRIT 19.6* 22.0* 27.9*   MCV 84.1 85.3 84.8   MCH 27.0 27.5 27.1   MCHC 32.1* 32.3* 31.9*   RDW 59.4* 56.6* 58.5*   PLATELETCT 77* 69* 88*   MPV 9.4 10.7 9.0         Exam:  Abdomen soft, benign.   Urine: pink to red      A/P:    Active Hospital Problems    Diagnosis     Elevated d-dimer [R79.89]     Coagulation defect (HCC) [D68.9]     Acute kidney injury (HCC) [N17.9]     Thrombocytopenia (HCC) [D69.6]     Hyponatremia [E87.1]     Pneumonia [J18.9]     Slow transit constipation [K59.01]     Hypovolemia [E86.1]     Acute blood loss anemia [D62]     Urothelial carcinoma of bladder without invasion of muscle (HCC) [C67.9]     Iron deficiency anemia [D50.9]     Hematuria [R31.9]     Obesity (BMI 30-39.9) [E66.9]     Leukocytosis [D72.829]     Dyslipidemia [E78.5]     COPD (chronic obstructive pulmonary disease) (HCC) [J44.9]        Gaurded.   New recommendations: Back to OR for another attempt at fulguration of bladder bleeding sites. Will then consider starting alum irrigation to prevent bleeding again     "

## 2022-11-15 NOTE — PROGRESS NOTES
HEMATOLOGY-ONCOLOGY PROGRESS NOTE    CC:  Persistent anemia, low grade papillary urothelial carcinoma    Interval Updates:  Taken for cystoscopy yesterday, had large clot evacuated and another portion of tumor was found, which was resected.  Hgb was 5.3 overnight, given 2u pRBCs.  This AM pt reports he is feeling much better than yesterday, and abdominal pain is greatly improved.  Arboleda is currently draining slightly pink urine.  Pending pathology for new section of tumor that was removed.      Objective:  Medications reviewed and notable for:  Current Facility-Administered Medications   Medication Dose    ALPRAZolam (XANAX) tablet 0.25 mg  0.25 mg    NS infusion      ampicillin/sulbactam (UNASYN) 3 g in  mL IVPB  3 g    oxybutynin (DITROPAN) tablet 5 mg  5 mg    Respiratory Therapy Consult      alum, ammonium (ALUM) 30 g in sterile water 3,000 mL bladder irrigation  30 g    oxyCODONE immediate-release (ROXICODONE) tablet 10 mg  10 mg    Or    oxyCODONE immediate-release (ROXICODONE) tablet 15 mg  15 mg    HYDROmorphone (Dilaudid) injection 1 mg  1 mg    gabapentin (NEURONTIN) capsule 200 mg  200 mg    senna-docusate (PERICOLACE or SENOKOT S) 8.6-50 MG per tablet 2 Tablet  2 Tablet    And    polyethylene glycol/lytes (MIRALAX) PACKET 1 Packet  1 Packet    And    magnesium hydroxide (MILK OF MAGNESIA) suspension 30 mL  30 mL    And    bisacodyl (DULCOLAX) suppository 10 mg  10 mg    acetaminophen (Tylenol) tablet 650 mg  650 mg    labetalol (NORMODYNE/TRANDATE) injection 10 mg  10 mg    ondansetron (ZOFRAN) syringe/vial injection 4 mg  4 mg    ondansetron (ZOFRAN ODT) dispertab 4 mg  4 mg    guaiFENesin dextromethorphan (ROBITUSSIN DM) 100-10 MG/5ML syrup 10 mL  10 mL    Pharmacy Consult Request ...Pain Management Review 1 Each  1 Each    albuterol inhaler 2 Puff  2 Puff    atorvastatin (LIPITOR) tablet 20 mg  20 mg    folic acid (FOLVITE) tablet 1 mg  1 mg    vitamin D3 (cholecalciferol) tablet 2,000 Units   "2,000 Units    thiamine (Vitamin B-1) tablet 100 mg  100 mg    cyanocobalamin (VITAMIN B-12) tablet 1,000 mcg  1,000 mcg       ROS:   Constitutional: No fatigue, no fevers or chills, no night sweats  Resp: No cough or SOB  Cardio:No chest pain or palpitations  Pschy: No depression or anxiety   Neuro: No headaches, no seizure, no vision changes  GI: no abdominal pain, nausea or vomiting. No diarrhea or constipation   All other ROS negative    BP (!) 95/54   Pulse 76   Temp 37.1 °C (98.7 °F) (Temporal)   Resp 17   Ht 1.727 m (5' 8\")   Wt 78.2 kg (172 lb 6.4 oz)   SpO2 98%     Physical Exam  General:  comfortable, NAD  HEENT:  sclera anicteric, oral cavity and oropharynx clear, mucous membranes moist  Neck:   supple, no lymphadenopathy  Cor:   regular rate and rhythm, no murmurs, rubs, or gallops  Pulm:   clear to auscultation bilaterally  Abd:   bowel sounds present, soft, mild tenderness to palpation in lower abdomen, improved from yesterday, nondistended, no palpable masses or organomegaly  Extremities:  warm, no lower extremity edema  Neurologic:  A&O x 3  Pyschiatric:  Appropriate mood and affect    Labs reviewed and notable for:  Recent Labs     11/14/22  1013 11/15/22  0031 11/15/22  0904   WBC 24.4* 12.1* 13.3*  13.5*   RBC 3.29* 2.02* 2.55*  2.58*   HEMOGLOBIN 8.9* 5.3* 7.2*  7.2*   HEMATOCRIT 27.9* 17.5* 21.8*  22.1*   MCV 84.8 86.6 85.5  85.7   MCH 27.1 26.2* 28.2  27.9   MCHC 31.9* 30.3* 33.0*  32.6*   RDW 58.5* 60.7* 56.0*  55.8*   PLATELETCT 88* 71* 48*  66*   MPV 9.0 9.4 9.8     Recent Labs     11/14/22  1013 11/15/22  0031   INR 1.11 1.20*     Recent Results (from the past 24 hour(s))   Prothrombin Time    Collection Time: 11/14/22 10:13 AM   Result Value Ref Range    PT 14.1 12.0 - 14.6 sec    INR 1.11 0.87 - 1.13   FIBRINOGEN    Collection Time: 11/14/22 10:13 AM   Result Value Ref Range    Fibrinogen 534 (H) 215 - 460 mg/dL   CBC WITHOUT DIFFERENTIAL    Collection Time: 11/14/22 10:13 " AM   Result Value Ref Range    WBC 24.4 (H) 4.8 - 10.8 K/uL    RBC 3.29 (L) 4.70 - 6.10 M/uL    Hemoglobin 8.9 (L) 14.0 - 18.0 g/dL    Hematocrit 27.9 (L) 42.0 - 52.0 %    MCV 84.8 81.4 - 97.8 fL    MCH 27.1 27.0 - 33.0 pg    MCHC 31.9 (L) 33.7 - 35.3 g/dL    RDW 58.5 (H) 35.9 - 50.0 fL    Platelet Count 88 (L) 164 - 446 K/uL    MPV 9.0 9.0 - 12.9 fL   RETICULOCYTES COUNT    Collection Time: 11/14/22 10:13 AM   Result Value Ref Range    Reticulocyte Count 6.8 (H) 0.8 - 2.1 %    Retic, Absolute 0.22 (H) 0.04 - 0.06 M/uL    Imm. Reticulocyte Fraction 40.4 (H) 9.3 - 17.4 %    Retic Hgb Equivalent 25.7 (L) 29.0 - 35.0 pg/cell   D-DIMER    Collection Time: 11/14/22 10:13 AM   Result Value Ref Range    D-Dimer Screen 2.06 (H) 0.00 - 0.50 ug/mL (FEU)   PROCALCITONIN    Collection Time: 11/14/22 10:13 AM   Result Value Ref Range    Procalcitonin 1.81 (H) <0.25 ng/mL   Prothrombin Time    Collection Time: 11/15/22 12:31 AM   Result Value Ref Range    PT 15.0 (H) 12.0 - 14.6 sec    INR 1.20 (H) 0.87 - 1.13   Comp Metabolic Panel    Collection Time: 11/15/22 12:31 AM   Result Value Ref Range    Sodium 131 (L) 135 - 145 mmol/L    Potassium 5.1 3.6 - 5.5 mmol/L    Chloride 99 96 - 112 mmol/L    Co2 24 20 - 33 mmol/L    Anion Gap 8.0 7.0 - 16.0    Glucose 131 (H) 65 - 99 mg/dL    Bun 24 (H) 8 - 22 mg/dL    Creatinine 1.47 (H) 0.50 - 1.40 mg/dL    Calcium 8.0 (L) 8.5 - 10.5 mg/dL    AST(SGOT) 11 (L) 12 - 45 U/L    ALT(SGPT) 6 2 - 50 U/L    Alkaline Phosphatase 72 30 - 99 U/L    Total Bilirubin 0.4 0.1 - 1.5 mg/dL    Albumin 2.6 (L) 3.2 - 4.9 g/dL    Total Protein 4.5 (L) 6.0 - 8.2 g/dL    Globulin 1.9 1.9 - 3.5 g/dL    A-G Ratio 1.4 g/dL   MAGNESIUM    Collection Time: 11/15/22 12:31 AM   Result Value Ref Range    Magnesium 1.9 1.5 - 2.5 mg/dL   PHOSPHORUS    Collection Time: 11/15/22 12:31 AM   Result Value Ref Range    Phosphorus 5.8 (H) 2.5 - 4.5 mg/dL   CBC WITHOUT DIFFERENTIAL    Collection Time: 11/15/22 12:31 AM   Result  Value Ref Range    WBC 12.1 (H) 4.8 - 10.8 K/uL    RBC 2.02 (L) 4.70 - 6.10 M/uL    Hemoglobin 5.3 (LL) 14.0 - 18.0 g/dL    Hematocrit 17.5 (LL) 42.0 - 52.0 %    MCV 86.6 81.4 - 97.8 fL    MCH 26.2 (L) 27.0 - 33.0 pg    MCHC 30.3 (L) 33.7 - 35.3 g/dL    RDW 60.7 (H) 35.9 - 50.0 fL    Platelet Count 71 (L) 164 - 446 K/uL    MPV 9.4 9.0 - 12.9 fL   COD (Adult) - Type and Crossmatch only order if transfusing RBC'S    Collection Time: 11/15/22 12:31 AM   Result Value Ref Range    ABO Grouping Only O     Rh Grouping Only POS     Antibody Screen-Cod NEG     Component R       R4                  Red Blood Cells     [id number]   issued       11/15/22   05:24      Product Type Red Blood Cells LR Pheresis     Dispense Status issued     Unit Number (Barcoded) [id number]     Product Code (Barcoded) S8556F09     Blood Type (Barcoded) 5100    ESTIMATED GFR    Collection Time: 11/15/22 12:31 AM   Result Value Ref Range    GFR (CKD-EPI) 51 (A) >60 mL/min/1.73 m 2   CBC Without Differential    Collection Time: 11/15/22  9:04 AM   Result Value Ref Range    WBC 13.3 (H) 4.8 - 10.8 K/uL    RBC 2.55 (L) 4.70 - 6.10 M/uL    Hemoglobin 7.2 (L) 14.0 - 18.0 g/dL    Hematocrit 21.8 (L) 42.0 - 52.0 %    MCV 85.5 81.4 - 97.8 fL    MCH 28.2 27.0 - 33.0 pg    MCHC 33.0 (L) 33.7 - 35.3 g/dL    RDW 56.0 (H) 35.9 - 50.0 fL    Platelet Count 48 (L) 164 - 446 K/uL   Comp Metabolic Panel    Collection Time: 11/15/22  9:04 AM   Result Value Ref Range    Sodium 133 (L) 135 - 145 mmol/L    Potassium 5.4 3.6 - 5.5 mmol/L    Chloride 100 96 - 112 mmol/L    Co2 24 20 - 33 mmol/L    Anion Gap 9.0 7.0 - 16.0    Glucose 117 (H) 65 - 99 mg/dL    Bun 22 8 - 22 mg/dL    Creatinine 1.34 0.50 - 1.40 mg/dL    Calcium 7.9 (L) 8.5 - 10.5 mg/dL    AST(SGOT) 11 (L) 12 - 45 U/L    ALT(SGPT) 5 2 - 50 U/L    Alkaline Phosphatase 71 30 - 99 U/L    Total Bilirubin 0.5 0.1 - 1.5 mg/dL    Albumin 2.5 (L) 3.2 - 4.9 g/dL    Total Protein 4.5 (L) 6.0 - 8.2 g/dL    Globulin  2.0 1.9 - 3.5 g/dL    A-G Ratio 1.3 g/dL   CBC WITHOUT DIFFERENTIAL    Collection Time: 11/15/22  9:04 AM   Result Value Ref Range    WBC 13.5 (H) 4.8 - 10.8 K/uL    RBC 2.58 (L) 4.70 - 6.10 M/uL    Hemoglobin 7.2 (L) 14.0 - 18.0 g/dL    Hematocrit 22.1 (L) 42.0 - 52.0 %    MCV 85.7 81.4 - 97.8 fL    MCH 27.9 27.0 - 33.0 pg    MCHC 32.6 (L) 33.7 - 35.3 g/dL    RDW 55.8 (H) 35.9 - 50.0 fL    Platelet Count 66 (L) 164 - 446 K/uL    MPV 9.8 9.0 - 12.9 fL   ESTIMATED GFR    Collection Time: 11/15/22  9:04 AM   Result Value Ref Range    GFR (CKD-EPI) 57 (A) >60 mL/min/1.73 m 2         Assessment and Recommendations:  #Persistent Anemia  -has had multiple cystoscopies following TURBT which have been finding large clots present.  -has required 10u pRBCs since admission  -has appropriate reticulocyte response, INR is unremarkable, LDH largely elevated on admission, but t. Bili is normal, making hemolysis less likely.  Fibrinogen high, unlikely to be DIC.    -platelets have been stably low since admission, and about a year ago were up in the 600-700 range.    -Etiology of persistent anemia would include acute blood loss anemia due to continuous bleeding from bladder, but cannot rule out myeloproliferative neoplasms at this time.    Recommendations:  -Pending EPO and Jak2 levels  -continue following PT/INR q48h  -will obtain bone marrow biopsy when pt stable and appropriate, maybe today or tomorrow  -continue monitoring hemoglobin, transfuse for Hgb <7 due active bleeding concerns and platelets <50K.  -avoid NSAIDs, anticoagulation at this time     #Low grade papillary urothelial carcinoma  -hematuria prior to admission, TURBT performed on 10/27 found low grade papillary urothelial carcinoma which was noninvasive with muscularis propria present on resection.    -may need further assessment by urology when appropriate for assessment of any remaining mass and further treatments  -will continue to follow pathology       High  complexicity/Drug monitoring     We will continue to follow with you; please call with any questions, [phone].

## 2022-11-15 NOTE — OP REPORT
[facility] Operative Report    Pre-operative Diagnosis: 1.  Urinary clot retention    2.  Status post transurethral resection of bladder tumor 10/27/2022    3.  Status post cystoscopy with clot evacuation x2 previously   Post-operative Diagnosis: Same as above   Procedure: 1.  Cystoscopy with resection of bladder mass (large)    2.  Transurethral resection of bladder tumor (small)    3.  Evacuation of bladder mass/clot    4.  Fulguration of venous oozing sites and bladder tumor resection base   Surgeon: Austin Kumar M.D., MD   Assistant: None   Anesthesia: General  Anesthesiologist: De Dunlap M.D.    Estimated Blood Loss: Minimal   IV fluids crystalloid   Specimens: 1.  Bladder mass/clot/tumor   Drains: 22 Iraqi three-way Arboleda catheter, running crystal-clear at the end of the procedure   Complications: None   Wound class Clean contaminated   Condition: Stable, procedure well tolerated    Disposition:  Stable to PACU    Findings: The bladder was entirely full of organized clot and alum coagulation with minimal lumen for irrigation at the beginning of the procedure.  The mass consisting of organized clot and alum coagulation had to be resected as it had formed a cast of the entire lumen of the and site of the bladder.  Once this was removed, over a 3-hour period, a small residual tumor was noted on the left superior lateral bladder wall and this was resected and the tumor base fulgurated as well.     Indications for Procedure:  Gregg Martinez is a 72 y.o. male with a prior history of bladder tumor resection on 10/27/22 and 2 trips to the operating room for ongoing bleeding which may be related to coagulopathy being investigated at this time.  He was started on alum irrigation earlier today and developed recurrent clot clot retention.  He was taken to the operating room for removal of clot, fulguration of bleeding sites, and placement of a functioning three-way Arboleda catheter.    Procedure in Detail:  The  patient was identified in the holding area.  He was taken to the operative suite.  General anesthesia was administered by Dr. Dunlap.  He was placed in the dorsal lithotomy position.  Urethral Arboleda catheter was removed.  He was then sterilely prepped and draped.    A 26 Vincentian continuous-flow resectoscope with visual obturator was advanced into the patient's urethra.  No urethral stricturing was noted.  Normal prostate was visualized.  Upon entering the bladder the bladder was entirely full of organized clot.  Initially I tried to irrigate this out with a Dawn syringe and an Ilich evacuator with little success.  Minimal clot was returned as this had been organized by the instillation of alum.    I passed a uSpeak resectoscope loop with a bipolar loop electrode and had to resect the organized clot into small enough pieces to evacuate them.  This resection of this organized bladder mass took over 3 hours.  I was eventually able to completely remove this.  At that point on the left superior lateral bladder wall a small papillary tumor was noted which was resected.  This appeared low-grade and superficial.  It was less than half centimeter in size.  I then coagulated that tumor resection site with a button electrode and used the button electrode to coagulate the prior site of bladder tumor resection.  No active bleeding was actually noted.  The left hemitrigone had previously been resected and I did not identify a ureteral orifice.    I filled and emptied the bladder on multiple occasions and inspecting it with no flow and at full volume and low volume saw no active bleeding.    I then placed a 22 Vincentian three-way Arboleda catheter to gravity drainage.  Urine/effluent returned crystal-clear.  I connected it to saline irrigation and again got entirely clear effluent.  Because the formalin had contributed to the organized clot with retention I elected not to replace it.  There was no active bleeding for the alum to  coagulate.  I felt it would add to, rather than prevent, problems.    The patient was sent to recovery room in stable condition.  He suffered no intraoperative complications.         Austin Kumar M.D.   [address] [place] [address]   [phone]

## 2022-11-15 NOTE — PROGRESS NOTES
Note to reader: this note follows the APSO format rather than the historical SOAP format. Assessment and plan located at the top of the note for ease of use.    Chief Complaint  72 y.o. year old male here with bladder mass and sclerotic lesions.     Assessment/Plan  Interval History   TCC bladder s/p TURBT 10/27  Hematuria and clot obstruction s/p clot evacuation and fulguration x2 11/2 and 11/12  Anemia   Thrombocytopenia      I irrigated catheter with 1000 cc of irrigation solution. Difficult irrigation unless cuellar pressed to hub at meatus in which case irrigation was not difficult. Initially drained 400-500 cc of dark brownish urine. Once proceeded with irrigation the urine turned a light cranberry color. This did not clear throughout irrigation. There were not clots removed.     NPO for OR today for clot evacuation and fulguration. Will plan to begin ALUM through CBI and continue for 24 hours post op      Case discussed with patient, RN, Hospitalist and Urology-Dr. Jasmin PETERSON  Patient seen and examined    11/14. Hematuria continues with difficult time maintaining flow via cuellar. RN unable to irrigate and bladder scan with 500 cc in bladder. Patient in pain in SP region. Receiving PRBCs daily.            Review of Systems  Physical Exam   Review of Systems   Constitutional:  Negative for chills and fever.   Gastrointestinal:  Positive for abdominal pain. Negative for nausea and vomiting.   Genitourinary:  Positive for hematuria.   Vitals:    11/14/22 0431 11/14/22 0752 11/14/22 1554 11/14/22 1642   BP: 115/64 (!) 151/108 118/80    Pulse: 90 (!) 106 (!) 102 (!) 103   Resp: 20 19 18 18   Temp: 37 °C (98.6 °F) 36.7 °C (98 °F) 36.7 °C (98.1 °F) 35.8 °C (96.5 °F)   TempSrc: Temporal Temporal Temporal Temporal   SpO2: 96% 93% 94% 92%   Weight: 78.2 kg (172 lb 6.4 oz)      Height:         Physical Exam  Vitals and nursing note reviewed.   Constitutional:       Appearance: Normal appearance.   HENT:      Head:  Normocephalic and atraumatic.      Nose: Nose normal.      Mouth/Throat:      Mouth: Mucous membranes are moist.   Eyes:      Pupils: Pupils are equal, round, and reactive to light.   Pulmonary:      Effort: Pulmonary effort is normal.   Abdominal:      General: Abdomen is flat. There is distension.      Palpations: Abdomen is soft.      Tenderness: There is abdominal tenderness.   Genitourinary:     Comments: 3 way cuellar in place with CBI on slow rate. Urine in collection bag dark brown.   Skin:     General: Skin is warm and dry.   Neurological:      General: No focal deficit present.      Mental Status: He is alert and oriented to person, place, and time.   Psychiatric:         Mood and Affect: Mood normal.         Behavior: Behavior normal.        Hematology Chemistry   Lab Results   Component Value Date/Time    WBC 24.4 (H) 11/14/2022 10:13 AM    HEMOGLOBIN 8.9 (L) 11/14/2022 10:13 AM    HEMATOCRIT 27.9 (L) 11/14/2022 10:13 AM    PLATELETCT 88 (L) 11/14/2022 10:13 AM     Lab Results   Component Value Date/Time    SODIUM 132 (L) 11/14/2022 01:51 AM    POTASSIUM 5.1 11/14/2022 01:51 AM    CHLORIDE 98 11/14/2022 01:51 AM    CO2 25 11/14/2022 01:51 AM    GLUCOSE 104 (H) 11/14/2022 01:51 AM    BUN 23 (H) 11/14/2022 01:51 AM    CREATININE 1.37 11/14/2022 01:51 AM         Labs not explicitly included in this progress note were reviewed by the author.   Radiology/imaging not explicitly included in this progress note was reviewed by the author.     Labs reviewed and Medications reviewed

## 2022-11-15 NOTE — ANESTHESIA PREPROCEDURE EVALUATION
Case: [id number] Date/Time: 11/14/22 1708    Procedure: CYSTOSCOPY-CLOT EVACUATION WITH FULGURATION    Location: [facility] OR 18 / SURGERY [facility]    Surgeons: Black Garcia M.D.          Relevant Problems   PULMONARY   (positive) COPD (chronic obstructive pulmonary disease) (HCC)   (positive) Pneumonia         (positive) Acute kidney injury (HCC)       Physical Exam    Airway   Mallampati: II  TM distance: >3 FB  Neck ROM: full       Cardiovascular - normal exam  Rhythm: regular  Rate: normal  (-) murmur     Dental - normal exam           Pulmonary - normal exam  Breath sounds clear to auscultation     Abdominal    Neurological - normal exam                 Anesthesia Plan    ASA 3   ASA physical status 3 criteria: COPD    Plan - general       Airway plan will be LMA          Induction: intravenous    Postoperative Plan: Postoperative administration of opioids is intended.    Pertinent diagnostic labs and testing reviewed    Informed Consent:    Anesthetic plan and risks discussed with patient.    Use of blood products discussed with: patient whom consented to blood products.

## 2022-11-15 NOTE — ANESTHESIA TIME REPORT
Anesthesia Start and Stop Event Times     Date Time Event    11/14/2022 1753 Ready for Procedure     1810 Anesthesia Start     2208 Anesthesia Stop        Responsible Staff  11/14/22    Name Role Begin End    De Dunlap M.D. Anesth 1810 2208        Overtime Reason:  no overtime (within assigned shift)    Comments: TETO 1st CALL

## 2022-11-15 NOTE — ANESTHESIA POSTPROCEDURE EVALUATION
Patient: Gregg Martinez    Procedure Summary     Date: 11/14/22 Room / Location: [place] [address] / SURGERY [facility]    Anesthesia Start: 1810 Anesthesia Stop: 2208    Procedure: CYSTOSCOPY-CLOT EVACUATION FROM BLADDER WITH FULGURATION  OF BLEEDING BLADDER TISSUE (Bladder) Diagnosis: (Gross Hematuria)    Surgeons: Austin Kumar M.D. Responsible Provider: De Dunlap M.D.    Anesthesia Type: general ASA Status: 2 - Emergent          Final Anesthesia Type: general  Last vitals  BP   Blood Pressure : 118/80    Temp   35.8 °C (96.5 °F)    Pulse   (!) 103   Resp   18    SpO2   92 %      Anesthesia Post Evaluation    Patient location during evaluation: PACU  Patient participation: complete - patient participated  Level of consciousness: awake and alert  Pain score: 1    Airway patency: patent  Anesthetic complications: no  Cardiovascular status: hemodynamically stable  Respiratory status: acceptable  Hydration status: euvolemic    PONV: none          No notable events documented.     Nurse Pain Score: 10 (NPRS)

## 2022-11-16 LAB
ANION GAP SERPL CALC-SCNC: 10 MMOL/L (ref 7–16)
BUN SERPL-MCNC: 18 MG/DL (ref 8–22)
CALCIUM SERPL-MCNC: 7.9 MG/DL (ref 8.5–10.5)
CHLORIDE SERPL-SCNC: 102 MMOL/L (ref 96–112)
CO2 SERPL-SCNC: 24 MMOL/L (ref 20–33)
CREAT SERPL-MCNC: 1.13 MG/DL (ref 0.5–1.4)
EPO SERPL-ACNC: 35 MU/ML (ref 4–27)
ERYTHROCYTE [DISTWIDTH] IN BLOOD BY AUTOMATED COUNT: 59.9 FL (ref 35.9–50)
GFR SERPLBLD CREATININE-BSD FMLA CKD-EPI: 69 ML/MIN/1.73 M 2
GLUCOSE SERPL-MCNC: 90 MG/DL (ref 65–99)
HCT VFR BLD AUTO: 27.3 % (ref 42–52)
HGB BLD-MCNC: 8.2 G/DL (ref 14–18)
HGB BLD-MCNC: 8.6 G/DL (ref 14–18)
MAGNESIUM SERPL-MCNC: 2 MG/DL (ref 1.5–2.5)
MCH RBC QN AUTO: 28.2 PG (ref 27–33)
MCHC RBC AUTO-ENTMCNC: 31.5 G/DL (ref 33.7–35.3)
MCV RBC AUTO: 89.5 FL (ref 81.4–97.8)
PATHOLOGY CONSULT NOTE: NORMAL
PHOSPHATE SERPL-MCNC: 4.2 MG/DL (ref 2.5–4.5)
PLATELET # BLD AUTO: 60 K/UL (ref 164–446)
PMV BLD AUTO: 9.7 FL (ref 9–12.9)
POTASSIUM SERPL-SCNC: 4.9 MMOL/L (ref 3.6–5.5)
RBC # BLD AUTO: 3.05 M/UL (ref 4.7–6.1)
SODIUM SERPL-SCNC: 136 MMOL/L (ref 135–145)
WBC # BLD AUTO: 10.9 K/UL (ref 4.8–10.8)

## 2022-11-16 PROCEDURE — 88305 TISSUE EXAM BY PATHOLOGIST: CPT | Mod: 59

## 2022-11-16 PROCEDURE — 88342 IMHCHEM/IMCYTCHM 1ST ANTB: CPT

## 2022-11-16 PROCEDURE — 88311 DECALCIFY TISSUE: CPT

## 2022-11-16 PROCEDURE — 80048 BASIC METABOLIC PNL TOTAL CA: CPT

## 2022-11-16 PROCEDURE — 88360 TUMOR IMMUNOHISTOCHEM/MANUAL: CPT | Mod: 91

## 2022-11-16 PROCEDURE — 99153 MOD SED SAME PHYS/QHP EA: CPT | Performed by: HOSPITALIST

## 2022-11-16 PROCEDURE — 700102 HCHG RX REV CODE 250 W/ 637 OVERRIDE(OP): Performed by: STUDENT IN AN ORGANIZED HEALTH CARE EDUCATION/TRAINING PROGRAM

## 2022-11-16 PROCEDURE — 88313 SPECIAL STAINS GROUP 2: CPT | Mod: 91

## 2022-11-16 PROCEDURE — 88184 FLOWCYTOMETRY/ TC 1 MARKER: CPT

## 2022-11-16 PROCEDURE — 700102 HCHG RX REV CODE 250 W/ 637 OVERRIDE(OP): Performed by: HOSPITALIST

## 2022-11-16 PROCEDURE — 07DR3ZX EXTRACTION OF ILIAC BONE MARROW, PERCUTANEOUS APPROACH, DIAGNOSTIC: ICD-10-PCS | Performed by: HOSPITALIST

## 2022-11-16 PROCEDURE — 88341 IMHCHEM/IMCYTCHM EA ADD ANTB: CPT

## 2022-11-16 PROCEDURE — 99233 SBSQ HOSP IP/OBS HIGH 50: CPT | Mod: 25 | Performed by: INTERNAL MEDICINE

## 2022-11-16 PROCEDURE — 160038 HCHG SURGERY MINUTES - EA ADDL 1 MIN LEVEL 2: Performed by: HOSPITALIST

## 2022-11-16 PROCEDURE — 85018 HEMOGLOBIN: CPT

## 2022-11-16 PROCEDURE — 81479 UNLISTED MOLECULAR PATHOLOGY: CPT

## 2022-11-16 PROCEDURE — 160027 HCHG SURGERY MINUTES - 1ST 30 MINS LEVEL 2: Performed by: HOSPITALIST

## 2022-11-16 PROCEDURE — 160048 HCHG OR STATISTICAL LEVEL 1-5: Performed by: HOSPITALIST

## 2022-11-16 PROCEDURE — 700111 HCHG RX REV CODE 636 W/ 250 OVERRIDE (IP): Performed by: NURSE PRACTITIONER

## 2022-11-16 PROCEDURE — 36415 COLL VENOUS BLD VENIPUNCTURE: CPT

## 2022-11-16 PROCEDURE — A9270 NON-COVERED ITEM OR SERVICE: HCPCS | Performed by: NURSE PRACTITIONER

## 2022-11-16 PROCEDURE — 700111 HCHG RX REV CODE 636 W/ 250 OVERRIDE (IP): Performed by: HOSPITALIST

## 2022-11-16 PROCEDURE — 84100 ASSAY OF PHOSPHORUS: CPT

## 2022-11-16 PROCEDURE — 88374 M/PHMTRC ALYS ISHQUANT/SEMIQ: CPT | Mod: 91

## 2022-11-16 PROCEDURE — 38222 DX BONE MARROW BX & ASPIR: CPT | Performed by: HOSPITALIST

## 2022-11-16 PROCEDURE — 770001 HCHG ROOM/CARE - MED/SURG/GYN PRIV*

## 2022-11-16 PROCEDURE — 88237 TISSUE CULTURE BONE MARROW: CPT

## 2022-11-16 PROCEDURE — A9270 NON-COVERED ITEM OR SERVICE: HCPCS | Performed by: STUDENT IN AN ORGANIZED HEALTH CARE EDUCATION/TRAINING PROGRAM

## 2022-11-16 PROCEDURE — 81310 NPM1 GENE: CPT

## 2022-11-16 PROCEDURE — 700105 HCHG RX REV CODE 258: Performed by: NURSE PRACTITIONER

## 2022-11-16 PROCEDURE — 81352 TP53 GENE TRGT SEQUENCE ALYS: CPT

## 2022-11-16 PROCEDURE — 88185 FLOWCYTOMETRY/TC ADD-ON: CPT | Mod: 91

## 2022-11-16 PROCEDURE — 160035 HCHG PACU - 1ST 60 MINS PHASE I: Performed by: HOSPITALIST

## 2022-11-16 PROCEDURE — 81347 SF3B1 GENE COMMON VARIANTS: CPT

## 2022-11-16 PROCEDURE — 81218 CEBPA GENE FULL SEQUENCE: CPT

## 2022-11-16 PROCEDURE — 99152 MOD SED SAME PHYS/QHP 5/>YRS: CPT | Performed by: HOSPITALIST

## 2022-11-16 PROCEDURE — 700102 HCHG RX REV CODE 250 W/ 637 OVERRIDE(OP): Performed by: NURSE PRACTITIONER

## 2022-11-16 PROCEDURE — 85027 COMPLETE CBC AUTOMATED: CPT

## 2022-11-16 PROCEDURE — 88264 CHROMOSOME ANALYSIS 20-25: CPT

## 2022-11-16 PROCEDURE — A9270 NON-COVERED ITEM OR SERVICE: HCPCS | Performed by: HOSPITALIST

## 2022-11-16 PROCEDURE — 160002 HCHG RECOVERY MINUTES (STAT): Performed by: HOSPITALIST

## 2022-11-16 PROCEDURE — 83735 ASSAY OF MAGNESIUM: CPT

## 2022-11-16 PROCEDURE — 99152 MOD SED SAME PHYS/QHP 5/>YRS: CPT | Mod: 59 | Performed by: HOSPITALIST

## 2022-11-16 RX ORDER — SODIUM CHLORIDE 9 MG/ML
500 INJECTION, SOLUTION INTRAVENOUS
Status: DISCONTINUED | OUTPATIENT
Start: 2022-11-16 | End: 2022-11-16 | Stop reason: HOSPADM

## 2022-11-16 RX ORDER — MIDAZOLAM HYDROCHLORIDE 1 MG/ML
INJECTION INTRAMUSCULAR; INTRAVENOUS
Status: DISPENSED
Start: 2022-11-16 | End: 2022-11-17

## 2022-11-16 RX ORDER — MIDAZOLAM HYDROCHLORIDE 1 MG/ML
.5-2 INJECTION INTRAMUSCULAR; INTRAVENOUS PRN
Status: DISCONTINUED | OUTPATIENT
Start: 2022-11-16 | End: 2022-11-16 | Stop reason: HOSPADM

## 2022-11-16 RX ADMIN — ATORVASTATIN CALCIUM 20 MG: 20 TABLET, FILM COATED ORAL at 17:40

## 2022-11-16 RX ADMIN — AMPICILLIN AND SULBACTAM 3 G: 1; 2 INJECTION, POWDER, FOR SOLUTION INTRAMUSCULAR; INTRAVENOUS at 17:50

## 2022-11-16 RX ADMIN — AMPICILLIN AND SULBACTAM 3 G: 1; 2 INJECTION, POWDER, FOR SOLUTION INTRAMUSCULAR; INTRAVENOUS at 01:01

## 2022-11-16 RX ADMIN — Medication 1 APPLICATOR: at 17:55

## 2022-11-16 RX ADMIN — AMPICILLIN AND SULBACTAM 3 G: 1; 2 INJECTION, POWDER, FOR SOLUTION INTRAMUSCULAR; INTRAVENOUS at 06:20

## 2022-11-16 RX ADMIN — ALPRAZOLAM 0.25 MG: 0.25 TABLET ORAL at 19:55

## 2022-11-16 RX ADMIN — HYDROMORPHONE HYDROCHLORIDE 1 MG: 1 INJECTION, SOLUTION INTRAMUSCULAR; INTRAVENOUS; SUBCUTANEOUS at 06:30

## 2022-11-16 RX ADMIN — OXYBUTYNIN CHLORIDE 5 MG: 5 TABLET ORAL at 17:40

## 2022-11-16 RX ADMIN — ALPRAZOLAM 0.25 MG: 0.25 TABLET ORAL at 17:40

## 2022-11-16 RX ADMIN — CYANOCOBALAMIN TAB 500 MCG 1000 MCG: 500 TAB at 06:21

## 2022-11-16 RX ADMIN — GABAPENTIN 200 MG: 100 CAPSULE ORAL at 17:41

## 2022-11-16 RX ADMIN — Medication 2000 UNITS: at 06:25

## 2022-11-16 RX ADMIN — ALPRAZOLAM 0.25 MG: 0.25 TABLET ORAL at 08:18

## 2022-11-16 RX ADMIN — AMPICILLIN AND SULBACTAM 3 G: 1; 2 INJECTION, POWDER, FOR SOLUTION INTRAMUSCULAR; INTRAVENOUS at 11:45

## 2022-11-16 RX ADMIN — THIAMINE HCL TAB 100 MG 100 MG: 100 TAB at 06:21

## 2022-11-16 RX ADMIN — HYDROMORPHONE HYDROCHLORIDE 1 MG: 1 INJECTION, SOLUTION INTRAMUSCULAR; INTRAVENOUS; SUBCUTANEOUS at 17:50

## 2022-11-16 RX ADMIN — Medication 1 APPLICATOR: at 06:23

## 2022-11-16 RX ADMIN — THIAMINE HCL TAB 100 MG 100 MG: 100 TAB at 17:40

## 2022-11-16 RX ADMIN — FOLIC ACID 1 MG: 1 TABLET ORAL at 06:22

## 2022-11-16 RX ADMIN — SODIUM CHLORIDE: 9 INJECTION, SOLUTION INTRAVENOUS at 17:45

## 2022-11-16 RX ADMIN — SODIUM CHLORIDE: 9 INJECTION, SOLUTION INTRAVENOUS at 07:35

## 2022-11-16 RX ADMIN — AMPICILLIN AND SULBACTAM 3 G: 1; 2 INJECTION, POWDER, FOR SOLUTION INTRAMUSCULAR; INTRAVENOUS at 23:44

## 2022-11-16 RX ADMIN — GABAPENTIN 200 MG: 100 CAPSULE ORAL at 06:21

## 2022-11-16 RX ADMIN — OXYBUTYNIN CHLORIDE 5 MG: 5 TABLET ORAL at 11:45

## 2022-11-16 RX ADMIN — GABAPENTIN 200 MG: 100 CAPSULE ORAL at 11:45

## 2022-11-16 RX ADMIN — OXYBUTYNIN CHLORIDE 5 MG: 5 TABLET ORAL at 06:21

## 2022-11-16 ASSESSMENT — COGNITIVE AND FUNCTIONAL STATUS - GENERAL
MOVING FROM LYING ON BACK TO SITTING ON SIDE OF FLAT BED: A LITTLE
DRESSING REGULAR LOWER BODY CLOTHING: A LITTLE
CLIMB 3 TO 5 STEPS WITH RAILING: A LITTLE
STANDING UP FROM CHAIR USING ARMS: A LITTLE
TOILETING: A LITTLE
SUGGESTED CMS G CODE MODIFIER MOBILITY: CK
MOVING TO AND FROM BED TO CHAIR: A LITTLE
SUGGESTED CMS G CODE MODIFIER MOBILITY: CK
TOILETING: A LITTLE
MOVING TO AND FROM BED TO CHAIR: A LITTLE
HELP NEEDED FOR BATHING: A LITTLE
HELP NEEDED FOR BATHING: A LITTLE
STANDING UP FROM CHAIR USING ARMS: A LITTLE
DRESSING REGULAR LOWER BODY CLOTHING: A LITTLE
MOBILITY SCORE: 18
MOVING FROM LYING ON BACK TO SITTING ON SIDE OF FLAT BED: A LITTLE
WALKING IN HOSPITAL ROOM: A LITTLE
DAILY ACTIVITIY SCORE: 21
DAILY ACTIVITIY SCORE: 21
MOBILITY SCORE: 18
TURNING FROM BACK TO SIDE WHILE IN FLAT BAD: A LITTLE
CLIMB 3 TO 5 STEPS WITH RAILING: A LITTLE
SUGGESTED CMS G CODE MODIFIER DAILY ACTIVITY: CJ
TURNING FROM BACK TO SIDE WHILE IN FLAT BAD: A LITTLE
SUGGESTED CMS G CODE MODIFIER DAILY ACTIVITY: CJ
WALKING IN HOSPITAL ROOM: A LITTLE

## 2022-11-16 ASSESSMENT — PAIN DESCRIPTION - PAIN TYPE
TYPE: ACUTE PAIN
TYPE: SURGICAL PAIN
TYPE: CHRONIC PAIN;SURGICAL PAIN
TYPE: ACUTE PAIN;CHRONIC PAIN
TYPE: ACUTE PAIN
TYPE: ACUTE PAIN
TYPE: ACUTE PAIN;CHRONIC PAIN
TYPE: CHRONIC PAIN
TYPE: ACUTE PAIN
TYPE: SURGICAL PAIN
TYPE: ACUTE PAIN
TYPE: CHRONIC PAIN;SURGICAL PAIN
TYPE: ACUTE PAIN

## 2022-11-16 ASSESSMENT — ENCOUNTER SYMPTOMS
NERVOUS/ANXIOUS: 0
WEAKNESS: 1
SHORTNESS OF BREATH: 0
CHILLS: 0
ABDOMINAL PAIN: 0
VOMITING: 0
FEVER: 0
MYALGIAS: 0
NAUSEA: 0

## 2022-11-16 ASSESSMENT — FIBROSIS 4 INDEX: FIB4 SCORE: 5.82

## 2022-11-16 NOTE — OR NURSING
1421 from OR to PACU 24. Connected to monitor. Report received from anesthesia & RN. VSS. 02 2L via nasal cannula. Breaths calm, even, unlabored.  Bandaid to R hip visualized; clean, dry, intact. Patient denies pain at this time.     1438 Report to Abeba JARAMILLO, verbalizes understanding and all questions answered. Patient transport request placed at this time.     1451 Report to Muriel JARAMILLO for break

## 2022-11-16 NOTE — PROCEDURES
Bone Marrow Biopsy/Aspiration    Date/Time: 11/16/2022 2:41 PM  Performed by: Skyler Santos D.O.  Authorized by: Skyler Santos D.O.     Consent:     Consent obtained:  Written    Consent given by:  Patient    Risks discussed:  Bleeding, infection, pain, nerve damage and repeat procedure    Alternatives discussed:  No treatment  Pre-procedure details:     Procedure type:  Aspiration and biopsy    Requesting physician:  Amy    Indications:  Anemia    Position:  Left lateral decubitus    Buttock laterality:  Right    Local anesthetic:  1% Lidocaine    Subcutaneous volume:  1 mL    Periosteum anesthetic volume:  6 mL    Preparation: Patient was prepped and draped in usual sterile fashion    Sedation:     Patient Sedated: Yes      Sedation type: moderate (conscious) sedation      Sedation:  Fentanyl and midazolam    Analgesia:  Fentanyl    Sedation length:  30 minutes  Procedure details:     Aspirate obtained:  5 mL followed by 5 mL    Blood cultures collected:  None    Biopsy performed:  2 cores    Number of attempts:  3    Estimated blood loss (mL):  1  Post-procedure:     Puncture site:  Adhesive bandage applied    Patient tolerance of procedure:  Tolerated well, no immediate complications  Comments:      Procedure started at 1338 after time out and completed at 1410  A total of 100mcg of fentanyl and 4mg of versed given during that time  I was present and supervising the sedation throughout that entire time

## 2022-11-16 NOTE — PROGRESS NOTES
Hospital Medicine Daily Progress Note    Date of Service  11/16/2022    Chief Complaint  Gregg Martinez is a 72 y.o. male admitted 10/26/2022 with hematuria    Hospital Course  73 yo man with asthma, HTN who presented with 2 weeks of hematuria and 10 lb weight loss. He was found anemia, Hgb 6.5. CT AP showed bladder mass. Urology was consulted, patient was found with mutifocal bladder tumor that was resected and intravesical gemcitabine. Pathology showing low-grade papillary urothelial carcinoma noninvasive with muscularis propria uninvolved by tumor. CT showed evidence of bone mets. He underwent bone biopsy 10/31. Post-op procedure he continued to have hematuria needing CBI. Patient had clot evacuation and fulguration with transurethral resection of bladder tumor 11/2. Arboleda cath was removed but hematuria recurrent and CBI restarted. Patient again had clot evacuation and fulguration of bleeding bladder tissue 11/12. However hematuria persists and he continued to need transfusion support. Patient had 3rd clot evacuation and fulguration with tumor resection 11/14.   Dr. Reyes with oncology was consulted 11/14, Splenimegaly 19 cms and having anemia and thrombocytopenia before admission rasies concerns of MPN. Needs BM BX ones stabilized ordered MIRTA-2 from peripheral blood.     Interval Problem Update  He has been feeling better  Hgb 8.6. Urine is clear. Holding CBI per urology  Bone marrow biopsy today    I have discussed this patient's plan of care and discharge plan at IDT rounds today with Case Management, Nursing, Nursing leadership, and other members of the IDT team.    Consultants/Specialty  oncology and urology    Code Status  Full Code    Disposition  Patient is not medically cleared for discharge.   Anticipate discharge to to home with close outpatient follow-up.  I have placed the appropriate orders for post-discharge needs.    Review of Systems  Review of Systems   Constitutional:  Positive for  malaise/fatigue.   Respiratory:  Negative for shortness of breath.    Cardiovascular:  Negative for chest pain.   Gastrointestinal:  Negative for abdominal pain and nausea.   Genitourinary:  Negative for hematuria.   Musculoskeletal:  Negative for myalgias.   Neurological:  Positive for weakness.   Psychiatric/Behavioral:  The patient is not nervous/anxious.       Physical Exam  Temp:  [36.1 °C (97 °F)-37.1 °C (98.7 °F)] 36.2 °C (97.1 °F)  Pulse:  [62-87] 71  Resp:  [16-18] 16  BP: ()/(46-62) 102/55  SpO2:  [89 %-100 %] 93 %    Physical Exam  Vitals and nursing note reviewed.   Constitutional:       General: He is not in acute distress.     Appearance: He is not toxic-appearing.   HENT:      Head: Normocephalic.      Mouth/Throat:      Mouth: Mucous membranes are moist.   Eyes:      General:         Right eye: No discharge.         Left eye: No discharge.   Cardiovascular:      Rate and Rhythm: Normal rate and regular rhythm.   Pulmonary:      Effort: Pulmonary effort is normal. No respiratory distress.      Breath sounds: No wheezing or rales.   Abdominal:      Palpations: Abdomen is soft.      Tenderness: There is no abdominal tenderness.   Musculoskeletal:      Cervical back: Neck supple.      Right lower leg: Edema present.      Left lower leg: Edema present.   Skin:     General: Skin is warm and dry.   Neurological:      Mental Status: He is alert and oriented to person, place, and time.       Fluids    Intake/Output Summary (Last 24 hours) at 11/16/2022 1347  Last data filed at 11/16/2022 1223  Gross per 24 hour   Intake 952.5 ml   Output 390 ml   Net 562.5 ml       Laboratory  Recent Labs     11/15/22  0031 11/15/22  0904 11/15/22  1559 11/16/22  0541   WBC 12.1* 13.3*  13.5*  --  10.9*   RBC 2.02* 2.55*  2.58*  --  3.05*   HEMOGLOBIN 5.3* 7.2*  7.2* 6.9* 8.6*   HEMATOCRIT 17.5* 21.8*  22.1* 21.0* 27.3*   MCV 86.6 85.5  85.7  --  89.5   MCH 26.2* 28.2  27.9  --  28.2   MCHC 30.3* 33.0*  32.6*  --   31.5*   RDW 60.7* 56.0*  55.8*  --  59.9*   PLATELETCT 71* 48*  66*  --  60*   MPV 9.4 9.8  --  9.7     Recent Labs     11/15/22  0031 11/15/22  0904 11/16/22  0541   SODIUM 131* 133* 136   POTASSIUM 5.1 5.4 4.9   CHLORIDE 99 100 102   CO2 24 24 24   GLUCOSE 131* 117* 90   BUN 24* 22 18   CREATININE 1.47* 1.34 1.13   CALCIUM 8.0* 7.9* 7.9*     Recent Labs     11/14/22  1013 11/15/22  0031   INR 1.11 1.20*               Imaging  US-EXTREMITY VENOUS LOWER BILAT   Final Result      DX-CHEST-PORTABLE (1 VIEW)   Final Result      Increased LEFT basilar atelectasis or pneumonia      CT-ABDOMEN-PELVIS W/O   Final Result      1.  Interval hemorrhage within the partially decompressed urinary bladder. Previously visualized posterior left bladder mass is no longer seen presumably this is due to interval resection      2.  New mild left hydroureteronephrosis with moderate perinephric fat stranding. This is concerning for obstruction perhaps related to hematoma, recent instrumentation or pyelonephritis/ureterectasis.      3.  Increasing colonic stool volume with similar moderate diverticulosis of the colon but no evidence of diverticulitis      4.  Marked splenomegaly      5.  New small effusions with some lower lung zone groundglass suspicious for interstitial edema/mild congestive failure. There is stable mild pericardial fluid      6.  Persistent inhomogeneous medullary spaces with generalized increased density. Recent bone scan showed no focal mass but this could reflect underlying myelofibrosis or metastases with false-negative bone scan      CT-NEEDLE BX-DEEP BONE   Final Result      1.  Successful CT-guided core biopsy of sclerotic lesion in the posterior aspect of left inferior pubic ramus.      NM-BONE/JOINT SCAN WHOLE BODY   Final Result      No evidence of bony metastatic disease      CT-CHEST (THORAX) WITH   Final Result         1.  Emphysema   2.  Hazy bilateral upper lobe opacities with slight intralobular  septal thickening, could represent component of mild pulmonary edema   3.  Linear densities the bilateral lung bases favors scarring or atelectasis.   4.  Atherosclerosis and atherosclerotic coronary artery disease   5.  Hepatomegaly with irregular hepatic contour favoring changes of cirrhosis.   6.  Subcentimeter hepatic lobe lesions, could represent small cyst or hemangioma, otherwise indeterminate.   7.  Mild mediastinal adenopathy, workup and evaluation for causes of adenopathy recommended as clinically appropriate.   8.  Splenomegaly      DX-CHEST-PORTABLE (1 VIEW)   Final Result         1.  No acute cardiopulmonary disease.   2.  Atherosclerosis      CT-ABDOMEN-PELVIS WITH   Final Result      1.  Enhancing 2.5 x 2.3 cm bladder wall mass suspicious for neoplasm.   2.  Heterogeneous sclerotic change of the pelvis and proximal femurs suspicious for metastatic disease or other infiltrating marrow process.   3.  Mild cardiomegaly.   4.  Hepatosplenomegaly.   5.  Small subcentimeter hepatic hypodensities which could represent cysts or hemangiomas but are nonspecific. If indicated, these findings could be further evaluated with dedicated liver protocol CT or MRI or ultrasound on a nonemergent basis.   6.  Colonic diverticulosis without evidence of diverticulitis.   7.  Atherosclerosis.           Assessment/Plan  * Urothelial carcinoma of bladder without invasion of muscle (HCC)- (present on admission)  Assessment & Plan  CT AP: Notable enhancing 2.5 x 2.3 cm bladder wall mass suspicious for neoplasms  s/p TURBT on 10/27 with multiple tumors noted  Pathology showing low-grade papillary urothelial carcinoma noninvasive with muscularis propria uninvolved by tumor  CT chest negative for metastasis  Bone scan without any evidence of metastasis  IR guided bone biopsy on 10/31 negative for malignancy    11/2: S/p Cystoscopy with clot evacuation, fulguration, and TUBRT  11/3: Attempt to wean CBI   11/5: CBI discontinued,  TOVR   11/6: Hemoglobin drop of 1 g and recurrence of hematuria.  CBI restarted  11/7: CBI running, hand irrigated by urology.  Still with leaking around catheter tip, urine culture NGTD  11/8: CBI running, hand irrigated by urology who deferred surgical intervention with cauterization until tomorrow with close monitoring of H&H, 7.6 this morning  11/9: Seen by urology- wean CBI as tolerated. Continue to trend H/h. Transfuse PRN. Follow up with urology and oncology OP  11/10: Seen and cleared by urology. Per their recs:   Clamp CBI. If hematuria not worsening, can d/c CBI.   They will arrange follow up with their office next week for voiding trial to have cuellar removed  11/11:  Failed CBI clamping yesterday. Deep red gross hematuria noted. hgb stable. Continue to monitor  11/12: Urology took to the OR today for cystoscopy with clot evacuation and fulguration of bleeding bladder tissue  11/13: Continues with cherry red urine and significant clotting requiring hand irrigation. 1 unit PRBC today. Discussed with Shakira Amos, urology aware, ordered B&O.  11/14: Catheter clogged twice past 24 hours, required upgrade to higher level of care given frequency of irrigation. D/W Urology, will take patient back to the OR today  11/15: POD #1 s/p cystoscopy with clot evacuation and fulguration of bleeding bladder tissue.  Hgb 7.2 this morning.  We will continue to trend and transfuse as needed.  Heme-onc has seen patient with plans for bone marrow biopsy tentatively today or tomorrow.  11/16 - urine is clear, hold CBI      Acute kidney injury (HCC)  Assessment & Plan  Secondary to hypoperfusion given hypovolemia  NS at 125 ml/hr  Improving   Transfuse for H/H < 7    Coagulation defect (HCC)  Assessment & Plan  Patient has acute blood loss anemia secondary to hematuria in the setting of newly diagnosed bladder cancer  Is required 9 units of PRBCs since admission  TEG abnormal 11/13, 1 unit platelets transfused  Hematology  consulted, appreciate recommendations  Transfuse 2 units FFP today  Hematology will follow, recommending bone marrow aspiration and testing for myeloproliferative disorder  JAK2 and erythropoetin pending    Elevated d-dimer  Assessment & Plan  D-dimer 2.06  Patient having coagulopathy issues, hematology consulted  Will check bilateral lower extremity ultrasound    11/15: US lower extremities negative for DVT.  D-dimer is nonspecific likely attributed to malignancy or generalized inflammation secondary to acute illness at this time.  We will continue to monitor for signs and symptoms of coagulopathy.    Hyponatremia  Assessment & Plan  Sodium improved to 133  Asymptomatic  Continue NS at 125 mL an hour  Will recheck with a.m. labs    Thrombocytopenia (HCC)  Assessment & Plan  Consistently low since admission  TEG abnormal  1 unit platelets transfused 11/13  Hematology following, appreciate recommendations  Plan for bone marrow biopsy tentatively today     Pneumonia  Assessment & Plan  Leukocytosis continuing  Diminished lung sounds with left bibasilar rales on exam  No tachycardia or fever  Pro-Nicolas 1.64, repeat 11/14 1.81  Chest x-ray consistent with left basilar pneumonia  Unasyn and azithromycin (end date 11/18/2022)  Blood cultures NGTD    Slow transit constipation- (present on admission)  Assessment & Plan  Continues to complain of intermittent constipation  Aggressive bowel protocol  11/15: Patient reports constipation unrelieved by docusate.  Requesting milk of magnesia.  One-time order for milk of magnesia ordered.    Hypovolemia  Assessment & Plan  Due to acute blood loss anemia    Hematuria- (present on admission)  Assessment & Plan  Likely secondary to above    Iron deficiency anemia- (present on admission)  Assessment & Plan  S/p IV iron infusion repletion  Repeat iron labs WNL    Acute blood loss anemia- (present on admission)  Assessment & Plan  Secondary to hematuria.   Required 8 units of PRBCs this  admission   Continues to fail CBI clamping with gross hematuria noted  Underwent cystoscopy 11/12 with Dr. Boyer who performed clot evaluation and fulguration of bleeding bladder tissue  Additional cystoscopy 11/14 given continued hematuria, clogging of catheter, and requiring blood transfusions  Hematology consulted and recommend transfusing for Hgb<8 or hemodynamic instability    11/15/2022: Hemoglobin 7.2 this morning.  Repeat H/H ordered.  Hgb 6.9-transfuse 1 unit PRBC  Hgb now stabilized    Obesity (BMI 30-39.9)- (present on admission)  Assessment & Plan  Diet and lifestyle modification  Body mass index is 26.21 kg/m².    Leukocytosis- (present on admission)  Assessment & Plan  Likely reactive in setting of malignancy and blood loss anemia  Increased to 24.4 today, discussed with hematology, likely secondary to malignancy  Pro-Nicolas 1.64, repeat 1.81  Chest x-ray consistent with atelectasis versus pneumonia  UA negative  Afebrile  Unasyn (end date 11/18/2022)   Incentive spirometry and ambulate    Dyslipidemia- (present on admission)  Assessment & Plan  Continue atorvastatin    COPD (chronic obstructive pulmonary disease) (HCC)- (present on admission)  Assessment & Plan  Not in acute exacerbation  PRN duonebs       VTE prophylaxis: SCDs/TEDs    I have performed a physical exam and reviewed and updated ROS and Plan today (11/16/2022). In review of yesterday's note (11/15/2022), there are no changes except as documented above.

## 2022-11-16 NOTE — CARE PLAN
The patient is Stable - Low risk of patient condition declining or worsening    Shift Goals  Clinical Goals: monitor CBI  Patient Goals: pain control, rest  Family Goals: AZIZA    Progress made toward(s) clinical / shift goals:  pain controlled, CBI clear pink    Patient is not progressing towards the following goals:      Problem: Hemodynamics  Goal: Patient's hemodynamics, fluid balance and neurologic status will be stable or improve  Outcome: Progressing     Problem: Urinary - Renal Perfusion  Goal: Ability to achieve and maintain adequate renal perfusion and functioning will improve  Outcome: Progressing     Problem: Knowledge Deficit - Standard  Goal: Patient and family/care givers will demonstrate understanding of plan of care, disease process/condition, diagnostic tests and medications  Outcome: Progressing     Problem: Pain - Standard  Goal: Alleviation of pain or a reduction in pain to the patient’s comfort goal  Outcome: Progressing     Problem: Fall Risk  Goal: Patient will remain free from falls  Outcome: Progressing

## 2022-11-16 NOTE — OR NURSING
1451-handoff received from ELLA Dimas. Pt resting comfortably in bed, VSS on 2L via NC    1502-pt taken in [facility] via RN transport with monitor to inpatient room, [id number]    Muriel Mathews R.N.

## 2022-11-16 NOTE — PROGRESS NOTES
Note to reader: this note follows the APSO format rather than the historical SOAP format. Assessment and plan located at the top of the note for ease of use.    Chief Complaint  72 y.o. year old male here with bladder mass and sclerotic lesions.     Assessment/Plan  Interval History   TCC bladder s/p TURBT 10/27  Hematuria and clot obstruction s/p clot evacuation and fulguration x2 11/2 and 11/12 and 11/14  Anemia   Thrombocytopenia    D/C CBI  Restart CBI if urine cranberry or darker.   Monitor H/H and transfuse prn        Case discussed with patient, RN, and Urology-Dr. Jasmin PETERSON  Patient seen and examined    11/16. Urine clear/pink. D/C CBI. Pt feels well and has not pain. H/H stable and improving.     11/15. Repeat clot evac and fulguration with TURBT yesterday. Urine now pink and no clot obstruction. Abdominal pains and spasms resolved. H/H declined further and received 2 units PRBCs today    11/14. Hematuria continues with difficult time maintaining flow via cuellar. RN unable to irrigate and bladder scan with 500 cc in bladder. Patient in pain in SP region. Receiving PRBCs daily.            Review of Systems  Physical Exam   Review of Systems   Constitutional:  Negative for chills and fever.   Gastrointestinal:  Negative for abdominal pain, nausea and vomiting.   Genitourinary:  Negative for hematuria.   Vitals:    11/15/22 2342 11/16/22 0408 11/16/22 0750 11/16/22 1000   BP: (!) 85/46 104/58 111/62    Pulse: 76 71 64    Resp: 16 16 16    Temp: 36.2 °C (97.1 °F) 36.1 °C (97 °F) 37.1 °C (98.7 °F)    TempSrc: Temporal Temporal Temporal    SpO2: 98% 98% 96%    Weight:    84.1 kg (185 lb 6.5 oz)   Height:         Physical Exam  Vitals and nursing note reviewed.   Constitutional:       Appearance: Normal appearance.   HENT:      Head: Normocephalic and atraumatic.      Nose: Nose normal.      Mouth/Throat:      Mouth: Mucous membranes are moist.   Eyes:      Pupils: Pupils are equal, round, and reactive to light.    Pulmonary:      Effort: Pulmonary effort is normal.   Abdominal:      General: Abdomen is flat. There is no distension.      Palpations: Abdomen is soft.      Tenderness: There is no abdominal tenderness.   Genitourinary:     Comments: 3 way cuellar in place with CBI on slow rate. Urine light pink  Skin:     General: Skin is warm and dry.   Neurological:      General: No focal deficit present.      Mental Status: He is alert and oriented to person, place, and time.   Psychiatric:         Mood and Affect: Mood normal.         Behavior: Behavior normal.        Hematology Chemistry   Lab Results   Component Value Date/Time    WBC 10.9 (H) 11/16/2022 05:41 AM    HEMOGLOBIN 8.6 (L) 11/16/2022 05:41 AM    HEMATOCRIT 27.3 (L) 11/16/2022 05:41 AM    PLATELETCT 60 (L) 11/16/2022 05:41 AM     Lab Results   Component Value Date/Time    SODIUM 136 11/16/2022 05:41 AM    POTASSIUM 4.9 11/16/2022 05:41 AM    CHLORIDE 102 11/16/2022 05:41 AM    CO2 24 11/16/2022 05:41 AM    GLUCOSE 90 11/16/2022 05:41 AM    BUN 18 11/16/2022 05:41 AM    CREATININE 1.13 11/16/2022 05:41 AM         Labs not explicitly included in this progress note were reviewed by the author.   Radiology/imaging not explicitly included in this progress note was reviewed by the author.     Core Measures

## 2022-11-16 NOTE — CARE PLAN
"The patient is Stable - Low risk of patient condition declining or worsening    Shift Goals  Clinical Goals: pain control, remain NPO for biopsy, monitor I/O  Patient Goals: \"Hopefully get to go home soon.\"  Family Goals: n/a    Progress made toward(s) clinical / shift goals:  Patient's CBI is clamped. Patient is being monitored for signs of hematuria and/or blood clots. Patient has been NPO in anticipation for bone biopsy at 1300 today. Pain medication is available as needed for MAR parameters. Patient ambulates well with standby assistance and is tolerating room air.    Patient is not progressing towards the following goals: Discharge clearance.      Problem: Hemodynamics  Goal: Patient's hemodynamics, fluid balance and neurologic status will be stable or improve  Outcome: Progressing     Problem: Fluid Volume  Goal: Fluid volume balance will be maintained  Outcome: Progressing     Problem: Urinary - Renal Perfusion  Goal: Ability to achieve and maintain adequate renal perfusion and functioning will improve  Outcome: Progressing     Problem: Knowledge Deficit - Standard  Goal: Patient and family/care givers will demonstrate understanding of plan of care, disease process/condition, diagnostic tests and medications  Outcome: Progressing         "

## 2022-11-16 NOTE — PROGRESS NOTES
Patient is being transported to [facility] pre-op for bone biopsy. Per Voalte message received at 12:37pm from Dr. Yola Payne, patient can be taken off his cardiac monitor at this time. This RN notified the monitor room. Patient is being transported down by RN with a Z[employer] monitor in place.

## 2022-11-16 NOTE — PROGRESS NOTES
Patient returned from [facility] PACU. Telemetry monitoring has been restarted. Continuous IVF restarted. Regular diet ordered per nursing communication from Dr. Payne. CBI remains clamped per nursing communication from Dr. Payne.

## 2022-11-17 ENCOUNTER — APPOINTMENT (OUTPATIENT)
Dept: CARDIOLOGY | Facility: MEDICAL CENTER | Age: 72
DRG: 668 | End: 2022-11-17
Attending: INTERNAL MEDICINE
Payer: MEDICARE

## 2022-11-17 PROBLEM — E03.9 HYPOTHYROIDISM: Status: ACTIVE | Noted: 2022-11-17

## 2022-11-17 PROBLEM — I48.0 PAROXYSMAL ATRIAL FIBRILLATION (HCC): Status: ACTIVE | Noted: 2022-11-17

## 2022-11-17 LAB
ANION GAP SERPL CALC-SCNC: 7 MMOL/L (ref 7–16)
ANISOCYTOSIS BLD QL SMEAR: ABNORMAL
BACTERIA BLD CULT: NORMAL
BACTERIA BLD CULT: NORMAL
BASOPHILS # BLD AUTO: 5.7 % (ref 0–1.8)
BASOPHILS # BLD: 0.54 K/UL (ref 0–0.12)
BLASTS NFR BLD MANUAL: 8.1 %
BUN SERPL-MCNC: 14 MG/DL (ref 8–22)
CALCIUM SERPL-MCNC: 7.9 MG/DL (ref 8.5–10.5)
CHLORIDE SERPL-SCNC: 103 MMOL/L (ref 96–112)
CO2 SERPL-SCNC: 27 MMOL/L (ref 20–33)
CREAT SERPL-MCNC: 1.09 MG/DL (ref 0.5–1.4)
EKG IMPRESSION: NORMAL
EOSINOPHIL # BLD AUTO: 0.23 K/UL (ref 0–0.51)
EOSINOPHIL NFR BLD: 2.4 % (ref 0–6.9)
ERYTHROCYTE [DISTWIDTH] IN BLOOD BY AUTOMATED COUNT: 60 FL (ref 35.9–50)
GFR SERPLBLD CREATININE-BSD FMLA CKD-EPI: 73 ML/MIN/1.73 M 2
GLUCOSE SERPL-MCNC: 89 MG/DL (ref 65–99)
HCT VFR BLD AUTO: 26.7 % (ref 42–52)
HGB BLD-MCNC: 8.2 G/DL (ref 14–18)
HYPOCHROMIA BLD QL SMEAR: ABNORMAL
INR PPP: 1.14 (ref 0.87–1.13)
LV EJECT FRACT  99904: 70
LV EJECT FRACT MOD 2C 99903: 67.75
LV EJECT FRACT MOD 4C 99902: 77.29
LV EJECT FRACT MOD BP 99901: 74.46
LYMPHOCYTES # BLD AUTO: 2.75 K/UL (ref 1–4.8)
LYMPHOCYTES NFR BLD: 29.3 % (ref 22–41)
MAGNESIUM SERPL-MCNC: 1.9 MG/DL (ref 1.5–2.5)
MANUAL DIFF BLD: ABNORMAL
MCH RBC QN AUTO: 27.5 PG (ref 27–33)
MCHC RBC AUTO-ENTMCNC: 30.7 G/DL (ref 33.7–35.3)
MCV RBC AUTO: 89.6 FL (ref 81.4–97.8)
METAMYELOCYTES NFR BLD MANUAL: 1.6 %
MICROCYTES BLD QL SMEAR: ABNORMAL
MONOCYTES # BLD AUTO: 0.23 K/UL (ref 0–0.85)
MONOCYTES NFR BLD AUTO: 2.4 % (ref 0–13.4)
MORPHOLOGY BLD-IMP: NORMAL
MYELOCYTES NFR BLD MANUAL: 3.3 %
NEUTROPHILS # BLD AUTO: 4.36 K/UL (ref 1.82–7.42)
NEUTROPHILS NFR BLD: 46.4 % (ref 44–72)
NRBC # BLD AUTO: 2.26 K/UL
NRBC BLD-RTO: 23.9 /100 WBC
PLATELET # BLD AUTO: 64 K/UL (ref 164–446)
PLATELET BLD QL SMEAR: NORMAL
POLYCHROMASIA BLD QL SMEAR: NORMAL
POTASSIUM SERPL-SCNC: 4.7 MMOL/L (ref 3.6–5.5)
PROMYELOCYTES NFR BLD MANUAL: 0.8 %
PROTHROMBIN TIME: 14.4 SEC (ref 12–14.6)
RBC # BLD AUTO: 2.98 M/UL (ref 4.7–6.1)
RBC BLD AUTO: PRESENT
SIGNIFICANT IND 70042: NORMAL
SIGNIFICANT IND 70042: NORMAL
SITE SITE: NORMAL
SITE SITE: NORMAL
SODIUM SERPL-SCNC: 137 MMOL/L (ref 135–145)
SOURCE SOURCE: NORMAL
SOURCE SOURCE: NORMAL
T4 FREE SERPL-MCNC: 0.85 NG/DL (ref 0.93–1.7)
TSH SERPL DL<=0.005 MIU/L-ACNC: 7.22 UIU/ML (ref 0.38–5.33)
WBC # BLD AUTO: 9.4 K/UL (ref 4.8–10.8)

## 2022-11-17 PROCEDURE — 93306 TTE W/DOPPLER COMPLETE: CPT | Mod: 26 | Performed by: INTERNAL MEDICINE

## 2022-11-17 PROCEDURE — 700102 HCHG RX REV CODE 250 W/ 637 OVERRIDE(OP): Performed by: STUDENT IN AN ORGANIZED HEALTH CARE EDUCATION/TRAINING PROGRAM

## 2022-11-17 PROCEDURE — 700105 HCHG RX REV CODE 258: Performed by: NURSE PRACTITIONER

## 2022-11-17 PROCEDURE — A9270 NON-COVERED ITEM OR SERVICE: HCPCS | Performed by: HOSPITALIST

## 2022-11-17 PROCEDURE — 700102 HCHG RX REV CODE 250 W/ 637 OVERRIDE(OP): Performed by: HOSPITALIST

## 2022-11-17 PROCEDURE — 700102 HCHG RX REV CODE 250 W/ 637 OVERRIDE(OP): Performed by: NURSE PRACTITIONER

## 2022-11-17 PROCEDURE — 84439 ASSAY OF FREE THYROXINE: CPT

## 2022-11-17 PROCEDURE — 84443 ASSAY THYROID STIM HORMONE: CPT

## 2022-11-17 PROCEDURE — 36415 COLL VENOUS BLD VENIPUNCTURE: CPT

## 2022-11-17 PROCEDURE — 80048 BASIC METABOLIC PNL TOTAL CA: CPT

## 2022-11-17 PROCEDURE — 85025 COMPLETE CBC W/AUTO DIFF WBC: CPT

## 2022-11-17 PROCEDURE — A9270 NON-COVERED ITEM OR SERVICE: HCPCS | Performed by: NURSE PRACTITIONER

## 2022-11-17 PROCEDURE — 93010 ELECTROCARDIOGRAM REPORT: CPT | Performed by: INTERNAL MEDICINE

## 2022-11-17 PROCEDURE — 93005 ELECTROCARDIOGRAM TRACING: CPT

## 2022-11-17 PROCEDURE — A9270 NON-COVERED ITEM OR SERVICE: HCPCS | Performed by: STUDENT IN AN ORGANIZED HEALTH CARE EDUCATION/TRAINING PROGRAM

## 2022-11-17 PROCEDURE — 83735 ASSAY OF MAGNESIUM: CPT

## 2022-11-17 PROCEDURE — 85610 PROTHROMBIN TIME: CPT

## 2022-11-17 PROCEDURE — 99233 SBSQ HOSP IP/OBS HIGH 50: CPT | Performed by: INTERNAL MEDICINE

## 2022-11-17 PROCEDURE — 85007 BL SMEAR W/DIFF WBC COUNT: CPT

## 2022-11-17 PROCEDURE — 770020 HCHG ROOM/CARE - TELE (206)

## 2022-11-17 PROCEDURE — 700111 HCHG RX REV CODE 636 W/ 250 OVERRIDE (IP): Performed by: NURSE PRACTITIONER

## 2022-11-17 PROCEDURE — 93306 TTE W/DOPPLER COMPLETE: CPT

## 2022-11-17 PROCEDURE — A9270 NON-COVERED ITEM OR SERVICE: HCPCS | Performed by: INTERNAL MEDICINE

## 2022-11-17 PROCEDURE — 700102 HCHG RX REV CODE 250 W/ 637 OVERRIDE(OP): Performed by: INTERNAL MEDICINE

## 2022-11-17 RX ORDER — LEVOTHYROXINE SODIUM 0.03 MG/1
25 TABLET ORAL
Status: DISCONTINUED | OUTPATIENT
Start: 2022-11-17 | End: 2022-11-22

## 2022-11-17 RX ORDER — MORPHINE SULFATE 15 MG/1
15-30 TABLET ORAL EVERY 4 HOURS PRN
Status: DISCONTINUED | OUTPATIENT
Start: 2022-11-17 | End: 2022-11-30 | Stop reason: HOSPADM

## 2022-11-17 RX ADMIN — MORPHINE SULFATE 30 MG: 15 TABLET ORAL at 20:31

## 2022-11-17 RX ADMIN — OXYBUTYNIN CHLORIDE 5 MG: 5 TABLET ORAL at 16:52

## 2022-11-17 RX ADMIN — ALPRAZOLAM 0.25 MG: 0.25 TABLET ORAL at 09:28

## 2022-11-17 RX ADMIN — LEVOTHYROXINE SODIUM 25 MCG: 0.03 TABLET ORAL at 16:18

## 2022-11-17 RX ADMIN — Medication 1 APPLICATOR: at 04:39

## 2022-11-17 RX ADMIN — AMPICILLIN AND SULBACTAM 3 G: 1; 2 INJECTION, POWDER, FOR SOLUTION INTRAMUSCULAR; INTRAVENOUS at 23:59

## 2022-11-17 RX ADMIN — DOCUSATE SODIUM 50 MG AND SENNOSIDES 8.6 MG 2 TABLET: 8.6; 5 TABLET, FILM COATED ORAL at 04:39

## 2022-11-17 RX ADMIN — OXYBUTYNIN CHLORIDE 5 MG: 5 TABLET ORAL at 04:39

## 2022-11-17 RX ADMIN — Medication 1 APPLICATOR: at 16:18

## 2022-11-17 RX ADMIN — CYANOCOBALAMIN TAB 500 MCG 1000 MCG: 500 TAB at 04:39

## 2022-11-17 RX ADMIN — GABAPENTIN 200 MG: 100 CAPSULE ORAL at 13:39

## 2022-11-17 RX ADMIN — AMPICILLIN AND SULBACTAM 3 G: 1; 2 INJECTION, POWDER, FOR SOLUTION INTRAMUSCULAR; INTRAVENOUS at 16:21

## 2022-11-17 RX ADMIN — OXYCODONE HYDROCHLORIDE 15 MG: 10 TABLET ORAL at 00:42

## 2022-11-17 RX ADMIN — ALPRAZOLAM 0.25 MG: 0.25 TABLET ORAL at 13:39

## 2022-11-17 RX ADMIN — AMPICILLIN AND SULBACTAM 3 G: 1; 2 INJECTION, POWDER, FOR SOLUTION INTRAMUSCULAR; INTRAVENOUS at 04:38

## 2022-11-17 RX ADMIN — OXYBUTYNIN CHLORIDE 5 MG: 5 TABLET ORAL at 13:37

## 2022-11-17 RX ADMIN — ALPRAZOLAM 0.25 MG: 0.25 TABLET ORAL at 20:24

## 2022-11-17 RX ADMIN — THIAMINE HCL TAB 100 MG 100 MG: 100 TAB at 04:38

## 2022-11-17 RX ADMIN — FOLIC ACID 1 MG: 1 TABLET ORAL at 04:39

## 2022-11-17 RX ADMIN — MORPHINE SULFATE 30 MG: 15 TABLET ORAL at 16:17

## 2022-11-17 RX ADMIN — AMPICILLIN AND SULBACTAM 3 G: 1; 2 INJECTION, POWDER, FOR SOLUTION INTRAMUSCULAR; INTRAVENOUS at 13:43

## 2022-11-17 RX ADMIN — THIAMINE HCL TAB 100 MG 100 MG: 100 TAB at 16:18

## 2022-11-17 RX ADMIN — Medication 2000 UNITS: at 04:39

## 2022-11-17 RX ADMIN — SODIUM CHLORIDE: 9 INJECTION, SOLUTION INTRAVENOUS at 03:22

## 2022-11-17 RX ADMIN — GABAPENTIN 200 MG: 100 CAPSULE ORAL at 04:39

## 2022-11-17 RX ADMIN — ALPRAZOLAM 0.25 MG: 0.25 TABLET ORAL at 16:18

## 2022-11-17 RX ADMIN — ACETAMINOPHEN 650 MG: 325 TABLET, FILM COATED ORAL at 00:43

## 2022-11-17 RX ADMIN — GABAPENTIN 200 MG: 100 CAPSULE ORAL at 16:52

## 2022-11-17 RX ADMIN — ATORVASTATIN CALCIUM 20 MG: 20 TABLET, FILM COATED ORAL at 16:18

## 2022-11-17 ASSESSMENT — PAIN DESCRIPTION - PAIN TYPE
TYPE: ACUTE PAIN
TYPE: ACUTE PAIN;SURGICAL PAIN
TYPE: ACUTE PAIN;SURGICAL PAIN
TYPE: ACUTE PAIN
TYPE: ACUTE PAIN;SURGICAL PAIN

## 2022-11-17 ASSESSMENT — ENCOUNTER SYMPTOMS
WEAKNESS: 1
CHILLS: 0
SHORTNESS OF BREATH: 0
NERVOUS/ANXIOUS: 0
MYALGIAS: 0
ABDOMINAL PAIN: 0
FEVER: 0
VOMITING: 0
NAUSEA: 0

## 2022-11-17 NOTE — PROGRESS NOTES
Hospital Medicine Daily Progress Note    Date of Service  11/17/2022    Chief Complaint  Gregg Martinez is a 72 y.o. male admitted 10/26/2022 with hematuria    Hospital Course  73 yo man with asthma, HTN who presented with 2 weeks of hematuria and 10 lb weight loss. He was found anemia, Hgb 6.5. CT AP showed bladder mass. Urology was consulted, patient was found with mutifocal bladder tumor that was resected and intravesical gemcitabine. Pathology showing low-grade papillary urothelial carcinoma noninvasive with muscularis propria uninvolved by tumor. CT showed evidence of bone mets. He underwent bone biopsy 10/31. Post-op procedure he continued to have hematuria needing CBI. Patient had clot evacuation and fulguration with transurethral resection of bladder tumor 11/2. Arboleda cath was removed but hematuria recurrent and CBI restarted. Patient again had clot evacuation and fulguration of bleeding bladder tissue 11/12. However hematuria persists and he continued to need transfusion support. Patient had 3rd clot evacuation and fulguration with tumor resection 11/14.   Dr. Reyes with oncology was consulted 11/14, Splenimegaly 19 cms and having anemia and thrombocytopenia before admission rasies concerns of MPN. Needs BM BX ones stabilized ordered MIRTA-2 from peripheral blood.     Interval Problem Update  CBI restarted last night for hematuria. Urine is clear this morning. Hold CBI and reassess.  Had 6 sec on afib on tele. No prior history of per patient. Will check TSH and echo    I have discussed this patient's plan of care and discharge plan at IDT rounds today with Case Management, Nursing, Nursing leadership, and other members of the IDT team.    Consultants/Specialty  oncology and urology    Code Status  Full Code    Disposition  Patient is not medically cleared for discharge.   Anticipate discharge to to home with close outpatient follow-up.  I have placed the appropriate orders for post-discharge  needs.    Review of Systems  Review of Systems   Constitutional:  Positive for malaise/fatigue.   Respiratory:  Negative for shortness of breath.    Cardiovascular:  Negative for chest pain.   Gastrointestinal:  Negative for abdominal pain and nausea.   Genitourinary:  Negative for hematuria.   Musculoskeletal:  Negative for myalgias.   Neurological:  Positive for weakness.   Psychiatric/Behavioral:  The patient is not nervous/anxious.       Physical Exam  Temp:  [36.2 °C (97.1 °F)-37.1 °C (98.8 °F)] 36.6 °C (97.9 °F)  Pulse:  [66-95] 93  Resp:  [16-18] 17  BP: ()/(43-92) 146/92  SpO2:  [76 %-100 %] 94 %    Physical Exam  Vitals and nursing note reviewed.   Constitutional:       General: He is not in acute distress.     Appearance: He is not toxic-appearing.   HENT:      Head: Normocephalic.      Mouth/Throat:      Mouth: Mucous membranes are moist.   Eyes:      General:         Right eye: No discharge.         Left eye: No discharge.   Cardiovascular:      Rate and Rhythm: Normal rate and regular rhythm.   Pulmonary:      Effort: Pulmonary effort is normal. No respiratory distress.      Breath sounds: No wheezing or rales.   Abdominal:      Palpations: Abdomen is soft.      Tenderness: There is no abdominal tenderness.   Musculoskeletal:      Cervical back: Neck supple.      Right lower leg: Edema present.      Left lower leg: Edema present.   Skin:     General: Skin is warm and dry.   Neurological:      Mental Status: He is alert and oriented to person, place, and time.       Fluids    Intake/Output Summary (Last 24 hours) at 11/17/2022 1230  Last data filed at 11/17/2022 0925  Gross per 24 hour   Intake 3196.3 ml   Output 2200 ml   Net 996.3 ml       Laboratory  Recent Labs     11/15/22  0031 11/15/22  0904 11/15/22  1559 11/16/22  0541 11/16/22  1800 11/17/22  0012   WBC 12.1* 13.3*  13.5*  --  10.9*  --  9.4   RBC 2.02* 2.55*  2.58*  --  3.05*  --  2.98*   HEMOGLOBIN 5.3* 7.2*  7.2* 6.9* 8.6* 8.2* 8.2*    HEMATOCRIT 17.5* 21.8*  22.1* 21.0* 27.3*  --  26.7*   MCV 86.6 85.5  85.7  --  89.5  --  89.6   MCH 26.2* 28.2  27.9  --  28.2  --  27.5   MCHC 30.3* 33.0*  32.6*  --  31.5*  --  30.7*   RDW 60.7* 56.0*  55.8*  --  59.9*  --  60.0*   PLATELETCT 71* 48*  66*  --  60*  --  64*   MPV 9.4 9.8  --  9.7  --   --      Recent Labs     11/15/22  0904 11/16/22  0541 11/17/22  0012   SODIUM 133* 136 137   POTASSIUM 5.4 4.9 4.7   CHLORIDE 100 102 103   CO2 24 24 27   GLUCOSE 117* 90 89   BUN 22 18 14   CREATININE 1.34 1.13 1.09   CALCIUM 7.9* 7.9* 7.9*     Recent Labs     11/15/22  0031 11/17/22  0012   INR 1.20* 1.14*               Imaging  US-EXTREMITY VENOUS LOWER BILAT   Final Result      DX-CHEST-PORTABLE (1 VIEW)   Final Result      Increased LEFT basilar atelectasis or pneumonia      CT-ABDOMEN-PELVIS W/O   Final Result      1.  Interval hemorrhage within the partially decompressed urinary bladder. Previously visualized posterior left bladder mass is no longer seen presumably this is due to interval resection      2.  New mild left hydroureteronephrosis with moderate perinephric fat stranding. This is concerning for obstruction perhaps related to hematoma, recent instrumentation or pyelonephritis/ureterectasis.      3.  Increasing colonic stool volume with similar moderate diverticulosis of the colon but no evidence of diverticulitis      4.  Marked splenomegaly      5.  New small effusions with some lower lung zone groundglass suspicious for interstitial edema/mild congestive failure. There is stable mild pericardial fluid      6.  Persistent inhomogeneous medullary spaces with generalized increased density. Recent bone scan showed no focal mass but this could reflect underlying myelofibrosis or metastases with false-negative bone scan      CT-NEEDLE BX-DEEP BONE   Final Result      1.  Successful CT-guided core biopsy of sclerotic lesion in the posterior aspect of left inferior pubic ramus.      NM-BONE/JOINT  SCAN WHOLE BODY   Final Result      No evidence of bony metastatic disease      CT-CHEST (THORAX) WITH   Final Result         1.  Emphysema   2.  Hazy bilateral upper lobe opacities with slight intralobular septal thickening, could represent component of mild pulmonary edema   3.  Linear densities the bilateral lung bases favors scarring or atelectasis.   4.  Atherosclerosis and atherosclerotic coronary artery disease   5.  Hepatomegaly with irregular hepatic contour favoring changes of cirrhosis.   6.  Subcentimeter hepatic lobe lesions, could represent small cyst or hemangioma, otherwise indeterminate.   7.  Mild mediastinal adenopathy, workup and evaluation for causes of adenopathy recommended as clinically appropriate.   8.  Splenomegaly      DX-CHEST-PORTABLE (1 VIEW)   Final Result         1.  No acute cardiopulmonary disease.   2.  Atherosclerosis      CT-ABDOMEN-PELVIS WITH   Final Result      1.  Enhancing 2.5 x 2.3 cm bladder wall mass suspicious for neoplasm.   2.  Heterogeneous sclerotic change of the pelvis and proximal femurs suspicious for metastatic disease or other infiltrating marrow process.   3.  Mild cardiomegaly.   4.  Hepatosplenomegaly.   5.  Small subcentimeter hepatic hypodensities which could represent cysts or hemangiomas but are nonspecific. If indicated, these findings could be further evaluated with dedicated liver protocol CT or MRI or ultrasound on a nonemergent basis.   6.  Colonic diverticulosis without evidence of diverticulitis.   7.  Atherosclerosis.      EC-ECHOCARDIOGRAM COMPLETE W/O CONT    (Results Pending)        Assessment/Plan  * Urothelial carcinoma of bladder without invasion of muscle (HCC)- (present on admission)  Assessment & Plan  CT AP: Notable enhancing 2.5 x 2.3 cm bladder wall mass suspicious for neoplasms  s/p TURBT on 10/27 with multiple tumors noted  Pathology showing low-grade papillary urothelial carcinoma noninvasive with muscularis propria uninvolved by  tumor  CT chest negative for metastasis  Bone scan without any evidence of metastasis  IR guided bone biopsy on 10/31 negative for malignancy    11/2: S/p Cystoscopy with clot evacuation, fulguration, and TUBRT  11/3: Attempt to wean CBI   11/5: CBI discontinued, TOVR   11/6: Hemoglobin drop of 1 g and recurrence of hematuria.  CBI restarted  11/7: CBI running, hand irrigated by urology.  Still with leaking around catheter tip, urine culture NGTD  11/8: CBI running, hand irrigated by urology who deferred surgical intervention with cauterization until tomorrow with close monitoring of H&H, 7.6 this morning  11/9: Seen by urology- wean CBI as tolerated. Continue to trend H/h. Transfuse PRN. Follow up with urology and oncology OP  11/10: Seen and cleared by urology. Per their recs:   Clamp CBI. If hematuria not worsening, can d/c CBI.   They will arrange follow up with their office next week for voiding trial to have cuellar removed  11/11:  Failed CBI clamping yesterday. Deep red gross hematuria noted. hgb stable. Continue to monitor  11/12: Urology took to the OR today for cystoscopy with clot evacuation and fulguration of bleeding bladder tissue  11/13: Continues with cherry red urine and significant clotting requiring hand irrigation. 1 unit PRBC today. Discussed with Shakira Amos, urology aware, ordered B&O.  11/14: Catheter clogged twice past 24 hours, required upgrade to higher level of care given frequency of irrigation. D/W Urology, will take patient back to the OR today  11/15: POD #1 s/p cystoscopy with clot evacuation and fulguration of bleeding bladder tissue.  Hgb 7.2 this morning.  We will continue to trend and transfuse as needed.  Heme-onc has seen patient with plans for bone marrow biopsy tentatively today or tomorrow.  11/16 - urine is clear, hold CBI  11/17 - CBI restarted overnight, hold today      Paroxysmal atrial fibrillation (HCC)  Assessment & Plan  Had 6 sec on afib on tele. No prior history of  per patient. Will check TSH and echo  No AC in setting of hematuria and high risk of bleeding    Acute kidney injury (HCC)  Assessment & Plan  Secondary to hypoperfusion given hypovolemia  NS at 125 ml/hr  Improving   Transfuse for H/H < 7    Coagulation defect (HCC)  Assessment & Plan  Patient has acute blood loss anemia secondary to hematuria in the setting of newly diagnosed bladder cancer  Is required 9 units of PRBCs since admission  TEG abnormal 11/13, 1 unit platelets transfused  Hematology consulted, appreciate recommendations  Transfuse 2 units FFP today  Hematology will follow, recommending bone marrow aspiration and testing for myeloproliferative disorder, biopsy is pending  JAK2 and erythropoetin pending    Elevated d-dimer  Assessment & Plan  D-dimer 2.06  Patient having coagulopathy issues, hematology consulted  Will check bilateral lower extremity ultrasound    11/15: US lower extremities negative for DVT.  D-dimer is nonspecific likely attributed to malignancy or generalized inflammation secondary to acute illness at this time.  We will continue to monitor for signs and symptoms of coagulopathy.    Hyponatremia  Assessment & Plan  Resolved, stop IVF    Thrombocytopenia (HCC)  Assessment & Plan  Consistently low since admission  TEG abnormal  1 unit platelets transfused 11/13  Hematology following, appreciate recommendations  Bone marrow biopsy is pending    Pneumonia  Assessment & Plan  Leukocytosis continuing  Diminished lung sounds with left bibasilar rales on exam  No tachycardia or fever  Pro-Nicolas 1.64, repeat 11/14 1.81  Chest x-ray consistent with left basilar pneumonia  Unasyn and azithromycin (end date 11/18/2022)  Blood cultures NGTD    Slow transit constipation- (present on admission)  Assessment & Plan  Continues to complain of intermittent constipation  Aggressive bowel protocol  11/15: Patient reports constipation unrelieved by docusate.  Requesting milk of magnesia.  One-time order for  milk of magnesia ordered.    Hypovolemia  Assessment & Plan  Due to acute blood loss anemia    Hematuria- (present on admission)  Assessment & Plan  Likely secondary to above    Iron deficiency anemia- (present on admission)  Assessment & Plan  S/p IV iron infusion repletion  Repeat iron labs WNL    Acute blood loss anemia- (present on admission)  Assessment & Plan  Secondary to hematuria.   Required 8 units of PRBCs this admission   Continues to fail CBI clamping with gross hematuria noted  Underwent cystoscopy 11/12 with Dr. Boyer who performed clot evaluation and fulguration of bleeding bladder tissue  Additional cystoscopy 11/14 given continued hematuria, clogging of catheter, and requiring blood transfusions  Hematology consulted and recommend transfusing for Hgb<8 or hemodynamic instability    11/15/2022: Hemoglobin 7.2 this morning.  Repeat H/H ordered.  Hgb 6.9-transfuse 1 unit PRBC  Hgb now stabilized    Obesity (BMI 30-39.9)- (present on admission)  Assessment & Plan  Diet and lifestyle modification  Body mass index is 26.21 kg/m².    Leukocytosis- (present on admission)  Assessment & Plan  Likely reactive in setting of malignancy and blood loss anemia  Increased to 24.4 today, discussed with hematology, likely secondary to malignancy  Pro-Nicolas 1.64, repeat 1.81  Chest x-ray consistent with atelectasis versus pneumonia  UA negative  Afebrile  Unasyn (end date 11/18/2022)   Incentive spirometry and ambulate  WBC has normalized    Dyslipidemia- (present on admission)  Assessment & Plan  Continue atorvastatin    COPD (chronic obstructive pulmonary disease) (HCC)- (present on admission)  Assessment & Plan  Not in acute exacerbation  PRN duonebs       VTE prophylaxis: SCDs/TEDs    I have performed a physical exam and reviewed and updated ROS and Plan today (11/17/2022). In review of yesterday's note (11/16/2022), there are no changes except as documented above.

## 2022-11-17 NOTE — ASSESSMENT & PLAN NOTE
TSH low at 7, FT4 low on 11/17 and Synthroid was started  Stopped Synthroid   11/22/2022  Check TSH, FT4, FT3 on 12/23/2022

## 2022-11-17 NOTE — PROGRESS NOTES
Monitor Summary     -SR 56-78    (R) PVC (R) PAC  (R)trig    6 sec burst of afib     .16/.12/.39

## 2022-11-17 NOTE — CARE PLAN
Pt is A&O 4  VSS  Pain declines   declines nausea  Tolerating a reg diet   CBI in place  + flatus  + BM  Up SBA 1x  SCD's on  Family  Bed alarm off, pt low fall risk per jeremy howe  Reviewed plan of care with patient, bed in lowest position and locked, pt resting comfortably now, call light within reach, all needs met at this time. Interventions will be executed per plan of care     Shift Goals  Clinical Goals: CBI; comfort  Patient Goals: discharge  Family Goals: n/a    Progress made toward(s) clinical / shift goals:        Problem: Urinary - Renal Perfusion  Goal: Ability to achieve and maintain adequate renal perfusion and functioning will improve  Outcome: Progressing     Problem: Pain - Standard  Goal: Alleviation of pain or a reduction in pain to the patient’s comfort goal  Outcome: Progressing

## 2022-11-17 NOTE — PROGRESS NOTES
Note to reader: this note follows the APSO format rather than the historical SOAP format. Assessment and plan located at the top of the note for ease of use.    Chief Complaint  72 y.o. year old male here with bladder mass and sclerotic lesions.     Assessment/Plan  Interval History   TCC bladder s/p TURBT 10/27  Hematuria and clot obstruction s/p clot evacuation and fulguration x2 11/2 and 11/12 and 11/14  Anemia   Thrombocytopenia    Remove cuellar catheter   Monitor H/H  Eventual f/up with our office for Bladder CA surveillance. Our office will call to schedule.         Case discussed with patient, RN, and Urology-Dr. Jasmin PETERSON  Patient seen and examined    11/17. Urine clear/pink. CBI discontinued for 5hrs. H/H stable. Pt anxious to leave.    11/16. Urine clear/pink. D/C CBI. Pt feels well and has not pain. H/H stable and improving.     11/15. Repeat clot evac and fulguration with TURBT yesterday. Urine now pink and no clot obstruction. Abdominal pains and spasms resolved. H/H declined further and received 2 units PRBCs today    11/14. Hematuria continues with difficult time maintaining flow via cuellar. RN unable to irrigate and bladder scan with 500 cc in bladder. Patient in pain in SP region. Receiving PRBCs daily.            Review of Systems  Physical Exam   Review of Systems   Constitutional:  Negative for chills and fever.   Gastrointestinal:  Negative for abdominal pain, nausea and vomiting.   Genitourinary:  Negative for hematuria.   Vitals:    11/16/22 2331 11/17/22 0426 11/17/22 0805 11/17/22 1130   BP: 118/56 125/76 (!) 146/92 117/57   Pulse: 74 66 93 66   Resp: 18 18 17 17   Temp: 36.4 °C (97.5 °F) 36.2 °C (97.2 °F) 36.6 °C (97.9 °F) 36.6 °C (97.8 °F)   TempSrc: Temporal Temporal Temporal Temporal   SpO2: 100% 91% 94% 97%   Weight:       Height:         Physical Exam  Vitals and nursing note reviewed.   Constitutional:       Appearance: Normal appearance.   HENT:      Head: Normocephalic and  atraumatic.      Nose: Nose normal.      Mouth/Throat:      Mouth: Mucous membranes are moist.   Eyes:      Pupils: Pupils are equal, round, and reactive to light.   Pulmonary:      Effort: Pulmonary effort is normal.   Abdominal:      General: Abdomen is flat. There is no distension.      Palpations: Abdomen is soft.      Tenderness: There is no abdominal tenderness.   Genitourinary:     Comments: Urine light pink. Soft penile edema.   Skin:     General: Skin is warm and dry.   Neurological:      General: No focal deficit present.      Mental Status: He is alert and oriented to person, place, and time.   Psychiatric:         Mood and Affect: Mood normal.         Behavior: Behavior normal.        Hematology Chemistry   Lab Results   Component Value Date/Time    WBC 9.4 11/17/2022 12:12 AM    HEMOGLOBIN 8.2 (L) 11/17/2022 12:12 AM    HEMATOCRIT 26.7 (L) 11/17/2022 12:12 AM    PLATELETCT 64 (L) 11/17/2022 12:12 AM     Lab Results   Component Value Date/Time    SODIUM 137 11/17/2022 12:12 AM    POTASSIUM 4.7 11/17/2022 12:12 AM    CHLORIDE 103 11/17/2022 12:12 AM    CO2 27 11/17/2022 12:12 AM    GLUCOSE 89 11/17/2022 12:12 AM    BUN 14 11/17/2022 12:12 AM    CREATININE 1.09 11/17/2022 12:12 AM         Labs not explicitly included in this progress note were reviewed by the author.   Radiology/imaging not explicitly included in this progress note was reviewed by the author.     Labs reviewed and Medications reviewed

## 2022-11-17 NOTE — PROGRESS NOTES
Emptied 800ml aubrey-colored fluid from patient's cuellar bag. Further checks of bag showed patient was putting out bloody urine again. Notified hospitalist Dr. Yola Payne via Voalte text. Received Voalte text order to restart CBI.

## 2022-11-18 ENCOUNTER — APPOINTMENT (OUTPATIENT)
Dept: RADIOLOGY | Facility: MEDICAL CENTER | Age: 72
DRG: 668 | End: 2022-11-18
Attending: INTERNAL MEDICINE
Payer: MEDICARE

## 2022-11-18 LAB
ANION GAP SERPL CALC-SCNC: 8 MMOL/L (ref 7–16)
ANISOCYTOSIS BLD QL SMEAR: ABNORMAL
BASOPHILS # BLD AUTO: 5.9 % (ref 0–1.8)
BASOPHILS # BLD: 0.86 K/UL (ref 0–0.12)
BLASTS NFR BLD MANUAL: 7.5 %
BUN SERPL-MCNC: 10 MG/DL (ref 8–22)
CALCIUM SERPL-MCNC: 8.7 MG/DL (ref 8.5–10.5)
CHLORIDE SERPL-SCNC: 102 MMOL/L (ref 96–112)
CO2 SERPL-SCNC: 29 MMOL/L (ref 20–33)
CREAT SERPL-MCNC: 1.03 MG/DL (ref 0.5–1.4)
EOSINOPHIL # BLD AUTO: 0.36 K/UL (ref 0–0.51)
EOSINOPHIL NFR BLD: 2.5 % (ref 0–6.9)
ERYTHROCYTE [DISTWIDTH] IN BLOOD BY AUTOMATED COUNT: 61 FL (ref 35.9–50)
GFR SERPLBLD CREATININE-BSD FMLA CKD-EPI: 78 ML/MIN/1.73 M 2
GLUCOSE SERPL-MCNC: 131 MG/DL (ref 65–99)
HCT VFR BLD AUTO: 29.1 % (ref 42–52)
HGB BLD-MCNC: 9 G/DL (ref 14–18)
LYMPHOCYTES # BLD AUTO: 3.26 K/UL (ref 1–4.8)
LYMPHOCYTES NFR BLD: 22.5 % (ref 22–41)
MANUAL DIFF BLD: ABNORMAL
MCH RBC QN AUTO: 27.5 PG (ref 27–33)
MCHC RBC AUTO-ENTMCNC: 30.9 G/DL (ref 33.7–35.3)
MCV RBC AUTO: 89 FL (ref 81.4–97.8)
METAMYELOCYTES NFR BLD MANUAL: 2.5 %
MICROCYTES BLD QL SMEAR: ABNORMAL
MONOCYTES # BLD AUTO: 1.57 K/UL (ref 0–0.85)
MONOCYTES NFR BLD AUTO: 10.8 % (ref 0–13.4)
MORPHOLOGY BLD-IMP: NORMAL
MYELOCYTES NFR BLD MANUAL: 5.8 %
NEUTROPHILS # BLD AUTO: 5.92 K/UL (ref 1.82–7.42)
NEUTROPHILS NFR BLD: 40.8 % (ref 44–72)
NRBC # BLD AUTO: 4.16 K/UL
NRBC BLD-RTO: 28.7 /100 WBC
PLATELET # BLD AUTO: 74 K/UL (ref 164–446)
PLATELET BLD QL SMEAR: NORMAL
PMV BLD AUTO: 9.1 FL (ref 9–12.9)
POLYCHROMASIA BLD QL SMEAR: NORMAL
POTASSIUM SERPL-SCNC: 4.7 MMOL/L (ref 3.6–5.5)
PROMYELOCYTES NFR BLD MANUAL: 1.7 %
RBC # BLD AUTO: 3.27 M/UL (ref 4.7–6.1)
RBC BLD AUTO: PRESENT
SODIUM SERPL-SCNC: 139 MMOL/L (ref 135–145)
WBC # BLD AUTO: 14.5 K/UL (ref 4.8–10.8)

## 2022-11-18 PROCEDURE — A9270 NON-COVERED ITEM OR SERVICE: HCPCS | Performed by: NURSE PRACTITIONER

## 2022-11-18 PROCEDURE — 85007 BL SMEAR W/DIFF WBC COUNT: CPT

## 2022-11-18 PROCEDURE — A9270 NON-COVERED ITEM OR SERVICE: HCPCS | Performed by: INTERNAL MEDICINE

## 2022-11-18 PROCEDURE — 36415 COLL VENOUS BLD VENIPUNCTURE: CPT

## 2022-11-18 PROCEDURE — 700102 HCHG RX REV CODE 250 W/ 637 OVERRIDE(OP): Performed by: NURSE PRACTITIONER

## 2022-11-18 PROCEDURE — 770001 HCHG ROOM/CARE - MED/SURG/GYN PRIV*

## 2022-11-18 PROCEDURE — 700102 HCHG RX REV CODE 250 W/ 637 OVERRIDE(OP): Performed by: HOSPITALIST

## 2022-11-18 PROCEDURE — 700102 HCHG RX REV CODE 250 W/ 637 OVERRIDE(OP): Performed by: STUDENT IN AN ORGANIZED HEALTH CARE EDUCATION/TRAINING PROGRAM

## 2022-11-18 PROCEDURE — 80048 BASIC METABOLIC PNL TOTAL CA: CPT

## 2022-11-18 PROCEDURE — 700102 HCHG RX REV CODE 250 W/ 637 OVERRIDE(OP): Performed by: INTERNAL MEDICINE

## 2022-11-18 PROCEDURE — 700105 HCHG RX REV CODE 258: Performed by: NURSE PRACTITIONER

## 2022-11-18 PROCEDURE — A9270 NON-COVERED ITEM OR SERVICE: HCPCS | Performed by: HOSPITALIST

## 2022-11-18 PROCEDURE — 700111 HCHG RX REV CODE 636 W/ 250 OVERRIDE (IP): Performed by: NURSE PRACTITIONER

## 2022-11-18 PROCEDURE — A9270 NON-COVERED ITEM OR SERVICE: HCPCS | Performed by: STUDENT IN AN ORGANIZED HEALTH CARE EDUCATION/TRAINING PROGRAM

## 2022-11-18 PROCEDURE — 99233 SBSQ HOSP IP/OBS HIGH 50: CPT | Performed by: INTERNAL MEDICINE

## 2022-11-18 PROCEDURE — 85025 COMPLETE CBC W/AUTO DIFF WBC: CPT

## 2022-11-18 RX ADMIN — GABAPENTIN 200 MG: 100 CAPSULE ORAL at 12:11

## 2022-11-18 RX ADMIN — ALPRAZOLAM 0.25 MG: 0.25 TABLET ORAL at 16:58

## 2022-11-18 RX ADMIN — LEVOTHYROXINE SODIUM 25 MCG: 0.03 TABLET ORAL at 05:07

## 2022-11-18 RX ADMIN — ALPRAZOLAM 0.25 MG: 0.25 TABLET ORAL at 21:07

## 2022-11-18 RX ADMIN — OXYBUTYNIN CHLORIDE 5 MG: 5 TABLET ORAL at 05:07

## 2022-11-18 RX ADMIN — OXYBUTYNIN CHLORIDE 5 MG: 5 TABLET ORAL at 16:59

## 2022-11-18 RX ADMIN — AMPICILLIN AND SULBACTAM 3 G: 1; 2 INJECTION, POWDER, FOR SOLUTION INTRAMUSCULAR; INTRAVENOUS at 05:11

## 2022-11-18 RX ADMIN — CYANOCOBALAMIN TAB 500 MCG 1000 MCG: 500 TAB at 05:07

## 2022-11-18 RX ADMIN — THIAMINE HCL TAB 100 MG 100 MG: 100 TAB at 05:07

## 2022-11-18 RX ADMIN — GABAPENTIN 200 MG: 100 CAPSULE ORAL at 05:07

## 2022-11-18 RX ADMIN — GABAPENTIN 200 MG: 100 CAPSULE ORAL at 16:58

## 2022-11-18 RX ADMIN — ATORVASTATIN CALCIUM 20 MG: 20 TABLET, FILM COATED ORAL at 16:59

## 2022-11-18 RX ADMIN — FOLIC ACID 1 MG: 1 TABLET ORAL at 05:07

## 2022-11-18 RX ADMIN — AMPICILLIN AND SULBACTAM 3 G: 1; 2 INJECTION, POWDER, FOR SOLUTION INTRAMUSCULAR; INTRAVENOUS at 12:09

## 2022-11-18 RX ADMIN — OXYBUTYNIN CHLORIDE 5 MG: 5 TABLET ORAL at 12:11

## 2022-11-18 RX ADMIN — Medication 2000 UNITS: at 05:07

## 2022-11-18 RX ADMIN — THIAMINE HCL TAB 100 MG 100 MG: 100 TAB at 17:52

## 2022-11-18 RX ADMIN — MORPHINE SULFATE 30 MG: 15 TABLET ORAL at 05:39

## 2022-11-18 RX ADMIN — DOCUSATE SODIUM 50 MG AND SENNOSIDES 8.6 MG 2 TABLET: 8.6; 5 TABLET, FILM COATED ORAL at 16:58

## 2022-11-18 RX ADMIN — ALPRAZOLAM 0.25 MG: 0.25 TABLET ORAL at 08:43

## 2022-11-18 RX ADMIN — ALPRAZOLAM 0.25 MG: 0.25 TABLET ORAL at 12:11

## 2022-11-18 ASSESSMENT — PAIN DESCRIPTION - PAIN TYPE
TYPE: ACUTE PAIN

## 2022-11-18 ASSESSMENT — ENCOUNTER SYMPTOMS
VOMITING: 0
FEVER: 0
NERVOUS/ANXIOUS: 0
CHILLS: 0
ABDOMINAL PAIN: 0
WEAKNESS: 1
NAUSEA: 0
MYALGIAS: 0
SHORTNESS OF BREATH: 0

## 2022-11-18 ASSESSMENT — FIBROSIS 4 INDEX: FIB4 SCORE: 4.72

## 2022-11-18 NOTE — PROGRESS NOTES
HEMATOLOGY-ONCOLOGY PROGRESS NOTE    CC:  Persistent anemia, low grade papillary urothelial carcinoma    Interval Updates:  Was trialed without catheter yesterday, and was retaining urine, after replacing cuellar pt has been having some mild bleeding and clots, re-started on CBI.  Pt reporting no abdominal pain at this time.      Objective:  Medications reviewed and notable for:  Current Facility-Administered Medications   Medication Dose    morphine (MS IR) tablet 15-30 mg  15-30 mg    levothyroxine (SYNTHROID) tablet 25 mcg  25 mcg    Nozin nasal  swab  1 Applicator    sodium chloride (OCEAN) 0.65 % nasal spray 2 Spray  2 Spray    carboxymethylcellulose (REFRESH TEARS) 0.5 % ophthalmic drops 1 Drop  1 Drop    ALPRAZolam (XANAX) tablet 0.25 mg  0.25 mg    ampicillin/sulbactam (UNASYN) 3 g in  mL IVPB  3 g    oxybutynin (DITROPAN) tablet 5 mg  5 mg    Respiratory Therapy Consult      HYDROmorphone (Dilaudid) injection 1 mg  1 mg    gabapentin (NEURONTIN) capsule 200 mg  200 mg    senna-docusate (PERICOLACE or SENOKOT S) 8.6-50 MG per tablet 2 Tablet  2 Tablet    And    polyethylene glycol/lytes (MIRALAX) PACKET 1 Packet  1 Packet    And    magnesium hydroxide (MILK OF MAGNESIA) suspension 30 mL  30 mL    And    bisacodyl (DULCOLAX) suppository 10 mg  10 mg    acetaminophen (Tylenol) tablet 650 mg  650 mg    labetalol (NORMODYNE/TRANDATE) injection 10 mg  10 mg    ondansetron (ZOFRAN) syringe/vial injection 4 mg  4 mg    ondansetron (ZOFRAN ODT) dispertab 4 mg  4 mg    guaiFENesin dextromethorphan (ROBITUSSIN DM) 100-10 MG/5ML syrup 10 mL  10 mL    Pharmacy Consult Request ...Pain Management Review 1 Each  1 Each    albuterol inhaler 2 Puff  2 Puff    atorvastatin (LIPITOR) tablet 20 mg  20 mg    folic acid (FOLVITE) tablet 1 mg  1 mg    vitamin D3 (cholecalciferol) tablet 2,000 Units  2,000 Units    thiamine (Vitamin B-1) tablet 100 mg  100 mg    cyanocobalamin (VITAMIN B-12) tablet 1,000 mcg  1,000 mcg  "      ROS:   Constitutional: No fatigue, no fevers or chills, no night sweats  Resp: No cough or SOB  Cardio:No chest pain or palpitations  Pschy: No depression or anxiety   Neuro: No headaches, no seizure, no vision changes  GI: no abdominal pain, nausea or vomiting. No diarrhea or constipation   All other ROS negative    /78   Pulse 82   Temp 36.3 °C (97.3 °F) (Temporal)   Resp 18   Ht 1.727 m (5' 8\")   Wt 83.2 kg (183 lb 6.8 oz)   SpO2 94%     Physical Exam  General:  comfortable, NAD  HEENT:  sclera anicteric, oral cavity and oropharynx clear, mucous membranes moist  Neck:   supple, no lymphadenopathy  Cor:   regular rate and rhythm, no murmurs, rubs, or gallops  Pulm:   clear to auscultation bilaterally  Abd:   bowel sounds present, soft, No tenderness to palpation, nondistended, no palpable masses or organomegaly  Extremities:  warm, no lower extremity edema  Neurologic:  A&O x 3  Pyschiatric:  Appropriate mood and affect    Labs reviewed and notable for:  Recent Labs     22  0541 22  1800 22  0012 22  1003   WBC 10.9*  --  9.4 14.5*   RBC 3.05*  --  2.98* 3.27*   HEMOGLOBIN 8.6* 8.2* 8.2* 9.0*   HEMATOCRIT 27.3*  --  26.7* 29.1*   MCV 89.5  --  89.6 89.0   MCH 28.2  --  27.5 27.5   MCHC 31.5*  --  30.7* 30.9*   RDW 59.9*  --  60.0* 61.0*   PLATELETCT 60*  --  64* 74*   MPV 9.7  --   --  9.1     Recent Labs     22  0012   INR 1.14*     Recent Results (from the past 24 hour(s))   EKG    Collection Time: 22  6:40 PM   Result Value Ref Range    Report       [facility]    Test Date:  2022  Pt Name:    QUYEN MORTENSEN                 Department: 141  MRN:        [id number]                      Room:       T422  Gender:     Male                         Technician: BOYD  :        1950                   Requested By:LINSEY M WEGENER  Order #:    [id number]                    Matt MD: Landry Braxton MD    Measurements  Intervals                            "     Axis  Rate:       85                           P:          45  VT:         140                          QRS:        -78  QRSD:       126                          T:          -1  QT:         372  QTc:        443    Interpretive Statements  SINUS RHYTHM  LEFTWARD AXIS  RIGHT BUNDLE BRANCH BLOCK  Electronically Signed On 11- 18:46:26 PST by Landry Braxton MD     CBC WITH DIFFERENTIAL    Collection Time: 11/18/22 10:03 AM   Result Value Ref Range    WBC 14.5 (H) 4.8 - 10.8 K/uL    RBC 3.27 (L) 4.70 - 6.10 M/uL    Hemoglobin 9.0 (L) 14.0 - 18.0 g/dL    Hematocrit 29.1 (L) 42.0 - 52.0 %    MCV 89.0 81.4 - 97.8 fL    MCH 27.5 27.0 - 33.0 pg    MCHC 30.9 (L) 33.7 - 35.3 g/dL    RDW 61.0 (H) 35.9 - 50.0 fL    Platelet Count 74 (L) 164 - 446 K/uL    MPV 9.1 9.0 - 12.9 fL    Neutrophils-Polys 40.80 (L) 44.00 - 72.00 %    Lymphocytes 22.50 22.00 - 41.00 %    Monocytes 10.80 0.00 - 13.40 %    Eosinophils 2.50 0.00 - 6.90 %    Basophils 5.90 (H) 0.00 - 1.80 %    Nucleated RBC 28.70 /100 WBC    Neutrophils (Absolute) 5.92 1.82 - 7.42 K/uL    Lymphs (Absolute) 3.26 1.00 - 4.80 K/uL    Monos (Absolute) 1.57 (H) 0.00 - 0.85 K/uL    Eos (Absolute) 0.36 0.00 - 0.51 K/uL    Baso (Absolute) 0.86 (H) 0.00 - 0.12 K/uL    NRBC (Absolute) 4.16 K/uL    Anisocytosis 1+     Microcytosis 1+    Basic Metabolic Panel    Collection Time: 11/18/22 10:03 AM   Result Value Ref Range    Sodium 139 135 - 145 mmol/L    Potassium 4.7 3.6 - 5.5 mmol/L    Chloride 102 96 - 112 mmol/L    Co2 29 20 - 33 mmol/L    Glucose 131 (H) 65 - 99 mg/dL    Bun 10 8 - 22 mg/dL    Creatinine 1.03 0.50 - 1.40 mg/dL    Calcium 8.7 8.5 - 10.5 mg/dL    Anion Gap 8.0 7.0 - 16.0   ESTIMATED GFR    Collection Time: 11/18/22 10:03 AM   Result Value Ref Range    GFR (CKD-EPI) 78 >60 mL/min/1.73 m 2   DIFFERENTIAL MANUAL    Collection Time: 11/18/22 10:03 AM   Result Value Ref Range    Metamyelocytes 2.50 %    Myelocytes 5.80 %    Progranulocytes 1.70 %    Blasts 7.50  (HH) %    Manual Diff Status PERFORMED    PERIPHERAL SMEAR REVIEW    Collection Time: 11/18/22 10:03 AM   Result Value Ref Range    Peripheral Smear Review see below    PLATELET ESTIMATE    Collection Time: 11/18/22 10:03 AM   Result Value Ref Range    Plt Estimation Decreased    MORPHOLOGY    Collection Time: 11/18/22 10:03 AM   Result Value Ref Range    RBC Morphology Present     Polychromia 1+          Assessment and Recommendations:  #Persistent Anemia  #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  -has required 10u pRBCs since admission  -has appropriate reticulocyte response, INR is unremarkable, LDH largely elevated on admission, but t. Bili is normal, making hemolysis less likely.  Fibrinogen high, unlikely to be DIC.    -platelets have been stably low since admission, and about a year ago were up in the 600-700 range.    -Etiology of persistent anemia would include acute blood loss anemia due to continuous bleeding from bladder, there is some concern that pt has had progression of essential thrombocytosis to myelofibrosis.  -Per chart review, pt is being worked up by primary hematologist for essential thrombocytosis, was previously treated with hydroxyurea, but had some issues with anemia and was stopped in spring of 2022.  Recommendations:  -appreciate urology assistance in bleeding control  -Pending Jak2 levels  -continue following PT/INR q48h  -Bone marrow biopsy pending formal read  -continue monitoring hemoglobin, transfuse for Hgb <7 due active bleeding concerns and platelets <50K.  -avoid NSAIDs, anticoagulation at this time  -will need follow-up with outpatient hematologist, Dr. Quevedo     #Low grade papillary urothelial carcinoma  -hematuria prior to admission, TURBT performed on 10/27 found low grade papillary urothelial carcinoma which was noninvasive with muscularis propria present on resection.     -may need further assessment by urology when appropriate for assessment of any remaining mass and further treatments  -will continue to follow pathology       High complexicity/Drug monitoring     We will continue to follow with you; please call with any questions, [phone].

## 2022-11-18 NOTE — PROGRESS NOTES
Hospital Medicine Daily Progress Note    Date of Service  11/18/2022    Chief Complaint  Gregg Martinez is a 72 y.o. male admitted 10/26/2022 with hematuria    Hospital Course  71 yo man with asthma, HTN who presented with 2 weeks of hematuria and 10 lb weight loss. He was found anemia, Hgb 6.5. CT AP showed bladder mass. Urology was consulted, patient was found with mutifocal bladder tumor that was resected and intravesical gemcitabine. Pathology showing low-grade papillary urothelial carcinoma noninvasive with muscularis propria uninvolved by tumor. CT showed evidence of bone mets. He underwent bone biopsy 10/31. Post-op procedure he continued to have hematuria needing CBI. Patient had clot evacuation and fulguration with transurethral resection of bladder tumor 11/2. Cuellar cath was removed but hematuria recurrent and CBI restarted. Patient again had clot evacuation and fulguration of bleeding bladder tissue 11/12. However hematuria persists and he continued to need transfusion support. Patient had 3rd clot evacuation and fulguration with tumor resection 11/14.   Dr. Reyes with oncology was consulted 11/14, Splenomegaly 19 cms and having anemia and thrombocytopenia before admission rasies concerns of MPN. Needs BM BX ones stabilized ordered MIRTA-2 from peripheral blood.     Interval Problem Update  Patient had severe urinary retention overnight and cuellar cath replaced, CBI started for pink urine, urology was paged overnight.  Weaning CBI today  Bone marrow path is still pending  Patient says morphine for pain is doing better than oxy, not having hallucinations    I have discussed this patient's plan of care and discharge plan at IDT rounds today with Case Management, Nursing, Nursing leadership, and other members of the IDT team.    Consultants/Specialty  oncology and urology    Code Status  Full Code    Disposition  Patient is not medically cleared for discharge.   Anticipate discharge to to home with close  outpatient follow-up.  I have placed the appropriate orders for post-discharge needs.    Review of Systems  Review of Systems   Constitutional:  Positive for malaise/fatigue.   Respiratory:  Negative for shortness of breath.    Cardiovascular:  Negative for chest pain.   Gastrointestinal:  Negative for abdominal pain and nausea.   Genitourinary:  Positive for hematuria.   Musculoskeletal:  Negative for myalgias.   Neurological:  Positive for weakness.   Psychiatric/Behavioral:  The patient is not nervous/anxious.       Physical Exam  Temp:  [35.9 °C (96.6 °F)-36.4 °C (97.5 °F)] 36.3 °C (97.3 °F)  Pulse:  [70-88] 82  Resp:  [17-18] 18  BP: (109-133)/(59-78) 124/78  SpO2:  [90 %-98 %] 94 %    Physical Exam  Vitals and nursing note reviewed.   Constitutional:       General: He is not in acute distress.     Appearance: He is not toxic-appearing.   HENT:      Head: Normocephalic.      Mouth/Throat:      Mouth: Mucous membranes are moist.   Eyes:      General:         Right eye: No discharge.         Left eye: No discharge.   Cardiovascular:      Rate and Rhythm: Normal rate and regular rhythm.   Pulmonary:      Effort: Pulmonary effort is normal. No respiratory distress.      Breath sounds: No wheezing or rales.   Abdominal:      Palpations: Abdomen is soft.      Tenderness: There is no abdominal tenderness.   Musculoskeletal:      Cervical back: Neck supple.      Right lower leg: Edema present.      Left lower leg: Edema present.   Skin:     General: Skin is warm and dry.   Neurological:      Mental Status: He is alert and oriented to person, place, and time.       Fluids    Intake/Output Summary (Last 24 hours) at 11/18/2022 1449  Last data filed at 11/18/2022 1356  Gross per 24 hour   Intake 1640 ml   Output 4299 ml   Net -2659 ml       Laboratory  Recent Labs     11/16/22  0541 11/16/22  1800 11/17/22  0012 11/18/22  1003   WBC 10.9*  --  9.4 14.5*   RBC 3.05*  --  2.98* 3.27*   HEMOGLOBIN 8.6* 8.2* 8.2* 9.0*    HEMATOCRIT 27.3*  --  26.7* 29.1*   MCV 89.5  --  89.6 89.0   MCH 28.2  --  27.5 27.5   MCHC 31.5*  --  30.7* 30.9*   RDW 59.9*  --  60.0* 61.0*   PLATELETCT 60*  --  64* 74*   MPV 9.7  --   --  9.1     Recent Labs     11/16/22  0541 11/17/22  0012 11/18/22  1003   SODIUM 136 137 139   POTASSIUM 4.9 4.7 4.7   CHLORIDE 102 103 102   CO2 24 27 29   GLUCOSE 90 89 131*   BUN 18 14 10   CREATININE 1.13 1.09 1.03   CALCIUM 7.9* 7.9* 8.7     Recent Labs     11/17/22  0012   INR 1.14*               Imaging  IR-US GUIDED PIV   Final Result    Ultrasound-guided PERIPHERAL IV INSERTION performed by    qualified nursing staff as above.      EC-ECHOCARDIOGRAM COMPLETE W/O CONT   Final Result      US-EXTREMITY VENOUS LOWER BILAT   Final Result      DX-CHEST-PORTABLE (1 VIEW)   Final Result      Increased LEFT basilar atelectasis or pneumonia      CT-ABDOMEN-PELVIS W/O   Final Result      1.  Interval hemorrhage within the partially decompressed urinary bladder. Previously visualized posterior left bladder mass is no longer seen presumably this is due to interval resection      2.  New mild left hydroureteronephrosis with moderate perinephric fat stranding. This is concerning for obstruction perhaps related to hematoma, recent instrumentation or pyelonephritis/ureterectasis.      3.  Increasing colonic stool volume with similar moderate diverticulosis of the colon but no evidence of diverticulitis      4.  Marked splenomegaly      5.  New small effusions with some lower lung zone groundglass suspicious for interstitial edema/mild congestive failure. There is stable mild pericardial fluid      6.  Persistent inhomogeneous medullary spaces with generalized increased density. Recent bone scan showed no focal mass but this could reflect underlying myelofibrosis or metastases with false-negative bone scan      CT-NEEDLE BX-DEEP BONE   Final Result      1.  Successful CT-guided core biopsy of sclerotic lesion in the posterior aspect  of left inferior pubic ramus.      NM-BONE/JOINT SCAN WHOLE BODY   Final Result      No evidence of bony metastatic disease      CT-CHEST (THORAX) WITH   Final Result         1.  Emphysema   2.  Hazy bilateral upper lobe opacities with slight intralobular septal thickening, could represent component of mild pulmonary edema   3.  Linear densities the bilateral lung bases favors scarring or atelectasis.   4.  Atherosclerosis and atherosclerotic coronary artery disease   5.  Hepatomegaly with irregular hepatic contour favoring changes of cirrhosis.   6.  Subcentimeter hepatic lobe lesions, could represent small cyst or hemangioma, otherwise indeterminate.   7.  Mild mediastinal adenopathy, workup and evaluation for causes of adenopathy recommended as clinically appropriate.   8.  Splenomegaly      DX-CHEST-PORTABLE (1 VIEW)   Final Result         1.  No acute cardiopulmonary disease.   2.  Atherosclerosis      CT-ABDOMEN-PELVIS WITH   Final Result      1.  Enhancing 2.5 x 2.3 cm bladder wall mass suspicious for neoplasm.   2.  Heterogeneous sclerotic change of the pelvis and proximal femurs suspicious for metastatic disease or other infiltrating marrow process.   3.  Mild cardiomegaly.   4.  Hepatosplenomegaly.   5.  Small subcentimeter hepatic hypodensities which could represent cysts or hemangiomas but are nonspecific. If indicated, these findings could be further evaluated with dedicated liver protocol CT or MRI or ultrasound on a nonemergent basis.   6.  Colonic diverticulosis without evidence of diverticulitis.   7.  Atherosclerosis.           Assessment/Plan  * Urothelial carcinoma of bladder without invasion of muscle (HCC)- (present on admission)  Assessment & Plan  CT AP: Notable enhancing 2.5 x 2.3 cm bladder wall mass suspicious for neoplasms  s/p TURBT on 10/27 with multiple tumors noted  Pathology showing low-grade papillary urothelial carcinoma noninvasive with muscularis propria uninvolved by tumor  CT  chest negative for metastasis  Bone scan without any evidence of metastasis  IR guided bone biopsy on 10/31 negative for malignancy    11/2: S/p Cystoscopy with clot evacuation, fulguration, and TUBRT  11/3: Attempt to wean CBI   11/5: CBI discontinued, TOVR   11/6: Hemoglobin drop of 1 g and recurrence of hematuria.  CBI restarted  11/7: CBI running, hand irrigated by urology.  Still with leaking around catheter tip, urine culture NGTD  11/8: CBI running, hand irrigated by urology who deferred surgical intervention with cauterization until tomorrow with close monitoring of H&H, 7.6 this morning  11/9: Seen by urology- wean CBI as tolerated. Continue to trend H/h. Transfuse PRN. Follow up with urology and oncology OP  11/10: Seen and cleared by urology. Per their recs:   Clamp CBI. If hematuria not worsening, can d/c CBI.   They will arrange follow up with their office next week for voiding trial to have cuellar removed  11/11:  Failed CBI clamping yesterday. Deep red gross hematuria noted. hgb stable. Continue to monitor  11/12: Urology took to the OR today for cystoscopy with clot evacuation and fulguration of bleeding bladder tissue  11/13: Continues with cherry red urine and significant clotting requiring hand irrigation. 1 unit PRBC today. Discussed with Shakira Amos, urology aware, ordered B&O.  11/14: Catheter clogged twice past 24 hours, required upgrade to higher level of care given frequency of irrigation. D/W Urology, will take patient back to the OR today  11/15: POD #1 s/p cystoscopy with clot evacuation and fulguration of bleeding bladder tissue.  Hgb 7.2 this morning.  We will continue to trend and transfuse as needed.  Heme-onc has seen patient with plans for bone marrow biopsy tentatively today or tomorrow.  11/16 - urine is clear, hold CBI  11/17 - CBI restarted overnight, hold today  11/18 - weaning CBI      Hypothyroidism  Assessment & Plan  New diagnosis of, TSH high and T4 low  Started on synthroid  25mcg daily, repeat labs in 4-6 weeks    Paroxysmal atrial fibrillation (HCC)  Assessment & Plan  Had 6 sec on afib on tele. No prior history of per patient. Will check TSH and echo  No AC in setting of hematuria and high risk of bleeding    Acute kidney injury (HCC)  Assessment & Plan  Secondary to hypoperfusion given hypovolemia  NS at 125 ml/hr  Improving   Transfuse for H/H < 7    Coagulation defect (HCC)  Assessment & Plan  Patient has acute blood loss anemia secondary to hematuria in the setting of newly diagnosed bladder cancer  Is required 9 units of PRBCs since admission  TEG abnormal 11/13, 1 unit platelets transfused  Hematology consulted, appreciate recommendations  Transfuse 2 units FFP today  Hematology will follow, recommending bone marrow aspiration and testing for myeloproliferative disorder, biopsy is pending  JAK2 and erythropoetin pending    Elevated d-dimer  Assessment & Plan  D-dimer 2.06  Patient having coagulopathy issues, hematology consulted  Will check bilateral lower extremity ultrasound    11/15: US lower extremities negative for DVT.  D-dimer is nonspecific likely attributed to malignancy or generalized inflammation secondary to acute illness at this time.  We will continue to monitor for signs and symptoms of coagulopathy.    Hyponatremia  Assessment & Plan  Resolved, stop IVF    Thrombocytopenia (HCC)  Assessment & Plan  Consistently low since admission  TEG abnormal  1 unit platelets transfused 11/13  Hematology following, appreciate recommendations  Bone marrow biopsy is pending    Pneumonia  Assessment & Plan  Leukocytosis continuing  Diminished lung sounds with left bibasilar rales on exam  No tachycardia or fever  Pro-Nicolas 1.64, repeat 11/14 1.81  Chest x-ray consistent with left basilar pneumonia  Unasyn and azithromycin (end date 11/18/2022)  Blood cultures NGTD    Slow transit constipation- (present on admission)  Assessment & Plan  Continues to complain of intermittent  constipation  Aggressive bowel protocol  11/15: Patient reports constipation unrelieved by docusate.  Requesting milk of magnesia.  One-time order for milk of magnesia ordered.    Hypovolemia  Assessment & Plan  Due to acute blood loss anemia    Hematuria- (present on admission)  Assessment & Plan  Likely secondary to above    Iron deficiency anemia- (present on admission)  Assessment & Plan  S/p IV iron infusion repletion  Repeat iron labs WNL    Acute blood loss anemia- (present on admission)  Assessment & Plan  Secondary to hematuria.   Required 8 units of PRBCs this admission   Continues to fail CBI clamping with gross hematuria noted  Underwent cystoscopy 11/12 with Dr. Boyer who performed clot evaluation and fulguration of bleeding bladder tissue  Additional cystoscopy 11/14 given continued hematuria, clogging of catheter, and requiring blood transfusions  Hematology consulted and recommend transfusing for Hgb<8 or hemodynamic instability    11/15/2022: Hemoglobin 7.2 this morning.  Repeat H/H ordered.  Hgb 6.9-transfuse 1 unit PRBC  Hgb now stabilized  11/18 - I spoke with Dr. Reyes regarding increased blasts on diff. He reviewed with pathology and this may be reaction from bleeding or transfusions. Path is running additional leukemia studies. He recommends keeping the patient inpatient for now, he may need repeat bone marrow biopsy    Obesity (BMI 30-39.9)- (present on admission)  Assessment & Plan  Diet and lifestyle modification  Body mass index is 26.21 kg/m².    Leukocytosis- (present on admission)  Assessment & Plan  Likely reactive in setting of malignancy and blood loss anemia  Increased to 24.4 today, discussed with hematology, likely secondary to malignancy  Pro-Nicolas 1.64, repeat 1.81  Chest x-ray consistent with atelectasis versus pneumonia  UA negative  Afebrile  Unasyn (end date 11/18/2022)   Incentive spirometry and ambulate  WBC has normalized    Dyslipidemia- (present on  admission)  Assessment & Plan  Continue atorvastatin    COPD (chronic obstructive pulmonary disease) (HCC)- (present on admission)  Assessment & Plan  Not in acute exacerbation  PRN duonebs       VTE prophylaxis: SCDs/TEDs    I have performed a physical exam and reviewed and updated ROS and Plan today (11/18/2022). In review of yesterday's note (11/17/2022), there are no changes except as documented above.

## 2022-11-18 NOTE — PROGRESS NOTES
At change of shift pt complains of bladder pain and distention with inability to urinate.    Patient bladder scanned found to have 999<ml in bladder.  Hospitalist called, order to cuellar pt received. Cuellar placed. 900ml of urine out    Urology contacted via voalt. Urine consistency initially pink/ red then clear yellow then back to dark pink tinge. Order to resume CBI received.CBI started. Patient tolerated OK

## 2022-11-18 NOTE — CARE PLAN
"The patient is Stable - Low risk of patient condition declining or worsening    Shift Goals  Clinical Goals: CBI, monitor vitals, pain contrpo  Patient Goals: \"hopefully go home tomorrow\"  Family Goals: n/a    Progress made toward(s) clinical / shift goals:  Patient continues to tolerate CBI. Urine output appears red. Patient is tolerating scheduled antibiotic regimen. Patient ambulates well and has commode at bedside that he can pivot to. Continuous telemetry monitoring has been discontinued.    Patient is not progressing towards the following goals: Patient has not yet been cleared to discharge.       Problem: Urinary - Renal Perfusion  Goal: Ability to achieve and maintain adequate renal perfusion and functioning will improve  Outcome: Progressing     Problem: Respiratory  Goal: Patient will achieve/maintain optimum respiratory ventilation and gas exchange  Outcome: Progressing     Problem: Knowledge Deficit - Standard  Goal: Patient and family/care givers will demonstrate understanding of plan of care, disease process/condition, diagnostic tests and medications  Outcome: Progressing         "

## 2022-11-18 NOTE — PROGRESS NOTES
Monitor room texted a report that pt had ST segment depression. APRN hospitalist on call Linsey Wegener gave order for EKG. Order has been placed.

## 2022-11-18 NOTE — CARE PLAN
The patient is Stable - Low risk of patient condition declining or worsening    Shift Goals  Clinical Goals: pain control, void after cuellar removal  Patient Goals: pain control  Family Goals: n/a    Progress made toward(s) clinical / shift goals:  CBI has been discontinued and cuellar has been removed. Pain medications have been adjusted. Patient is tolerating diet well and has stable vital signs.     Patient is not progressing towards the following goals: N/A    Problem: Hemodynamics  Goal: Patient's hemodynamics, fluid balance and neurologic status will be stable or improve  Outcome: Progressing     Problem: Fluid Volume  Goal: Fluid volume balance will be maintained  Outcome: Progressing     Problem: Knowledge Deficit - Standard  Goal: Patient and family/care givers will demonstrate understanding of plan of care, disease process/condition, diagnostic tests and medications  Outcome: Progressing

## 2022-11-18 NOTE — CARE PLAN
Pt is A&O 4  VSS  Pain declines   declines nausea  Tolerating a reg diet   CBI in place  + flatus  + BM  Up SBA 1x  SCD's off  Bed alarm off, pt low fall risk per jeremy howe  Reviewed plan of care with patient, bed in lowest position and locked, pt resting comfortably now, call light within reach, all needs met at this time. Interventions will be executed per plan of care     Shift Goals  Clinical Goals: manage urine output  Patient Goals: comfort  Family Goals: n/a    Progress made toward(s) clinical / shift goals:        Problem: Knowledge Deficit - Standard  Goal: Patient and family/care givers will demonstrate understanding of plan of care, disease process/condition, diagnostic tests and medications  Outcome: Progressing     Problem: Pain - Standard  Goal: Alleviation of pain or a reduction in pain to the patient’s comfort goal  Outcome: Progressing

## 2022-11-18 NOTE — PROGRESS NOTES
Note to reader: this note follows the APSO format rather than the historical SOAP format. Assessment and plan located at the top of the note for ease of use.    Chief Complaint  72 y.o. year old male here with bladder mass and sclerotic lesions.     Assessment/Plan  Interval History   TCC bladder s/p TURBT 10/27  Hematuria and clot obstruction s/p clot evacuation and fulguration x2 11/2 and 11/12 and 11/14  Anemia   Thrombocytopenia    Cuellar catheter to remain   Continue CBI - wean as clears   Monitor H/H  Eventual f/up with our office for Bladder CA surveillance. Our office will call to schedule.         Case discussed with patient, RN, and Urology-Dr. Jasmin PETERSON  Patient seen and examined    11/18. Cuellar was replaced after pt unable to void last night. CBI restarted for darkening of urine. H/H improving.     11/17. Urine clear/pink. CBI discontinued for 5hrs. H/H stable. Pt anxious to leave.    11/16. Urine clear/pink. D/C CBI. Pt feels well and has not pain. H/H stable and improving.     11/15. Repeat clot evac and fulguration with TURBT yesterday. Urine now pink and no clot obstruction. Abdominal pains and spasms resolved. H/H declined further and received 2 units PRBCs today    11/14. Hematuria continues with difficult time maintaining flow via cuellar. RN unable to irrigate and bladder scan with 500 cc in bladder. Patient in pain in SP region. Receiving PRBCs daily.            Review of Systems  Physical Exam   Review of Systems   Constitutional:  Negative for chills and fever.   Gastrointestinal:  Negative for abdominal pain, nausea and vomiting.   Genitourinary:  Negative for hematuria.   Vitals:    11/17/22 2332 11/18/22 0408 11/18/22 0815 11/18/22 1200   BP: 109/60 110/59 123/75 124/78   Pulse: 73 88 85 82   Resp: 18 18 17 18   Temp: 36.3 °C (97.4 °F) 36.2 °C (97.2 °F) 36.4 °C (97.5 °F) 36.3 °C (97.3 °F)   TempSrc: Temporal Temporal Temporal Temporal   SpO2: 92% 90% 95% 94%   Weight:    83.2 kg (183 lb 6.8  oz)   Height:         Physical Exam  Vitals and nursing note reviewed.   Constitutional:       Appearance: Normal appearance.   HENT:      Head: Normocephalic and atraumatic.      Nose: Nose normal.      Mouth/Throat:      Mouth: Mucous membranes are moist.   Eyes:      Pupils: Pupils are equal, round, and reactive to light.   Pulmonary:      Effort: Pulmonary effort is normal.   Abdominal:      General: Abdomen is flat. There is no distension.      Palpations: Abdomen is soft.      Tenderness: There is no abdominal tenderness.   Genitourinary:     Comments: Urine light pink. Soft penile edema.   Skin:     General: Skin is warm and dry.   Neurological:      General: No focal deficit present.      Mental Status: He is alert and oriented to person, place, and time.   Psychiatric:         Mood and Affect: Mood normal.         Behavior: Behavior normal.        Hematology Chemistry   Lab Results   Component Value Date/Time    WBC 14.5 (H) 11/18/2022 10:03 AM    HEMOGLOBIN 9.0 (L) 11/18/2022 10:03 AM    HEMATOCRIT 29.1 (L) 11/18/2022 10:03 AM    PLATELETCT 74 (L) 11/18/2022 10:03 AM     Lab Results   Component Value Date/Time    SODIUM 139 11/18/2022 10:03 AM    POTASSIUM 4.7 11/18/2022 10:03 AM    CHLORIDE 102 11/18/2022 10:03 AM    CO2 29 11/18/2022 10:03 AM    GLUCOSE 131 (H) 11/18/2022 10:03 AM    BUN 10 11/18/2022 10:03 AM    CREATININE 1.03 11/18/2022 10:03 AM         Labs not explicitly included in this progress note were reviewed by the author.   Radiology/imaging not explicitly included in this progress note was reviewed by the author.     Core Measures

## 2022-11-18 NOTE — PROGRESS NOTES
Lab called with critical CBC blast result of 7.50. Notified attending Dr. Yola Payne via Voalte message at 12:12.

## 2022-11-19 LAB
ANION GAP SERPL CALC-SCNC: 9 MMOL/L (ref 7–16)
ANISOCYTOSIS BLD QL SMEAR: ABNORMAL
BASOPHILS # BLD AUTO: 7.6 % (ref 0–1.8)
BASOPHILS # BLD: 0.96 K/UL (ref 0–0.12)
BUN SERPL-MCNC: 12 MG/DL (ref 8–22)
CALCIUM SERPL-MCNC: 8.6 MG/DL (ref 8.5–10.5)
CHLORIDE SERPL-SCNC: 103 MMOL/L (ref 96–112)
CO2 SERPL-SCNC: 28 MMOL/L (ref 20–33)
CREAT SERPL-MCNC: 1.22 MG/DL (ref 0.5–1.4)
EOSINOPHIL # BLD AUTO: 0.54 K/UL (ref 0–0.51)
EOSINOPHIL NFR BLD: 4.3 % (ref 0–6.9)
ERYTHROCYTE [DISTWIDTH] IN BLOOD BY AUTOMATED COUNT: 62.2 FL (ref 35.9–50)
GFR SERPLBLD CREATININE-BSD FMLA CKD-EPI: 63 ML/MIN/1.73 M 2
GLUCOSE SERPL-MCNC: 123 MG/DL (ref 65–99)
HCT VFR BLD AUTO: 27.6 % (ref 42–52)
HGB BLD-MCNC: 8.3 G/DL (ref 14–18)
INR PPP: 1.12 (ref 0.87–1.13)
LYMPHOCYTES # BLD AUTO: 5.66 K/UL (ref 1–4.8)
LYMPHOCYTES NFR BLD: 44.9 % (ref 22–41)
MACROCYTES BLD QL SMEAR: ABNORMAL
MANUAL DIFF BLD: NORMAL
MCH RBC QN AUTO: 26.9 PG (ref 27–33)
MCHC RBC AUTO-ENTMCNC: 30.1 G/DL (ref 33.7–35.3)
MCV RBC AUTO: 89.6 FL (ref 81.4–97.8)
MICROCYTES BLD QL SMEAR: ABNORMAL
MONOCYTES # BLD AUTO: 1.17 K/UL (ref 0–0.85)
MONOCYTES NFR BLD AUTO: 9.3 % (ref 0–13.4)
MORPHOLOGY BLD-IMP: NORMAL
NEUTROPHILS # BLD AUTO: 4.27 K/UL (ref 1.82–7.42)
NEUTROPHILS NFR BLD: 33.9 % (ref 44–72)
NRBC # BLD AUTO: 4.09 K/UL
NRBC BLD-RTO: 32.5 /100 WBC
PLATELET # BLD AUTO: 74 K/UL (ref 164–446)
PLATELET BLD QL SMEAR: NORMAL
POLYCHROMASIA BLD QL SMEAR: NORMAL
POTASSIUM SERPL-SCNC: 4.9 MMOL/L (ref 3.6–5.5)
PROTHROMBIN TIME: 14.3 SEC (ref 12–14.6)
RBC # BLD AUTO: 3.08 M/UL (ref 4.7–6.1)
RBC BLD AUTO: PRESENT
SODIUM SERPL-SCNC: 140 MMOL/L (ref 135–145)
WBC # BLD AUTO: 12.6 K/UL (ref 4.8–10.8)

## 2022-11-19 PROCEDURE — A9270 NON-COVERED ITEM OR SERVICE: HCPCS | Performed by: NURSE PRACTITIONER

## 2022-11-19 PROCEDURE — A9270 NON-COVERED ITEM OR SERVICE: HCPCS | Performed by: INTERNAL MEDICINE

## 2022-11-19 PROCEDURE — 770001 HCHG ROOM/CARE - MED/SURG/GYN PRIV*

## 2022-11-19 PROCEDURE — 99233 SBSQ HOSP IP/OBS HIGH 50: CPT | Performed by: INTERNAL MEDICINE

## 2022-11-19 PROCEDURE — A9270 NON-COVERED ITEM OR SERVICE: HCPCS | Performed by: STUDENT IN AN ORGANIZED HEALTH CARE EDUCATION/TRAINING PROGRAM

## 2022-11-19 PROCEDURE — 36415 COLL VENOUS BLD VENIPUNCTURE: CPT

## 2022-11-19 PROCEDURE — 85610 PROTHROMBIN TIME: CPT

## 2022-11-19 PROCEDURE — A9270 NON-COVERED ITEM OR SERVICE: HCPCS | Performed by: HOSPITALIST

## 2022-11-19 PROCEDURE — 700102 HCHG RX REV CODE 250 W/ 637 OVERRIDE(OP): Performed by: STUDENT IN AN ORGANIZED HEALTH CARE EDUCATION/TRAINING PROGRAM

## 2022-11-19 PROCEDURE — 700102 HCHG RX REV CODE 250 W/ 637 OVERRIDE(OP): Performed by: INTERNAL MEDICINE

## 2022-11-19 PROCEDURE — 700102 HCHG RX REV CODE 250 W/ 637 OVERRIDE(OP): Performed by: HOSPITALIST

## 2022-11-19 PROCEDURE — 85025 COMPLETE CBC W/AUTO DIFF WBC: CPT

## 2022-11-19 PROCEDURE — 700102 HCHG RX REV CODE 250 W/ 637 OVERRIDE(OP): Performed by: NURSE PRACTITIONER

## 2022-11-19 PROCEDURE — 85007 BL SMEAR W/DIFF WBC COUNT: CPT

## 2022-11-19 PROCEDURE — 80048 BASIC METABOLIC PNL TOTAL CA: CPT

## 2022-11-19 RX ADMIN — ALPRAZOLAM 0.25 MG: 0.25 TABLET ORAL at 09:06

## 2022-11-19 RX ADMIN — ATORVASTATIN CALCIUM 20 MG: 20 TABLET, FILM COATED ORAL at 18:00

## 2022-11-19 RX ADMIN — ALPRAZOLAM 0.25 MG: 0.25 TABLET ORAL at 21:20

## 2022-11-19 RX ADMIN — ALPRAZOLAM 0.25 MG: 0.25 TABLET ORAL at 18:00

## 2022-11-19 RX ADMIN — LEVOTHYROXINE SODIUM 25 MCG: 0.03 TABLET ORAL at 04:59

## 2022-11-19 RX ADMIN — MORPHINE SULFATE 30 MG: 15 TABLET ORAL at 09:06

## 2022-11-19 RX ADMIN — THIAMINE HCL TAB 100 MG 100 MG: 100 TAB at 18:00

## 2022-11-19 RX ADMIN — GABAPENTIN 200 MG: 100 CAPSULE ORAL at 13:29

## 2022-11-19 RX ADMIN — GABAPENTIN 200 MG: 100 CAPSULE ORAL at 04:59

## 2022-11-19 RX ADMIN — Medication 2000 UNITS: at 04:59

## 2022-11-19 RX ADMIN — OXYBUTYNIN CHLORIDE 5 MG: 5 TABLET ORAL at 04:59

## 2022-11-19 RX ADMIN — FOLIC ACID 1 MG: 1 TABLET ORAL at 04:59

## 2022-11-19 RX ADMIN — MAGNESIUM HYDROXIDE 30 ML: 400 SUSPENSION ORAL at 18:01

## 2022-11-19 RX ADMIN — GABAPENTIN 200 MG: 100 CAPSULE ORAL at 18:00

## 2022-11-19 RX ADMIN — CYANOCOBALAMIN TAB 500 MCG 1000 MCG: 500 TAB at 05:01

## 2022-11-19 RX ADMIN — DOCUSATE SODIUM 50 MG AND SENNOSIDES 8.6 MG 2 TABLET: 8.6; 5 TABLET, FILM COATED ORAL at 04:59

## 2022-11-19 RX ADMIN — ALPRAZOLAM 0.25 MG: 0.25 TABLET ORAL at 13:29

## 2022-11-19 RX ADMIN — OXYBUTYNIN CHLORIDE 5 MG: 5 TABLET ORAL at 13:28

## 2022-11-19 RX ADMIN — OXYBUTYNIN CHLORIDE 5 MG: 5 TABLET ORAL at 18:01

## 2022-11-19 RX ADMIN — MORPHINE SULFATE 30 MG: 15 TABLET ORAL at 20:27

## 2022-11-19 RX ADMIN — THIAMINE HCL TAB 100 MG 100 MG: 100 TAB at 04:59

## 2022-11-19 ASSESSMENT — ENCOUNTER SYMPTOMS
ABDOMINAL PAIN: 0
VOMITING: 0
FEVER: 0
NAUSEA: 0
NERVOUS/ANXIOUS: 0
SHORTNESS OF BREATH: 0
WEAKNESS: 1
MYALGIAS: 0
CHILLS: 0

## 2022-11-19 ASSESSMENT — PAIN DESCRIPTION - PAIN TYPE
TYPE: ACUTE PAIN

## 2022-11-19 NOTE — CARE PLAN
Pt is A&O 4  VSS  Pain declines   declines nausea  Tolerating a reg diet   CBI in place; pink light red output; small clots visible occasionally. Flow running moderately- see flowsheet for output and intake amts   + flatus  + BM  Up SBA 1x  SCD's off  Bed alarm off, pt low fall risk per jeremy howe  Reviewed plan of care with patient, bed in lowest position and locked, pt resting comfortably now, call light within reach, all needs met at this time. Interventions will be executed per plan of care     Shift Goals  Clinical Goals: CBI  Patient Goals: discharge  Family Goals: n/a    Progress made toward(s) clinical / shift goals:        Problem: Knowledge Deficit - Standard  Goal: Patient and family/care givers will demonstrate understanding of plan of care, disease process/condition, diagnostic tests and medications  Outcome: Progressing     Problem: Pain - Standard  Goal: Alleviation of pain or a reduction in pain to the patient’s comfort goal  Outcome: Progressing

## 2022-11-19 NOTE — PROGRESS NOTES
Hospital Medicine Daily Progress Note    Date of Service  11/19/2022    Chief Complaint  Gregg Martinez is a 72 y.o. male admitted 10/26/2022 with hematuria    Hospital Course  73 yo man with asthma, HTN who presented with 2 weeks of hematuria and 10 lb weight loss. He was found anemia, Hgb 6.5. CT AP showed bladder mass. Urology was consulted, patient was found with mutifocal bladder tumor that was resected and intravesical gemcitabine. Pathology showing low-grade papillary urothelial carcinoma noninvasive with muscularis propria uninvolved by tumor. CT showed evidence of bone mets. He underwent bone biopsy 10/31. Post-op procedure he continued to have hematuria needing CBI. Patient had clot evacuation and fulguration with transurethral resection of bladder tumor 11/2. Arboleda cath was removed but hematuria recurrent and CBI restarted. Patient again had clot evacuation and fulguration of bleeding bladder tissue 11/12. However hematuria persists and he continued to need transfusion support. Patient had 3rd clot evacuation and fulguration with tumor resection 11/14.   Dr. Reyes with oncology was consulted 11/14, Splenomegaly 19 cms and having anemia and thrombocytopenia before admission rasies concerns of MPN. Needs BM BX ones stabilized ordered MIRTA-2 from peripheral blood.     Interval Problem Update  Still weaning CBI, hematuria recurring  Bone marrow path is still pending. Hgb and plt levels unchanged, INR is low  Patient has no complaints currently.     I have discussed this patient's plan of care and discharge plan at IDT rounds today with Case Management, Nursing, Nursing leadership, and other members of the IDT team.    Consultants/Specialty  oncology and urology    Code Status  Full Code    Disposition  Patient is not medically cleared for discharge.   Anticipate discharge to to home with close outpatient follow-up.  I have placed the appropriate orders for post-discharge needs.    Review of Systems  Review  of Systems   Constitutional:  Negative for malaise/fatigue.   Respiratory:  Negative for shortness of breath.    Cardiovascular:  Negative for chest pain.   Gastrointestinal:  Negative for abdominal pain and nausea.   Genitourinary:  Positive for hematuria.   Musculoskeletal:  Negative for myalgias.   Neurological:  Positive for weakness.   Psychiatric/Behavioral:  The patient is not nervous/anxious.       Physical Exam  Temp:  [35.9 °C (96.6 °F)-36.4 °C (97.6 °F)] 36.3 °C (97.4 °F)  Pulse:  [60-89] 64  Resp:  [16-18] 16  BP: (107-133)/(60-78) 108/60  SpO2:  [90 %-96 %] 96 %    Physical Exam  Vitals and nursing note reviewed.   Constitutional:       General: He is not in acute distress.     Appearance: He is not toxic-appearing.   HENT:      Head: Normocephalic.      Mouth/Throat:      Mouth: Mucous membranes are moist.   Eyes:      General:         Right eye: No discharge.         Left eye: No discharge.   Cardiovascular:      Rate and Rhythm: Normal rate and regular rhythm.   Pulmonary:      Effort: Pulmonary effort is normal. No respiratory distress.      Breath sounds: No wheezing or rales.   Abdominal:      Palpations: Abdomen is soft.      Tenderness: There is no abdominal tenderness.   Musculoskeletal:      Cervical back: Neck supple.      Right lower leg: Edema present.      Left lower leg: Edema present.   Skin:     General: Skin is warm and dry.   Neurological:      Mental Status: He is alert and oriented to person, place, and time.       Fluids    Intake/Output Summary (Last 24 hours) at 11/19/2022 1027  Last data filed at 11/19/2022 0700  Gross per 24 hour   Intake 1270 ml   Output 4350 ml   Net -3080 ml       Laboratory  Recent Labs     11/17/22  0012 11/18/22  1003 11/19/22  0010   WBC 9.4 14.5* 12.6*   RBC 2.98* 3.27* 3.08*   HEMOGLOBIN 8.2* 9.0* 8.3*   HEMATOCRIT 26.7* 29.1* 27.6*   MCV 89.6 89.0 89.6   MCH 27.5 27.5 26.9*   MCHC 30.7* 30.9* 30.1*   RDW 60.0* 61.0* 62.2*   PLATELETCT 64* 74* 74*    MPV  --  9.1  --      Recent Labs     11/17/22  0012 11/18/22  1003 11/19/22  0010   SODIUM 137 139 140   POTASSIUM 4.7 4.7 4.9   CHLORIDE 103 102 103   CO2 27 29 28   GLUCOSE 89 131* 123*   BUN 14 10 12   CREATININE 1.09 1.03 1.22   CALCIUM 7.9* 8.7 8.6     Recent Labs     11/17/22  0012 11/19/22  0010   INR 1.14* 1.12               Imaging  IR-US GUIDED PIV   Final Result    Ultrasound-guided PERIPHERAL IV INSERTION performed by    qualified nursing staff as above.      EC-ECHOCARDIOGRAM COMPLETE W/O CONT   Final Result      US-EXTREMITY VENOUS LOWER BILAT   Final Result      DX-CHEST-PORTABLE (1 VIEW)   Final Result      Increased LEFT basilar atelectasis or pneumonia      CT-ABDOMEN-PELVIS W/O   Final Result      1.  Interval hemorrhage within the partially decompressed urinary bladder. Previously visualized posterior left bladder mass is no longer seen presumably this is due to interval resection      2.  New mild left hydroureteronephrosis with moderate perinephric fat stranding. This is concerning for obstruction perhaps related to hematoma, recent instrumentation or pyelonephritis/ureterectasis.      3.  Increasing colonic stool volume with similar moderate diverticulosis of the colon but no evidence of diverticulitis      4.  Marked splenomegaly      5.  New small effusions with some lower lung zone groundglass suspicious for interstitial edema/mild congestive failure. There is stable mild pericardial fluid      6.  Persistent inhomogeneous medullary spaces with generalized increased density. Recent bone scan showed no focal mass but this could reflect underlying myelofibrosis or metastases with false-negative bone scan      CT-NEEDLE BX-DEEP BONE   Final Result      1.  Successful CT-guided core biopsy of sclerotic lesion in the posterior aspect of left inferior pubic ramus.      NM-BONE/JOINT SCAN WHOLE BODY   Final Result      No evidence of bony metastatic disease      CT-CHEST (THORAX) WITH   Final  Result         1.  Emphysema   2.  Hazy bilateral upper lobe opacities with slight intralobular septal thickening, could represent component of mild pulmonary edema   3.  Linear densities the bilateral lung bases favors scarring or atelectasis.   4.  Atherosclerosis and atherosclerotic coronary artery disease   5.  Hepatomegaly with irregular hepatic contour favoring changes of cirrhosis.   6.  Subcentimeter hepatic lobe lesions, could represent small cyst or hemangioma, otherwise indeterminate.   7.  Mild mediastinal adenopathy, workup and evaluation for causes of adenopathy recommended as clinically appropriate.   8.  Splenomegaly      DX-CHEST-PORTABLE (1 VIEW)   Final Result         1.  No acute cardiopulmonary disease.   2.  Atherosclerosis      CT-ABDOMEN-PELVIS WITH   Final Result      1.  Enhancing 2.5 x 2.3 cm bladder wall mass suspicious for neoplasm.   2.  Heterogeneous sclerotic change of the pelvis and proximal femurs suspicious for metastatic disease or other infiltrating marrow process.   3.  Mild cardiomegaly.   4.  Hepatosplenomegaly.   5.  Small subcentimeter hepatic hypodensities which could represent cysts or hemangiomas but are nonspecific. If indicated, these findings could be further evaluated with dedicated liver protocol CT or MRI or ultrasound on a nonemergent basis.   6.  Colonic diverticulosis without evidence of diverticulitis.   7.  Atherosclerosis.           Assessment/Plan  * Urothelial carcinoma of bladder without invasion of muscle (HCC)- (present on admission)  Assessment & Plan  CT AP: Notable enhancing 2.5 x 2.3 cm bladder wall mass suspicious for neoplasms  s/p TURBT on 10/27 with multiple tumors noted  Pathology showing low-grade papillary urothelial carcinoma noninvasive with muscularis propria uninvolved by tumor  CT chest negative for metastasis  Bone scan without any evidence of metastasis  IR guided bone biopsy on 10/31 negative for malignancy    11/2: S/p Cystoscopy with  clot evacuation, fulguration, and TUBRT  11/3: Attempt to wean CBI   11/5: CBI discontinued, TOVR   11/6: Hemoglobin drop of 1 g and recurrence of hematuria.  CBI restarted  11/7: CBI running, hand irrigated by urology.  Still with leaking around catheter tip, urine culture NGTD  11/8: CBI running, hand irrigated by urology who deferred surgical intervention with cauterization until tomorrow with close monitoring of H&H, 7.6 this morning  11/9: Seen by urology- wean CBI as tolerated. Continue to trend H/h. Transfuse PRN. Follow up with urology and oncology OP  11/10: Seen and cleared by urology. Per their recs:   Clamp CBI. If hematuria not worsening, can d/c CBI.   They will arrange follow up with their office next week for voiding trial to have cuellar removed  11/11:  Failed CBI clamping yesterday. Deep red gross hematuria noted. hgb stable. Continue to monitor  11/12: Urology took to the OR today for cystoscopy with clot evacuation and fulguration of bleeding bladder tissue  11/13: Continues with cherry red urine and significant clotting requiring hand irrigation. 1 unit PRBC today. Discussed with Shakira Amos urology aware, ordered B&O.  11/14: Catheter clogged twice past 24 hours, required upgrade to higher level of care given frequency of irrigation. D/W Urology, will take patient back to the OR today  11/15: POD #1 s/p cystoscopy with clot evacuation and fulguration of bleeding bladder tissue.  Hgb 7.2 this morning.  We will continue to trend and transfuse as needed.  Heme-onc has seen patient with plans for bone marrow biopsy tentatively today or tomorrow.    Attempting to wean CBI      Hypothyroidism  Assessment & Plan  New diagnosis of, TSH high and T4 low  Started on synthroid 25mcg daily, repeat labs in 4-6 weeks    Paroxysmal atrial fibrillation (HCC)  Assessment & Plan  Had 6 sec on afib on tele. No prior history of per patient. Will check TSH and echo  No AC in setting of hematuria and high risk of  bleeding    Acute kidney injury (HCC)  Assessment & Plan  Secondary to hypoperfusion given hypovolemia  NS at 125 ml/hr  Improving   Transfuse for H/H < 7    Coagulation defect (HCC)  Assessment & Plan  Patient has acute blood loss anemia secondary to hematuria in the setting of newly diagnosed bladder cancer  Is required 9 units of PRBCs since admission  TEG abnormal 11/13, 1 unit platelets transfused  Hematology consulted, appreciate recommendations  Transfuse 2 units FFP today  Hematology will follow, recommending bone marrow aspiration and testing for myeloproliferative disorder, biopsy is pending  JAK2 and erythropoetin pending    Elevated d-dimer  Assessment & Plan  D-dimer 2.06  Patient having coagulopathy issues, hematology consulted  Will check bilateral lower extremity ultrasound    11/15: US lower extremities negative for DVT.  D-dimer is nonspecific likely attributed to malignancy or generalized inflammation secondary to acute illness at this time.  We will continue to monitor for signs and symptoms of coagulopathy.    Hyponatremia  Assessment & Plan  Resolved, stop IVF    Thrombocytopenia (HCC)  Assessment & Plan  Consistently low since admission  TEG abnormal  1 unit platelets transfused 11/13  Hematology following, appreciate recommendations  Bone marrow biopsy is pending    Pneumonia  Assessment & Plan  Leukocytosis continuing  Diminished lung sounds with left bibasilar rales on exam  No tachycardia or fever  Pro-Nicolas 1.64, repeat 11/14 1.81  Chest x-ray consistent with left basilar pneumonia  Unasyn and azithromycin (end date 11/18/2022)  Blood cultures NGTD    Slow transit constipation- (present on admission)  Assessment & Plan  Continues to complain of intermittent constipation  Aggressive bowel protocol  11/15: Patient reports constipation unrelieved by docusate.  Requesting milk of magnesia.  One-time order for milk of magnesia ordered.    Hypovolemia  Assessment & Plan  Due to acute blood loss  anemia    Hematuria- (present on admission)  Assessment & Plan  Likely secondary to above    Iron deficiency anemia- (present on admission)  Assessment & Plan  S/p IV iron infusion repletion  Repeat iron labs WNL    Acute blood loss anemia- (present on admission)  Assessment & Plan  Secondary to hematuria.   Required 8 units of PRBCs this admission   Continues to fail CBI clamping with gross hematuria noted  Underwent cystoscopy 11/12 with Dr. Boyer who performed clot evaluation and fulguration of bleeding bladder tissue  Additional cystoscopy 11/14 given continued hematuria, clogging of catheter, and requiring blood transfusions  Hematology consulted and recommend transfusing for Hgb<8 or hemodynamic instability    11/15/2022: Hemoglobin 7.2 this morning.  Repeat H/H ordered.  Hgb 6.9-transfuse 1 unit PRBC  Hgb now stabilized  11/18 - I spoke with Dr. Reyes regarding increased blasts on diff. He reviewed with pathology and this may be reaction from bleeding or transfusions. Path is running additional leukemia studies. He recommends keeping the patient inpatient for now, he may need repeat bone marrow biopsy    Obesity (BMI 30-39.9)- (present on admission)  Assessment & Plan  Diet and lifestyle modification  Body mass index is 26.21 kg/m².    Leukocytosis- (present on admission)  Assessment & Plan  Likely reactive in setting of malignancy and blood loss anemia  Increased to 24.4 today, discussed with hematology, likely secondary to malignancy  Pro-Nicolas 1.64, repeat 1.81  Chest x-ray consistent with atelectasis versus pneumonia  UA negative  Afebrile  Unasyn (end date 11/18/2022)   Incentive spirometry and ambulate  WBC has normalized    Dyslipidemia- (present on admission)  Assessment & Plan  Continue atorvastatin    COPD (chronic obstructive pulmonary disease) (HCC)- (present on admission)  Assessment & Plan  Not in acute exacerbation  PRN duonebs       VTE prophylaxis: SCDs/TEDs    I have performed a physical  exam and reviewed and updated ROS and Plan today (11/19/2022). In review of yesterday's note (11/18/2022), there are no changes except as documented above.

## 2022-11-19 NOTE — PROGRESS NOTES
Note to reader: this note follows the APSO format rather than the historical SOAP format. Assessment and plan located at the top of the note for ease of use.    Chief Complaint  72 y.o. year old male here with bladder mass and sclerotic lesions.     Assessment/Plan  Interval History   TCC bladder s/p TURBT 10/27  Hematuria and clot obstruction s/p clot evacuation and fulguration x2 11/2 and 11/12 and 11/14  Anemia   Thrombocytopenia    Cuellar catheter to remain   Continue CBI - wean as clears   Monitor H/H  Eventual f/up with our office for Bladder CA surveillance. Our office will call to schedule.         Case discussed with patient, RN, and Urology-Dr. José PETERSON  Patient seen and examined    11/19. Cuellar remains. Urine watermelon with CBI on slow rate. Hgb 8.3, Plt stable at 74    11/18. Cuellar was replaced after pt unable to void last night. CBI restarted for darkening of urine. H/H improving.     11/17. Urine clear/pink. CBI discontinued for 5hrs. H/H stable. Pt anxious to leave.    11/16. Urine clear/pink. D/C CBI. Pt feels well and has not pain. H/H stable and improving.     11/15. Repeat clot evac and fulguration with TURBT yesterday. Urine now pink and no clot obstruction. Abdominal pains and spasms resolved. H/H declined further and received 2 units PRBCs today    11/14. Hematuria continues with difficult time maintaining flow via cuellar. RN unable to irrigate and bladder scan with 500 cc in bladder. Patient in pain in SP region. Receiving PRBCs daily.            Review of Systems  Physical Exam   Review of Systems   Constitutional:  Negative for chills and fever.   Gastrointestinal:  Negative for abdominal pain, nausea and vomiting.   Genitourinary:  Negative for hematuria.   Vitals:    11/18/22 1635 11/18/22 2008 11/19/22 0425 11/19/22 0800   BP: 133/71 114/69 107/61 108/60   Pulse: 82 89 60 64   Resp: 17 18 18 16   Temp: 35.9 °C (96.6 °F) 36.4 °C (97.6 °F) 36.2 °C (97.1 °F) 36.3 °C (97.4 °F)   TempSrc:  Temporal Temporal Temporal Temporal   SpO2: 95% 90% 90% 96%   Weight:       Height:         Physical Exam  Vitals and nursing note reviewed.   Constitutional:       Appearance: Normal appearance.   HENT:      Head: Normocephalic and atraumatic.      Nose: Nose normal.      Mouth/Throat:      Mouth: Mucous membranes are moist.   Eyes:      Pupils: Pupils are equal, round, and reactive to light.   Pulmonary:      Effort: Pulmonary effort is normal.   Abdominal:      General: Abdomen is flat. There is no distension.      Palpations: Abdomen is soft.      Tenderness: There is no abdominal tenderness.   Genitourinary:     Comments: Urine light pink. Soft penile edema.   Skin:     General: Skin is warm and dry.   Neurological:      General: No focal deficit present.      Mental Status: He is alert and oriented to person, place, and time.   Psychiatric:         Mood and Affect: Mood normal.         Behavior: Behavior normal.        Hematology Chemistry   Lab Results   Component Value Date/Time    WBC 12.6 (H) 11/19/2022 12:10 AM    HEMOGLOBIN 8.3 (L) 11/19/2022 12:10 AM    HEMATOCRIT 27.6 (L) 11/19/2022 12:10 AM    PLATELETCT 74 (L) 11/19/2022 12:10 AM     Lab Results   Component Value Date/Time    SODIUM 140 11/19/2022 12:10 AM    POTASSIUM 4.9 11/19/2022 12:10 AM    CHLORIDE 103 11/19/2022 12:10 AM    CO2 28 11/19/2022 12:10 AM    GLUCOSE 123 (H) 11/19/2022 12:10 AM    BUN 12 11/19/2022 12:10 AM    CREATININE 1.22 11/19/2022 12:10 AM         Labs not explicitly included in this progress note were reviewed by the author.   Radiology/imaging not explicitly included in this progress note was reviewed by the author.     Core Measures

## 2022-11-19 NOTE — PROGRESS NOTES
Received report from night shift RN. Assumed patient care.    Patient is A+O x4.  Tolerating regular diet. Denies N/V.  Denies chest pain or SOB.  Pain 7 on a 0-10 pain scale. Medicated per MAR.  CBI running, - BM. Last BM 11/18.  Patient ambulates with stand by assistance.     Plan of care discussed, all questions answered. Educated on the importance of calling before getting OOB and pt verbalizes understanding. Educated regarding importance of oral care. Oral care kit at bedside. Call light is within reach, treaded slipper socks on, bed in lowest/ locked position, hourly rounding in place, all needs met at this time

## 2022-11-20 LAB
ANION GAP SERPL CALC-SCNC: 5 MMOL/L (ref 7–16)
ANISOCYTOSIS BLD QL SMEAR: ABNORMAL
ANISOCYTOSIS BLD QL SMEAR: ABNORMAL
BASOPHILS # BLD AUTO: 4.1 % (ref 0–1.8)
BASOPHILS # BLD AUTO: 5.9 % (ref 0–1.8)
BASOPHILS # BLD: 0.53 K/UL (ref 0–0.12)
BASOPHILS # BLD: 0.69 K/UL (ref 0–0.12)
BUN SERPL-MCNC: 12 MG/DL (ref 8–22)
CALCIUM SERPL-MCNC: 8.6 MG/DL (ref 8.5–10.5)
CFT BLD TEG: 6.2 MIN (ref 4.6–9.1)
CFT P HPASE BLD TEG: 6.6 MIN (ref 4.3–8.3)
CHLORIDE SERPL-SCNC: 101 MMOL/L (ref 96–112)
CLOT ANGLE BLD TEG: 75.3 DEGREES (ref 63–78)
CO2 SERPL-SCNC: 30 MMOL/L (ref 20–33)
CREAT SERPL-MCNC: 0.94 MG/DL (ref 0.5–1.4)
CT.EXTRINSIC BLD ROTEM: 1 MIN (ref 0.8–2.1)
EOSINOPHIL # BLD AUTO: 0.1 K/UL (ref 0–0.51)
EOSINOPHIL # BLD AUTO: 0.4 K/UL (ref 0–0.51)
EOSINOPHIL NFR BLD: 0.8 % (ref 0–6.9)
EOSINOPHIL NFR BLD: 3.4 % (ref 0–6.9)
ERYTHROCYTE [DISTWIDTH] IN BLOOD BY AUTOMATED COUNT: 60.8 FL (ref 35.9–50)
ERYTHROCYTE [DISTWIDTH] IN BLOOD BY AUTOMATED COUNT: 61.7 FL (ref 35.9–50)
GFR SERPLBLD CREATININE-BSD FMLA CKD-EPI: 87 ML/MIN/1.73 M 2
GLUCOSE SERPL-MCNC: 95 MG/DL (ref 65–99)
HCT VFR BLD AUTO: 26.4 % (ref 42–52)
HCT VFR BLD AUTO: 27.1 % (ref 42–52)
HGB BLD-MCNC: 7.8 G/DL (ref 14–18)
HGB BLD-MCNC: 8.1 G/DL (ref 14–18)
LYMPHOCYTES # BLD AUTO: 3.09 K/UL (ref 1–4.8)
LYMPHOCYTES # BLD AUTO: 5.05 K/UL (ref 1–4.8)
LYMPHOCYTES NFR BLD: 23.8 % (ref 22–41)
LYMPHOCYTES NFR BLD: 43.2 % (ref 22–41)
MANUAL DIFF BLD: NORMAL
MANUAL DIFF BLD: NORMAL
MCF BLD TEG: 64.3 MM (ref 52–69)
MCF.PLATELET INHIB BLD ROTEM: 30.9 MM (ref 15–32)
MCH RBC QN AUTO: 26.1 PG (ref 27–33)
MCH RBC QN AUTO: 26.8 PG (ref 27–33)
MCHC RBC AUTO-ENTMCNC: 29.5 G/DL (ref 33.7–35.3)
MCHC RBC AUTO-ENTMCNC: 29.9 G/DL (ref 33.7–35.3)
MCV RBC AUTO: 88.3 FL (ref 81.4–97.8)
MCV RBC AUTO: 89.7 FL (ref 81.4–97.8)
METAMYELOCYTES NFR BLD MANUAL: 0.8 %
METAMYELOCYTES NFR BLD MANUAL: 3.4 %
MICROCYTES BLD QL SMEAR: ABNORMAL
MICROCYTES BLD QL SMEAR: ABNORMAL
MONOCYTES # BLD AUTO: 0.6 K/UL (ref 0–0.85)
MONOCYTES # BLD AUTO: 2.03 K/UL (ref 0–0.85)
MONOCYTES NFR BLD AUTO: 15.6 % (ref 0–13.4)
MONOCYTES NFR BLD AUTO: 5.1 % (ref 0–13.4)
MORPHOLOGY BLD-IMP: NORMAL
MORPHOLOGY BLD-IMP: NORMAL
MYELOCYTES NFR BLD MANUAL: 0.9 %
MYELOCYTES NFR BLD MANUAL: 5.7 %
NEUTROPHILS # BLD AUTO: 4.46 K/UL (ref 1.82–7.42)
NEUTROPHILS # BLD AUTO: 6.29 K/UL (ref 1.82–7.42)
NEUTROPHILS NFR BLD: 38.1 % (ref 44–72)
NEUTROPHILS NFR BLD: 48.4 % (ref 44–72)
NRBC # BLD AUTO: 3.52 K/UL
NRBC # BLD AUTO: 4.18 K/UL
NRBC BLD-RTO: 30.1 /100 WBC
NRBC BLD-RTO: 32.1 /100 WBC
PA AA BLD-ACNC: 17 % (ref 0–11)
PA ADP BLD-ACNC: 84.3 % (ref 0–17)
PLATELET # BLD AUTO: 58 K/UL (ref 164–446)
PLATELET # BLD AUTO: 76 K/UL (ref 164–446)
PLATELET BLD QL SMEAR: NORMAL
PLATELET BLD QL SMEAR: NORMAL
POIKILOCYTOSIS BLD QL SMEAR: NORMAL
POLYCHROMASIA BLD QL SMEAR: NORMAL
POLYCHROMASIA BLD QL SMEAR: NORMAL
POTASSIUM SERPL-SCNC: 4.9 MMOL/L (ref 3.6–5.5)
PROMYELOCYTES NFR BLD MANUAL: 0.8 %
RBC # BLD AUTO: 2.99 M/UL (ref 4.7–6.1)
RBC # BLD AUTO: 3.02 M/UL (ref 4.7–6.1)
RBC BLD AUTO: PRESENT
RBC BLD AUTO: PRESENT
SODIUM SERPL-SCNC: 136 MMOL/L (ref 135–145)
STOMATOCYTES BLD QL SMEAR: NORMAL
TEG ALGORITHM TGALG: ABNORMAL
WBC # BLD AUTO: 11.7 K/UL (ref 4.8–10.8)
WBC # BLD AUTO: 13 K/UL (ref 4.8–10.8)

## 2022-11-20 PROCEDURE — 85018 HEMOGLOBIN: CPT

## 2022-11-20 PROCEDURE — 36415 COLL VENOUS BLD VENIPUNCTURE: CPT

## 2022-11-20 PROCEDURE — 700102 HCHG RX REV CODE 250 W/ 637 OVERRIDE(OP): Performed by: STUDENT IN AN ORGANIZED HEALTH CARE EDUCATION/TRAINING PROGRAM

## 2022-11-20 PROCEDURE — A9270 NON-COVERED ITEM OR SERVICE: HCPCS | Performed by: STUDENT IN AN ORGANIZED HEALTH CARE EDUCATION/TRAINING PROGRAM

## 2022-11-20 PROCEDURE — A9270 NON-COVERED ITEM OR SERVICE: HCPCS | Performed by: NURSE PRACTITIONER

## 2022-11-20 PROCEDURE — 85384 FIBRINOGEN ACTIVITY: CPT

## 2022-11-20 PROCEDURE — 80048 BASIC METABOLIC PNL TOTAL CA: CPT

## 2022-11-20 PROCEDURE — 700102 HCHG RX REV CODE 250 W/ 637 OVERRIDE(OP): Performed by: NURSE PRACTITIONER

## 2022-11-20 PROCEDURE — A9270 NON-COVERED ITEM OR SERVICE: HCPCS | Performed by: INTERNAL MEDICINE

## 2022-11-20 PROCEDURE — A9270 NON-COVERED ITEM OR SERVICE: HCPCS | Performed by: HOSPITALIST

## 2022-11-20 PROCEDURE — A9270 NON-COVERED ITEM OR SERVICE: HCPCS | Performed by: PHYSICIAN ASSISTANT

## 2022-11-20 PROCEDURE — 85025 COMPLETE CBC W/AUTO DIFF WBC: CPT

## 2022-11-20 PROCEDURE — 770001 HCHG ROOM/CARE - MED/SURG/GYN PRIV*

## 2022-11-20 PROCEDURE — 700102 HCHG RX REV CODE 250 W/ 637 OVERRIDE(OP): Performed by: PHYSICIAN ASSISTANT

## 2022-11-20 PROCEDURE — 85007 BL SMEAR W/DIFF WBC COUNT: CPT | Mod: 91

## 2022-11-20 PROCEDURE — 700102 HCHG RX REV CODE 250 W/ 637 OVERRIDE(OP): Performed by: HOSPITALIST

## 2022-11-20 PROCEDURE — 85576 BLOOD PLATELET AGGREGATION: CPT | Mod: 91

## 2022-11-20 PROCEDURE — 99233 SBSQ HOSP IP/OBS HIGH 50: CPT | Performed by: INTERNAL MEDICINE

## 2022-11-20 PROCEDURE — 85347 COAGULATION TIME ACTIVATED: CPT

## 2022-11-20 PROCEDURE — 700102 HCHG RX REV CODE 250 W/ 637 OVERRIDE(OP): Performed by: INTERNAL MEDICINE

## 2022-11-20 RX ORDER — FINASTERIDE 5 MG/1
5 TABLET, FILM COATED ORAL DAILY
Status: DISCONTINUED | OUTPATIENT
Start: 2022-11-20 | End: 2022-11-30 | Stop reason: HOSPADM

## 2022-11-20 RX ORDER — TAMSULOSIN HYDROCHLORIDE 0.4 MG/1
0.4 CAPSULE ORAL
Status: DISCONTINUED | OUTPATIENT
Start: 2022-11-20 | End: 2022-11-30 | Stop reason: HOSPADM

## 2022-11-20 RX ADMIN — FOLIC ACID 1 MG: 1 TABLET ORAL at 04:18

## 2022-11-20 RX ADMIN — ATORVASTATIN CALCIUM 20 MG: 20 TABLET, FILM COATED ORAL at 17:28

## 2022-11-20 RX ADMIN — GABAPENTIN 200 MG: 100 CAPSULE ORAL at 17:29

## 2022-11-20 RX ADMIN — GABAPENTIN 200 MG: 100 CAPSULE ORAL at 13:29

## 2022-11-20 RX ADMIN — ALPRAZOLAM 0.25 MG: 0.25 TABLET ORAL at 20:53

## 2022-11-20 RX ADMIN — CYANOCOBALAMIN TAB 500 MCG 1000 MCG: 500 TAB at 04:18

## 2022-11-20 RX ADMIN — ALPRAZOLAM 0.25 MG: 0.25 TABLET ORAL at 13:29

## 2022-11-20 RX ADMIN — OXYBUTYNIN CHLORIDE 5 MG: 5 TABLET ORAL at 04:16

## 2022-11-20 RX ADMIN — DOCUSATE SODIUM 50 MG AND SENNOSIDES 8.6 MG 2 TABLET: 8.6; 5 TABLET, FILM COATED ORAL at 17:29

## 2022-11-20 RX ADMIN — MAGNESIUM HYDROXIDE 30 ML: 400 SUSPENSION ORAL at 04:17

## 2022-11-20 RX ADMIN — Medication 2000 UNITS: at 04:16

## 2022-11-20 RX ADMIN — MORPHINE SULFATE 30 MG: 15 TABLET ORAL at 04:18

## 2022-11-20 RX ADMIN — ALPRAZOLAM 0.25 MG: 0.25 TABLET ORAL at 10:11

## 2022-11-20 RX ADMIN — THIAMINE HCL TAB 100 MG 100 MG: 100 TAB at 04:16

## 2022-11-20 RX ADMIN — OXYBUTYNIN CHLORIDE 5 MG: 5 TABLET ORAL at 17:29

## 2022-11-20 RX ADMIN — OXYBUTYNIN CHLORIDE 5 MG: 5 TABLET ORAL at 13:29

## 2022-11-20 RX ADMIN — LEVOTHYROXINE SODIUM 25 MCG: 0.03 TABLET ORAL at 04:17

## 2022-11-20 RX ADMIN — FINASTERIDE 5 MG: 5 TABLET, FILM COATED ORAL at 13:28

## 2022-11-20 RX ADMIN — TAMSULOSIN HYDROCHLORIDE 0.4 MG: 0.4 CAPSULE ORAL at 13:29

## 2022-11-20 RX ADMIN — THIAMINE HCL TAB 100 MG 100 MG: 100 TAB at 17:29

## 2022-11-20 RX ADMIN — GABAPENTIN 200 MG: 100 CAPSULE ORAL at 04:16

## 2022-11-20 RX ADMIN — ALPRAZOLAM 0.25 MG: 0.25 TABLET ORAL at 17:29

## 2022-11-20 ASSESSMENT — PAIN DESCRIPTION - PAIN TYPE
TYPE: ACUTE PAIN
TYPE: ACUTE PAIN;CHRONIC PAIN
TYPE: ACUTE PAIN

## 2022-11-20 ASSESSMENT — ENCOUNTER SYMPTOMS
VOMITING: 0
MYALGIAS: 0
WEAKNESS: 1
ABDOMINAL PAIN: 0
NAUSEA: 0
CHILLS: 0
NERVOUS/ANXIOUS: 0
SHORTNESS OF BREATH: 0
FEVER: 0

## 2022-11-20 NOTE — CARE PLAN
The patient is Stable - Low risk of patient condition declining or worsening    Shift Goals  Clinical Goals: Monitor CBI, safety  Patient Goals: Discharge  Family Goals: No family present    Problem: Knowledge Deficit - Standard  Goal: Patient and family/care givers will demonstrate understanding of plan of care, disease process/condition, diagnostic tests and medications  Outcome: Progressing     Problem: Pain - Standard  Goal: Alleviation of pain or a reduction in pain to the patient’s comfort goal  Outcome: Progressing     Problem: Fall Risk  Goal: Patient will remain free from falls  Outcome: Progressing     Progress made toward(s) clinical / shift goals:          Patient is not progressing towards the following goals:

## 2022-11-20 NOTE — PROGRESS NOTES
Hospital Medicine Daily Progress Note    Date of Service  11/20/2022    Chief Complaint  Gregg Martinez is a 72 y.o. male admitted 10/26/2022 with hematuria    Hospital Course  73 yo man with asthma, HTN who presented with 2 weeks of hematuria and 10 lb weight loss. He was found anemia, Hgb 6.5. CT AP showed bladder mass. Urology was consulted, patient was found with mutifocal bladder tumor that was resected and intravesical gemcitabine. Pathology showing low-grade papillary urothelial carcinoma noninvasive with muscularis propria uninvolved by tumor. CT showed evidence of bone mets. He underwent bone biopsy 10/31. Post-op procedure he continued to have hematuria needing CBI. Patient had clot evacuation and fulguration with transurethral resection of bladder tumor 11/2. Arboleda cath was removed but hematuria recurrent and CBI restarted. Patient again had clot evacuation and fulguration of bleeding bladder tissue 11/12. However hematuria persists and he continued to need transfusion support. Patient had 3rd clot evacuation and fulguration with tumor resection 11/14.   Dr. Reyes with oncology was consulted 11/14, Splenomegaly 19 cms and having anemia and thrombocytopenia before admission rasies concerns of MPN. Needs BM BX ones stabilized ordered MIRTA-2 from peripheral blood.     Interval Problem Update  Have not been able to wean CBI yet, hematuria remains. Nurse is hand irrigating clot  Hgb mildly decreasing, recheck level and will check TEG  I spoke with Dr. Reyes regarding bone marrow biopsy and anemia, thrombocytopenia. This may be MPN but this can be follow up as outpatient at this point with possible repeat bone marrow biopsy in 4-6 weeks.  Patient has no new symptoms, family member updated    I have discussed this patient's plan of care and discharge plan at IDT rounds today with Case Management, Nursing, Nursing leadership, and other members of the IDT team.    Consultants/Specialty  oncology and  urology    Code Status  Full Code    Disposition  Patient is not medically cleared for discharge.   Anticipate discharge to to home with close outpatient follow-up.  I have placed the appropriate orders for post-discharge needs.    Review of Systems  Review of Systems   Constitutional:  Negative for malaise/fatigue.   Respiratory:  Negative for shortness of breath.    Cardiovascular:  Negative for chest pain.   Gastrointestinal:  Negative for abdominal pain and nausea.   Genitourinary:  Positive for hematuria.   Musculoskeletal:  Negative for myalgias.   Neurological:  Positive for weakness.   Psychiatric/Behavioral:  The patient is not nervous/anxious.       Physical Exam  Temp:  [36.1 °C (96.9 °F)-37 °C (98.6 °F)] 37 °C (98.6 °F)  Pulse:  [65-96] 96  Resp:  [16] 16  BP: (107-129)/(64-72) 129/69  SpO2:  [94 %-97 %] 94 %    Physical Exam  Vitals and nursing note reviewed.   Constitutional:       General: He is not in acute distress.     Appearance: He is not toxic-appearing.   HENT:      Head: Normocephalic.      Mouth/Throat:      Mouth: Mucous membranes are moist.   Eyes:      General:         Right eye: No discharge.         Left eye: No discharge.   Pulmonary:      Effort: No respiratory distress.   Abdominal:      Palpations: Abdomen is soft.      Tenderness: There is no abdominal tenderness.   Musculoskeletal:      Cervical back: Neck supple.      Right lower leg: Edema present.      Left lower leg: Edema present.   Skin:     General: Skin is warm and dry.   Neurological:      Mental Status: He is alert and oriented to person, place, and time.       Fluids    Intake/Output Summary (Last 24 hours) at 11/20/2022 1430  Last data filed at 11/20/2022 1000  Gross per 24 hour   Intake 240 ml   Output 3200 ml   Net -2960 ml       Laboratory  Recent Labs     11/18/22  1003 11/19/22  0010 11/20/22  0423   WBC 14.5* 12.6* 11.7*   RBC 3.27* 3.08* 3.02*   HEMOGLOBIN 9.0* 8.3* 8.1*   HEMATOCRIT 29.1* 27.6* 27.1*   MCV 89.0  89.6 89.7   MCH 27.5 26.9* 26.8*   MCHC 30.9* 30.1* 29.9*   RDW 61.0* 62.2* 61.7*   PLATELETCT 74* 74* 58*   MPV 9.1  --   --      Recent Labs     11/18/22  1003 11/19/22  0010 11/20/22  0423   SODIUM 139 140 136   POTASSIUM 4.7 4.9 4.9   CHLORIDE 102 103 101   CO2 29 28 30   GLUCOSE 131* 123* 95   BUN 10 12 12   CREATININE 1.03 1.22 0.94   CALCIUM 8.7 8.6 8.6     Recent Labs     11/19/22  0010   INR 1.12               Imaging  IR-US GUIDED PIV   Final Result    Ultrasound-guided PERIPHERAL IV INSERTION performed by    qualified nursing staff as above.      EC-ECHOCARDIOGRAM COMPLETE W/O CONT   Final Result      US-EXTREMITY VENOUS LOWER BILAT   Final Result      DX-CHEST-PORTABLE (1 VIEW)   Final Result      Increased LEFT basilar atelectasis or pneumonia      CT-ABDOMEN-PELVIS W/O   Final Result      1.  Interval hemorrhage within the partially decompressed urinary bladder. Previously visualized posterior left bladder mass is no longer seen presumably this is due to interval resection      2.  New mild left hydroureteronephrosis with moderate perinephric fat stranding. This is concerning for obstruction perhaps related to hematoma, recent instrumentation or pyelonephritis/ureterectasis.      3.  Increasing colonic stool volume with similar moderate diverticulosis of the colon but no evidence of diverticulitis      4.  Marked splenomegaly      5.  New small effusions with some lower lung zone groundglass suspicious for interstitial edema/mild congestive failure. There is stable mild pericardial fluid      6.  Persistent inhomogeneous medullary spaces with generalized increased density. Recent bone scan showed no focal mass but this could reflect underlying myelofibrosis or metastases with false-negative bone scan      CT-NEEDLE BX-DEEP BONE   Final Result      1.  Successful CT-guided core biopsy of sclerotic lesion in the posterior aspect of left inferior pubic ramus.      NM-BONE/JOINT SCAN WHOLE BODY   Final  Result      No evidence of bony metastatic disease      CT-CHEST (THORAX) WITH   Final Result         1.  Emphysema   2.  Hazy bilateral upper lobe opacities with slight intralobular septal thickening, could represent component of mild pulmonary edema   3.  Linear densities the bilateral lung bases favors scarring or atelectasis.   4.  Atherosclerosis and atherosclerotic coronary artery disease   5.  Hepatomegaly with irregular hepatic contour favoring changes of cirrhosis.   6.  Subcentimeter hepatic lobe lesions, could represent small cyst or hemangioma, otherwise indeterminate.   7.  Mild mediastinal adenopathy, workup and evaluation for causes of adenopathy recommended as clinically appropriate.   8.  Splenomegaly      DX-CHEST-PORTABLE (1 VIEW)   Final Result         1.  No acute cardiopulmonary disease.   2.  Atherosclerosis      CT-ABDOMEN-PELVIS WITH   Final Result      1.  Enhancing 2.5 x 2.3 cm bladder wall mass suspicious for neoplasm.   2.  Heterogeneous sclerotic change of the pelvis and proximal femurs suspicious for metastatic disease or other infiltrating marrow process.   3.  Mild cardiomegaly.   4.  Hepatosplenomegaly.   5.  Small subcentimeter hepatic hypodensities which could represent cysts or hemangiomas but are nonspecific. If indicated, these findings could be further evaluated with dedicated liver protocol CT or MRI or ultrasound on a nonemergent basis.   6.  Colonic diverticulosis without evidence of diverticulitis.   7.  Atherosclerosis.           Assessment/Plan  * Urothelial carcinoma of bladder without invasion of muscle (HCC)- (present on admission)  Assessment & Plan  CT AP: Notable enhancing 2.5 x 2.3 cm bladder wall mass suspicious for neoplasms  s/p TURBT on 10/27 with multiple tumors noted  Pathology showing low-grade papillary urothelial carcinoma noninvasive with muscularis propria uninvolved by tumor  CT chest negative for metastasis  Bone scan without any evidence of  metastasis  IR guided bone biopsy on 10/31 negative for malignancy    11/2: S/p Cystoscopy with clot evacuation, fulguration, and TUBRT  11/3: Attempt to wean CBI   11/5: CBI discontinued, TOVR   11/6: Hemoglobin drop of 1 g and recurrence of hematuria.  CBI restarted  11/7: CBI running, hand irrigated by urology.  Still with leaking around catheter tip, urine culture NGTD  11/8: CBI running, hand irrigated by urology who deferred surgical intervention with cauterization until tomorrow with close monitoring of H&H, 7.6 this morning  11/9: Seen by urology- wean CBI as tolerated. Continue to trend H/h. Transfuse PRN. Follow up with urology and oncology OP  11/10: Seen and cleared by urology. Per their recs:   Clamp CBI. If hematuria not worsening, can d/c CBI.   They will arrange follow up with their office next week for voiding trial to have cuellar removed  11/11:  Failed CBI clamping yesterday. Deep red gross hematuria noted. hgb stable. Continue to monitor  11/12: Urology took to the OR today for cystoscopy with clot evacuation and fulguration of bleeding bladder tissue  11/13: Continues with cherry red urine and significant clotting requiring hand irrigation. 1 unit PRBC today. Discussed with Shakira Amos, urology aware, ordered B&O.  11/14: Catheter clogged twice past 24 hours, required upgrade to higher level of care given frequency of irrigation. D/W Urology, will take patient back to the OR today  11/15: POD #1 s/p cystoscopy with clot evacuation and fulguration of bleeding bladder tissue.  Hgb 7.2 this morning.  We will continue to trend and transfuse as needed.  Heme-onc has seen patient with plans for bone marrow biopsy tentatively today or tomorrow.    Attempting to wean CBI      Hypothyroidism  Assessment & Plan  New diagnosis of, TSH high and T4 low  Started on synthroid 25mcg daily, repeat labs in 4-6 weeks    Paroxysmal atrial fibrillation (HCC)  Assessment & Plan  Had 6 sec on afib on tele. No prior  history of per patient. Will check TSH and echo  No AC in setting of hematuria and high risk of bleeding    Acute kidney injury (HCC)  Assessment & Plan  Secondary to hypoperfusion given hypovolemia  NS at 125 ml/hr  Improving   Transfuse for H/H < 7    Coagulation defect (HCC)  Assessment & Plan  Patient has acute blood loss anemia secondary to hematuria in the setting of newly diagnosed bladder cancer  Is required 9 units of PRBCs since admission  TEG abnormal 11/13, 1 unit platelets transfused  Hematology consulted, appreciate recommendations  Hematology will follow, recommending bone marrow aspiration and testing for myeloproliferative disorder, biopsy is pending  JAK2 and erythropoetin pending    Elevated d-dimer  Assessment & Plan  D-dimer 2.06  Patient having coagulopathy issues, hematology consulted  Will check bilateral lower extremity ultrasound    11/15: US lower extremities negative for DVT.  D-dimer is nonspecific likely attributed to malignancy or generalized inflammation secondary to acute illness at this time.  We will continue to monitor for signs and symptoms of coagulopathy.    Hyponatremia  Assessment & Plan  Resolved, stop IVF    Thrombocytopenia (HCC)  Assessment & Plan  Consistently low since admission  TEG abnormal  1 unit platelets transfused 11/13  Hematology consulted and bone marrow biopsy done. F/u outpatient    Pneumonia  Assessment & Plan  Leukocytosis continuing  Diminished lung sounds with left bibasilar rales on exam  No tachycardia or fever  Pro-Nicolas 1.64, repeat 11/14 1.81  Chest x-ray consistent with left basilar pneumonia  Unasyn and azithromycin (end date 11/18/2022)  Blood cultures NGTD    Slow transit constipation- (present on admission)  Assessment & Plan  Continues to complain of intermittent constipation  Aggressive bowel protocol  11/15: Patient reports constipation unrelieved by docusate.  Requesting milk of magnesia.  One-time order for milk of magnesia  ordered.    Hypovolemia  Assessment & Plan  Due to acute blood loss anemia    Hematuria- (present on admission)  Assessment & Plan  Likely secondary to above    Iron deficiency anemia- (present on admission)  Assessment & Plan  S/p IV iron infusion repletion  Repeat iron labs WNL    Acute blood loss anemia- (present on admission)  Assessment & Plan  Secondary to hematuria.   Required 8 units of PRBCs this admission   Continues to fail CBI clamping with gross hematuria noted  Underwent cystoscopy 11/12 with Dr. Boyer who performed clot evaluation and fulguration of bleeding bladder tissue  Additional cystoscopy 11/14 given continued hematuria, clogging of catheter, and requiring blood transfusions  Hematology consulted and recommend transfusing for Hgb<8 or hemodynamic instability    11/15/2022: Hemoglobin 7.2 this morning.  Repeat H/H ordered.  Hgb 6.9-transfuse 1 unit PRBC  Hgb now stabilized  11/18 - I spoke with Dr. Reyes regarding increased blasts on diff. He reviewed with pathology and this may be reaction from bleeding or transfusions. Path is running additional leukemia studies. He recommends keeping the patient inpatient for now, he may need repeat bone marrow biopsy  11/20 - I spoke with Dr. Reyes regarding bone marrow biopsy and anemia, thrombocytopenia. This may be MPN but this can be follow up as outpatient at this point with possible repeat bone marrow biopsy in 4-6 weeks.    Obesity (BMI 30-39.9)- (present on admission)  Assessment & Plan  Diet and lifestyle modification  Body mass index is 26.21 kg/m².    Leukocytosis- (present on admission)  Assessment & Plan  Likely reactive in setting of malignancy and blood loss anemia  Increased to 24.4 today, discussed with hematology, likely secondary to malignancy  Pro-Nicolas 1.64, repeat 1.81  Chest x-ray consistent with atelectasis versus pneumonia  UA negative  Afebrile  Unasyn (end date 11/18/2022)   Incentive spirometry and ambulate  WBC has  normalized    Dyslipidemia- (present on admission)  Assessment & Plan  Continue atorvastatin    COPD (chronic obstructive pulmonary disease) (HCC)- (present on admission)  Assessment & Plan  Not in acute exacerbation  PRN duonebs       VTE prophylaxis: SCDs/TEDs    I have performed a physical exam and reviewed and updated ROS and Plan today (11/20/2022). In review of yesterday's note (11/19/2022), there are no changes except as documented above.

## 2022-11-20 NOTE — PROGRESS NOTES
Note to reader: this note follows the APSO format rather than the historical SOAP format. Assessment and plan located at the top of the note for ease of use.    Chief Complaint  72 y.o. year old male here with bladder mass and sclerotic lesions.     Assessment/Plan  Interval History   TCC bladder s/p TURBT 10/27, 11/2 and 11/14  Hematuria and clot obstruction s/p clot evacuation and fulguration x3 11/2 and 11/12 and 11/14  Anemia   Thrombocytopenia  BPH with retention      Hand irrigated cuellar with 300 cc sterile water. No clot released. Urine remained pink.  Await hematology work up and recommendations  Cuellar catheter to remain. Consider repeat voiding trial after urine clears.   On Flomax and Finasteride.   Continue CBI - wean as clears. Hand irrigate prn clot obstruction  Monitor H/H        Case discussed with patient and Urology-Dr. José PETERSON  Patient seen and examined    11/20. CBI remains with watermelon color urine. No clots. Hgb slow downtrend.     11/19. Cuellar remains. Urine watermelon with CBI on slow rate. Hgb 8.3, Plt stable at 74    11/18. Cuellar was replaced after pt unable to void last night. CBI restarted for darkening of urine. H/H improving.     11/17. Urine clear/pink. CBI discontinued for 5hrs. H/H stable. Pt anxious to leave.    11/16. Urine clear/pink. D/C CBI. Pt feels well and has not pain. H/H stable and improving.     11/15. Repeat clot evac and fulguration with TURBT yesterday. Urine now pink and no clot obstruction. Abdominal pains and spasms resolved. H/H declined further and received 2 units PRBCs today    11/14. Hematuria continues with difficult time maintaining flow via cuellar. RN unable to irrigate and bladder scan with 500 cc in bladder. Patient in pain in SP region. Receiving PRBCs daily.            Review of Systems  Physical Exam   Review of Systems   Constitutional:  Negative for chills and fever.   Gastrointestinal:  Negative for abdominal pain, nausea and vomiting.    Genitourinary:  Negative for hematuria.   Vitals:    11/19/22 0800 11/19/22 1546 11/19/22 2029 11/20/22 0425   BP: 108/60 120/68 107/64 120/72   Pulse: 64 65 78 77   Resp: 16 16 16 16   Temp: 36.3 °C (97.4 °F) 36.7 °C (98 °F) 36.7 °C (98.1 °F) 36.1 °C (96.9 °F)   TempSrc: Temporal  Temporal Temporal   SpO2: 96% 96% 97% 95%   Weight:       Height:         Physical Exam  Vitals and nursing note reviewed.   Constitutional:       Appearance: Normal appearance.   HENT:      Head: Normocephalic and atraumatic.      Nose: Nose normal.      Mouth/Throat:      Mouth: Mucous membranes are moist.   Eyes:      Pupils: Pupils are equal, round, and reactive to light.   Pulmonary:      Effort: Pulmonary effort is normal.   Abdominal:      General: Abdomen is flat. There is no distension.      Palpations: Abdomen is soft.      Tenderness: There is no abdominal tenderness.   Genitourinary:     Comments: Urine light pink with low flow CBI. Improved penile edema  Skin:     General: Skin is warm and dry.   Neurological:      General: No focal deficit present.      Mental Status: He is alert and oriented to person, place, and time.   Psychiatric:         Mood and Affect: Mood normal.         Behavior: Behavior normal.        Hematology Chemistry   Lab Results   Component Value Date/Time    WBC 11.7 (H) 11/20/2022 04:23 AM    HEMOGLOBIN 8.1 (L) 11/20/2022 04:23 AM    HEMATOCRIT 27.1 (L) 11/20/2022 04:23 AM    PLATELETCT 58 (L) 11/20/2022 04:23 AM     Lab Results   Component Value Date/Time    SODIUM 136 11/20/2022 04:23 AM    POTASSIUM 4.9 11/20/2022 04:23 AM    CHLORIDE 101 11/20/2022 04:23 AM    CO2 30 11/20/2022 04:23 AM    GLUCOSE 95 11/20/2022 04:23 AM    BUN 12 11/20/2022 04:23 AM    CREATININE 0.94 11/20/2022 04:23 AM         Labs not explicitly included in this progress note were reviewed by the author.   Radiology/imaging not explicitly included in this progress note was reviewed by the author.     Medications reviewed and  Labs reviewed

## 2022-11-20 NOTE — PROGRESS NOTES
Pt removed IV, attempting to get OOB and continuously removing O2.  Replaced restraints, updated MD for new order.

## 2022-11-20 NOTE — PROGRESS NOTES
Bedside report received.  Assessment complete.  A&O x 4. Patient calls appropriately.  Patient ambulates with standby assist.  Patient has 6/10 pain. Pain managed with prescribed medications.  Denies N&V. Tolerating regular diet.  R lower back biopsy site, band aid in place.  + void to CBI cuellar, + flatus, - BM.  Patient denies SOB.  Review plan with of care with patient. Call light and personal belongings within reach. Hourly rounding in place. All needs met at this time.

## 2022-11-20 NOTE — CARE PLAN
The patient is Stable - Low risk of patient condition declining or worsening    Shift Goals  Clinical Goals: monitor CBI  Patient Goals: rest    Progress made toward(s) clinical / shift goals:  CBI monitored q1 hour and PRN for volume in cuellar bag and amount of fluid left in bags. Large stock of replacement irrigation bags outside of room. Urine is pink tinged without evidence of clots. Pt has been resting comfortably during the shift.      Problem: Knowledge Deficit - Standard  Goal: Patient and family/care givers will demonstrate understanding of plan of care, disease process/condition, diagnostic tests and medications  Outcome: Progressing     Problem: Pain - Standard  Goal: Alleviation of pain or a reduction in pain to the patient’s comfort goal  Outcome: Progressing     Problem: Fall Risk  Goal: Patient will remain free from falls  Outcome: Progressing       Patient is not progressing towards the following goals:

## 2022-11-21 LAB
ABO GROUP BLD: NORMAL
ANION GAP SERPL CALC-SCNC: 8 MMOL/L (ref 7–16)
ANISOCYTOSIS BLD QL SMEAR: ABNORMAL
BARCODED ABORH UBTYP: 5100
BARCODED PRD CODE UBPRD: NORMAL
BARCODED UNIT NUM UBUNT: NORMAL
BASOPHILS # BLD AUTO: 5.9 % (ref 0–1.8)
BASOPHILS # BLD: 0.53 K/UL (ref 0–0.12)
BLASTS NFR BLD MANUAL: 5.9 %
BLD GP AB SCN SERPL QL: NORMAL
BUN SERPL-MCNC: 15 MG/DL (ref 8–22)
CALCIUM SERPL-MCNC: 8.3 MG/DL (ref 8.5–10.5)
CHLORIDE SERPL-SCNC: 99 MMOL/L (ref 96–112)
CO2 SERPL-SCNC: 29 MMOL/L (ref 20–33)
COMPONENT R 8504R: NORMAL
CREAT SERPL-MCNC: 1 MG/DL (ref 0.5–1.4)
EOSINOPHIL # BLD AUTO: 0.15 K/UL (ref 0–0.51)
EOSINOPHIL NFR BLD: 1.7 % (ref 0–6.9)
ERYTHROCYTE [DISTWIDTH] IN BLOOD BY AUTOMATED COUNT: 61.5 FL (ref 35.9–50)
GFR SERPLBLD CREATININE-BSD FMLA CKD-EPI: 80 ML/MIN/1.73 M 2
GLUCOSE SERPL-MCNC: 134 MG/DL (ref 65–99)
HCT VFR BLD AUTO: 22.8 % (ref 42–52)
HGB BLD-MCNC: 6.9 G/DL (ref 14–18)
HGB BLD-MCNC: 7.1 G/DL (ref 14–18)
HGB BLD-MCNC: 8.5 G/DL (ref 14–18)
INR PPP: 1.24 (ref 0.87–1.13)
IRON SATN MFR SERPL: 5 % (ref 15–55)
IRON SERPL-MCNC: 10 UG/DL (ref 50–180)
LYMPHOCYTES # BLD AUTO: 2.62 K/UL (ref 1–4.8)
LYMPHOCYTES NFR BLD: 29.4 % (ref 22–41)
MACROCYTES BLD QL SMEAR: ABNORMAL
MANUAL DIFF BLD: ABNORMAL
MCH RBC QN AUTO: 26.7 PG (ref 27–33)
MCHC RBC AUTO-ENTMCNC: 30.3 G/DL (ref 33.7–35.3)
MCV RBC AUTO: 88.4 FL (ref 81.4–97.8)
METAMYELOCYTES NFR BLD MANUAL: 3.4 %
MICROCYTES BLD QL SMEAR: ABNORMAL
MONOCYTES # BLD AUTO: 0.29 K/UL (ref 0–0.85)
MONOCYTES NFR BLD AUTO: 3.3 % (ref 0–13.4)
MORPHOLOGY BLD-IMP: NORMAL
NEUTROPHILS # BLD AUTO: 4.49 K/UL (ref 1.82–7.42)
NEUTROPHILS NFR BLD: 48.7 % (ref 44–72)
NEUTS BAND NFR BLD MANUAL: 1.7 % (ref 0–10)
NRBC # BLD AUTO: 2.5 K/UL
NRBC BLD-RTO: 28.1 /100 WBC
PLATELET # BLD AUTO: 69 K/UL (ref 164–446)
PLATELET BLD QL SMEAR: NORMAL
POLYCHROMASIA BLD QL SMEAR: NORMAL
POTASSIUM SERPL-SCNC: 4.9 MMOL/L (ref 3.6–5.5)
PRODUCT TYPE UPROD: NORMAL
PROTHROMBIN TIME: 15.4 SEC (ref 12–14.6)
RBC # BLD AUTO: 2.58 M/UL (ref 4.7–6.1)
RBC BLD AUTO: PRESENT
RH BLD: NORMAL
SODIUM SERPL-SCNC: 136 MMOL/L (ref 135–145)
TIBC SERPL-MCNC: 207 UG/DL (ref 250–450)
UIBC SERPL-MCNC: 197 UG/DL (ref 110–370)
UNIT STATUS USTAT: NORMAL
WBC # BLD AUTO: 8.9 K/UL (ref 4.8–10.8)

## 2022-11-21 PROCEDURE — 36415 COLL VENOUS BLD VENIPUNCTURE: CPT

## 2022-11-21 PROCEDURE — 86901 BLOOD TYPING SEROLOGIC RH(D): CPT

## 2022-11-21 PROCEDURE — 86850 RBC ANTIBODY SCREEN: CPT

## 2022-11-21 PROCEDURE — 99233 SBSQ HOSP IP/OBS HIGH 50: CPT | Performed by: INTERNAL MEDICINE

## 2022-11-21 PROCEDURE — 85018 HEMOGLOBIN: CPT

## 2022-11-21 PROCEDURE — A9270 NON-COVERED ITEM OR SERVICE: HCPCS | Performed by: NURSE PRACTITIONER

## 2022-11-21 PROCEDURE — 770001 HCHG ROOM/CARE - MED/SURG/GYN PRIV*

## 2022-11-21 PROCEDURE — 700102 HCHG RX REV CODE 250 W/ 637 OVERRIDE(OP): Performed by: STUDENT IN AN ORGANIZED HEALTH CARE EDUCATION/TRAINING PROGRAM

## 2022-11-21 PROCEDURE — 85610 PROTHROMBIN TIME: CPT

## 2022-11-21 PROCEDURE — A9270 NON-COVERED ITEM OR SERVICE: HCPCS | Performed by: PHYSICIAN ASSISTANT

## 2022-11-21 PROCEDURE — 700111 HCHG RX REV CODE 636 W/ 250 OVERRIDE (IP): Performed by: INTERNAL MEDICINE

## 2022-11-21 PROCEDURE — 700102 HCHG RX REV CODE 250 W/ 637 OVERRIDE(OP): Performed by: NURSE PRACTITIONER

## 2022-11-21 PROCEDURE — 85025 COMPLETE CBC W/AUTO DIFF WBC: CPT

## 2022-11-21 PROCEDURE — 36430 TRANSFUSION BLD/BLD COMPNT: CPT

## 2022-11-21 PROCEDURE — 85007 BL SMEAR W/DIFF WBC COUNT: CPT

## 2022-11-21 PROCEDURE — P9016 RBC LEUKOCYTES REDUCED: HCPCS

## 2022-11-21 PROCEDURE — 83550 IRON BINDING TEST: CPT

## 2022-11-21 PROCEDURE — A9270 NON-COVERED ITEM OR SERVICE: HCPCS | Performed by: STUDENT IN AN ORGANIZED HEALTH CARE EDUCATION/TRAINING PROGRAM

## 2022-11-21 PROCEDURE — 700105 HCHG RX REV CODE 258: Performed by: STUDENT IN AN ORGANIZED HEALTH CARE EDUCATION/TRAINING PROGRAM

## 2022-11-21 PROCEDURE — 700101 HCHG RX REV CODE 250: Performed by: STUDENT IN AN ORGANIZED HEALTH CARE EDUCATION/TRAINING PROGRAM

## 2022-11-21 PROCEDURE — 700105 HCHG RX REV CODE 258: Performed by: INTERNAL MEDICINE

## 2022-11-21 PROCEDURE — A9270 NON-COVERED ITEM OR SERVICE: HCPCS | Performed by: HOSPITALIST

## 2022-11-21 PROCEDURE — 80048 BASIC METABOLIC PNL TOTAL CA: CPT

## 2022-11-21 PROCEDURE — 700102 HCHG RX REV CODE 250 W/ 637 OVERRIDE(OP): Performed by: INTERNAL MEDICINE

## 2022-11-21 PROCEDURE — 83540 ASSAY OF IRON: CPT

## 2022-11-21 PROCEDURE — 86900 BLOOD TYPING SEROLOGIC ABO: CPT

## 2022-11-21 PROCEDURE — 94760 N-INVAS EAR/PLS OXIMETRY 1: CPT

## 2022-11-21 PROCEDURE — A9270 NON-COVERED ITEM OR SERVICE: HCPCS | Performed by: INTERNAL MEDICINE

## 2022-11-21 PROCEDURE — 86923 COMPATIBILITY TEST ELECTRIC: CPT

## 2022-11-21 PROCEDURE — 700102 HCHG RX REV CODE 250 W/ 637 OVERRIDE(OP): Performed by: PHYSICIAN ASSISTANT

## 2022-11-21 PROCEDURE — 700102 HCHG RX REV CODE 250 W/ 637 OVERRIDE(OP): Performed by: HOSPITALIST

## 2022-11-21 RX ADMIN — ATORVASTATIN CALCIUM 20 MG: 20 TABLET, FILM COATED ORAL at 18:50

## 2022-11-21 RX ADMIN — AMINOCAPROIC ACID: 250 INJECTION, SOLUTION INTRAVENOUS at 21:09

## 2022-11-21 RX ADMIN — OXYBUTYNIN CHLORIDE 5 MG: 5 TABLET ORAL at 05:04

## 2022-11-21 RX ADMIN — MORPHINE SULFATE 30 MG: 15 TABLET ORAL at 21:53

## 2022-11-21 RX ADMIN — ALPRAZOLAM 0.25 MG: 0.25 TABLET ORAL at 09:23

## 2022-11-21 RX ADMIN — AMINOCAPROIC ACID: 250 INJECTION, SOLUTION INTRAVENOUS at 23:40

## 2022-11-21 RX ADMIN — GABAPENTIN 200 MG: 100 CAPSULE ORAL at 05:03

## 2022-11-21 RX ADMIN — TAMSULOSIN HYDROCHLORIDE 0.4 MG: 0.4 CAPSULE ORAL at 09:23

## 2022-11-21 RX ADMIN — AMINOCAPROIC ACID: 250 INJECTION, SOLUTION INTRAVENOUS at 18:50

## 2022-11-21 RX ADMIN — SODIUM CHLORIDE 250 MG: 9 INJECTION, SOLUTION INTRAVENOUS at 18:52

## 2022-11-21 RX ADMIN — AMINOCAPROIC ACID: 250 INJECTION, SOLUTION INTRAVENOUS at 15:59

## 2022-11-21 RX ADMIN — ALPRAZOLAM 0.25 MG: 0.25 TABLET ORAL at 13:14

## 2022-11-21 RX ADMIN — DOCUSATE SODIUM 50 MG AND SENNOSIDES 8.6 MG 2 TABLET: 8.6; 5 TABLET, FILM COATED ORAL at 18:50

## 2022-11-21 RX ADMIN — AMINOCAPROIC ACID: 250 INJECTION, SOLUTION INTRAVENOUS at 13:14

## 2022-11-21 RX ADMIN — FINASTERIDE 5 MG: 5 TABLET, FILM COATED ORAL at 05:03

## 2022-11-21 RX ADMIN — FOLIC ACID 1 MG: 1 TABLET ORAL at 05:03

## 2022-11-21 RX ADMIN — THIAMINE HCL TAB 100 MG 100 MG: 100 TAB at 18:50

## 2022-11-21 RX ADMIN — CYANOCOBALAMIN TAB 500 MCG 1000 MCG: 500 TAB at 05:03

## 2022-11-21 RX ADMIN — GABAPENTIN 200 MG: 100 CAPSULE ORAL at 13:14

## 2022-11-21 RX ADMIN — AMINOCAPROIC ACID: 250 INJECTION, SOLUTION INTRAVENOUS at 17:42

## 2022-11-21 RX ADMIN — Medication 2000 UNITS: at 05:03

## 2022-11-21 RX ADMIN — AMINOCAPROIC ACID: 250 INJECTION, SOLUTION INTRAVENOUS at 19:51

## 2022-11-21 RX ADMIN — ALPRAZOLAM 0.25 MG: 0.25 TABLET ORAL at 21:53

## 2022-11-21 RX ADMIN — AMINOCAPROIC ACID: 250 INJECTION, SOLUTION INTRAVENOUS at 21:57

## 2022-11-21 RX ADMIN — OXYBUTYNIN CHLORIDE 5 MG: 5 TABLET ORAL at 13:14

## 2022-11-21 RX ADMIN — THIAMINE HCL TAB 100 MG 100 MG: 100 TAB at 05:03

## 2022-11-21 RX ADMIN — ACETAMINOPHEN 650 MG: 325 TABLET, FILM COATED ORAL at 05:03

## 2022-11-21 RX ADMIN — OXYBUTYNIN CHLORIDE 5 MG: 5 TABLET ORAL at 18:50

## 2022-11-21 RX ADMIN — LEVOTHYROXINE SODIUM 25 MCG: 0.03 TABLET ORAL at 05:04

## 2022-11-21 RX ADMIN — GABAPENTIN 200 MG: 100 CAPSULE ORAL at 18:50

## 2022-11-21 RX ADMIN — AMINOCAPROIC ACID: 250 INJECTION, SOLUTION INTRAVENOUS at 22:50

## 2022-11-21 RX ADMIN — MORPHINE SULFATE 30 MG: 15 TABLET ORAL at 02:02

## 2022-11-21 RX ADMIN — AMINOCAPROIC ACID: 250 INJECTION, SOLUTION INTRAVENOUS at 14:28

## 2022-11-21 RX ADMIN — AMINOCAPROIC ACID: 250 INJECTION, SOLUTION INTRAVENOUS at 16:22

## 2022-11-21 ASSESSMENT — ENCOUNTER SYMPTOMS
SHORTNESS OF BREATH: 0
ABDOMINAL PAIN: 0
MYALGIAS: 0
FEVER: 0
WEAKNESS: 0
NAUSEA: 0
NERVOUS/ANXIOUS: 0
CHILLS: 0
COUGH: 0
VOMITING: 0

## 2022-11-21 ASSESSMENT — PAIN DESCRIPTION - PAIN TYPE
TYPE: ACUTE PAIN

## 2022-11-21 ASSESSMENT — FIBROSIS 4 INDEX: FIB4 SCORE: 5.06

## 2022-11-21 NOTE — CARE PLAN
The patient is Stable - Low risk of patient condition declining or worsening    Shift Goals  Clinical Goals: CBI, monitor hematuria/labs, pain control, rest  Patient Goals: Discharge  Family Goals: No family present    Progress made toward(s) clinical / shift goals: CBI running at a fast rate. Patient reported comfort/improvement in pressure when cuellar bag is empty. Medicated for pain; see MAR. Patient slept intermittently during shift. Tolerating blood transfusion this AM.     Patient is not progressing towards the following goals: NA.

## 2022-11-21 NOTE — PROGRESS NOTES
Note to reader: this note follows the APSO format rather than the historical SOAP format. Assessment and plan located at the top of the note for ease of use.    Chief Complaint  72 y.o. year old male here with bladder mass and sclerotic lesions.     Assessment/Plan  Interval History   TCC bladder s/p TURBT 10/27, 11/2 and 11/14  Hematuria and clot obstruction s/p clot evacuation and fulguration x3 11/2 and 11/12 and 11/14  Anemia   Thrombocytopenia  BPH with retention    Procedure:  - At bedside, I clamped CBI and manually irrigated arboleda with ~250cc of sterile saline, with return of one small clot. Urine remained light pink with irrigation      Plan:  - Discussed with Dr Trevizo and Dr Castellanos; will continue CBI with 200mg/1L of amicar in NS. Spoke with pharmacy who is preparing many 1L bags of solution  - Amicar CBI initiated at 1315 after hand irrigation to ensure clots removed prior  - NPO at midnight in case of need for OR tomorrow  - S/p bone marrow bx, appreciate hematology rec's  - On Flomax and Finasteride, to continue          Case discussed with patient, nursing, pharmacist, and Urology-Dr. Castellanos and Dr Trevizo    Patient seen and examined    11/21. CBI on high flow, urine light cherry. Clots irrigated this am by nursing. Hb 6.9 (8.1), received another unit of pRBCs this am. VSS. CBI slowed at bedside and urine immediately darkened.    11/20. CBI remains with watermelon color urine. No clots. Hgb slow downtrend.     11/19. Arboleda remains. Urine watermelon with CBI on slow rate. Hgb 8.3, Plt stable at 74    11/18. Arboleda was replaced after pt unable to void last night. CBI restarted for darkening of urine. H/H improving.     11/17. Urine clear/pink. CBI discontinued for 5hrs. H/H stable. Pt anxious to leave.    11/16. Urine clear/pink. D/C CBI. Pt feels well and has not pain. H/H stable and improving.     11/15. Repeat clot evac and fulguration with TURBT yesterday. Urine now pink and no clot obstruction.  Abdominal pains and spasms resolved. H/H declined further and received 2 units PRBCs today    11/14. Hematuria continues with difficult time maintaining flow via cuellar. RN unable to irrigate and bladder scan with 500 cc in bladder. Patient in pain in SP region. Receiving PRBCs daily.            Review of Systems  Physical Exam   Review of Systems   Constitutional:  Negative for chills and fever.   Respiratory:  Negative for cough.    Cardiovascular:  Negative for chest pain.   Gastrointestinal:  Negative for abdominal pain, nausea and vomiting.   Genitourinary:  Positive for hematuria.   All other systems reviewed and are negative.  Vitals:    11/21/22 0500 11/21/22 0508 11/21/22 0730 11/21/22 0808   BP: 120/67 134/71 112/72 118/72   Pulse: 75 77 76 81   Resp: 20 16 14 16   Temp: 36.2 °C (97.1 °F) 36.6 °C (97.8 °F) 36.2 °C (97.2 °F) 36.4 °C (97.5 °F)   TempSrc: Temporal Temporal  Temporal   SpO2: 95% 95% 97% 94%   Weight:       Height:         Physical Exam  Vitals and nursing note reviewed.   Constitutional:       Appearance: Normal appearance.   HENT:      Head: Normocephalic and atraumatic.      Nose: Nose normal.      Mouth/Throat:      Mouth: Mucous membranes are moist.   Eyes:      Pupils: Pupils are equal, round, and reactive to light.   Pulmonary:      Effort: Pulmonary effort is normal.   Abdominal:      General: Abdomen is flat. There is no distension.      Palpations: Abdomen is soft.      Tenderness: There is no abdominal tenderness.   Genitourinary:     Comments: Urine light cherry in cuellar tubing with CBI on high flow  Skin:     General: Skin is warm and dry.   Neurological:      General: No focal deficit present.      Mental Status: He is alert and oriented to person, place, and time.   Psychiatric:         Mood and Affect: Mood normal.         Behavior: Behavior normal.        Hematology Chemistry   Lab Results   Component Value Date/Time    WBC 8.9 11/21/2022 12:28 AM    HEMOGLOBIN 6.9 (L)  11/21/2022 12:28 AM    HEMATOCRIT 22.8 (L) 11/21/2022 12:28 AM    PLATELETCT 69 (L) 11/21/2022 12:28 AM     Lab Results   Component Value Date/Time    SODIUM 136 11/21/2022 12:28 AM    POTASSIUM 4.9 11/21/2022 12:28 AM    CHLORIDE 99 11/21/2022 12:28 AM    CO2 29 11/21/2022 12:28 AM    GLUCOSE 134 (H) 11/21/2022 12:28 AM    BUN 15 11/21/2022 12:28 AM    CREATININE 1.00 11/21/2022 12:28 AM         Labs not explicitly included in this progress note were reviewed by the author.   Radiology/imaging not explicitly included in this progress note was reviewed by the author.     Radiology images reviewed, Labs reviewed and Medications reviewed

## 2022-11-21 NOTE — PROGRESS NOTES
Report received from AM RN; assumed care. A&O x 4. VSS. 95% on 1L NC. Assessment complete. Patient denying SOB, numbness, tingling, nausea, vomiting, dizziness. Medicated for back pain; see MAR.  Abdomen round. CBI running at fast rate d/t increased bloody output. Small clots noted, no irrigation needed. + void + eructation. + flatus. LBM 11/20. Patient tolerating diet. Requesting additional breakfast items. Patient up self/SBA with steady gait noted. Patient tolerated blood transfusion. Patient expressed being sad because of the receiving another blood transfusion, believing he was going to be discharged today. Discussed plan of care with patient. All questions answered.  Low fall risk. Bed in locked/lowest position.  Call light/personal belongings within reach.  All needs met, patient slept intermittently during shift

## 2022-11-21 NOTE — CARE PLAN
The patient is Stable - Low risk of patient condition declining or worsening    Shift Goals  Clinical Goals: monitor CBI, pain control  Patient Goals: rest  Family Goals: No family present    Progress made toward(s) clinical / shift goals:  CBI running pink, hand irrigated x2 this shift, denies pain at this time.    Patient is not progressing towards the following goals:

## 2022-11-21 NOTE — PROGRESS NOTES
Hospital Medicine Daily Progress Note    Date of Service  11/21/2022    Chief Complaint  Gregg Martinez is a 72 y.o. male admitted 10/26/2022 with hematuria    Hospital Course  73 yo man with asthma, HTN who presented with 2 weeks of hematuria and 10 lb weight loss. He was found anemia, Hgb 6.5. CT AP showed bladder mass. Urology was consulted, patient was found with mutifocal bladder tumor that was resected and intravesical gemcitabine. Pathology showing low-grade papillary urothelial carcinoma noninvasive with muscularis propria uninvolved by tumor. CT showed evidence of bone mets. He underwent bone biopsy 10/31. Post-op procedure he continued to have hematuria needing CBI. Patient had clot evacuation and fulguration with transurethral resection of bladder tumor 11/2. Arboleda cath was removed but hematuria recurrent and CBI restarted. Patient again had clot evacuation and fulguration of bleeding bladder tissue 11/12. However hematuria persists and he continued to need transfusion support. Patient had 3rd clot evacuation and fulguration with tumor resection 11/14.   Dr. Reyes with oncology was consulted 11/14, Splenomegaly 19 cms and having anemia and thrombocytopenia before admission rasies concerns of MPN. Needs BM BX ones stabilized ordered MIRTA-2 from peripheral blood.     Interval Problem Update  Hgb decreased to 6.9, plt 69  Patient has no dizziness, feels anxious to go home  Ongoing hematuria, urology plans for OR today and amniocaproic acid irrigation    I have discussed this patient's plan of care and discharge plan at IDT rounds today with Case Management, Nursing, Nursing leadership, and other members of the IDT team.    Consultants/Specialty  oncology and urology    Code Status  Full Code    Disposition  Patient is not medically cleared for discharge.   Anticipate discharge to to home with close outpatient follow-up.  I have placed the appropriate orders for post-discharge needs.    Review of  Systems  Review of Systems   Constitutional:  Negative for malaise/fatigue.   Respiratory:  Negative for shortness of breath.    Cardiovascular:  Negative for chest pain.   Gastrointestinal:  Negative for abdominal pain and nausea.   Genitourinary:  Positive for hematuria.   Musculoskeletal:  Negative for myalgias.   Neurological:  Negative for weakness.   Psychiatric/Behavioral:  The patient is not nervous/anxious.       Physical Exam  Temp:  [36.2 °C (97.1 °F)-37.3 °C (99.2 °F)] 36.7 °C (98 °F)  Pulse:  [71-87] 71  Resp:  [14-20] 18  BP: ()/(46-72) 98/46  SpO2:  [93 %-98 %] 96 %    Physical Exam  Vitals and nursing note reviewed.   Constitutional:       General: He is not in acute distress.     Appearance: He is not toxic-appearing.   HENT:      Head: Normocephalic.      Mouth/Throat:      Mouth: Mucous membranes are moist.   Eyes:      General:         Right eye: No discharge.         Left eye: No discharge.   Cardiovascular:      Rate and Rhythm: Normal rate and regular rhythm.   Pulmonary:      Effort: No respiratory distress.      Breath sounds: Normal breath sounds.   Abdominal:      Palpations: Abdomen is soft.      Tenderness: There is no abdominal tenderness.   Musculoskeletal:      Cervical back: Neck supple.      Right lower leg: Edema present.      Left lower leg: Edema present.   Skin:     General: Skin is warm and dry.   Neurological:      Mental Status: He is alert and oriented to person, place, and time.       Fluids    Intake/Output Summary (Last 24 hours) at 11/21/2022 1524  Last data filed at 11/21/2022 0900  Gross per 24 hour   Intake 240 ml   Output 35760 ml   Net -35608 ml       Laboratory  Recent Labs     11/20/22  0423 11/20/22  1423 11/20/22  2158 11/21/22  0028   WBC 11.7* 13.0*  --  8.9   RBC 3.02* 2.99*  --  2.58*   HEMOGLOBIN 8.1* 7.8* 7.1* 6.9*   HEMATOCRIT 27.1* 26.4*  --  22.8*   MCV 89.7 88.3  --  88.4   MCH 26.8* 26.1*  --  26.7*   MCHC 29.9* 29.5*  --  30.3*   RDW 61.7*  60.8*  --  61.5*   PLATELETCT 58* 76*  --  69*     Recent Labs     11/19/22  0010 11/20/22  0423 11/21/22  0028   SODIUM 140 136 136   POTASSIUM 4.9 4.9 4.9   CHLORIDE 103 101 99   CO2 28 30 29   GLUCOSE 123* 95 134*   BUN 12 12 15   CREATININE 1.22 0.94 1.00   CALCIUM 8.6 8.6 8.3*     Recent Labs     11/19/22  0010 11/21/22  0028   INR 1.12 1.24*               Imaging  IR-US GUIDED PIV   Final Result    Ultrasound-guided PERIPHERAL IV INSERTION performed by    qualified nursing staff as above.      EC-ECHOCARDIOGRAM COMPLETE W/O CONT   Final Result      US-EXTREMITY VENOUS LOWER BILAT   Final Result      DX-CHEST-PORTABLE (1 VIEW)   Final Result      Increased LEFT basilar atelectasis or pneumonia      CT-ABDOMEN-PELVIS W/O   Final Result      1.  Interval hemorrhage within the partially decompressed urinary bladder. Previously visualized posterior left bladder mass is no longer seen presumably this is due to interval resection      2.  New mild left hydroureteronephrosis with moderate perinephric fat stranding. This is concerning for obstruction perhaps related to hematoma, recent instrumentation or pyelonephritis/ureterectasis.      3.  Increasing colonic stool volume with similar moderate diverticulosis of the colon but no evidence of diverticulitis      4.  Marked splenomegaly      5.  New small effusions with some lower lung zone groundglass suspicious for interstitial edema/mild congestive failure. There is stable mild pericardial fluid      6.  Persistent inhomogeneous medullary spaces with generalized increased density. Recent bone scan showed no focal mass but this could reflect underlying myelofibrosis or metastases with false-negative bone scan      CT-NEEDLE BX-DEEP BONE   Final Result      1.  Successful CT-guided core biopsy of sclerotic lesion in the posterior aspect of left inferior pubic ramus.      NM-BONE/JOINT SCAN WHOLE BODY   Final Result      No evidence of bony metastatic disease       CT-CHEST (THORAX) WITH   Final Result         1.  Emphysema   2.  Hazy bilateral upper lobe opacities with slight intralobular septal thickening, could represent component of mild pulmonary edema   3.  Linear densities the bilateral lung bases favors scarring or atelectasis.   4.  Atherosclerosis and atherosclerotic coronary artery disease   5.  Hepatomegaly with irregular hepatic contour favoring changes of cirrhosis.   6.  Subcentimeter hepatic lobe lesions, could represent small cyst or hemangioma, otherwise indeterminate.   7.  Mild mediastinal adenopathy, workup and evaluation for causes of adenopathy recommended as clinically appropriate.   8.  Splenomegaly      DX-CHEST-PORTABLE (1 VIEW)   Final Result         1.  No acute cardiopulmonary disease.   2.  Atherosclerosis      CT-ABDOMEN-PELVIS WITH   Final Result      1.  Enhancing 2.5 x 2.3 cm bladder wall mass suspicious for neoplasm.   2.  Heterogeneous sclerotic change of the pelvis and proximal femurs suspicious for metastatic disease or other infiltrating marrow process.   3.  Mild cardiomegaly.   4.  Hepatosplenomegaly.   5.  Small subcentimeter hepatic hypodensities which could represent cysts or hemangiomas but are nonspecific. If indicated, these findings could be further evaluated with dedicated liver protocol CT or MRI or ultrasound on a nonemergent basis.   6.  Colonic diverticulosis without evidence of diverticulitis.   7.  Atherosclerosis.           Assessment/Plan  * Urothelial carcinoma of bladder without invasion of muscle (HCC)- (present on admission)  Assessment & Plan  CT AP: Notable enhancing 2.5 x 2.3 cm bladder wall mass suspicious for neoplasms  s/p TURBT on 10/27 with multiple tumors noted  Pathology showing low-grade papillary urothelial carcinoma noninvasive with muscularis propria uninvolved by tumor  CT chest negative for metastasis  Bone scan without any evidence of metastasis  IR guided bone biopsy on 10/31 negative for  malignancy    11/2: S/p Cystoscopy with clot evacuation, fulguration, and TUBRT  11/3: Attempt to wean CBI   11/5: CBI discontinued, TOVR   11/6: Hemoglobin drop of 1 g and recurrence of hematuria.  CBI restarted  11/7: CBI running, hand irrigated by urology.  Still with leaking around catheter tip, urine culture NGTD  11/8: CBI running, hand irrigated by urology who deferred surgical intervention with cauterization until tomorrow with close monitoring of H&H, 7.6 this morning  11/9: Seen by urology- wean CBI as tolerated. Continue to trend H/h. Transfuse PRN. Follow up with urology and oncology OP  11/10: Seen and cleared by urology. Per their recs:   Clamp CBI. If hematuria not worsening, can d/c CBI.   They will arrange follow up with their office next week for voiding trial to have cuellar removed  11/11:  Failed CBI clamping yesterday. Deep red gross hematuria noted. hgb stable. Continue to monitor  11/12: Urology took to the OR today for cystoscopy with clot evacuation and fulguration of bleeding bladder tissue  11/13: Continues with cherry red urine and significant clotting requiring hand irrigation. 1 unit PRBC today. Discussed with Shakira Amos urology aware, ordered B&O.  11/14: Catheter clogged twice past 24 hours, required upgrade to higher level of care given frequency of irrigation. D/W Urology, will take patient back to the OR today  11/15: POD #1 s/p cystoscopy with clot evacuation and fulguration of bleeding bladder tissue.  Hgb 7.2 this morning.  We will continue to trend and transfuse as needed.  Heme-onc has seen patient with plans for bone marrow biopsy tentatively today or tomorrow.    Unsuccessful weaning of CBI, OR today with urology and amniocaproic acid irrigation      Hypothyroidism  Assessment & Plan  New diagnosis of, TSH high and T4 low  Started on synthroid 25mcg daily, repeat labs in 4-6 weeks    Paroxysmal atrial fibrillation (HCC)  Assessment & Plan  Had 6 sec on afib on tele. No prior  history of per patient. Will check TSH and echo  No AC in setting of hematuria and high risk of bleeding    Acute kidney injury (HCC)  Assessment & Plan  Secondary to hypoperfusion given hypovolemia  NS at 125 ml/hr  Improving   Transfuse for H/H < 7    Coagulation defect (HCC)  Assessment & Plan  Patient has acute blood loss anemia secondary to hematuria in the setting of newly diagnosed bladder cancer  Is required 9 units of PRBCs since admission, along with platelets and FFP  Hematology consulted, recommending bone marrow aspiration and testing for myeloproliferative disorder, biopsy is pending  JAK2 and erythropoetin pending    Elevated d-dimer  Assessment & Plan  D-dimer 2.06  Patient having coagulopathy issues, hematology consulted  Will check bilateral lower extremity ultrasound    11/15: US lower extremities negative for DVT.  D-dimer is nonspecific likely attributed to malignancy or generalized inflammation secondary to acute illness at this time.  We will continue to monitor for signs and symptoms of coagulopathy.    Hyponatremia  Assessment & Plan  Resolved, stop IVF    Thrombocytopenia (HCC)  Assessment & Plan  Consistently low since admission  TEG normal  1 unit platelets transfused 11/13  Hematology consulted and bone marrow biopsy done. F/u outpatient    Pneumonia  Assessment & Plan  Leukocytosis continuing  Diminished lung sounds with left bibasilar rales on exam  No tachycardia or fever  Pro-Nicolas 1.64, repeat 11/14 1.81  Chest x-ray consistent with left basilar pneumonia  Unasyn and azithromycin (end date 11/18/2022)  Blood cultures NGTD    Slow transit constipation- (present on admission)  Assessment & Plan  Continues to complain of intermittent constipation  Aggressive bowel protocol  11/15: Patient reports constipation unrelieved by docusate.  Requesting milk of magnesia.  One-time order for milk of magnesia ordered.    Hypovolemia  Assessment & Plan  Due to acute blood loss anemia    Hematuria-  (present on admission)  Assessment & Plan  Likely secondary to above    Iron deficiency anemia- (present on admission)  Assessment & Plan  S/p IV iron infusion repletion  Repeat iron labs WNL    Acute blood loss anemia- (present on admission)  Assessment & Plan  Secondary to hematuria.   Required 8 units of PRBCs this admission   Continues to fail CBI clamping with gross hematuria noted  Underwent cystoscopy 11/12 with Dr. Boyer who performed clot evaluation and fulguration of bleeding bladder tissue  Additional cystoscopy 11/14 given continued hematuria, clogging of catheter, and requiring blood transfusions  Hematology consulted and recommend transfusing for Hgb<8 or hemodynamic instability    11/15/2022: Hemoglobin 7.2 this morning.  Repeat H/H ordered.  Hgb 6.9-transfuse 1 unit PRBC  Hgb now stabilized  11/18 - I spoke with Dr. Reyes regarding increased blasts on diff. He reviewed with pathology and this may be reaction from bleeding or transfusions. Path is running additional leukemia studies. He recommends keeping the patient inpatient for now, he may need repeat bone marrow biopsy  11/20 - I spoke with Dr. Reyes regarding bone marrow biopsy and anemia, thrombocytopenia. This may be MPN but this can be follow up as outpatient at this point with possible repeat bone marrow biopsy in 4 weeks.    Obesity (BMI 30-39.9)- (present on admission)  Assessment & Plan  Diet and lifestyle modification  Body mass index is 26.21 kg/m².    Leukocytosis- (present on admission)  Assessment & Plan  Likely reactive in setting of malignancy and blood loss anemia  Increased to 24.4 today, discussed with hematology, likely secondary to malignancy  Pro-Nicolas 1.64, repeat 1.81  Chest x-ray consistent with atelectasis versus pneumonia  UA negative  Afebrile  Unasyn (end date 11/18/2022)   Incentive spirometry and ambulate  WBC has normalized    Dyslipidemia- (present on admission)  Assessment & Plan  Continue  atorvastatin    COPD (chronic obstructive pulmonary disease) (HCC)- (present on admission)  Assessment & Plan  Not in acute exacerbation  PRN duonebs       VTE prophylaxis: SCDs/TEDs    I have performed a physical exam and reviewed and updated ROS and Plan today (11/21/2022). In review of yesterday's note (11/20/2022), there are no changes except as documented above.

## 2022-11-21 NOTE — PROGRESS NOTES
HEMATOLOGY-ONCOLOGY PROGRESS NOTE    CC:  Persistent anemia, low grade papillary urothelial carcinoma    Interval Updates:  ongoing hematuria. Still continues to need continuous bladder flushes.  Patient overall stable.    Objective:  Medications reviewed and notable for:  Current Facility-Administered Medications   Medication Dose    tamsulosin (FLOMAX) capsule 0.4 mg  0.4 mg    finasteride (PROSCAR) tablet 5 mg  5 mg    morphine (MS IR) tablet 15-30 mg  15-30 mg    levothyroxine (SYNTHROID) tablet 25 mcg  25 mcg    Nozin nasal  swab  1 Applicator    sodium chloride (OCEAN) 0.65 % nasal spray 2 Spray  2 Spray    carboxymethylcellulose (REFRESH TEARS) 0.5 % ophthalmic drops 1 Drop  1 Drop    ALPRAZolam (XANAX) tablet 0.25 mg  0.25 mg    oxybutynin (DITROPAN) tablet 5 mg  5 mg    Respiratory Therapy Consult      HYDROmorphone (Dilaudid) injection 1 mg  1 mg    gabapentin (NEURONTIN) capsule 200 mg  200 mg    senna-docusate (PERICOLACE or SENOKOT S) 8.6-50 MG per tablet 2 Tablet  2 Tablet    And    polyethylene glycol/lytes (MIRALAX) PACKET 1 Packet  1 Packet    And    magnesium hydroxide (MILK OF MAGNESIA) suspension 30 mL  30 mL    And    bisacodyl (DULCOLAX) suppository 10 mg  10 mg    acetaminophen (Tylenol) tablet 650 mg  650 mg    labetalol (NORMODYNE/TRANDATE) injection 10 mg  10 mg    ondansetron (ZOFRAN) syringe/vial injection 4 mg  4 mg    ondansetron (ZOFRAN ODT) dispertab 4 mg  4 mg    guaiFENesin dextromethorphan (ROBITUSSIN DM) 100-10 MG/5ML syrup 10 mL  10 mL    Pharmacy Consult Request ...Pain Management Review 1 Each  1 Each    albuterol inhaler 2 Puff  2 Puff    atorvastatin (LIPITOR) tablet 20 mg  20 mg    folic acid (FOLVITE) tablet 1 mg  1 mg    vitamin D3 (cholecalciferol) tablet 2,000 Units  2,000 Units    thiamine (Vitamin B-1) tablet 100 mg  100 mg    cyanocobalamin (VITAMIN B-12) tablet 1,000 mcg  1,000 mcg       ROS:   Constitutional: No fatigue, no fevers or chills, no night  "sweats  Resp: No cough or SOB  Cardio:No chest pain or palpitations  Pschy: No depression or anxiety   Neuro: No headaches, no seizure, no vision changes  GI: no abdominal pain, nausea or vomiting. No diarrhea or constipation   All other ROS negative    /57   Pulse 87   Temp 37.3 °C (99.2 °F) (Temporal)   Resp 16   Ht 1.727 m (5' 8\")   Wt 83.2 kg (183 lb 6.8 oz)   SpO2 97%     Physical Exam  General:  comfortable, NAD  HEENT:  sclera anicteric, oral cavity and oropharynx clear, mucous membranes moist  Neck:   supple, no lymphadenopathy  Cor:   regular rate and rhythm, no murmurs, rubs, or gallops  Pulm:   clear to auscultation bilaterally  Abd:   bowel sounds present, soft, No tenderness to palpation, nondistended, no palpable masses or organomegaly  Extremities:  warm, no lower extremity edema  Neurologic:  A&O x 3  Pyschiatric:  Appropriate mood and affect    Labs reviewed and notable for:  Recent Labs     11/18/22  1003 11/19/22  0010 11/20/22  0423 11/20/22  1423   WBC 14.5* 12.6* 11.7* 13.0*   RBC 3.27* 3.08* 3.02* 2.99*   HEMOGLOBIN 9.0* 8.3* 8.1* 7.8*   HEMATOCRIT 29.1* 27.6* 27.1* 26.4*   MCV 89.0 89.6 89.7 88.3   MCH 27.5 26.9* 26.8* 26.1*   MCHC 30.9* 30.1* 29.9* 29.5*   RDW 61.0* 62.2* 61.7* 60.8*   PLATELETCT 74* 74* 58* 76*   MPV 9.1  --   --   --      Recent Labs     11/19/22  0010   INR 1.12     Recent Results (from the past 24 hour(s))   CBC WITH DIFFERENTIAL    Collection Time: 11/20/22  4:23 AM   Result Value Ref Range    WBC 11.7 (H) 4.8 - 10.8 K/uL    RBC 3.02 (L) 4.70 - 6.10 M/uL    Hemoglobin 8.1 (L) 14.0 - 18.0 g/dL    Hematocrit 27.1 (L) 42.0 - 52.0 %    MCV 89.7 81.4 - 97.8 fL    MCH 26.8 (L) 27.0 - 33.0 pg    MCHC 29.9 (L) 33.7 - 35.3 g/dL    RDW 61.7 (H) 35.9 - 50.0 fL    Platelet Count 58 (L) 164 - 446 K/uL    Neutrophils-Polys 38.10 (L) 44.00 - 72.00 %    Lymphocytes 43.20 (H) 22.00 - 41.00 %    Monocytes 5.10 0.00 - 13.40 %    Eosinophils 3.40 0.00 - 6.90 %    Basophils 5.90 " (H) 0.00 - 1.80 %    Nucleated RBC 30.10 /100 WBC    Neutrophils (Absolute) 4.46 1.82 - 7.42 K/uL    Lymphs (Absolute) 5.05 (H) 1.00 - 4.80 K/uL    Monos (Absolute) 0.60 0.00 - 0.85 K/uL    Eos (Absolute) 0.40 0.00 - 0.51 K/uL    Baso (Absolute) 0.69 (H) 0.00 - 0.12 K/uL    NRBC (Absolute) 3.52 K/uL    Anisocytosis 2+ (A)     Microcytosis 2+ (A)    Basic Metabolic Panel    Collection Time: 11/20/22  4:23 AM   Result Value Ref Range    Sodium 136 135 - 145 mmol/L    Potassium 4.9 3.6 - 5.5 mmol/L    Chloride 101 96 - 112 mmol/L    Co2 30 20 - 33 mmol/L    Glucose 95 65 - 99 mg/dL    Bun 12 8 - 22 mg/dL    Creatinine 0.94 0.50 - 1.40 mg/dL    Calcium 8.6 8.5 - 10.5 mg/dL    Anion Gap 5.0 (L) 7.0 - 16.0   ESTIMATED GFR    Collection Time: 11/20/22  4:23 AM   Result Value Ref Range    GFR (CKD-EPI) 87 >60 mL/min/1.73 m 2   DIFFERENTIAL MANUAL    Collection Time: 11/20/22  4:23 AM   Result Value Ref Range    Metamyelocytes 3.40 %    Myelocytes 0.90 %    Manual Diff Status PERFORMED    PERIPHERAL SMEAR REVIEW    Collection Time: 11/20/22  4:23 AM   Result Value Ref Range    Peripheral Smear Review see below    PLATELET ESTIMATE    Collection Time: 11/20/22  4:23 AM   Result Value Ref Range    Plt Estimation Decreased    MORPHOLOGY    Collection Time: 11/20/22  4:23 AM   Result Value Ref Range    RBC Morphology Present     Polychromia 1+     Poikilocytosis 1+     Stomatocytes 1+    CBC WITH DIFFERENTIAL    Collection Time: 11/20/22  2:23 PM   Result Value Ref Range    WBC 13.0 (H) 4.8 - 10.8 K/uL    RBC 2.99 (L) 4.70 - 6.10 M/uL    Hemoglobin 7.8 (L) 14.0 - 18.0 g/dL    Hematocrit 26.4 (L) 42.0 - 52.0 %    MCV 88.3 81.4 - 97.8 fL    MCH 26.1 (L) 27.0 - 33.0 pg    MCHC 29.5 (L) 33.7 - 35.3 g/dL    RDW 60.8 (H) 35.9 - 50.0 fL    Platelet Count 76 (L) 164 - 446 K/uL    Neutrophils-Polys 48.40 44.00 - 72.00 %    Lymphocytes 23.80 22.00 - 41.00 %    Monocytes 15.60 (H) 0.00 - 13.40 %    Eosinophils 0.80 0.00 - 6.90 %     Basophils 4.10 (H) 0.00 - 1.80 %    Nucleated RBC 32.10 /100 WBC    Neutrophils (Absolute) 6.29 1.82 - 7.42 K/uL    Lymphs (Absolute) 3.09 1.00 - 4.80 K/uL    Monos (Absolute) 2.03 (H) 0.00 - 0.85 K/uL    Eos (Absolute) 0.10 0.00 - 0.51 K/uL    Baso (Absolute) 0.53 (H) 0.00 - 0.12 K/uL    NRBC (Absolute) 4.18 K/uL    Anisocytosis 2+ (A)     Microcytosis 2+ (A)    PERIPHERAL SMEAR REVIEW    Collection Time: 11/20/22  2:23 PM   Result Value Ref Range    Peripheral Smear Review see below    PLATELET ESTIMATE    Collection Time: 11/20/22  2:23 PM   Result Value Ref Range    Plt Estimation Decreased    MORPHOLOGY    Collection Time: 11/20/22  2:23 PM   Result Value Ref Range    RBC Morphology Present     Polychromia 2+    DIFFERENTIAL MANUAL    Collection Time: 11/20/22  2:23 PM   Result Value Ref Range    Metamyelocytes 0.80 %    Myelocytes 5.70 %    Progranulocytes 0.80 %    Manual Diff Status PERFORMED    PLATELET MAPPING WITH BASIC TEG    Collection Time: 11/20/22  2:40 PM   Result Value Ref Range    Reaction Time Initial-R 6.2 4.6 - 9.1 min    React Time Initial Hep 6.6 4.3 - 8.3 min    Clot Kinetics-K 1.0 0.8 - 2.1 min    Clot Angle-Angle 75.3 63.0 - 78.0 degrees    Maximum Clot Strength-MA 64.3 52.0 - 69.0 mm    TEG Functional Fibrinogen(MA) 30.9 15.0 - 32.0 mm    % Inhibition ADP 84.3 (H) 0.0 - 17.0 %    % Inhibition AA 17.0 (H) 0.0 - 11.0 %    TEG Algorithm Link Algorithm          Assessment and Recommendations:  #Persistent Anemia  #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  -has required 10u pRBCs since admission  -has appropriate reticulocyte response, INR is unremarkable, LDH largely elevated on admission, but t. Bili is normal, making hemolysis less likely.  Fibrinogen high, unlikely to be DIC.    -platelets have been stably low since admission, and about a year ago were up in the 600-700 range.    -Etiology  of persistent anemia would include acute blood loss anemia due to continuous bleeding from bladder, there is some concern that pt has had progression of essential thrombocytosis to myelofibrosis.  -Per chart review, pt is being worked up by primary hematologist for essential thrombocytosis, was previously treated with hydroxyurea, but had some issues with anemia and was stopped in spring of 2022.  Recommendations:  -appreciate urology assistance in bleeding control    -Bone marrow biopsy this admission showed myelofibrosis suspicion, flow cytometry showed 8-10% blast.  Due to transfusion requirement which is not clear whether this blasts and immature red cells are secondary to erythropoiesis due to continued blood loss or actually patient myelofibrosis is progressing.  But in any case, patient would need to follow-up within 2-3 weeks after discharge from hospital for another bone marrow biopsy for consideration of looking into persistence of blasts, and further treatments.  Due to ongoing active bleeding patient would not a candidate for any chemotherapy.      -  transfuse for Hgb <7 due active bleeding concerns and platelets <50K.  -avoid NSAIDs, anticoagulation at this time    -will need follow-up with outpatient hematologist, Dr. Quevedo    - If anything changes during this admission and you would like hematology to look into patient please call us back again. At this point will be at standby     #Low grade papillary urothelial carcinoma  -hematuria prior to admission, TURBT performed on 10/27 found low grade papillary urothelial carcinoma which was noninvasive with muscularis propria present on resection.    -may need further assessment by urology when appropriate for assessment of any remaining mass and further treatments  -will continue to follow pathology       High complexicity/Drug monitoring     We will continue to follow with you; please call with any questions, [phone].

## 2022-11-22 PROBLEM — R16.2 HEPATOSPLENOMEGALY: Status: ACTIVE | Noted: 2022-11-22

## 2022-11-22 PROBLEM — R09.02 HYPOXIA: Status: ACTIVE | Noted: 2022-11-22

## 2022-11-22 PROBLEM — I27.20 PULMONARY HYPERTENSION (HCC): Status: ACTIVE | Noted: 2022-11-22

## 2022-11-22 PROBLEM — R79.89 TSH ELEVATION: Status: ACTIVE | Noted: 2022-11-17

## 2022-11-22 LAB
ANION GAP SERPL CALC-SCNC: 8 MMOL/L (ref 7–16)
BASOPHILS # BLD AUTO: 5 % (ref 0–1.8)
BASOPHILS # BLD: 0.39 K/UL (ref 0–0.12)
BLASTS NFR BLD MANUAL: 7.5 %
BUN SERPL-MCNC: 14 MG/DL (ref 8–22)
CALCIUM SERPL-MCNC: 8.7 MG/DL (ref 8.5–10.5)
CHLORIDE SERPL-SCNC: 100 MMOL/L (ref 96–112)
CO2 SERPL-SCNC: 27 MMOL/L (ref 20–33)
CREAT SERPL-MCNC: 0.93 MG/DL (ref 0.5–1.4)
EOSINOPHIL # BLD AUTO: 0.39 K/UL (ref 0–0.51)
EOSINOPHIL NFR BLD: 5 % (ref 0–6.9)
ERYTHROCYTE [DISTWIDTH] IN BLOOD BY AUTOMATED COUNT: 57.5 FL (ref 35.9–50)
GFR SERPLBLD CREATININE-BSD FMLA CKD-EPI: 88 ML/MIN/1.73 M 2
GLUCOSE SERPL-MCNC: 122 MG/DL (ref 65–99)
HCT VFR BLD AUTO: 27.2 % (ref 42–52)
HGB BLD-MCNC: 8.3 G/DL (ref 14–18)
HGB BLD-MCNC: 8.3 G/DL (ref 14–18)
HGB BLD-MCNC: 8.6 G/DL (ref 14–18)
LYMPHOCYTES # BLD AUTO: 1.93 K/UL (ref 1–4.8)
LYMPHOCYTES NFR BLD: 25 % (ref 22–41)
MANUAL DIFF BLD: ABNORMAL
MCH RBC QN AUTO: 26.9 PG (ref 27–33)
MCHC RBC AUTO-ENTMCNC: 30.5 G/DL (ref 33.7–35.3)
MCV RBC AUTO: 88 FL (ref 81.4–97.8)
MICROCYTES BLD QL SMEAR: ABNORMAL
MONOCYTES # BLD AUTO: 0.71 K/UL (ref 0–0.85)
MONOCYTES NFR BLD AUTO: 9.2 % (ref 0–13.4)
MORPHOLOGY BLD-IMP: NORMAL
MYELOCYTES NFR BLD MANUAL: 3.3 %
NEUTROPHILS # BLD AUTO: 3.47 K/UL (ref 1.82–7.42)
NEUTROPHILS NFR BLD: 45 % (ref 44–72)
NRBC # BLD AUTO: 1.33 K/UL
NRBC BLD-RTO: 17.4 /100 WBC
PLATELET # BLD AUTO: 74 K/UL (ref 164–446)
PLATELET BLD QL SMEAR: NORMAL
POLYCHROMASIA BLD QL SMEAR: NORMAL
POTASSIUM SERPL-SCNC: 4.7 MMOL/L (ref 3.6–5.5)
RBC # BLD AUTO: 3.09 M/UL (ref 4.7–6.1)
RBC BLD AUTO: PRESENT
SMUDGE CELLS BLD QL SMEAR: NORMAL
SODIUM SERPL-SCNC: 135 MMOL/L (ref 135–145)
WBC # BLD AUTO: 7.7 K/UL (ref 4.8–10.8)

## 2022-11-22 PROCEDURE — 700111 HCHG RX REV CODE 636 W/ 250 OVERRIDE (IP): Performed by: INTERNAL MEDICINE

## 2022-11-22 PROCEDURE — A9270 NON-COVERED ITEM OR SERVICE: HCPCS | Performed by: STUDENT IN AN ORGANIZED HEALTH CARE EDUCATION/TRAINING PROGRAM

## 2022-11-22 PROCEDURE — 700101 HCHG RX REV CODE 250: Performed by: PHYSICIAN ASSISTANT

## 2022-11-22 PROCEDURE — 700101 HCHG RX REV CODE 250: Performed by: STUDENT IN AN ORGANIZED HEALTH CARE EDUCATION/TRAINING PROGRAM

## 2022-11-22 PROCEDURE — 700102 HCHG RX REV CODE 250 W/ 637 OVERRIDE(OP): Performed by: STUDENT IN AN ORGANIZED HEALTH CARE EDUCATION/TRAINING PROGRAM

## 2022-11-22 PROCEDURE — 99232 SBSQ HOSP IP/OBS MODERATE 35: CPT | Performed by: FAMILY MEDICINE

## 2022-11-22 PROCEDURE — 80048 BASIC METABOLIC PNL TOTAL CA: CPT

## 2022-11-22 PROCEDURE — 85025 COMPLETE CBC W/AUTO DIFF WBC: CPT

## 2022-11-22 PROCEDURE — A9270 NON-COVERED ITEM OR SERVICE: HCPCS | Performed by: INTERNAL MEDICINE

## 2022-11-22 PROCEDURE — A9270 NON-COVERED ITEM OR SERVICE: HCPCS | Performed by: HOSPITALIST

## 2022-11-22 PROCEDURE — 700102 HCHG RX REV CODE 250 W/ 637 OVERRIDE(OP): Performed by: PHYSICIAN ASSISTANT

## 2022-11-22 PROCEDURE — 700105 HCHG RX REV CODE 258: Performed by: STUDENT IN AN ORGANIZED HEALTH CARE EDUCATION/TRAINING PROGRAM

## 2022-11-22 PROCEDURE — 700105 HCHG RX REV CODE 258: Performed by: INTERNAL MEDICINE

## 2022-11-22 PROCEDURE — 85018 HEMOGLOBIN: CPT | Mod: 91

## 2022-11-22 PROCEDURE — 700102 HCHG RX REV CODE 250 W/ 637 OVERRIDE(OP): Performed by: HOSPITALIST

## 2022-11-22 PROCEDURE — A9270 NON-COVERED ITEM OR SERVICE: HCPCS | Performed by: NURSE PRACTITIONER

## 2022-11-22 PROCEDURE — 700102 HCHG RX REV CODE 250 W/ 637 OVERRIDE(OP): Performed by: NURSE PRACTITIONER

## 2022-11-22 PROCEDURE — 700102 HCHG RX REV CODE 250 W/ 637 OVERRIDE(OP): Performed by: INTERNAL MEDICINE

## 2022-11-22 PROCEDURE — 700105 HCHG RX REV CODE 258: Performed by: PHYSICIAN ASSISTANT

## 2022-11-22 PROCEDURE — A9270 NON-COVERED ITEM OR SERVICE: HCPCS | Performed by: PHYSICIAN ASSISTANT

## 2022-11-22 PROCEDURE — 770001 HCHG ROOM/CARE - MED/SURG/GYN PRIV*

## 2022-11-22 PROCEDURE — 36415 COLL VENOUS BLD VENIPUNCTURE: CPT

## 2022-11-22 PROCEDURE — 85007 BL SMEAR W/DIFF WBC COUNT: CPT

## 2022-11-22 RX ADMIN — ATORVASTATIN CALCIUM 20 MG: 20 TABLET, FILM COATED ORAL at 18:21

## 2022-11-22 RX ADMIN — GABAPENTIN 200 MG: 100 CAPSULE ORAL at 12:35

## 2022-11-22 RX ADMIN — CYANOCOBALAMIN TAB 500 MCG 1000 MCG: 500 TAB at 05:56

## 2022-11-22 RX ADMIN — AMINOCAPROIC ACID: 250 INJECTION, SOLUTION INTRAVENOUS at 00:20

## 2022-11-22 RX ADMIN — AMINOCAPROIC ACID: 250 INJECTION, SOLUTION INTRAVENOUS at 01:54

## 2022-11-22 RX ADMIN — AMINOCAPROIC ACID: 250 INJECTION, SOLUTION INTRAVENOUS at 17:29

## 2022-11-22 RX ADMIN — AMINOCAPROIC ACID: 250 INJECTION, SOLUTION INTRAVENOUS at 13:03

## 2022-11-22 RX ADMIN — ALPRAZOLAM 0.25 MG: 0.25 TABLET ORAL at 12:35

## 2022-11-22 RX ADMIN — SODIUM CHLORIDE 250 MG: 9 INJECTION, SOLUTION INTRAVENOUS at 18:49

## 2022-11-22 RX ADMIN — AMINOCAPROIC ACID: 250 INJECTION, SOLUTION INTRAVENOUS at 04:11

## 2022-11-22 RX ADMIN — AMINOCAPROIC ACID: 250 INJECTION, SOLUTION INTRAVENOUS at 21:32

## 2022-11-22 RX ADMIN — THIAMINE HCL TAB 100 MG 100 MG: 100 TAB at 18:20

## 2022-11-22 RX ADMIN — AMINOCAPROIC ACID: 250 INJECTION, SOLUTION INTRAVENOUS at 18:21

## 2022-11-22 RX ADMIN — MORPHINE SULFATE 30 MG: 15 TABLET ORAL at 05:57

## 2022-11-22 RX ADMIN — AMINOCAPROIC ACID: 250 INJECTION, SOLUTION INTRAVENOUS at 14:13

## 2022-11-22 RX ADMIN — LEVOTHYROXINE SODIUM 25 MCG: 0.03 TABLET ORAL at 05:57

## 2022-11-22 RX ADMIN — OXYBUTYNIN CHLORIDE 5 MG: 5 TABLET ORAL at 05:57

## 2022-11-22 RX ADMIN — TAMSULOSIN HYDROCHLORIDE 0.4 MG: 0.4 CAPSULE ORAL at 08:03

## 2022-11-22 RX ADMIN — ALPRAZOLAM 0.25 MG: 0.25 TABLET ORAL at 18:21

## 2022-11-22 RX ADMIN — ALPRAZOLAM 0.25 MG: 0.25 TABLET ORAL at 21:37

## 2022-11-22 RX ADMIN — FINASTERIDE 5 MG: 5 TABLET, FILM COATED ORAL at 05:57

## 2022-11-22 RX ADMIN — AMINOCAPROIC ACID: 250 INJECTION, SOLUTION INTRAVENOUS at 07:11

## 2022-11-22 RX ADMIN — AMINOCAPROIC ACID: 250 INJECTION, SOLUTION INTRAVENOUS at 13:02

## 2022-11-22 RX ADMIN — THIAMINE HCL TAB 100 MG 100 MG: 100 TAB at 05:57

## 2022-11-22 RX ADMIN — AMINOCAPROIC ACID: 250 INJECTION, SOLUTION INTRAVENOUS at 09:30

## 2022-11-22 RX ADMIN — AMINOCAPROIC ACID: 250 INJECTION, SOLUTION INTRAVENOUS at 07:59

## 2022-11-22 RX ADMIN — AMINOCAPROIC ACID: 250 INJECTION, SOLUTION INTRAVENOUS at 06:19

## 2022-11-22 RX ADMIN — AMINOCAPROIC ACID: 250 INJECTION, SOLUTION INTRAVENOUS at 01:12

## 2022-11-22 RX ADMIN — AMINOCAPROIC ACID: 250 INJECTION, SOLUTION INTRAVENOUS at 11:02

## 2022-11-22 RX ADMIN — GABAPENTIN 200 MG: 100 CAPSULE ORAL at 05:57

## 2022-11-22 RX ADMIN — AMINOCAPROIC ACID: 250 INJECTION, SOLUTION INTRAVENOUS at 02:48

## 2022-11-22 RX ADMIN — FOLIC ACID 1 MG: 1 TABLET ORAL at 05:57

## 2022-11-22 RX ADMIN — AMINOCAPROIC ACID: 250 INJECTION, SOLUTION INTRAVENOUS at 15:17

## 2022-11-22 RX ADMIN — AMINOCAPROIC ACID: 250 INJECTION, SOLUTION INTRAVENOUS at 19:38

## 2022-11-22 RX ADMIN — DOCUSATE SODIUM 50 MG AND SENNOSIDES 8.6 MG 2 TABLET: 8.6; 5 TABLET, FILM COATED ORAL at 18:20

## 2022-11-22 RX ADMIN — DOCUSATE SODIUM 50 MG AND SENNOSIDES 8.6 MG 2 TABLET: 8.6; 5 TABLET, FILM COATED ORAL at 05:57

## 2022-11-22 RX ADMIN — ALPRAZOLAM 0.25 MG: 0.25 TABLET ORAL at 08:03

## 2022-11-22 RX ADMIN — AMINOCAPROIC ACID: 250 INJECTION, SOLUTION INTRAVENOUS at 05:47

## 2022-11-22 RX ADMIN — AMINOCAPROIC ACID: 250 INJECTION, SOLUTION INTRAVENOUS at 14:15

## 2022-11-22 RX ADMIN — OXYBUTYNIN CHLORIDE 5 MG: 5 TABLET ORAL at 12:34

## 2022-11-22 RX ADMIN — GABAPENTIN 200 MG: 100 CAPSULE ORAL at 18:20

## 2022-11-22 RX ADMIN — AMINOCAPROIC ACID: 250 INJECTION, SOLUTION INTRAVENOUS at 03:28

## 2022-11-22 RX ADMIN — SODIUM CHLORIDE 250 MG: 9 INJECTION, SOLUTION INTRAVENOUS at 06:03

## 2022-11-22 RX ADMIN — AMINOCAPROIC ACID: 250 INJECTION, SOLUTION INTRAVENOUS at 04:58

## 2022-11-22 RX ADMIN — Medication 2000 UNITS: at 05:57

## 2022-11-22 RX ADMIN — OXYBUTYNIN CHLORIDE 5 MG: 5 TABLET ORAL at 18:20

## 2022-11-22 RX ADMIN — AMINOCAPROIC ACID: 250 INJECTION, SOLUTION INTRAVENOUS at 23:22

## 2022-11-22 ASSESSMENT — ENCOUNTER SYMPTOMS
SHORTNESS OF BREATH: 0
COUGH: 0
DIARRHEA: 0
HEADACHES: 0
WHEEZING: 0
MYALGIAS: 0
BLURRED VISION: 0
DIAPHORESIS: 0
HEARTBURN: 0
WEAKNESS: 1
BACK PAIN: 0
VOMITING: 0
FOCAL WEAKNESS: 0
NERVOUS/ANXIOUS: 0
DIZZINESS: 0
CHILLS: 0
SENSORY CHANGE: 0
ABDOMINAL PAIN: 0
FEVER: 0
SORE THROAT: 0
PALPITATIONS: 0
FLANK PAIN: 0
NAUSEA: 0
NECK PAIN: 0
SPEECH CHANGE: 0

## 2022-11-22 NOTE — PROGRESS NOTES
[facility] Medicine Daily Progress Note    Date of Service  11/22/2022    Chief Complaint  Gregg Martinez is a 72 y.o. male admitted 10/26/2022 with hematuria.    Hospital Course  Admitted with hematuria, he was noted to have a bladder tumor.  Urology was consulted on the case.  Patient underwent Transurethral Resection of Bladder tumor (>5cm in size) on 10/27/2022.  Remained on continuous bladder irrigation, he had persistent hematuria. Pathology showed urothelial carcinoma. He further underwent Cystoscopy, Clot evacuation, Fulguration, Transurethral resection of bladder tumor 3 cm  on 11/2/2022. Cystoscopy with clot evacuation from the bladder and fulguration of bleeding bladder tissue on 11/12/2022. Cystoscopy with resection of bladder mass (large), Transurethral resection of bladder tumor (small), Evacuation of bladder mass/clot, Fulguration of venous oozing sites and bladder tumor resection base on 11/14/2022.  He had anemia secondary to blood loss, and required transfusion of PRBC.  He was also noted to have thrombocytopenia and required platelet transfusion.  Oncology was also consulted on the case, he underwent bone marrow biopsy.  He has continued to have persistent hematuria.  He also had an episode of acute kidney injury which resolved with IVF hydration.  He was also noted to have increasing leukocytosis, was noted to have pneumonia, and finished a course of Unasyn and azithromycin.    Interval Problem Update  Urothelial CA -persistent hematuria, on CBI with Amicar  Anemia - hgb 8.6  [facility] - crea 0.9  Hypoxia - O2 2 lpm    I have discussed this patient's plan of care and discharge plan at IDT rounds today with Case Management, Nursing, Nursing leadership, and other members of the IDT team.    Consultants/Specialty  oncology and urology    Code Status  Full Code    Disposition  Patient is not medically cleared for discharge.   Anticipate discharge to to home with close outpatient follow-up.  I have placed the  appropriate orders for post-discharge needs.    Review of Systems  Review of Systems   Constitutional:  Positive for malaise/fatigue. Negative for chills, diaphoresis and fever.   HENT:  Negative for congestion, hearing loss and sore throat.    Eyes:  Negative for blurred vision.   Respiratory:  Negative for cough, shortness of breath and wheezing.    Cardiovascular:  Positive for leg swelling. Negative for chest pain and palpitations.   Gastrointestinal:  Negative for abdominal pain, diarrhea, heartburn, nausea and vomiting.   Genitourinary:  Positive for hematuria. Negative for dysuria and flank pain.   Musculoskeletal:  Negative for back pain, joint pain, myalgias and neck pain.   Skin:  Negative for rash.   Neurological:  Positive for weakness. Negative for dizziness, sensory change, speech change, focal weakness and headaches.   Psychiatric/Behavioral:  The patient is not nervous/anxious.       Physical Exam  Temp:  [36.2 °C (97.2 °F)-36.6 °C (97.9 °F)] 36.6 °C (97.9 °F)  Pulse:  [73-89] 89  Resp:  [18] 18  BP: (109-116)/(60-65) 116/60  SpO2:  [88 %-97 %] 93 %    Physical Exam  Vitals and nursing note reviewed.   HENT:      Head: Normocephalic and atraumatic.      Nose: No congestion.      Mouth/Throat:      Mouth: Mucous membranes are moist.   Eyes:      Extraocular Movements: Extraocular movements intact.      Conjunctiva/sclera: Conjunctivae normal.   Cardiovascular:      Rate and Rhythm: Normal rate and regular rhythm.   Pulmonary:      Effort: Pulmonary effort is normal.      Breath sounds: Normal breath sounds.   Abdominal:      General: There is no distension.      Tenderness: There is no abdominal tenderness. There is no right CVA tenderness, left CVA tenderness, guarding or rebound.   Musculoskeletal:      Cervical back: No tenderness.      Right lower leg: Edema present.      Left lower leg: Edema present.   Skin:     General: Skin is warm and dry.   Neurological:      General: No focal deficit  present.      Mental Status: He is alert and oriented to person, place, and time.      Cranial Nerves: No cranial nerve deficit.       Fluids    Intake/Output Summary (Last 24 hours) at 11/22/2022 1501  Last data filed at 11/22/2022 0603  Gross per 24 hour   Intake --   Output 4125 ml   Net -4125 ml       Laboratory  Recent Labs     11/20/22  1423 11/20/22  2158 11/21/22  0028 11/21/22  1647 11/22/22  0111 11/22/22  0758   WBC 13.0*  --  8.9  --  7.7  --    RBC 2.99*  --  2.58*  --  3.09*  --    HEMOGLOBIN 7.8*   < > 6.9* 8.5* 8.3* 8.6*   HEMATOCRIT 26.4*  --  22.8*  --  27.2*  --    MCV 88.3  --  88.4  --  88.0  --    MCH 26.1*  --  26.7*  --  26.9*  --    MCHC 29.5*  --  30.3*  --  30.5*  --    RDW 60.8*  --  61.5*  --  57.5*  --    PLATELETCT 76*  --  69*  --  74*  --     < > = values in this interval not displayed.     Recent Labs     11/20/22  0423 11/21/22  0028 11/22/22  0111   SODIUM 136 136 135   POTASSIUM 4.9 4.9 4.7   CHLORIDE 101 99 100   CO2 30 29 27   GLUCOSE 95 134* 122*   BUN 12 15 14   CREATININE 0.94 1.00 0.93   CALCIUM 8.6 8.3* 8.7     Recent Labs     11/21/22  0028   INR 1.24*               Imaging  IR-US GUIDED PIV   Final Result    Ultrasound-guided PERIPHERAL IV INSERTION performed by    qualified nursing staff as above.      EC-ECHOCARDIOGRAM COMPLETE W/O CONT   Final Result      US-EXTREMITY VENOUS LOWER BILAT   Final Result      DX-CHEST-PORTABLE (1 VIEW)   Final Result      Increased LEFT basilar atelectasis or pneumonia      CT-ABDOMEN-PELVIS W/O   Final Result      1.  Interval hemorrhage within the partially decompressed urinary bladder. Previously visualized posterior left bladder mass is no longer seen presumably this is due to interval resection      2.  New mild left hydroureteronephrosis with moderate perinephric fat stranding. This is concerning for obstruction perhaps related to hematoma, recent instrumentation or pyelonephritis/ureterectasis.      3.  Increasing colonic stool  volume with similar moderate diverticulosis of the colon but no evidence of diverticulitis      4.  Marked splenomegaly      5.  New small effusions with some lower lung zone groundglass suspicious for interstitial edema/mild congestive failure. There is stable mild pericardial fluid      6.  Persistent inhomogeneous medullary spaces with generalized increased density. Recent bone scan showed no focal mass but this could reflect underlying myelofibrosis or metastases with false-negative bone scan      CT-NEEDLE BX-DEEP BONE   Final Result      1.  Successful CT-guided core biopsy of sclerotic lesion in the posterior aspect of left inferior pubic ramus.      NM-BONE/JOINT SCAN WHOLE BODY   Final Result      No evidence of bony metastatic disease      CT-CHEST (THORAX) WITH   Final Result         1.  Emphysema   2.  Hazy bilateral upper lobe opacities with slight intralobular septal thickening, could represent component of mild pulmonary edema   3.  Linear densities the bilateral lung bases favors scarring or atelectasis.   4.  Atherosclerosis and atherosclerotic coronary artery disease   5.  Hepatomegaly with irregular hepatic contour favoring changes of cirrhosis.   6.  Subcentimeter hepatic lobe lesions, could represent small cyst or hemangioma, otherwise indeterminate.   7.  Mild mediastinal adenopathy, workup and evaluation for causes of adenopathy recommended as clinically appropriate.   8.  Splenomegaly      DX-CHEST-PORTABLE (1 VIEW)   Final Result         1.  No acute cardiopulmonary disease.   2.  Atherosclerosis      CT-ABDOMEN-PELVIS WITH   Final Result      1.  Enhancing 2.5 x 2.3 cm bladder wall mass suspicious for neoplasm.   2.  Heterogeneous sclerotic change of the pelvis and proximal femurs suspicious for metastatic disease or other infiltrating marrow process.   3.  Mild cardiomegaly.   4.  Hepatosplenomegaly.   5.  Small subcentimeter hepatic hypodensities which could represent cysts or hemangiomas  but are nonspecific. If indicated, these findings could be further evaluated with dedicated liver protocol CT or MRI or ultrasound on a nonemergent basis.   6.  Colonic diverticulosis without evidence of diverticulitis.   7.  Atherosclerosis.           Assessment/Plan  * Urothelial carcinoma of bladder without invasion of muscle (HCC)- (present on admission)  Assessment & Plan  Transurethral Resection of Bladder tumor (>5cm in size)  10/27/2022  Cystoscopy, Clot evacuation, Fulguration, Transurethral resection of bladder tumor 3 cm    11/2/2022  Cystoscopy with clot evacuation from the bladder and fulguration of bleeding bladder tissue   11/12/2022  Cystoscopy with resection of bladder mass (large), Transurethral resection of bladder tumor (small), Evacuation of bladder mass/clot, Fulguration of venous oozing sites and bladder tumor resection base    11/14/2022  CBI with Amicar      Acute blood loss anemia- (present on admission)  Assessment & Plan  Transfused PRBC  Follow CBC    Hypoxia- (present on admission)  Assessment & Plan  RT protocol, encourage I-S    Hepatosplenomegaly- (present on admission)  Assessment & Plan  monitor    Pulmonary hypertension (HCC)- (present on admission)  Assessment & Plan  Monitor volume status    TSH elevation- (present on admission)  Assessment & Plan  Stop Synthroid   11/22/2022  Check TSH, FT4, FT3 on 12/23/2022    Paroxysmal atrial fibrillation (HCC)  Assessment & Plan  Monitor    Acute kidney injury (HCC)- (present on admission)  Assessment & Plan  Follow bmp    Coagulation defect (HCC)- (present on admission)  Assessment & Plan  Follow INR    Hyponatremia- (present on admission)  Assessment & Plan  Follow bmp    Thrombocytopenia (HCC)- (present on admission)  Assessment & Plan  Transfused platelets  Follow CBC  Follow pathology results    Pneumonia- (present on admission)  Assessment & Plan  Finished course of Unasyn and azithromycin (end date 11/18/2022)    Slow transit  constipation- (present on admission)  Assessment & Plan  bowel protocol    Hypovolemia  Assessment & Plan  Due to acute blood loss anemia    Iron deficiency anemia- (present on admission)  Assessment & Plan  Iron replacement per pharmacy    COPD (chronic obstructive pulmonary disease) (HCC)- (present on admission)  Assessment & Plan  RT protocol    Dyslipidemia- (present on admission)  Assessment & Plan  Atorvastatin       VTE prophylaxis: SCDs/TEDs    I have performed a physical exam and reviewed and updated ROS and Plan today (11/22/2022). In review of yesterday's note (11/21/2022), there are no changes except as documented above.

## 2022-11-22 NOTE — PROGRESS NOTES
Assumed care at 1845. Pt resting in bed. A&ox 4  Ambulating independently  O2 on RA while awake. Requesting 1LNC while sleeping  C/o back pain. Morphine given as ordered  Arboleda catheter in place with high flow CBI (amicar) infusing.  Urine clear and no clots noted.   Tolerating diet. NPO after midnight  +BM  Call light within reach. Hourly rounding in place

## 2022-11-22 NOTE — CARE PLAN
The patient is Stable - Low risk of patient condition declining or worsening    Shift Goals  Clinical Goals: monitor CBI  Patient Goals: Discharge  Family Goals: No family present    Progress made toward(s) clinical / shift goals:  high flow CBI. Urine clear and no clots noted at this time    Patient is not progressing towards the following goals:    Problem: Hemodynamics  Goal: Patient's hemodynamics, fluid balance and neurologic status will be stable or improve  Outcome: Progressing     Problem: Urinary - Renal Perfusion  Goal: Ability to achieve and maintain adequate renal perfusion and functioning will improve  Outcome: Progressing

## 2022-11-22 NOTE — PROGRESS NOTES
Note to reader: this note follows the APSO format rather than the historical SOAP format. Assessment and plan located at the top of the note for ease of use.    Chief Complaint  72 y.o. year old male here with bladder mass and sclerotic lesions.     Assessment/Plan  Interval History   TCC bladder s/p TURBT 10/27, 11/2 and 11/14  Hematuria and clot obstruction s/p clot evacuation and fulguration x3 11/2 and 11/12 and 11/14  Anemia   Thrombocytopenia  BPH with retention      Plan:  - Discussed care again with Dr Trevizo and Dr Castellanos; will continue CBI with 200mg/1L of amicar in NS as urine does appear to be improving today.   - Amicar CBI initiated at 1315 on 11/21  - NPO at midnight in case of need for OR tomorrow  - S/p bone marrow bx, appreciate hematology rec's  - On Flomax and Finasteride, to continue          Case discussed with patient, nursing, pharmacist, and Urology-Dr. Castellanos and Dr Trevizo    Patient seen and examined    11/22. CBI on low flow with clear light pink urine. H/H stable 8.3/27.2.No pain at this time    11/21. CBI on high flow, urine light cherry. Clots irrigated this am by nursing. Hb 6.9 (8.1), received another unit of pRBCs this am. VSS. CBI slowed at bedside and urine immediately darkened.    11/20. CBI remains with watermelon color urine. No clots. Hgb slow downtrend.     11/19. Arboleda remains. Urine watermelon with CBI on slow rate. Hgb 8.3, Plt stable at 74    11/18. Arboleda was replaced after pt unable to void last night. CBI restarted for darkening of urine. H/H improving.     11/17. Urine clear/pink. CBI discontinued for 5hrs. H/H stable. Pt anxious to leave.    11/16. Urine clear/pink. D/C CBI. Pt feels well and has not pain. H/H stable and improving.     11/15. Repeat clot evac and fulguration with TURBT yesterday. Urine now pink and no clot obstruction. Abdominal pains and spasms resolved. H/H declined further and received 2 units PRBCs today    11/14. Hematuria continues with  difficult time maintaining flow via cuellar. RN unable to irrigate and bladder scan with 500 cc in bladder. Patient in pain in SP region. Receiving PRBCs daily.            Review of Systems  Physical Exam   Review of Systems   Constitutional:  Negative for chills and fever.   Gastrointestinal:  Negative for nausea and vomiting.   Genitourinary:  Positive for hematuria.   All other systems reviewed and are negative.  Vitals:    11/21/22 1552 11/22/22 0546 11/22/22 0557 11/22/22 0714   BP: 109/65 113/61  116/60   Pulse: 73   89   Resp: 18 18  18   Temp: 36.4 °C (97.5 °F) 36.2 °C (97.2 °F)  36.6 °C (97.9 °F)   TempSrc: Temporal Temporal  Temporal   SpO2: 97% 94% 88% 93%   Weight:       Height:         Physical Exam  Vitals and nursing note reviewed.   Constitutional:       Appearance: Normal appearance.   HENT:      Head: Normocephalic and atraumatic.      Nose: Nose normal.      Mouth/Throat:      Mouth: Mucous membranes are moist.   Eyes:      Pupils: Pupils are equal, round, and reactive to light.   Pulmonary:      Effort: Pulmonary effort is normal.   Abdominal:      General: Abdomen is flat. There is no distension.      Palpations: Abdomen is soft.      Tenderness: There is no abdominal tenderness.   Genitourinary:     Comments: Amicart CBI on low flow with clear light pink urine  Skin:     General: Skin is warm and dry.   Neurological:      General: No focal deficit present.      Mental Status: He is alert and oriented to person, place, and time.   Psychiatric:         Mood and Affect: Mood normal.         Behavior: Behavior normal.        Hematology Chemistry   Lab Results   Component Value Date/Time    WBC 7.7 11/22/2022 01:11 AM    HEMOGLOBIN 8.6 (L) 11/22/2022 07:58 AM    HEMATOCRIT 27.2 (L) 11/22/2022 01:11 AM    PLATELETCT 74 (L) 11/22/2022 01:11 AM     Lab Results   Component Value Date/Time    SODIUM 135 11/22/2022 01:11 AM    POTASSIUM 4.7 11/22/2022 01:11 AM    CHLORIDE 100 11/22/2022 01:11 AM    CO2 27  11/22/2022 01:11 AM    GLUCOSE 122 (H) 11/22/2022 01:11 AM    BUN 14 11/22/2022 01:11 AM    CREATININE 0.93 11/22/2022 01:11 AM         Labs not explicitly included in this progress note were reviewed by the author.   Radiology/imaging not explicitly included in this progress note was reviewed by the author.     Labs reviewed, Radiology images reviewed and Medications reviewed

## 2022-11-23 ENCOUNTER — APPOINTMENT (OUTPATIENT)
Dept: RADIOLOGY | Facility: MEDICAL CENTER | Age: 72
DRG: 668 | End: 2022-11-23
Attending: PHYSICIAN ASSISTANT
Payer: MEDICARE

## 2022-11-23 LAB
ANION GAP SERPL CALC-SCNC: 9 MMOL/L (ref 7–16)
BUN SERPL-MCNC: 16 MG/DL (ref 8–22)
CALCIUM SERPL-MCNC: 8.9 MG/DL (ref 8.5–10.5)
CHLORIDE SERPL-SCNC: 100 MMOL/L (ref 96–112)
CO2 SERPL-SCNC: 29 MMOL/L (ref 20–33)
CREAT SERPL-MCNC: 1.06 MG/DL (ref 0.5–1.4)
ERYTHROCYTE [DISTWIDTH] IN BLOOD BY AUTOMATED COUNT: 58.7 FL (ref 35.9–50)
GFR SERPLBLD CREATININE-BSD FMLA CKD-EPI: 75 ML/MIN/1.73 M 2
GLUCOSE SERPL-MCNC: 105 MG/DL (ref 65–99)
HCT VFR BLD AUTO: 29.6 % (ref 42–52)
HGB BLD-MCNC: 8.9 G/DL (ref 14–18)
INR PPP: 1.21 (ref 0.87–1.13)
JAK2 P.V617F BLD/T QL: DETECTED
MCH RBC QN AUTO: 26.4 PG (ref 27–33)
MCHC RBC AUTO-ENTMCNC: 30.1 G/DL (ref 33.7–35.3)
MCV RBC AUTO: 87.8 FL (ref 81.4–97.8)
NT-PROBNP SERPL IA-MCNC: 1224 PG/ML (ref 0–125)
PLATELET # BLD AUTO: 82 K/UL (ref 164–446)
POTASSIUM SERPL-SCNC: 5 MMOL/L (ref 3.6–5.5)
PROTHROMBIN TIME: 15.1 SEC (ref 12–14.6)
RBC # BLD AUTO: 3.37 M/UL (ref 4.7–6.1)
SODIUM SERPL-SCNC: 138 MMOL/L (ref 135–145)
SPECIMEN SOURCE: ABNORMAL
WBC # BLD AUTO: 8.3 K/UL (ref 4.8–10.8)

## 2022-11-23 PROCEDURE — A9270 NON-COVERED ITEM OR SERVICE: HCPCS | Performed by: INTERNAL MEDICINE

## 2022-11-23 PROCEDURE — 76775 US EXAM ABDO BACK WALL LIM: CPT

## 2022-11-23 PROCEDURE — 700102 HCHG RX REV CODE 250 W/ 637 OVERRIDE(OP): Performed by: STUDENT IN AN ORGANIZED HEALTH CARE EDUCATION/TRAINING PROGRAM

## 2022-11-23 PROCEDURE — 700102 HCHG RX REV CODE 250 W/ 637 OVERRIDE(OP): Performed by: NURSE PRACTITIONER

## 2022-11-23 PROCEDURE — 700102 HCHG RX REV CODE 250 W/ 637 OVERRIDE(OP): Performed by: PHYSICIAN ASSISTANT

## 2022-11-23 PROCEDURE — 85027 COMPLETE CBC AUTOMATED: CPT

## 2022-11-23 PROCEDURE — 80048 BASIC METABOLIC PNL TOTAL CA: CPT

## 2022-11-23 PROCEDURE — 700102 HCHG RX REV CODE 250 W/ 637 OVERRIDE(OP): Performed by: HOSPITALIST

## 2022-11-23 PROCEDURE — 85610 PROTHROMBIN TIME: CPT

## 2022-11-23 PROCEDURE — 83880 ASSAY OF NATRIURETIC PEPTIDE: CPT

## 2022-11-23 PROCEDURE — 36415 COLL VENOUS BLD VENIPUNCTURE: CPT

## 2022-11-23 PROCEDURE — A9270 NON-COVERED ITEM OR SERVICE: HCPCS | Performed by: STUDENT IN AN ORGANIZED HEALTH CARE EDUCATION/TRAINING PROGRAM

## 2022-11-23 PROCEDURE — A9270 NON-COVERED ITEM OR SERVICE: HCPCS | Performed by: PHYSICIAN ASSISTANT

## 2022-11-23 PROCEDURE — 700105 HCHG RX REV CODE 258: Performed by: INTERNAL MEDICINE

## 2022-11-23 PROCEDURE — 99232 SBSQ HOSP IP/OBS MODERATE 35: CPT | Performed by: FAMILY MEDICINE

## 2022-11-23 PROCEDURE — A9270 NON-COVERED ITEM OR SERVICE: HCPCS | Performed by: HOSPITALIST

## 2022-11-23 PROCEDURE — 700102 HCHG RX REV CODE 250 W/ 637 OVERRIDE(OP): Performed by: INTERNAL MEDICINE

## 2022-11-23 PROCEDURE — 700111 HCHG RX REV CODE 636 W/ 250 OVERRIDE (IP): Performed by: INTERNAL MEDICINE

## 2022-11-23 PROCEDURE — 770001 HCHG ROOM/CARE - MED/SURG/GYN PRIV*

## 2022-11-23 PROCEDURE — A9270 NON-COVERED ITEM OR SERVICE: HCPCS | Performed by: NURSE PRACTITIONER

## 2022-11-23 PROCEDURE — 700101 HCHG RX REV CODE 250: Performed by: PHYSICIAN ASSISTANT

## 2022-11-23 RX ADMIN — ALPRAZOLAM 0.25 MG: 0.25 TABLET ORAL at 16:17

## 2022-11-23 RX ADMIN — GABAPENTIN 200 MG: 100 CAPSULE ORAL at 13:18

## 2022-11-23 RX ADMIN — GABAPENTIN 200 MG: 100 CAPSULE ORAL at 04:12

## 2022-11-23 RX ADMIN — DOCUSATE SODIUM 50 MG AND SENNOSIDES 8.6 MG 2 TABLET: 8.6; 5 TABLET, FILM COATED ORAL at 16:17

## 2022-11-23 RX ADMIN — AMINOCAPROIC ACID: 250 INJECTION, SOLUTION INTRAVENOUS at 08:59

## 2022-11-23 RX ADMIN — Medication 30 G: at 18:02

## 2022-11-23 RX ADMIN — THIAMINE HCL TAB 100 MG 100 MG: 100 TAB at 04:12

## 2022-11-23 RX ADMIN — FOLIC ACID 1 MG: 1 TABLET ORAL at 04:12

## 2022-11-23 RX ADMIN — ALPRAZOLAM 0.25 MG: 0.25 TABLET ORAL at 13:18

## 2022-11-23 RX ADMIN — GABAPENTIN 200 MG: 100 CAPSULE ORAL at 16:18

## 2022-11-23 RX ADMIN — THIAMINE HCL TAB 100 MG 100 MG: 100 TAB at 16:18

## 2022-11-23 RX ADMIN — MORPHINE SULFATE 30 MG: 15 TABLET ORAL at 16:30

## 2022-11-23 RX ADMIN — FINASTERIDE 5 MG: 5 TABLET, FILM COATED ORAL at 04:12

## 2022-11-23 RX ADMIN — ATORVASTATIN CALCIUM 20 MG: 20 TABLET, FILM COATED ORAL at 16:17

## 2022-11-23 RX ADMIN — AMINOCAPROIC ACID: 250 INJECTION, SOLUTION INTRAVENOUS at 03:22

## 2022-11-23 RX ADMIN — MAGNESIUM HYDROXIDE 30 ML: 400 SUSPENSION ORAL at 09:10

## 2022-11-23 RX ADMIN — ALPRAZOLAM 0.25 MG: 0.25 TABLET ORAL at 21:40

## 2022-11-23 RX ADMIN — OXYBUTYNIN CHLORIDE 5 MG: 5 TABLET ORAL at 21:40

## 2022-11-23 RX ADMIN — AMINOCAPROIC ACID: 250 INJECTION, SOLUTION INTRAVENOUS at 01:58

## 2022-11-23 RX ADMIN — CYANOCOBALAMIN TAB 500 MCG 1000 MCG: 500 TAB at 04:12

## 2022-11-23 RX ADMIN — Medication 2000 UNITS: at 04:12

## 2022-11-23 RX ADMIN — TAMSULOSIN HYDROCHLORIDE 0.4 MG: 0.4 CAPSULE ORAL at 09:00

## 2022-11-23 RX ADMIN — OXYBUTYNIN CHLORIDE 5 MG: 5 TABLET ORAL at 04:12

## 2022-11-23 RX ADMIN — DOCUSATE SODIUM 50 MG AND SENNOSIDES 8.6 MG 2 TABLET: 8.6; 5 TABLET, FILM COATED ORAL at 04:12

## 2022-11-23 RX ADMIN — Medication 30 G: at 21:41

## 2022-11-23 RX ADMIN — AMINOCAPROIC ACID: 250 INJECTION, SOLUTION INTRAVENOUS at 06:36

## 2022-11-23 RX ADMIN — OXYBUTYNIN CHLORIDE 5 MG: 5 TABLET ORAL at 13:18

## 2022-11-23 RX ADMIN — MORPHINE SULFATE 15 MG: 15 TABLET ORAL at 09:00

## 2022-11-23 RX ADMIN — SODIUM CHLORIDE 250 MG: 9 INJECTION, SOLUTION INTRAVENOUS at 05:37

## 2022-11-23 RX ADMIN — AMINOCAPROIC ACID: 250 INJECTION, SOLUTION INTRAVENOUS at 00:12

## 2022-11-23 RX ADMIN — MORPHINE SULFATE 30 MG: 15 TABLET ORAL at 00:11

## 2022-11-23 RX ADMIN — MORPHINE SULFATE 30 MG: 15 TABLET ORAL at 21:39

## 2022-11-23 RX ADMIN — AMINOCAPROIC ACID: 250 INJECTION, SOLUTION INTRAVENOUS at 05:29

## 2022-11-23 RX ADMIN — ALPRAZOLAM 0.25 MG: 0.25 TABLET ORAL at 09:00

## 2022-11-23 RX ADMIN — MORPHINE SULFATE 30 MG: 15 TABLET ORAL at 04:17

## 2022-11-23 RX ADMIN — AMINOCAPROIC ACID: 250 INJECTION, SOLUTION INTRAVENOUS at 07:50

## 2022-11-23 ASSESSMENT — ENCOUNTER SYMPTOMS
MYALGIAS: 0
NECK PAIN: 0
DIZZINESS: 0
VOMITING: 0
FOCAL WEAKNESS: 0
FLANK PAIN: 0
HEADACHES: 0
NERVOUS/ANXIOUS: 0
SHORTNESS OF BREATH: 0
DIAPHORESIS: 0
WEAKNESS: 1
NAUSEA: 0
CHILLS: 0
WHEEZING: 0
SENSORY CHANGE: 0
COUGH: 0
FEVER: 0
DIARRHEA: 0
ABDOMINAL PAIN: 0
HEARTBURN: 0
SPEECH CHANGE: 0
SORE THROAT: 0
PALPITATIONS: 0
BLURRED VISION: 0
BACK PAIN: 0

## 2022-11-23 ASSESSMENT — PAIN DESCRIPTION - PAIN TYPE
TYPE: ACUTE PAIN;CHRONIC PAIN
TYPE: CHRONIC PAIN

## 2022-11-23 NOTE — CARE PLAN
Pt is A&O 4  VSS  Pain declines   declines nausea  NPO at midnight  CBI in place  + flatus  + BM  Upself  SCD's refused  Family  Bed alarm off, pt low fall risk per jeremy howe  Reviewed plan of care with patient, bed in lowest position and locked, pt resting comfortably now, call light within reach, all needs met at this time. Interventions will be executed per plan of care     Shift Goals  Clinical Goals: CBI  Patient Goals: Discharge  Family Goals: No family present    Progress made toward(s) clinical / shift goals:        Problem: Fluid Volume  Goal: Fluid volume balance will be maintained  Outcome: Progressing     Problem: Pain - Standard  Goal: Alleviation of pain or a reduction in pain to the patient’s comfort goal  Outcome: Progressing

## 2022-11-23 NOTE — PROGRESS NOTES
[facility] Medicine Daily Progress Note    Date of Service  11/23/2022    Chief Complaint  Gregg Martinez is a 72 y.o. male admitted 10/26/2022 with hematuria.    Hospital Course  Admitted with hematuria, he was noted to have a bladder tumor.  Urology was consulted on the case.  Patient underwent Transurethral Resection of Bladder tumor (>5cm in size) on 10/27/2022.  Remained on continuous bladder irrigation, he had persistent hematuria. Pathology showed urothelial carcinoma. He further underwent Cystoscopy, Clot evacuation, Fulguration, Transurethral resection of bladder tumor 3 cm  on 11/2/2022. Cystoscopy with clot evacuation from the bladder and fulguration of bleeding bladder tissue on 11/12/2022. Cystoscopy with resection of bladder mass (large), Transurethral resection of bladder tumor (small), Evacuation of bladder mass/clot, Fulguration of venous oozing sites and bladder tumor resection base on 11/14/2022.  He had anemia secondary to blood loss, and required transfusion of PRBC.  He was also noted to have thrombocytopenia and required platelet transfusion.  Oncology was also consulted on the case, he underwent bone marrow biopsy.  He has continued to have persistent hematuria.  He also had an episode of acute kidney injury which resolved with IVF hydration.  He was also noted to have increasing leukocytosis, was noted to have Pneumonia, and finished a course of Unasyn and Azithromycin.  His bone marrow pathology results showed possible bilateral fibrosis.  Oncology has recommended follow-up as outpatient.  With persistent hematuria, continuous bladder irrigation was done with Amicar.    Interval Problem Update  Urothelial CA - less hematuria, CBI to be stopped today  Anemia - hgb 8.9  [facility] - crea 1.0  Hypoxia - O2 1 lpm    I have discussed this patient's plan of care and discharge plan at IDT rounds today with Case Management, Nursing, Nursing leadership, and other members of the IDT  team.    Consultants/Specialty  oncology and urology    Code Status  Full Code    Disposition  Patient is not medically cleared for discharge.   Anticipate discharge to to home with close outpatient follow-up.  I have placed the appropriate orders for post-discharge needs.    Review of Systems  Review of Systems   Constitutional:  Positive for malaise/fatigue. Negative for chills, diaphoresis and fever.   HENT:  Negative for congestion, hearing loss and sore throat.    Eyes:  Negative for blurred vision.   Respiratory:  Negative for cough, shortness of breath and wheezing.    Cardiovascular:  Positive for leg swelling. Negative for chest pain and palpitations.   Gastrointestinal:  Negative for abdominal pain, diarrhea, heartburn, nausea and vomiting.   Genitourinary:  Positive for hematuria. Negative for dysuria and flank pain.   Musculoskeletal:  Negative for back pain, joint pain, myalgias and neck pain.   Skin:  Negative for rash.   Neurological:  Positive for weakness. Negative for dizziness, sensory change, speech change, focal weakness and headaches.   Psychiatric/Behavioral:  The patient is not nervous/anxious.       Physical Exam  Temp:  [36 °C (96.8 °F)-36.7 °C (98 °F)] 36.4 °C (97.5 °F)  Pulse:  [70-81] 75  Resp:  [18] 18  BP: (101-125)/(57-69) 125/65  SpO2:  [93 %-98 %] 93 %    Physical Exam  Vitals and nursing note reviewed.   HENT:      Head: Normocephalic and atraumatic.      Nose: No congestion.      Mouth/Throat:      Mouth: Mucous membranes are moist.   Eyes:      Extraocular Movements: Extraocular movements intact.      Conjunctiva/sclera: Conjunctivae normal.   Cardiovascular:      Rate and Rhythm: Normal rate and regular rhythm.   Pulmonary:      Effort: Pulmonary effort is normal.      Breath sounds: Normal breath sounds.   Abdominal:      General: There is no distension.      Tenderness: There is no abdominal tenderness. There is no right CVA tenderness, left CVA tenderness, guarding or  rebound.   Musculoskeletal:      Cervical back: No tenderness.      Right lower leg: Edema present.      Left lower leg: Edema present.   Skin:     General: Skin is warm and dry.   Neurological:      General: No focal deficit present.      Mental Status: He is alert and oriented to person, place, and time.      Cranial Nerves: No cranial nerve deficit.       Fluids    Intake/Output Summary (Last 24 hours) at 11/23/2022 1032  Last data filed at 11/23/2022 0905  Gross per 24 hour   Intake 520 ml   Output 6750 ml   Net -6230 ml         Laboratory  Recent Labs     11/21/22  0028 11/21/22  1647 11/22/22  0111 11/22/22  0758 11/22/22  1625 11/23/22  0027   WBC 8.9  --  7.7  --   --  8.3   RBC 2.58*  --  3.09*  --   --  3.37*   HEMOGLOBIN 6.9*   < > 8.3* 8.6* 8.3* 8.9*   HEMATOCRIT 22.8*  --  27.2*  --   --  29.6*   MCV 88.4  --  88.0  --   --  87.8   MCH 26.7*  --  26.9*  --   --  26.4*   MCHC 30.3*  --  30.5*  --   --  30.1*   RDW 61.5*  --  57.5*  --   --  58.7*   PLATELETCT 69*  --  74*  --   --  82*    < > = values in this interval not displayed.       Recent Labs     11/21/22  0028 11/22/22  0111 11/23/22  0027   SODIUM 136 135 138   POTASSIUM 4.9 4.7 5.0   CHLORIDE 99 100 100   CO2 29 27 29   GLUCOSE 134* 122* 105*   BUN 15 14 16   CREATININE 1.00 0.93 1.06   CALCIUM 8.3* 8.7 8.9       Recent Labs     11/21/22  0028 11/23/22  0027   INR 1.24* 1.21*                 Imaging  IR-US GUIDED PIV   Final Result    Ultrasound-guided PERIPHERAL IV INSERTION performed by    qualified nursing staff as above.      EC-ECHOCARDIOGRAM COMPLETE W/O CONT   Final Result      US-EXTREMITY VENOUS LOWER BILAT   Final Result      DX-CHEST-PORTABLE (1 VIEW)   Final Result      Increased LEFT basilar atelectasis or pneumonia      CT-ABDOMEN-PELVIS W/O   Final Result      1.  Interval hemorrhage within the partially decompressed urinary bladder. Previously visualized posterior left bladder mass is no longer seen presumably this is due to  interval resection      2.  New mild left hydroureteronephrosis with moderate perinephric fat stranding. This is concerning for obstruction perhaps related to hematoma, recent instrumentation or pyelonephritis/ureterectasis.      3.  Increasing colonic stool volume with similar moderate diverticulosis of the colon but no evidence of diverticulitis      4.  Marked splenomegaly      5.  New small effusions with some lower lung zone groundglass suspicious for interstitial edema/mild congestive failure. There is stable mild pericardial fluid      6.  Persistent inhomogeneous medullary spaces with generalized increased density. Recent bone scan showed no focal mass but this could reflect underlying myelofibrosis or metastases with false-negative bone scan      CT-NEEDLE BX-DEEP BONE   Final Result      1.  Successful CT-guided core biopsy of sclerotic lesion in the posterior aspect of left inferior pubic ramus.      NM-BONE/JOINT SCAN WHOLE BODY   Final Result      No evidence of bony metastatic disease      CT-CHEST (THORAX) WITH   Final Result         1.  Emphysema   2.  Hazy bilateral upper lobe opacities with slight intralobular septal thickening, could represent component of mild pulmonary edema   3.  Linear densities the bilateral lung bases favors scarring or atelectasis.   4.  Atherosclerosis and atherosclerotic coronary artery disease   5.  Hepatomegaly with irregular hepatic contour favoring changes of cirrhosis.   6.  Subcentimeter hepatic lobe lesions, could represent small cyst or hemangioma, otherwise indeterminate.   7.  Mild mediastinal adenopathy, workup and evaluation for causes of adenopathy recommended as clinically appropriate.   8.  Splenomegaly      DX-CHEST-PORTABLE (1 VIEW)   Final Result         1.  No acute cardiopulmonary disease.   2.  Atherosclerosis      CT-ABDOMEN-PELVIS WITH   Final Result      1.  Enhancing 2.5 x 2.3 cm bladder wall mass suspicious for neoplasm.   2.  Heterogeneous  sclerotic change of the pelvis and proximal femurs suspicious for metastatic disease or other infiltrating marrow process.   3.  Mild cardiomegaly.   4.  Hepatosplenomegaly.   5.  Small subcentimeter hepatic hypodensities which could represent cysts or hemangiomas but are nonspecific. If indicated, these findings could be further evaluated with dedicated liver protocol CT or MRI or ultrasound on a nonemergent basis.   6.  Colonic diverticulosis without evidence of diverticulitis.   7.  Atherosclerosis.             Assessment/Plan  * Urothelial carcinoma of bladder without invasion of muscle (HCC)- (present on admission)  Assessment & Plan  Transurethral Resection of Bladder tumor (>5cm in size)  10/27/2022  Cystoscopy, Clot evacuation, Fulguration, Transurethral resection of bladder tumor 3 cm    11/2/2022  Cystoscopy with clot evacuation from the bladder and fulguration of bleeding bladder tissue   11/12/2022  Cystoscopy with resection of bladder mass (large), Transurethral resection of bladder tumor (small), Evacuation of bladder mass/clot, Fulguration of venous oozing sites and bladder tumor resection base    11/14/2022  CBI with Amicar stopped today      Acute blood loss anemia- (present on admission)  Assessment & Plan  Transfused PRBC  Follow CBC    Hypoxia- (present on admission)  Assessment & Plan  RT protocol, encourage I-S    Hepatosplenomegaly- (present on admission)  Assessment & Plan  monitor    Pulmonary hypertension (HCC)- (present on admission)  Assessment & Plan  Monitor volume status    TSH elevation- (present on admission)  Assessment & Plan  Stop Synthroid   11/22/2022  Check TSH, FT4, FT3 on 12/23/2022    Paroxysmal atrial fibrillation (HCC)  Assessment & Plan  Monitor    Acute kidney injury (HCC)- (present on admission)  Assessment & Plan  Follow bmp    Coagulation defect (HCC)- (present on admission)  Assessment & Plan  Follow INR    Hyponatremia- (present on admission)  Assessment &  Plan  Follow bmp    Thrombocytopenia (HCC)- (present on admission)  Assessment & Plan  Transfused platelets  Follow CBC  Pathology results - preliminary Myelofibrosis?  Follow up with Oncology as outpatient    Pneumonia- (present on admission)  Assessment & Plan  Finished course of Unasyn and azithromycin (end date 11/18/2022)    Slow transit constipation- (present on admission)  Assessment & Plan  bowel protocol    Hypovolemia  Assessment & Plan  Due to acute blood loss anemia    Iron deficiency anemia- (present on admission)  Assessment & Plan  Iron replacement per pharmacy    COPD (chronic obstructive pulmonary disease) (HCC)- (present on admission)  Assessment & Plan  RT protocol    Dyslipidemia- (present on admission)  Assessment & Plan  Atorvastatin         VTE prophylaxis: SCDs/TEDs    I have performed a physical exam and reviewed and updated ROS and Plan today (11/23/2022). In review of yesterday's note (11/22/2022), there are no changes except as documented above.

## 2022-11-23 NOTE — PROGRESS NOTES
Urine color change reported to Emeterio TOLLIVER. Stated to restart CBI.     CBI restarted. Small clot noted when emptying drainage bag. Pt states when CBI is restarted he feels better and has less pressure.

## 2022-11-23 NOTE — PROGRESS NOTES
Assumed care of patient at 0645. Bedside report received. Assessment complete.    AA&Ox4. Denies SOB.    Reporting 8/10 pain. Medication given as ordered.  Educated patient regarding pharmacologic and non pharmacologic modalities for pain management.    Skin per flow sheets.    Tolerating regular diet. Denies N/V.    CBI in place, currently paused for observation.    Pt ambulates x self in hallway.    Plan of care discussed, all questions answered. Educated on the importance of calling before getting OOB and pt verbalizes understanding. Educated regarding importance of oral care. Oral care kit at bedside. Call light is within reach, treaded slipper socks on, bed in lowest/ locked position, hourly rounding in place, all needs met at this time.    Oliva Jean-Baptiste R.N.

## 2022-11-23 NOTE — CARE PLAN
The patient is Stable - Low risk of patient condition declining or worsening    Shift Goals  Clinical Goals: CBI management, NPO for procedure, continue mobility, reduce edema  Patient Goals: procedure today, continue mobility  Family Goals: no family at bedside    Progress made toward(s) clinical / shift goals: Pt had BM yesterday, medication given as charted in MAR for bowel regimen. Pt states he takes this at home for regular bowel movements. CBI paused per verbal order. Monitoring continued. Pt VSS. Pt restarted on regular diet. Medication given for pain and anxiety.     Patient is not progressing towards the following goals: N/A

## 2022-11-23 NOTE — PROGRESS NOTES
Note to reader: this note follows the APSO format rather than the historical SOAP format. Assessment and plan located at the top of the note for ease of use.    Chief Complaint  72 y.o. year old male here with bladder mass and sclerotic lesions.     Assessment/Plan  Interval History   TCC bladder s/p TURBT 10/27, 11/2 and 11/14  Hematuria and clot obstruction s/p clot evacuation and fulguration x3 11/2 and 11/12 and 11/14  Anemia   Thrombocytopenia  BPH with retention      Plan:  - Amicar CBI initiated at 1315 on 11/21. Stopped at 11/23 at 0945.  - May consider TOV tomorrow if urine remains clear off CBI  - S/p bone marrow bx, appreciate hematology rec's  - On Flomax and Finasteride, to continue        Case discussed with patient, nursing, pharmacist, and Urology-Dr. Castellanos and Dr Santhosh PETERSON  Patient seen and examined    11/23. CBI on low flow with completely clear urine. H/H stable. No overnight events. No complaints.     11/22. CBI on low flow with clear light pink urine. H/H stable 8.3/27.2.No pain at this time    11/21. CBI on high flow, urine light cherry. Clots irrigated this am by nursing. Hb 6.9 (8.1), received another unit of pRBCs this am. VSS. CBI slowed at bedside and urine immediately darkened.    11/20. CBI remains with watermelon color urine. No clots. Hgb slow downtrend.     11/19. Arboleda remains. Urine watermelon with CBI on slow rate. Hgb 8.3, Plt stable at 74    11/18. Arboleda was replaced after pt unable to void last night. CBI restarted for darkening of urine. H/H improving.     11/17. Urine clear/pink. CBI discontinued for 5hrs. H/H stable. Pt anxious to leave.    11/16. Urine clear/pink. D/C CBI. Pt feels well and has not pain. H/H stable and improving.     11/15. Repeat clot evac and fulguration with TURBT yesterday. Urine now pink and no clot obstruction. Abdominal pains and spasms resolved. H/H declined further and received 2 units PRBCs today    11/14. Hematuria continues with difficult time  maintaining flow via cuellar. RN unable to irrigate and bladder scan with 500 cc in bladder. Patient in pain in SP region. Receiving PRBCs daily.            Review of Systems  Physical Exam   Review of Systems   Constitutional:  Negative for chills and fever.   Cardiovascular:  Negative for chest pain.   Gastrointestinal:  Negative for nausea and vomiting.   Genitourinary:  Negative for dysuria, flank pain and hematuria.   All other systems reviewed and are negative.  Vitals:    11/22/22 2027 11/23/22 0003 11/23/22 0428 11/23/22 0900   BP: 119/63 117/63 111/57 125/65   Pulse: 78 74 81 75   Resp: 18 18 18 18   Temp: 36 °C (96.8 °F) 36.4 °C (97.5 °F) 36.3 °C (97.4 °F) 36.4 °C (97.5 °F)   TempSrc: Temporal Temporal Temporal Temporal   SpO2: 98% 95% 98% 93%   Weight:       Height:         Physical Exam  Vitals and nursing note reviewed.   Constitutional:       Appearance: Normal appearance.   HENT:      Head: Normocephalic and atraumatic.      Nose: Nose normal.      Mouth/Throat:      Mouth: Mucous membranes are moist.   Eyes:      Pupils: Pupils are equal, round, and reactive to light.   Pulmonary:      Effort: Pulmonary effort is normal.   Abdominal:      General: Abdomen is flat. There is no distension.      Palpations: Abdomen is soft.      Tenderness: There is no abdominal tenderness.   Genitourinary:     Comments: Amicart CBI on low flow with clear urine  Skin:     General: Skin is warm and dry.   Neurological:      General: No focal deficit present.      Mental Status: He is alert and oriented to person, place, and time.   Psychiatric:         Mood and Affect: Mood normal.         Behavior: Behavior normal.        Hematology Chemistry   Lab Results   Component Value Date/Time    WBC 8.3 11/23/2022 12:27 AM    HEMOGLOBIN 8.9 (L) 11/23/2022 12:27 AM    HEMATOCRIT 29.6 (L) 11/23/2022 12:27 AM    PLATELETCT 82 (L) 11/23/2022 12:27 AM     Lab Results   Component Value Date/Time    SODIUM 138 11/23/2022 12:27 AM     POTASSIUM 5.0 11/23/2022 12:27 AM    CHLORIDE 100 11/23/2022 12:27 AM    CO2 29 11/23/2022 12:27 AM    GLUCOSE 105 (H) 11/23/2022 12:27 AM    BUN 16 11/23/2022 12:27 AM    CREATININE 1.06 11/23/2022 12:27 AM         Labs not explicitly included in this progress note were reviewed by the author.   Radiology/imaging not explicitly included in this progress note was reviewed by the author.     Labs reviewed, Medications reviewed and Radiology images reviewed

## 2022-11-24 LAB
ANION GAP SERPL CALC-SCNC: 8 MMOL/L (ref 7–16)
BUN SERPL-MCNC: 15 MG/DL (ref 8–22)
CALCIUM SERPL-MCNC: 8.9 MG/DL (ref 8.5–10.5)
CHLORIDE SERPL-SCNC: 99 MMOL/L (ref 96–112)
CO2 SERPL-SCNC: 30 MMOL/L (ref 20–33)
CREAT SERPL-MCNC: 1.01 MG/DL (ref 0.5–1.4)
ERYTHROCYTE [DISTWIDTH] IN BLOOD BY AUTOMATED COUNT: 58.7 FL (ref 35.9–50)
GFR SERPLBLD CREATININE-BSD FMLA CKD-EPI: 79 ML/MIN/1.73 M 2
GLUCOSE SERPL-MCNC: 103 MG/DL (ref 65–99)
HCT VFR BLD AUTO: 26.8 % (ref 42–52)
HGB BLD-MCNC: 8.3 G/DL (ref 14–18)
MCH RBC QN AUTO: 26.8 PG (ref 27–33)
MCHC RBC AUTO-ENTMCNC: 31 G/DL (ref 33.7–35.3)
MCV RBC AUTO: 86.5 FL (ref 81.4–97.8)
PLATELET # BLD AUTO: 102 K/UL (ref 164–446)
PMV BLD AUTO: 9.9 FL (ref 9–12.9)
POTASSIUM SERPL-SCNC: 5.1 MMOL/L (ref 3.6–5.5)
RBC # BLD AUTO: 3.1 M/UL (ref 4.7–6.1)
SODIUM SERPL-SCNC: 137 MMOL/L (ref 135–145)
WBC # BLD AUTO: 7.8 K/UL (ref 4.8–10.8)

## 2022-11-24 PROCEDURE — 99232 SBSQ HOSP IP/OBS MODERATE 35: CPT | Performed by: FAMILY MEDICINE

## 2022-11-24 PROCEDURE — 700101 HCHG RX REV CODE 250: Performed by: PHYSICIAN ASSISTANT

## 2022-11-24 PROCEDURE — 85027 COMPLETE CBC AUTOMATED: CPT

## 2022-11-24 PROCEDURE — A9270 NON-COVERED ITEM OR SERVICE: HCPCS | Performed by: PHYSICIAN ASSISTANT

## 2022-11-24 PROCEDURE — 700102 HCHG RX REV CODE 250 W/ 637 OVERRIDE(OP): Performed by: STUDENT IN AN ORGANIZED HEALTH CARE EDUCATION/TRAINING PROGRAM

## 2022-11-24 PROCEDURE — 700102 HCHG RX REV CODE 250 W/ 637 OVERRIDE(OP): Performed by: INTERNAL MEDICINE

## 2022-11-24 PROCEDURE — A9270 NON-COVERED ITEM OR SERVICE: HCPCS | Performed by: STUDENT IN AN ORGANIZED HEALTH CARE EDUCATION/TRAINING PROGRAM

## 2022-11-24 PROCEDURE — 770001 HCHG ROOM/CARE - MED/SURG/GYN PRIV*

## 2022-11-24 PROCEDURE — A9270 NON-COVERED ITEM OR SERVICE: HCPCS | Performed by: NURSE PRACTITIONER

## 2022-11-24 PROCEDURE — 700102 HCHG RX REV CODE 250 W/ 637 OVERRIDE(OP): Performed by: PHYSICIAN ASSISTANT

## 2022-11-24 PROCEDURE — 700102 HCHG RX REV CODE 250 W/ 637 OVERRIDE(OP): Performed by: FAMILY MEDICINE

## 2022-11-24 PROCEDURE — A9270 NON-COVERED ITEM OR SERVICE: HCPCS | Performed by: HOSPITALIST

## 2022-11-24 PROCEDURE — A9270 NON-COVERED ITEM OR SERVICE: HCPCS | Performed by: FAMILY MEDICINE

## 2022-11-24 PROCEDURE — A9270 NON-COVERED ITEM OR SERVICE: HCPCS | Performed by: INTERNAL MEDICINE

## 2022-11-24 PROCEDURE — 80048 BASIC METABOLIC PNL TOTAL CA: CPT

## 2022-11-24 PROCEDURE — 36415 COLL VENOUS BLD VENIPUNCTURE: CPT

## 2022-11-24 PROCEDURE — 700102 HCHG RX REV CODE 250 W/ 637 OVERRIDE(OP): Performed by: HOSPITALIST

## 2022-11-24 PROCEDURE — 700102 HCHG RX REV CODE 250 W/ 637 OVERRIDE(OP): Performed by: NURSE PRACTITIONER

## 2022-11-24 RX ORDER — ALPRAZOLAM 0.25 MG/1
0.25 TABLET ORAL 3 TIMES DAILY PRN
Status: DISCONTINUED | OUTPATIENT
Start: 2022-11-24 | End: 2022-11-30 | Stop reason: HOSPADM

## 2022-11-24 RX ADMIN — Medication 30 G: at 20:34

## 2022-11-24 RX ADMIN — DOCUSATE SODIUM 50 MG AND SENNOSIDES 8.6 MG 2 TABLET: 8.6; 5 TABLET, FILM COATED ORAL at 04:36

## 2022-11-24 RX ADMIN — Medication 30 G: at 06:41

## 2022-11-24 RX ADMIN — GABAPENTIN 200 MG: 100 CAPSULE ORAL at 17:23

## 2022-11-24 RX ADMIN — ALPRAZOLAM 0.25 MG: 0.25 TABLET ORAL at 23:01

## 2022-11-24 RX ADMIN — ALPRAZOLAM 0.25 MG: 0.25 TABLET ORAL at 15:46

## 2022-11-24 RX ADMIN — MORPHINE SULFATE 30 MG: 15 TABLET ORAL at 07:57

## 2022-11-24 RX ADMIN — FINASTERIDE 5 MG: 5 TABLET, FILM COATED ORAL at 04:36

## 2022-11-24 RX ADMIN — FOLIC ACID 1 MG: 1 TABLET ORAL at 04:36

## 2022-11-24 RX ADMIN — Medication 2000 UNITS: at 04:36

## 2022-11-24 RX ADMIN — MORPHINE SULFATE 30 MG: 15 TABLET ORAL at 01:42

## 2022-11-24 RX ADMIN — OXYBUTYNIN CHLORIDE 5 MG: 5 TABLET ORAL at 17:23

## 2022-11-24 RX ADMIN — THIAMINE HCL TAB 100 MG 100 MG: 100 TAB at 04:36

## 2022-11-24 RX ADMIN — MORPHINE SULFATE 30 MG: 15 TABLET ORAL at 15:46

## 2022-11-24 RX ADMIN — Medication 30 G: at 02:04

## 2022-11-24 RX ADMIN — ALPRAZOLAM 0.25 MG: 0.25 TABLET ORAL at 07:57

## 2022-11-24 RX ADMIN — GABAPENTIN 200 MG: 100 CAPSULE ORAL at 04:36

## 2022-11-24 RX ADMIN — THIAMINE HCL TAB 100 MG 100 MG: 100 TAB at 17:23

## 2022-11-24 RX ADMIN — Medication 30 G: at 12:17

## 2022-11-24 RX ADMIN — MAGNESIUM HYDROXIDE 30 ML: 400 SUSPENSION ORAL at 08:06

## 2022-11-24 RX ADMIN — CYANOCOBALAMIN TAB 500 MCG 1000 MCG: 500 TAB at 04:36

## 2022-11-24 RX ADMIN — GABAPENTIN 200 MG: 100 CAPSULE ORAL at 12:17

## 2022-11-24 RX ADMIN — ATORVASTATIN CALCIUM 20 MG: 20 TABLET, FILM COATED ORAL at 17:23

## 2022-11-24 RX ADMIN — TAMSULOSIN HYDROCHLORIDE 0.4 MG: 0.4 CAPSULE ORAL at 07:57

## 2022-11-24 RX ADMIN — Medication 30 G: at 09:20

## 2022-11-24 RX ADMIN — OXYBUTYNIN CHLORIDE 5 MG: 5 TABLET ORAL at 12:17

## 2022-11-24 RX ADMIN — OXYBUTYNIN CHLORIDE 5 MG: 5 TABLET ORAL at 04:36

## 2022-11-24 RX ADMIN — DOCUSATE SODIUM 50 MG AND SENNOSIDES 8.6 MG 2 TABLET: 8.6; 5 TABLET, FILM COATED ORAL at 17:23

## 2022-11-24 ASSESSMENT — ENCOUNTER SYMPTOMS
SPEECH CHANGE: 0
BLURRED VISION: 0
NAUSEA: 0
FEVER: 0
VOMITING: 0
NERVOUS/ANXIOUS: 0
CHILLS: 0
COUGH: 0
SENSORY CHANGE: 0
ABDOMINAL PAIN: 0
FOCAL WEAKNESS: 0
HEADACHES: 0
WEAKNESS: 1
BACK PAIN: 0
HEARTBURN: 0
PALPITATIONS: 0
MYALGIAS: 0
DIZZINESS: 0
DIARRHEA: 0
SHORTNESS OF BREATH: 0
NECK PAIN: 0
SORE THROAT: 0
FLANK PAIN: 0
WHEEZING: 0
DIAPHORESIS: 0

## 2022-11-24 ASSESSMENT — PAIN DESCRIPTION - PAIN TYPE
TYPE: ACUTE PAIN;CHRONIC PAIN
TYPE: CHRONIC PAIN
TYPE: ACUTE PAIN;CHRONIC PAIN
TYPE: ACUTE PAIN;CHRONIC PAIN

## 2022-11-24 ASSESSMENT — PATIENT HEALTH QUESTIONNAIRE - PHQ9
1. LITTLE INTEREST OR PLEASURE IN DOING THINGS: NOT AT ALL
SUM OF ALL RESPONSES TO PHQ9 QUESTIONS 1 AND 2: 0
2. FEELING DOWN, DEPRESSED, IRRITABLE, OR HOPELESS: NOT AT ALL

## 2022-11-24 NOTE — CARE PLAN
The patient is Stable - Low risk of patient condition declining or worsening    Shift Goals  Clinical Goals: CBI management, maintain safety, pain control  Patient Goals: pain control  Family Goals: no family at bedside    Progress made toward(s) clinical / shift goals: Pt OOB and ambulates in renee x self. CBI continued. Pain controlled with oral medications.     Patient is not progressing towards the following goals: CBI still needed.

## 2022-11-24 NOTE — PROGRESS NOTES
CBI restarted per verbal order from SHAREE Storm.     New formula ordered for CBI. Administered as shown in MAR.

## 2022-11-24 NOTE — PROGRESS NOTES
CBI infusing Alum per MAR and per pharmacy availability. NS used to cover gaps between bags of Alum due to time needed for pharmacy to make/compound Alum CBI bags to ensure irrigation is continuous.

## 2022-11-24 NOTE — PROGRESS NOTES
[facility] Medicine Daily Progress Note    Date of Service  11/24/2022    Chief Complaint  Gregg Martinez is a 72 y.o. male admitted 10/26/2022 with hematuria.    Hospital Course  Admitted with hematuria, he was noted to have a bladder tumor.  Urology was consulted on the case.  Patient underwent Transurethral Resection of Bladder tumor (>5cm in size) on 10/27/2022.  Remained on continuous bladder irrigation, he had persistent hematuria. Pathology showed urothelial carcinoma. He further underwent Cystoscopy, Clot evacuation, Fulguration, Transurethral resection of bladder tumor 3 cm  on 11/2/2022. Cystoscopy with clot evacuation from the bladder and fulguration of bleeding bladder tissue on 11/12/2022. Cystoscopy with resection of bladder mass (large), Transurethral resection of bladder tumor (small), Evacuation of bladder mass/clot, Fulguration of venous oozing sites and bladder tumor resection base on 11/14/2022.  He had anemia secondary to blood loss, and required transfusion of PRBC.  He was also noted to have thrombocytopenia and required platelet transfusion.  Oncology was also consulted on the case, he underwent bone marrow biopsy.  He has continued to have persistent hematuria.  He also had an episode of acute kidney injury which resolved with IVF hydration.  He was also noted to have increasing leukocytosis, was noted to have Pneumonia, and finished a course of Unasyn and Azithromycin.  His bone marrow pathology results showed possible bilateral fibrosis.  Oncology has recommended follow-up as outpatient.  With persistent hematuria, continuous bladder irrigation was done with Amicar.    Interval Problem Update  Urothelial CA - mild hematuria noted  Anemia - hgb 8.3  Hypoxia - O2 1 lpm    I have discussed this patient's plan of care and discharge plan at IDT rounds today with Case Management, Nursing, Nursing leadership, and other members of the IDT team.    Consultants/Specialty  oncology and urology    Code  Status  Full Code    Disposition  Patient is not medically cleared for discharge.   Anticipate discharge to to home with close outpatient follow-up.  I have placed the appropriate orders for post-discharge needs.    Review of Systems  Review of Systems   Constitutional:  Positive for malaise/fatigue. Negative for chills, diaphoresis and fever.   HENT:  Negative for congestion, hearing loss and sore throat.    Eyes:  Negative for blurred vision.   Respiratory:  Negative for cough, shortness of breath and wheezing.    Cardiovascular:  Positive for leg swelling. Negative for chest pain and palpitations.   Gastrointestinal:  Negative for abdominal pain, diarrhea, heartburn, nausea and vomiting.   Genitourinary:  Positive for hematuria. Negative for dysuria and flank pain.   Musculoskeletal:  Negative for back pain, joint pain, myalgias and neck pain.   Skin:  Negative for rash.   Neurological:  Positive for weakness. Negative for dizziness, sensory change, speech change, focal weakness and headaches.   Psychiatric/Behavioral:  The patient is not nervous/anxious.       Physical Exam  Temp:  [36.4 °C (97.6 °F)-36.7 °C (98 °F)] 36.6 °C (97.9 °F)  Pulse:  [61-79] 61  Resp:  [18] 18  BP: (105-127)/(57-78) 127/65  SpO2:  [95 %-99 %] 95 %    Physical Exam  Vitals and nursing note reviewed.   HENT:      Head: Normocephalic and atraumatic.      Nose: No congestion.      Mouth/Throat:      Mouth: Mucous membranes are moist.   Eyes:      Extraocular Movements: Extraocular movements intact.      Conjunctiva/sclera: Conjunctivae normal.   Cardiovascular:      Rate and Rhythm: Normal rate and regular rhythm.   Pulmonary:      Effort: Pulmonary effort is normal.      Breath sounds: Normal breath sounds.   Abdominal:      General: There is no distension.      Tenderness: There is no abdominal tenderness. There is no right CVA tenderness, left CVA tenderness, guarding or rebound.   Musculoskeletal:      Cervical back: No tenderness.       Right lower leg: Edema present.      Left lower leg: Edema present.   Skin:     General: Skin is warm and dry.   Neurological:      General: No focal deficit present.      Mental Status: He is alert and oriented to person, place, and time.      Cranial Nerves: No cranial nerve deficit.       Fluids    Intake/Output Summary (Last 24 hours) at 11/24/2022 1303  Last data filed at 11/24/2022 1222  Gross per 24 hour   Intake 720 ml   Output 6090 ml   Net -5370 ml       Laboratory  Recent Labs     11/22/22  0111 11/22/22  0758 11/22/22  1625 11/23/22  0027 11/24/22  0325   WBC 7.7  --   --  8.3 7.8   RBC 3.09*  --   --  3.37* 3.10*   HEMOGLOBIN 8.3*   < > 8.3* 8.9* 8.3*   HEMATOCRIT 27.2*  --   --  29.6* 26.8*   MCV 88.0  --   --  87.8 86.5   MCH 26.9*  --   --  26.4* 26.8*   MCHC 30.5*  --   --  30.1* 31.0*   RDW 57.5*  --   --  58.7* 58.7*   PLATELETCT 74*  --   --  82* 102*   MPV  --   --   --   --  9.9    < > = values in this interval not displayed.     Recent Labs     11/22/22  0111 11/23/22  0027 11/24/22  0325   SODIUM 135 138 137   POTASSIUM 4.7 5.0 5.1   CHLORIDE 100 100 99   CO2 27 29 30   GLUCOSE 122* 105* 103*   BUN 14 16 15   CREATININE 0.93 1.06 1.01   CALCIUM 8.7 8.9 8.9     Recent Labs     11/23/22  0027   INR 1.21*               Imaging  US-RENAL   Final Result      1.  Patchy echogenic areas in the LEFT kidney of uncertain significance.   2.  No hydronephrosis.   3.  Limited evaluation of the bladder as is decompressed with Arboleda catheter in place.  Mass and/or clot is difficult to exclude.      IR-US GUIDED PIV   Final Result    Ultrasound-guided PERIPHERAL IV INSERTION performed by    qualified nursing staff as above.      EC-ECHOCARDIOGRAM COMPLETE W/O CONT   Final Result      US-EXTREMITY VENOUS LOWER BILAT   Final Result      DX-CHEST-PORTABLE (1 VIEW)   Final Result      Increased LEFT basilar atelectasis or pneumonia      CT-ABDOMEN-PELVIS W/O   Final Result      1.  Interval hemorrhage within  the partially decompressed urinary bladder. Previously visualized posterior left bladder mass is no longer seen presumably this is due to interval resection      2.  New mild left hydroureteronephrosis with moderate perinephric fat stranding. This is concerning for obstruction perhaps related to hematoma, recent instrumentation or pyelonephritis/ureterectasis.      3.  Increasing colonic stool volume with similar moderate diverticulosis of the colon but no evidence of diverticulitis      4.  Marked splenomegaly      5.  New small effusions with some lower lung zone groundglass suspicious for interstitial edema/mild congestive failure. There is stable mild pericardial fluid      6.  Persistent inhomogeneous medullary spaces with generalized increased density. Recent bone scan showed no focal mass but this could reflect underlying myelofibrosis or metastases with false-negative bone scan      CT-NEEDLE BX-DEEP BONE   Final Result      1.  Successful CT-guided core biopsy of sclerotic lesion in the posterior aspect of left inferior pubic ramus.      NM-BONE/JOINT SCAN WHOLE BODY   Final Result      No evidence of bony metastatic disease      CT-CHEST (THORAX) WITH   Final Result         1.  Emphysema   2.  Hazy bilateral upper lobe opacities with slight intralobular septal thickening, could represent component of mild pulmonary edema   3.  Linear densities the bilateral lung bases favors scarring or atelectasis.   4.  Atherosclerosis and atherosclerotic coronary artery disease   5.  Hepatomegaly with irregular hepatic contour favoring changes of cirrhosis.   6.  Subcentimeter hepatic lobe lesions, could represent small cyst or hemangioma, otherwise indeterminate.   7.  Mild mediastinal adenopathy, workup and evaluation for causes of adenopathy recommended as clinically appropriate.   8.  Splenomegaly      DX-CHEST-PORTABLE (1 VIEW)   Final Result         1.  No acute cardiopulmonary disease.   2.  Atherosclerosis       CT-ABDOMEN-PELVIS WITH   Final Result      1.  Enhancing 2.5 x 2.3 cm bladder wall mass suspicious for neoplasm.   2.  Heterogeneous sclerotic change of the pelvis and proximal femurs suspicious for metastatic disease or other infiltrating marrow process.   3.  Mild cardiomegaly.   4.  Hepatosplenomegaly.   5.  Small subcentimeter hepatic hypodensities which could represent cysts or hemangiomas but are nonspecific. If indicated, these findings could be further evaluated with dedicated liver protocol CT or MRI or ultrasound on a nonemergent basis.   6.  Colonic diverticulosis without evidence of diverticulitis.   7.  Atherosclerosis.             Assessment/Plan  * Urothelial carcinoma of bladder without invasion of muscle (HCC)- (present on admission)  Assessment & Plan  Transurethral Resection of Bladder tumor (>5cm in size)  10/27/2022  Cystoscopy, Clot evacuation, Fulguration, Transurethral resection of bladder tumor 3 cm    11/2/2022  Cystoscopy with clot evacuation from the bladder and fulguration of bleeding bladder tissue   11/12/2022  Cystoscopy with resection of bladder mass (large), Transurethral resection of bladder tumor (small), Evacuation of bladder mass/clot, Fulguration of venous oozing sites and bladder tumor resection base    11/14/2022      Acute blood loss anemia- (present on admission)  Assessment & Plan  Transfused PRBC  Follow CBC    Hypoxia- (present on admission)  Assessment & Plan  RT protocol, encourage I-S    Hepatosplenomegaly- (present on admission)  Assessment & Plan  monitor    Pulmonary hypertension (HCC)- (present on admission)  Assessment & Plan  Monitor volume status    TSH elevation- (present on admission)  Assessment & Plan  Stop Synthroid   11/22/2022  Check TSH, FT4, FT3 on 12/23/2022    Paroxysmal atrial fibrillation (HCC)  Assessment & Plan  Monitor    Acute kidney injury (HCC)- (present on admission)  Assessment & Plan  Follow bmp    Coagulation defect (HCC)- (present on  admission)  Assessment & Plan  Follow INR    Hyponatremia- (present on admission)  Assessment & Plan  Follow bmp    Thrombocytopenia (HCC)- (present on admission)  Assessment & Plan  Transfused platelets  Follow CBC  Pathology results - preliminary Myelofibrosis?  Follow up with Oncology as outpatient    Pneumonia- (present on admission)  Assessment & Plan  Finished course of Unasyn and azithromycin (end date 11/18/2022)    Slow transit constipation- (present on admission)  Assessment & Plan  bowel protocol    Hypovolemia  Assessment & Plan  Due to acute blood loss anemia    Iron deficiency anemia- (present on admission)  Assessment & Plan  Iron replacement per pharmacy    COPD (chronic obstructive pulmonary disease) (HCC)- (present on admission)  Assessment & Plan  RT protocol    Dyslipidemia- (present on admission)  Assessment & Plan  Atorvastatin       VTE prophylaxis: SCDs/TEDs    I have performed a physical exam and reviewed and updated ROS and Plan today (11/24/2022). In review of yesterday's note (11/23/2022), there are no changes except as documented above.

## 2022-11-24 NOTE — PROGRESS NOTES
Per pharmacy, stock of medication ordered for CBI is running low and may only have 4 bags to last throughout the night. Emeterio TOLLIVER notified and stated to run NS once we are out of currently ordered CBI fluid.     PA also notified that urine color changed from light watermelon color to light tea color.

## 2022-11-24 NOTE — PROGRESS NOTES
Assumed care of patient at 0645. Bedside report received. Assessment complete.    AA&Ox4.     Reporting 7/10 pain. Medication given for pain control.   Educated patient regarding pharmacologic and non pharmacologic modalities for pain management.    Skin per flow sheets.    Tolerating regular diet. Denies N/V.    + void. Last BM 11/22/2022.    Pt ambulates x self in hallway.    Plan of care discussed, all questions answered. Educated on the importance of calling before getting OOB and pt verbalizes understanding. Educated regarding importance of oral care. Oral care kit at bedside. Call light is within reach, treaded slipper socks on, bed in lowest/ locked position, hourly rounding in place, all needs met at this time.    Oliva Jean-Baptiste R.N.

## 2022-11-24 NOTE — CARE PLAN
Problem: Urinary - Renal Perfusion  Goal: Ability to achieve and maintain adequate renal perfusion and functioning will improve  Outcome: Progressing     Problem: Pain - Standard  Goal: Alleviation of pain or a reduction in pain to the patient’s comfort goal  Outcome: Progressing     The patient is Stable - Low risk of patient condition declining or worsening    Shift Goals  Clinical Goals: CBI, clear urine output  Patient Goals: pain control  Family Goals: no family at bedside    Progress made toward(s) clinical / shift goals:  CBI Alum in place per MAR. Pain controlled on orals    Patient is not progressing towards the following goals:

## 2022-11-25 LAB
ANION GAP SERPL CALC-SCNC: 12 MMOL/L (ref 7–16)
BUN SERPL-MCNC: 16 MG/DL (ref 8–22)
CALCIUM SERPL-MCNC: 9.4 MG/DL (ref 8.5–10.5)
CHLORIDE SERPL-SCNC: 98 MMOL/L (ref 96–112)
CO2 SERPL-SCNC: 28 MMOL/L (ref 20–33)
CREAT SERPL-MCNC: 1.03 MG/DL (ref 0.5–1.4)
ERYTHROCYTE [DISTWIDTH] IN BLOOD BY AUTOMATED COUNT: 61 FL (ref 35.9–50)
GFR SERPLBLD CREATININE-BSD FMLA CKD-EPI: 78 ML/MIN/1.73 M 2
GLUCOSE SERPL-MCNC: 99 MG/DL (ref 65–99)
HCT VFR BLD AUTO: 31 % (ref 42–52)
HGB BLD-MCNC: 9.3 G/DL (ref 14–18)
INR PPP: 1.03 (ref 0.87–1.13)
MCH RBC QN AUTO: 26.6 PG (ref 27–33)
MCHC RBC AUTO-ENTMCNC: 30 G/DL (ref 33.7–35.3)
MCV RBC AUTO: 88.8 FL (ref 81.4–97.8)
PLATELET # BLD AUTO: 119 K/UL (ref 164–446)
PMV BLD AUTO: 9.4 FL (ref 9–12.9)
POTASSIUM SERPL-SCNC: 5.2 MMOL/L (ref 3.6–5.5)
PROTHROMBIN TIME: 13.4 SEC (ref 12–14.6)
RBC # BLD AUTO: 3.49 M/UL (ref 4.7–6.1)
SODIUM SERPL-SCNC: 138 MMOL/L (ref 135–145)
WBC # BLD AUTO: 8.6 K/UL (ref 4.8–10.8)

## 2022-11-25 PROCEDURE — 36415 COLL VENOUS BLD VENIPUNCTURE: CPT

## 2022-11-25 PROCEDURE — A9270 NON-COVERED ITEM OR SERVICE: HCPCS | Performed by: HOSPITALIST

## 2022-11-25 PROCEDURE — 770001 HCHG ROOM/CARE - MED/SURG/GYN PRIV*

## 2022-11-25 PROCEDURE — 80048 BASIC METABOLIC PNL TOTAL CA: CPT

## 2022-11-25 PROCEDURE — 700101 HCHG RX REV CODE 250: Performed by: PHYSICIAN ASSISTANT

## 2022-11-25 PROCEDURE — 700102 HCHG RX REV CODE 250 W/ 637 OVERRIDE(OP): Performed by: STUDENT IN AN ORGANIZED HEALTH CARE EDUCATION/TRAINING PROGRAM

## 2022-11-25 PROCEDURE — A9270 NON-COVERED ITEM OR SERVICE: HCPCS | Performed by: INTERNAL MEDICINE

## 2022-11-25 PROCEDURE — 99232 SBSQ HOSP IP/OBS MODERATE 35: CPT | Performed by: FAMILY MEDICINE

## 2022-11-25 PROCEDURE — A9270 NON-COVERED ITEM OR SERVICE: HCPCS | Performed by: STUDENT IN AN ORGANIZED HEALTH CARE EDUCATION/TRAINING PROGRAM

## 2022-11-25 PROCEDURE — 700102 HCHG RX REV CODE 250 W/ 637 OVERRIDE(OP): Performed by: PHYSICIAN ASSISTANT

## 2022-11-25 PROCEDURE — 700102 HCHG RX REV CODE 250 W/ 637 OVERRIDE(OP): Performed by: INTERNAL MEDICINE

## 2022-11-25 PROCEDURE — 85027 COMPLETE CBC AUTOMATED: CPT

## 2022-11-25 PROCEDURE — A9270 NON-COVERED ITEM OR SERVICE: HCPCS | Performed by: PHYSICIAN ASSISTANT

## 2022-11-25 PROCEDURE — 700102 HCHG RX REV CODE 250 W/ 637 OVERRIDE(OP): Performed by: HOSPITALIST

## 2022-11-25 PROCEDURE — 85610 PROTHROMBIN TIME: CPT

## 2022-11-25 PROCEDURE — 700102 HCHG RX REV CODE 250 W/ 637 OVERRIDE(OP): Performed by: FAMILY MEDICINE

## 2022-11-25 PROCEDURE — A9270 NON-COVERED ITEM OR SERVICE: HCPCS | Performed by: FAMILY MEDICINE

## 2022-11-25 RX ADMIN — GABAPENTIN 200 MG: 100 CAPSULE ORAL at 06:28

## 2022-11-25 RX ADMIN — MORPHINE SULFATE 15 MG: 15 TABLET ORAL at 22:22

## 2022-11-25 RX ADMIN — Medication 30 G: at 18:19

## 2022-11-25 RX ADMIN — FINASTERIDE 5 MG: 5 TABLET, FILM COATED ORAL at 06:28

## 2022-11-25 RX ADMIN — DOCUSATE SODIUM 50 MG AND SENNOSIDES 8.6 MG 2 TABLET: 8.6; 5 TABLET, FILM COATED ORAL at 06:29

## 2022-11-25 RX ADMIN — ALPRAZOLAM 0.25 MG: 0.25 TABLET ORAL at 06:28

## 2022-11-25 RX ADMIN — ALPRAZOLAM 0.25 MG: 0.25 TABLET ORAL at 17:10

## 2022-11-25 RX ADMIN — GABAPENTIN 200 MG: 100 CAPSULE ORAL at 11:31

## 2022-11-25 RX ADMIN — FOLIC ACID 1 MG: 1 TABLET ORAL at 06:28

## 2022-11-25 RX ADMIN — Medication 2000 UNITS: at 06:28

## 2022-11-25 RX ADMIN — Medication 1 LOZENGE: at 20:23

## 2022-11-25 RX ADMIN — OXYBUTYNIN CHLORIDE 5 MG: 5 TABLET ORAL at 17:06

## 2022-11-25 RX ADMIN — OXYBUTYNIN CHLORIDE 5 MG: 5 TABLET ORAL at 06:29

## 2022-11-25 RX ADMIN — Medication 30 G: at 15:49

## 2022-11-25 RX ADMIN — DOCUSATE SODIUM 50 MG AND SENNOSIDES 8.6 MG 2 TABLET: 8.6; 5 TABLET, FILM COATED ORAL at 17:06

## 2022-11-25 RX ADMIN — CYANOCOBALAMIN TAB 500 MCG 1000 MCG: 500 TAB at 06:29

## 2022-11-25 RX ADMIN — THIAMINE HCL TAB 100 MG 100 MG: 100 TAB at 06:29

## 2022-11-25 RX ADMIN — THIAMINE HCL TAB 100 MG 100 MG: 100 TAB at 17:06

## 2022-11-25 RX ADMIN — OXYBUTYNIN CHLORIDE 5 MG: 5 TABLET ORAL at 11:31

## 2022-11-25 RX ADMIN — Medication 1 LOZENGE: at 15:57

## 2022-11-25 RX ADMIN — Medication 1 LOZENGE: at 10:00

## 2022-11-25 RX ADMIN — GABAPENTIN 200 MG: 100 CAPSULE ORAL at 17:06

## 2022-11-25 RX ADMIN — MORPHINE SULFATE 30 MG: 15 TABLET ORAL at 17:10

## 2022-11-25 RX ADMIN — Medication 30 G: at 20:12

## 2022-11-25 RX ADMIN — ATORVASTATIN CALCIUM 20 MG: 20 TABLET, FILM COATED ORAL at 17:06

## 2022-11-25 RX ADMIN — MORPHINE SULFATE 30 MG: 15 TABLET ORAL at 10:29

## 2022-11-25 RX ADMIN — Medication 30 G: at 13:22

## 2022-11-25 RX ADMIN — TAMSULOSIN HYDROCHLORIDE 0.4 MG: 0.4 CAPSULE ORAL at 06:31

## 2022-11-25 ASSESSMENT — ENCOUNTER SYMPTOMS
VOMITING: 0
SHORTNESS OF BREATH: 0
FEVER: 0
NECK PAIN: 0
SENSORY CHANGE: 0
NAUSEA: 0
WHEEZING: 0
BLURRED VISION: 0
HEARTBURN: 0
NERVOUS/ANXIOUS: 1
DIARRHEA: 0
DIAPHORESIS: 0
MYALGIAS: 0
SORE THROAT: 0
HEADACHES: 0
SPEECH CHANGE: 0
WEAKNESS: 1
BACK PAIN: 0
FOCAL WEAKNESS: 0
DIZZINESS: 0
PALPITATIONS: 0
FLANK PAIN: 0
CHILLS: 0
COUGH: 0
ABDOMINAL PAIN: 0

## 2022-11-25 ASSESSMENT — PATIENT HEALTH QUESTIONNAIRE - PHQ9
SUM OF ALL RESPONSES TO PHQ9 QUESTIONS 1 AND 2: 0
1. LITTLE INTEREST OR PLEASURE IN DOING THINGS: NOT AT ALL
2. FEELING DOWN, DEPRESSED, IRRITABLE, OR HOPELESS: NOT AT ALL

## 2022-11-25 ASSESSMENT — PAIN DESCRIPTION - PAIN TYPE
TYPE: ACUTE PAIN;CHRONIC PAIN

## 2022-11-25 NOTE — PROGRESS NOTES
Pt is AxOx4; on room air.  Verbalizes acute and chronic pain but declined pain medicatino at this time. PRN pain meds available per MAR.  Skin per flowsheets.  Denies SOB/chest pain/numbness or tingling.  +void (cuellar catheter). CBI in use. LBM 11/22 per pt.  Tolerating diet well without N/V.  Pt ambulates independently; VTE  contraindicated.

## 2022-11-25 NOTE — PROGRESS NOTES
Assumed care of patient at 0645. Bedside report received. Assessment complete.    AA&Ox4.     Denies pain this morning. Declined pharmacologic intervention at this time.     Educated patient regarding pharmacologic and non pharmacologic modalities for pain management.    Skin per flow sheets.    Tolerating regular diet. Denies N/V.    + void. Last BM 11/25/2022.    Pt ambulates x self in room.    Plan of care discussed, all questions answered. Educated on the importance of calling before getting OOB and pt verbalizes understanding. Educated regarding importance of oral care. Oral care kit at bedside. Call light is within reach, treaded slipper socks on, bed in lowest/ locked position, hourly rounding in place, all needs met at this time.

## 2022-11-25 NOTE — PROGRESS NOTES
Note to reader: this note follows the APSO format rather than the historical SOAP format. Assessment and plan located at the top of the note for ease of use.    Chief Complaint  72 y.o. year old male here with bladder mass and sclerotic lesions.     Assessment/Plan  Interval History   TCC bladder s/p TURBT 10/27, 11/2 and 11/14  Hematuria and clot obstruction s/p clot evacuation and fulguration x3 11/2 and 11/12 and 11/14  Anemia   Bone biopsy with myelofibrosis  Thrombocytopenia  BPH with retention      Plan:  - Amicar CBI initiated at 1315 on 11/21. Stopped at 11/23 at 0945 and transitioned to alum irrigation. Unfortunately ran out of this mixture and will not be available until tomorrow.   - Cont alum irrigation for now. Hand irrigate PRN clots  - On Flomax and Finasteride, to continue        Case discussed with patient, nursing, pharmacist, and Urology    Patient seen and examined    11/24. CBI with alum started yesterday but ran out today, pharmacy will have more bags tomorrow. Running CBI with sterile saline with clear yellow urine but darkens as soon as its turned down. H/H stable. No pain. RBUS 11/23 without clot. I hand irrigated cuellar at bedside with clear pink urine with no blood clots.     11/23. CBI on low flow with completely clear urine. H/H stable. No overnight events. No complaints.     11/22. CBI on low flow with clear light pink urine. H/H stable 8.3/27.2.No pain at this time    11/21. CBI on high flow, urine light cherry. Clots irrigated this am by nursing. Hb 6.9 (8.1), received another unit of pRBCs this am. VSS. CBI slowed at bedside and urine immediately darkened.    11/20. CBI remains with watermelon color urine. No clots. Hgb slow downtrend.     11/19. Cuellar remains. Urine watermelon with CBI on slow rate. Hgb 8.3, Plt stable at 74    11/18. Cuellar was replaced after pt unable to void last night. CBI restarted for darkening of urine. H/H improving.     11/17. Urine clear/pink. CBI  discontinued for 5hrs. H/H stable. Pt anxious to leave.    11/16. Urine clear/pink. D/C CBI. Pt feels well and has not pain. H/H stable and improving.     11/15. Repeat clot evac and fulguration with TURBT yesterday. Urine now pink and no clot obstruction. Abdominal pains and spasms resolved. H/H declined further and received 2 units PRBCs today    11/14. Hematuria continues with difficult time maintaining flow via cuellar. RN unable to irrigate and bladder scan with 500 cc in bladder. Patient in pain in SP region. Receiving PRBCs daily.            Review of Systems  Physical Exam   Review of Systems   Constitutional:  Negative for chills and fever.   Cardiovascular:  Negative for chest pain.   Gastrointestinal:  Negative for nausea and vomiting.   Genitourinary:  Positive for hematuria. Negative for dysuria and flank pain.   All other systems reviewed and are negative.  Vitals:    11/23/22 2008 11/24/22 0419 11/24/22 0817 11/24/22 1528   BP: 105/71 120/78 127/65 126/83   Pulse: 76 70 61 85   Resp: 18 18 18 16   Temp: 36.7 °C (98 °F) 36.4 °C (97.6 °F) 36.6 °C (97.9 °F) 36.4 °C (97.5 °F)   TempSrc: Temporal Temporal Temporal Temporal   SpO2: 99% 98% 95% 93%   Weight:       Height:         Physical Exam  Vitals and nursing note reviewed.   Constitutional:       Appearance: Normal appearance.   HENT:      Head: Normocephalic and atraumatic.      Nose: Nose normal.      Mouth/Throat:      Mouth: Mucous membranes are moist.   Eyes:      Pupils: Pupils are equal, round, and reactive to light.   Pulmonary:      Effort: Pulmonary effort is normal.   Abdominal:      General: Abdomen is flat. There is no distension.      Palpations: Abdomen is soft.      Tenderness: There is no abdominal tenderness.   Genitourinary:     Comments: CBI with sterile saline with clear yellow urine on low flow, darkens up quickly  Musculoskeletal:      Cervical back: Normal range of motion.   Skin:     General: Skin is warm and dry.   Neurological:       General: No focal deficit present.      Mental Status: He is alert and oriented to person, place, and time.   Psychiatric:         Mood and Affect: Mood normal.         Behavior: Behavior normal.        Hematology Chemistry   Lab Results   Component Value Date/Time    WBC 7.8 11/24/2022 03:25 AM    HEMOGLOBIN 8.3 (L) 11/24/2022 03:25 AM    HEMATOCRIT 26.8 (L) 11/24/2022 03:25 AM    PLATELETCT 102 (L) 11/24/2022 03:25 AM     Lab Results   Component Value Date/Time    SODIUM 137 11/24/2022 03:25 AM    POTASSIUM 5.1 11/24/2022 03:25 AM    CHLORIDE 99 11/24/2022 03:25 AM    CO2 30 11/24/2022 03:25 AM    GLUCOSE 103 (H) 11/24/2022 03:25 AM    BUN 15 11/24/2022 03:25 AM    CREATININE 1.01 11/24/2022 03:25 AM         Labs not explicitly included in this progress note were reviewed by the author.   Radiology/imaging not explicitly included in this progress note was reviewed by the author.     Labs reviewed, Medications reviewed and Radiology images reviewed

## 2022-11-25 NOTE — CARE PLAN
The patient is Stable - Low risk of patient condition declining or worsening    Shift Goals  Clinical Goals: CBI management, pain control, reduce anxiety  Patient Goals: reduce anxiety  Family Goals: no family at bedside    Progress made toward(s) clinical / shift goals:  CBI continued. Pt OOB and ambulates x self. Pt states medication helps with pain control. Oral intake.     Patient is not progressing towards the following goals: N/A

## 2022-11-26 LAB
ANION GAP SERPL CALC-SCNC: 9 MMOL/L (ref 7–16)
BUN SERPL-MCNC: 17 MG/DL (ref 8–22)
CALCIUM SERPL-MCNC: 9.4 MG/DL (ref 8.5–10.5)
CHLORIDE SERPL-SCNC: 99 MMOL/L (ref 96–112)
CO2 SERPL-SCNC: 30 MMOL/L (ref 20–33)
CREAT SERPL-MCNC: 1.14 MG/DL (ref 0.5–1.4)
ERYTHROCYTE [DISTWIDTH] IN BLOOD BY AUTOMATED COUNT: 59.5 FL (ref 35.9–50)
GFR SERPLBLD CREATININE-BSD FMLA CKD-EPI: 69 ML/MIN/1.73 M 2
GLUCOSE SERPL-MCNC: 109 MG/DL (ref 65–99)
HCT VFR BLD AUTO: 28.2 % (ref 42–52)
HGB BLD-MCNC: 8.4 G/DL (ref 14–18)
MCH RBC QN AUTO: 26.3 PG (ref 27–33)
MCHC RBC AUTO-ENTMCNC: 29.8 G/DL (ref 33.7–35.3)
MCV RBC AUTO: 88.1 FL (ref 81.4–97.8)
PLATELET # BLD AUTO: 128 K/UL (ref 164–446)
PMV BLD AUTO: 9.6 FL (ref 9–12.9)
POTASSIUM SERPL-SCNC: 4.6 MMOL/L (ref 3.6–5.5)
RBC # BLD AUTO: 3.2 M/UL (ref 4.7–6.1)
SODIUM SERPL-SCNC: 138 MMOL/L (ref 135–145)
WBC # BLD AUTO: 8 K/UL (ref 4.8–10.8)

## 2022-11-26 PROCEDURE — 700102 HCHG RX REV CODE 250 W/ 637 OVERRIDE(OP): Performed by: FAMILY MEDICINE

## 2022-11-26 PROCEDURE — 700102 HCHG RX REV CODE 250 W/ 637 OVERRIDE(OP): Performed by: STUDENT IN AN ORGANIZED HEALTH CARE EDUCATION/TRAINING PROGRAM

## 2022-11-26 PROCEDURE — A9270 NON-COVERED ITEM OR SERVICE: HCPCS | Performed by: STUDENT IN AN ORGANIZED HEALTH CARE EDUCATION/TRAINING PROGRAM

## 2022-11-26 PROCEDURE — 99232 SBSQ HOSP IP/OBS MODERATE 35: CPT | Performed by: FAMILY MEDICINE

## 2022-11-26 PROCEDURE — 770001 HCHG ROOM/CARE - MED/SURG/GYN PRIV*

## 2022-11-26 PROCEDURE — A9270 NON-COVERED ITEM OR SERVICE: HCPCS | Performed by: INTERNAL MEDICINE

## 2022-11-26 PROCEDURE — 85027 COMPLETE CBC AUTOMATED: CPT

## 2022-11-26 PROCEDURE — A9270 NON-COVERED ITEM OR SERVICE: HCPCS | Performed by: FAMILY MEDICINE

## 2022-11-26 PROCEDURE — 700101 HCHG RX REV CODE 250: Performed by: FAMILY MEDICINE

## 2022-11-26 PROCEDURE — 80048 BASIC METABOLIC PNL TOTAL CA: CPT

## 2022-11-26 PROCEDURE — A9270 NON-COVERED ITEM OR SERVICE: HCPCS | Performed by: PHYSICIAN ASSISTANT

## 2022-11-26 PROCEDURE — 700102 HCHG RX REV CODE 250 W/ 637 OVERRIDE(OP): Performed by: INTERNAL MEDICINE

## 2022-11-26 PROCEDURE — 700102 HCHG RX REV CODE 250 W/ 637 OVERRIDE(OP): Performed by: HOSPITALIST

## 2022-11-26 PROCEDURE — 700102 HCHG RX REV CODE 250 W/ 637 OVERRIDE(OP): Performed by: PHYSICIAN ASSISTANT

## 2022-11-26 PROCEDURE — 36415 COLL VENOUS BLD VENIPUNCTURE: CPT

## 2022-11-26 PROCEDURE — A9270 NON-COVERED ITEM OR SERVICE: HCPCS | Performed by: HOSPITALIST

## 2022-11-26 PROCEDURE — 700101 HCHG RX REV CODE 250: Performed by: PHYSICIAN ASSISTANT

## 2022-11-26 RX ADMIN — Medication 1 LOZENGE: at 09:27

## 2022-11-26 RX ADMIN — ALPRAZOLAM 0.25 MG: 0.25 TABLET ORAL at 00:45

## 2022-11-26 RX ADMIN — GABAPENTIN 200 MG: 100 CAPSULE ORAL at 12:01

## 2022-11-26 RX ADMIN — DOCUSATE SODIUM 50 MG AND SENNOSIDES 8.6 MG 2 TABLET: 8.6; 5 TABLET, FILM COATED ORAL at 18:27

## 2022-11-26 RX ADMIN — Medication 30 G: at 15:00

## 2022-11-26 RX ADMIN — OXYBUTYNIN CHLORIDE 5 MG: 5 TABLET ORAL at 18:27

## 2022-11-26 RX ADMIN — OXYBUTYNIN CHLORIDE 5 MG: 5 TABLET ORAL at 06:33

## 2022-11-26 RX ADMIN — OXYBUTYNIN CHLORIDE 5 MG: 5 TABLET ORAL at 12:01

## 2022-11-26 RX ADMIN — GABAPENTIN 200 MG: 100 CAPSULE ORAL at 06:32

## 2022-11-26 RX ADMIN — Medication 1 LOZENGE: at 00:45

## 2022-11-26 RX ADMIN — Medication 2000 UNITS: at 06:33

## 2022-11-26 RX ADMIN — Medication 30 G: at 00:48

## 2022-11-26 RX ADMIN — FOLIC ACID 1 MG: 1 TABLET ORAL at 06:33

## 2022-11-26 RX ADMIN — Medication 30 G: at 16:37

## 2022-11-26 RX ADMIN — Medication 30 G: at 19:02

## 2022-11-26 RX ADMIN — ALPRAZOLAM 0.25 MG: 0.25 TABLET ORAL at 20:32

## 2022-11-26 RX ADMIN — TAMSULOSIN HYDROCHLORIDE 0.4 MG: 0.4 CAPSULE ORAL at 09:27

## 2022-11-26 RX ADMIN — ATORVASTATIN CALCIUM 20 MG: 20 TABLET, FILM COATED ORAL at 18:27

## 2022-11-26 RX ADMIN — GABAPENTIN 200 MG: 100 CAPSULE ORAL at 18:27

## 2022-11-26 RX ADMIN — MORPHINE SULFATE 15 MG: 15 TABLET ORAL at 03:21

## 2022-11-26 RX ADMIN — CYANOCOBALAMIN TAB 500 MCG 1000 MCG: 500 TAB at 06:33

## 2022-11-26 RX ADMIN — THIAMINE HCL TAB 100 MG 100 MG: 100 TAB at 18:28

## 2022-11-26 RX ADMIN — FINASTERIDE 5 MG: 5 TABLET, FILM COATED ORAL at 06:33

## 2022-11-26 ASSESSMENT — PAIN DESCRIPTION - PAIN TYPE
TYPE: CHRONIC PAIN
TYPE: ACUTE PAIN;CHRONIC PAIN
TYPE: ACUTE PAIN;CHRONIC PAIN
TYPE: CHRONIC PAIN
TYPE: ACUTE PAIN

## 2022-11-26 ASSESSMENT — COPD QUESTIONNAIRES
DO YOU EVER COUGH UP ANY MUCUS OR PHLEGM?: NO/ONLY WITH OCCASIONAL COLDS OR INFECTIONS
COPD SCREENING SCORE: 4
DURING THE PAST 4 WEEKS HOW MUCH DID YOU FEEL SHORT OF BREATH: NONE/LITTLE OF THE TIME
HAVE YOU SMOKED AT LEAST 100 CIGARETTES IN YOUR ENTIRE LIFE: YES

## 2022-11-26 ASSESSMENT — ENCOUNTER SYMPTOMS
SENSORY CHANGE: 0
NERVOUS/ANXIOUS: 0
DIZZINESS: 0
NAUSEA: 0
HEARTBURN: 0
DIAPHORESIS: 0
SPEECH CHANGE: 0
DIARRHEA: 0
SHORTNESS OF BREATH: 0
FLANK PAIN: 0
WEAKNESS: 1
FOCAL WEAKNESS: 0
PALPITATIONS: 0
WHEEZING: 0
SORE THROAT: 0
BACK PAIN: 0
ABDOMINAL PAIN: 0
VOMITING: 0
HEADACHES: 0
BLURRED VISION: 0
NECK PAIN: 0
CHILLS: 0
COUGH: 0
MYALGIAS: 0
FEVER: 0

## 2022-11-26 NOTE — PROGRESS NOTES
Note to reader: this note follows the APSO format rather than the historical SOAP format. Assessment and plan located at the top of the note for ease of use.    Chief Complaint  72 y.o. year old male here with bladder mass and sclerotic lesions.     Assessment/Plan  Interval History   TCC bladder s/p TURBT 10/27, 11/2 and 11/14  Hematuria and clot obstruction s/p clot evacuation and fulguration x3 11/2 and 11/12 and 11/14  Anemia   Bone biopsy with myelofibrosis  Thrombocytopenia  BPH with retention      Plan:  - Amicar CBI initiated at 1315 on 11/21. Stopped at 11/23 at 0945 and transitioned to alum irrigation. Ran out 11/24, restarted evening of 11/25  - Cont alum irrigation for now. Hand irrigate PRN clots- PA to hand irrigate aggressively on rounds.   - On Flomax and Finasteride, to continue        Case discussed with patient, nursing, pharmacist, and Urology    Patient seen and examined    11/26 h/h continues to fluctuate daily. Urine strawberry lemonade. No pain. No clots irrigated on rounds today.     11/24. CBI with alum started yesterday but ran out today, pharmacy will have more bags tomorrow. Running CBI with sterile saline with clear yellow urine but darkens as soon as its turned down. H/H stable. No pain. RBUS 11/23 without clot. I hand irrigated cuellar at bedside with clear pink urine with no blood clots.     11/23. CBI on low flow with completely clear urine. H/H stable. No overnight events. No complaints.     11/22. CBI on low flow with clear light pink urine. H/H stable 8.3/27.2.No pain at this time    11/21. CBI on high flow, urine light cherry. Clots irrigated this am by nursing. Hb 6.9 (8.1), received another unit of pRBCs this am. VSS. CBI slowed at bedside and urine immediately darkened.    11/20. CBI remains with watermelon color urine. No clots. Hgb slow downtrend.     11/19. Cuellar remains. Urine watermelon with CBI on slow rate. Hgb 8.3, Plt stable at 74    11/18. Cuellar was replaced after  pt unable to void last night. CBI restarted for darkening of urine. H/H improving.     11/17. Urine clear/pink. CBI discontinued for 5hrs. H/H stable. Pt anxious to leave.    11/16. Urine clear/pink. D/C CBI. Pt feels well and has not pain. H/H stable and improving.     11/15. Repeat clot evac and fulguration with TURBT yesterday. Urine now pink and no clot obstruction. Abdominal pains and spasms resolved. H/H declined further and received 2 units PRBCs today    11/14. Hematuria continues with difficult time maintaining flow via cuellar. RN unable to irrigate and bladder scan with 500 cc in bladder. Patient in pain in SP region. Receiving PRBCs daily.            Review of Systems  Physical Exam   Review of Systems   Constitutional:  Negative for chills and fever.   Cardiovascular:  Negative for chest pain.   Gastrointestinal:  Negative for nausea and vomiting.   Genitourinary:  Positive for hematuria. Negative for dysuria and flank pain.   All other systems reviewed and are negative.  Vitals:    11/25/22 1534 11/25/22 2111 11/26/22 0504 11/26/22 0740   BP: 109/61 106/63 115/64 119/61   Pulse: 73  77 71   Resp: 16 18 20 16   Temp: 36.1 °C (97 °F) 36.4 °C (97.6 °F) 36.2 °C (97.2 °F) 36.6 °C (97.9 °F)   TempSrc: Temporal Temporal Temporal Temporal   SpO2: 97% 95% 100% 97%   Weight:       Height:         Physical Exam  Vitals and nursing note reviewed.   Constitutional:       Appearance: Normal appearance.   HENT:      Head: Normocephalic and atraumatic.      Nose: Nose normal.      Mouth/Throat:      Mouth: Mucous membranes are moist.   Eyes:      Pupils: Pupils are equal, round, and reactive to light.   Pulmonary:      Effort: Pulmonary effort is normal.   Abdominal:      General: Abdomen is flat. There is no distension.      Palpations: Abdomen is soft.      Tenderness: There is no abdominal tenderness.   Genitourinary:     Comments: CBI with sterile saline with clear yellow urine on low flow, darkens up  quickly  Musculoskeletal:      Cervical back: Normal range of motion.   Skin:     General: Skin is warm and dry.   Neurological:      General: No focal deficit present.      Mental Status: He is alert and oriented to person, place, and time.   Psychiatric:         Mood and Affect: Mood normal.         Behavior: Behavior normal.        Hematology Chemistry   Lab Results   Component Value Date/Time    WBC 8.0 11/26/2022 12:37 AM    HEMOGLOBIN 8.4 (L) 11/26/2022 12:37 AM    HEMATOCRIT 28.2 (L) 11/26/2022 12:37 AM    PLATELETCT 128 (L) 11/26/2022 12:37 AM     Lab Results   Component Value Date/Time    SODIUM 138 11/26/2022 12:37 AM    POTASSIUM 4.6 11/26/2022 12:37 AM    CHLORIDE 99 11/26/2022 12:37 AM    CO2 30 11/26/2022 12:37 AM    GLUCOSE 109 (H) 11/26/2022 12:37 AM    BUN 17 11/26/2022 12:37 AM    CREATININE 1.14 11/26/2022 12:37 AM         Labs not explicitly included in this progress note were reviewed by the author.   Radiology/imaging not explicitly included in this progress note was reviewed by the author.     Core Measures

## 2022-11-26 NOTE — PROGRESS NOTES
Hospital Medicine Daily Progress Note    Date of Service  11/26/2022    Chief Complaint  Gregg Martinez is a 72 y.o. male admitted 10/26/2022 with hematuria.    Hospital Course  Admitted with hematuria, he was noted to have a bladder tumor.  Urology was consulted on the case.  Patient underwent Transurethral Resection of Bladder tumor (>5cm in size) on 10/27/2022.  Remained on continuous bladder irrigation, he had persistent hematuria. Pathology showed urothelial carcinoma. He further underwent Cystoscopy, Clot evacuation, Fulguration, Transurethral resection of bladder tumor 3 cm  on 11/2/2022. Cystoscopy with clot evacuation from the bladder and fulguration of bleeding bladder tissue on 11/12/2022. Cystoscopy with resection of bladder mass (large), Transurethral resection of bladder tumor (small), Evacuation of bladder mass/clot, Fulguration of venous oozing sites and bladder tumor resection base on 11/14/2022.  He had anemia secondary to blood loss, and required transfusion of PRBC.  He was also noted to have thrombocytopenia and required platelet transfusion.  Oncology was also consulted on the case, he underwent bone marrow biopsy.  He has continued to have persistent hematuria.  He also had an episode of acute kidney injury which resolved with IVF hydration.  He was also noted to have increasing leukocytosis, was noted to have Pneumonia, and finished a course of Unasyn and Azithromycin.  His bone marrow pathology results showed possible bilateral fibrosis.  Oncology has recommended follow-up as outpatient.  With persistent hematuria, continuous bladder irrigation was done with Amicar.    Interval Problem Update  Urothelial CA - CBI ongoing, persistent hematuria  Anemia - hgb trended down to 8.4  Hypoxia - O2 1 lpm    I have discussed this patient's plan of care and discharge plan at IDT rounds today with Case Management, Nursing, Nursing leadership, and other members of the IDT  team.    Consultants/Specialty  oncology and urology    Code Status  Full Code    Disposition  Patient is not medically cleared for discharge.   Anticipate discharge to to home with close outpatient follow-up.  I have placed the appropriate orders for post-discharge needs.    Review of Systems  Review of Systems   Constitutional:  Positive for malaise/fatigue. Negative for chills, diaphoresis and fever.   HENT:  Negative for congestion, hearing loss and sore throat.    Eyes:  Negative for blurred vision.   Respiratory:  Negative for cough, shortness of breath and wheezing.    Cardiovascular:  Positive for leg swelling. Negative for chest pain and palpitations.   Gastrointestinal:  Negative for abdominal pain, diarrhea, heartburn, nausea and vomiting.   Genitourinary:  Positive for hematuria. Negative for dysuria and flank pain.   Musculoskeletal:  Negative for back pain, joint pain, myalgias and neck pain.   Skin:  Negative for rash.   Neurological:  Positive for weakness. Negative for dizziness, sensory change, speech change, focal weakness and headaches.   Psychiatric/Behavioral:  The patient is not nervous/anxious.       Physical Exam  Temp:  [36.1 °C (97 °F)-36.6 °C (97.9 °F)] 36.6 °C (97.9 °F)  Pulse:  [71-77] 71  Resp:  [16-20] 16  BP: (106-119)/(61-64) 119/61  SpO2:  [95 %-100 %] 97 %    Physical Exam  Vitals and nursing note reviewed.   HENT:      Head: Normocephalic and atraumatic.      Nose: No congestion.      Mouth/Throat:      Mouth: Mucous membranes are moist.   Eyes:      Extraocular Movements: Extraocular movements intact.      Conjunctiva/sclera: Conjunctivae normal.   Cardiovascular:      Rate and Rhythm: Normal rate and regular rhythm.   Pulmonary:      Effort: Pulmonary effort is normal.      Breath sounds: Normal breath sounds.   Abdominal:      General: There is no distension.      Tenderness: There is no abdominal tenderness. There is no right CVA tenderness, left CVA tenderness, guarding or  rebound.   Musculoskeletal:      Cervical back: No tenderness.      Right lower leg: Edema present.      Left lower leg: Edema present.   Skin:     General: Skin is warm and dry.   Neurological:      General: No focal deficit present.      Mental Status: He is alert and oriented to person, place, and time.      Cranial Nerves: No cranial nerve deficit.       Fluids    Intake/Output Summary (Last 24 hours) at 11/26/2022 1405  Last data filed at 11/26/2022 0601  Gross per 24 hour   Intake 580 ml   Output 3250 ml   Net -2670 ml         Laboratory  Recent Labs     11/24/22  0325 11/25/22  0256 11/26/22  0037   WBC 7.8 8.6 8.0   RBC 3.10* 3.49* 3.20*   HEMOGLOBIN 8.3* 9.3* 8.4*   HEMATOCRIT 26.8* 31.0* 28.2*   MCV 86.5 88.8 88.1   MCH 26.8* 26.6* 26.3*   MCHC 31.0* 30.0* 29.8*   RDW 58.7* 61.0* 59.5*   PLATELETCT 102* 119* 128*   MPV 9.9 9.4 9.6       Recent Labs     11/24/22  0325 11/25/22  0030 11/26/22  0037   SODIUM 137 138 138   POTASSIUM 5.1 5.2 4.6   CHLORIDE 99 98 99   CO2 30 28 30   GLUCOSE 103* 99 109*   BUN 15 16 17   CREATININE 1.01 1.03 1.14   CALCIUM 8.9 9.4 9.4       Recent Labs     11/25/22  0030   INR 1.03                 Imaging  US-RENAL   Final Result      1.  Patchy echogenic areas in the LEFT kidney of uncertain significance.   2.  No hydronephrosis.   3.  Limited evaluation of the bladder as is decompressed with Arboleda catheter in place.  Mass and/or clot is difficult to exclude.      IR-US GUIDED PIV   Final Result    Ultrasound-guided PERIPHERAL IV INSERTION performed by    qualified nursing staff as above.      EC-ECHOCARDIOGRAM COMPLETE W/O CONT   Final Result      US-EXTREMITY VENOUS LOWER BILAT   Final Result      DX-CHEST-PORTABLE (1 VIEW)   Final Result      Increased LEFT basilar atelectasis or pneumonia      CT-ABDOMEN-PELVIS W/O   Final Result      1.  Interval hemorrhage within the partially decompressed urinary bladder. Previously visualized posterior left bladder mass is no longer seen  presumably this is due to interval resection      2.  New mild left hydroureteronephrosis with moderate perinephric fat stranding. This is concerning for obstruction perhaps related to hematoma, recent instrumentation or pyelonephritis/ureterectasis.      3.  Increasing colonic stool volume with similar moderate diverticulosis of the colon but no evidence of diverticulitis      4.  Marked splenomegaly      5.  New small effusions with some lower lung zone groundglass suspicious for interstitial edema/mild congestive failure. There is stable mild pericardial fluid      6.  Persistent inhomogeneous medullary spaces with generalized increased density. Recent bone scan showed no focal mass but this could reflect underlying myelofibrosis or metastases with false-negative bone scan      CT-NEEDLE BX-DEEP BONE   Final Result      1.  Successful CT-guided core biopsy of sclerotic lesion in the posterior aspect of left inferior pubic ramus.      NM-BONE/JOINT SCAN WHOLE BODY   Final Result      No evidence of bony metastatic disease      CT-CHEST (THORAX) WITH   Final Result         1.  Emphysema   2.  Hazy bilateral upper lobe opacities with slight intralobular septal thickening, could represent component of mild pulmonary edema   3.  Linear densities the bilateral lung bases favors scarring or atelectasis.   4.  Atherosclerosis and atherosclerotic coronary artery disease   5.  Hepatomegaly with irregular hepatic contour favoring changes of cirrhosis.   6.  Subcentimeter hepatic lobe lesions, could represent small cyst or hemangioma, otherwise indeterminate.   7.  Mild mediastinal adenopathy, workup and evaluation for causes of adenopathy recommended as clinically appropriate.   8.  Splenomegaly      DX-CHEST-PORTABLE (1 VIEW)   Final Result         1.  No acute cardiopulmonary disease.   2.  Atherosclerosis      CT-ABDOMEN-PELVIS WITH   Final Result      1.  Enhancing 2.5 x 2.3 cm bladder wall mass suspicious for neoplasm.    2.  Heterogeneous sclerotic change of the pelvis and proximal femurs suspicious for metastatic disease or other infiltrating marrow process.   3.  Mild cardiomegaly.   4.  Hepatosplenomegaly.   5.  Small subcentimeter hepatic hypodensities which could represent cysts or hemangiomas but are nonspecific. If indicated, these findings could be further evaluated with dedicated liver protocol CT or MRI or ultrasound on a nonemergent basis.   6.  Colonic diverticulosis without evidence of diverticulitis.   7.  Atherosclerosis.             Assessment/Plan  * Urothelial carcinoma of bladder without invasion of muscle (HCC)- (present on admission)  Assessment & Plan  Transurethral Resection of Bladder tumor (>5cm in size)  10/27/2022  Cystoscopy, Clot evacuation, Fulguration, Transurethral resection of bladder tumor 3 cm    11/2/2022  Cystoscopy with clot evacuation from the bladder and fulguration of bleeding bladder tissue   11/12/2022  Cystoscopy with resection of bladder mass (large), Transurethral resection of bladder tumor (small), Evacuation of bladder mass/clot, Fulguration of venous oozing sites and bladder tumor resection base    11/14/2022  CBI with ALUM      Acute blood loss anemia- (present on admission)  Assessment & Plan  Transfused PRBC  Follow CBC    Hypoxia- (present on admission)  Assessment & Plan  RT protocol, encourage I-S    Hepatosplenomegaly- (present on admission)  Assessment & Plan  monitor    Pulmonary hypertension (HCC)- (present on admission)  Assessment & Plan  Monitor volume status    TSH elevation- (present on admission)  Assessment & Plan  Stop Synthroid   11/22/2022  Check TSH, FT4, FT3 on 12/23/2022    Paroxysmal atrial fibrillation (HCC)  Assessment & Plan  Monitor    Acute kidney injury (HCC)- (present on admission)  Assessment & Plan  Follow bmp    Coagulation defect (HCC)- (present on admission)  Assessment & Plan  Follow INR    Hyponatremia- (present on admission)  Assessment &  Plan  Follow bmp    Thrombocytopenia (HCC)- (present on admission)  Assessment & Plan  Transfused platelets  Follow CBC  Pathology results - preliminary Myelofibrosis?  Follow up with Oncology as outpatient    Pneumonia- (present on admission)  Assessment & Plan  Finished course of Unasyn and azithromycin (end date 11/18/2022)    Slow transit constipation- (present on admission)  Assessment & Plan  bowel protocol    Hypovolemia  Assessment & Plan  Due to acute blood loss anemia    Iron deficiency anemia- (present on admission)  Assessment & Plan  Iron replacement per pharmacy    COPD (chronic obstructive pulmonary disease) (HCC)- (present on admission)  Assessment & Plan  RT protocol    Dyslipidemia- (present on admission)  Assessment & Plan  Atorvastatin         VTE prophylaxis: SCDs/TEDs    I have performed a physical exam and reviewed and updated ROS and Plan today (11/26/2022). In review of yesterday's note (11/25/2022), there are no changes except as documented above.

## 2022-11-26 NOTE — PROGRESS NOTES
Pharmacy Pharmacotherapy Consult for LOS >30 days    Admit Date: 10/26/2022      Medications were reviewed for appropriateness and ongoing need.     Current Facility-Administered Medications   Medication Dose Route Frequency Provider Last Rate Last Admin    menthol ([place]) lozenge 1 Lozenge  1 Lozenge Oral Q2HRS PRN Georges Combs M.D.   1 Lozenge at 11/26/22 0927    ALPRAZolam (XANAX) tablet 0.25 mg  0.25 mg Oral TID PRN Georges Combs M.D.   0.25 mg at 11/26/22 0045    alum, ammonium (ALUM) 30 g in sterile water 3,000 mL bladder irrigation  30 g Irrigation Continuous Georges Combs M.D.   30 g at 11/26/22 0048    tamsulosin (FLOMAX) capsule 0.4 mg  0.4 mg Oral AFTER BREAKFAST Georges Combs M.D.   0.4 mg at 11/26/22 0927    finasteride (PROSCAR) tablet 5 mg  5 mg Oral DAILY Georges Combs M.D.   5 mg at 11/26/22 0633    morphine (MS IR) tablet 15-30 mg  15-30 mg Oral Q4HRS PRN Georges Combs M.D.   15 mg at 11/26/22 0321    sodium chloride (OCEAN) 0.65 % nasal spray 2 Spray  2 Spray Nasal Q2HRS PRN Georges Combs M.D.        carboxymethylcellulose (REFRESH TEARS) 0.5 % ophthalmic drops 1 Drop  1 Drop Both Eyes PRN Georges Combs M.D.        oxybutynin (DITROPAN) tablet 5 mg  5 mg Oral TID Georges Combs M.D.   5 mg at 11/26/22 1201    Respiratory Therapy Consult   Nebulization Continuous RT Georges Combs M.D.        HYDROmorphone (Dilaudid) injection 1 mg  1 mg Intravenous Q4HRS PRN Georges Combs M.D.   1 mg at 11/16/22 1750    gabapentin (NEURONTIN) capsule 200 mg  200 mg Oral TID Georges Combs M.D.   200 mg at 11/26/22 1201    senna-docusate (PERICOLACE or SENOKOT S) 8.6-50 MG per tablet 2 Tablet  2 Tablet Oral BID Georges Combs M.D.   2 Tablet at 11/25/22 1706    And    polyethylene glycol/lytes (MIRALAX) PACKET 1 Packet  1 Packet Oral QDAY PRN Georges Combs M.D.   1 Packet at 11/11/22 1148    And    magnesium hydroxide (MILK OF MAGNESIA) suspension 30 mL  30 mL  Oral QDAY PRN Georges Combs M.D.   30 mL at 11/24/22 0806    And    bisacodyl (DULCOLAX) suppository 10 mg  10 mg Rectal QDAY PRN Georges Combs M.D.        acetaminophen (Tylenol) tablet 650 mg  650 mg Oral Q6HRS PRN Georges Combs M.D.   650 mg at 11/21/22 0503    ondansetron (ZOFRAN) syringe/vial injection 4 mg  4 mg Intravenous Q4HRS PRN Georges Combs M.D.        ondansetron (ZOFRAN ODT) dispertab 4 mg  4 mg Oral Q4HRS PRN Georges Combs M.D.   4 mg at 11/03/22 0748    Pharmacy Consult Request ...Pain Management Review 1 Each  1 Each Other PHARMACY TO DOSE Georges Combs M.D.        atorvastatin (LIPITOR) tablet 20 mg  20 mg Oral Q EVENING KWAKU RosaDRubia   20 mg at 11/25/22 1706    folic acid (FOLVITE) tablet 1 mg  1 mg Oral DAILY ARTIE Rosa.[email or web address]   1 mg at 11/26/22 0633    vitamin D3 (cholecalciferol) tablet 2,000 Units  2,000 Units Oral DAILY ARTIE Rosa.DRubia   2,000 Units at 11/26/22 0633    thiamine (Vitamin B-1) tablet 100 mg  100 mg Oral BID ARTIE Rosa.DRubia   100 mg at 11/25/22 1706    cyanocobalamin (VITAMIN B-12) tablet 1,000 mcg  1,000 mcg Oral DAILY ARTIE Rosa.DRubia   1,000 mcg at 11/26/22 0633       Recommendations:  - Stop the following PRN medications due to lack of use since ordered: albuterol inhaler, Robutissun DM, Labetalol IV.   - All other medications are appropriate at this point  - D/W MD and he is agreeable.    William De La Cruz, PharmD

## 2022-11-26 NOTE — PROGRESS NOTES
Pt is AxOx4; on room air.  Verbalizes acute and chronic pain but declined pain medications at this time. PRN pain meds in use per MAR.  Skin per flowsheets.  Denies SOB/chest pain/numbness or tingling.  +void (cuellar catheter). CBI in use. LBM 11/25.  Tolerating low potassium diet well without N/V.  Pt ambulates independently; VTE contraindicated due to GI/ bleeding

## 2022-11-26 NOTE — PROGRESS NOTES
Hospital Medicine Daily Progress Note    Date of Service  11/25/2022    Chief Complaint  Gregg Martinez is a 72 y.o. male admitted 10/26/2022 with hematuria.    Hospital Course  Admitted with hematuria, he was noted to have a bladder tumor.  Urology was consulted on the case.  Patient underwent Transurethral Resection of Bladder tumor (>5cm in size) on 10/27/2022.  Remained on continuous bladder irrigation, he had persistent hematuria. Pathology showed urothelial carcinoma. He further underwent Cystoscopy, Clot evacuation, Fulguration, Transurethral resection of bladder tumor 3 cm  on 11/2/2022. Cystoscopy with clot evacuation from the bladder and fulguration of bleeding bladder tissue on 11/12/2022. Cystoscopy with resection of bladder mass (large), Transurethral resection of bladder tumor (small), Evacuation of bladder mass/clot, Fulguration of venous oozing sites and bladder tumor resection base on 11/14/2022.  He had anemia secondary to blood loss, and required transfusion of PRBC.  He was also noted to have thrombocytopenia and required platelet transfusion.  Oncology was also consulted on the case, he underwent bone marrow biopsy.  He has continued to have persistent hematuria.  He also had an episode of acute kidney injury which resolved with IVF hydration.  He was also noted to have increasing leukocytosis, was noted to have Pneumonia, and finished a course of Unasyn and Azithromycin.  His bone marrow pathology results showed possible bilateral fibrosis.  Oncology has recommended follow-up as outpatient.  With persistent hematuria, continuous bladder irrigation was done with Amicar.    Interval Problem Update  Urothelial CA - CBI reinitiated with ALUM as AMICAR not available   Anemia - hgb 9.3  Hypoxia - O2 1 lpm    I have discussed this patient's plan of care and discharge plan at IDT rounds today with Case Management, Nursing, Nursing leadership, and other members of the IDT  team.    Consultants/Specialty  oncology and urology    Code Status  Full Code    Disposition  Patient is not medically cleared for discharge.   Anticipate discharge to to home with close outpatient follow-up.  I have placed the appropriate orders for post-discharge needs.    Review of Systems  Review of Systems   Constitutional:  Positive for malaise/fatigue. Negative for chills, diaphoresis and fever.   HENT:  Negative for congestion, hearing loss and sore throat.    Eyes:  Negative for blurred vision.   Respiratory:  Negative for cough, shortness of breath and wheezing.    Cardiovascular:  Positive for leg swelling. Negative for chest pain and palpitations.   Gastrointestinal:  Negative for abdominal pain, diarrhea, heartburn, nausea and vomiting.   Genitourinary:  Positive for hematuria. Negative for dysuria and flank pain.   Musculoskeletal:  Negative for back pain, joint pain, myalgias and neck pain.   Skin:  Negative for rash.   Neurological:  Positive for weakness. Negative for dizziness, sensory change, speech change, focal weakness and headaches.   Psychiatric/Behavioral:  The patient is nervous/anxious.       Physical Exam  Temp:  [36.1 °C (97 °F)-36.5 °C (97.7 °F)] 36.1 °C (97 °F)  Pulse:  [64-79] 73  Resp:  [16-18] 16  BP: (109-117)/(57-74) 109/61  SpO2:  [96 %-99 %] 97 %    Physical Exam  Vitals and nursing note reviewed.   HENT:      Head: Normocephalic and atraumatic.      Nose: No congestion.      Mouth/Throat:      Mouth: Mucous membranes are moist.   Eyes:      Extraocular Movements: Extraocular movements intact.      Conjunctiva/sclera: Conjunctivae normal.   Cardiovascular:      Rate and Rhythm: Normal rate and regular rhythm.   Pulmonary:      Effort: Pulmonary effort is normal.      Breath sounds: Normal breath sounds.   Abdominal:      General: There is no distension.      Tenderness: There is no abdominal tenderness. There is no right CVA tenderness, left CVA tenderness, guarding or rebound.    Musculoskeletal:      Cervical back: No tenderness.      Right lower leg: Edema present.      Left lower leg: Edema present.   Skin:     General: Skin is warm and dry.   Neurological:      General: No focal deficit present.      Mental Status: He is alert and oriented to person, place, and time.      Cranial Nerves: No cranial nerve deficit.   Psychiatric:         Mood and Affect: Mood is anxious.       Fluids    Intake/Output Summary (Last 24 hours) at 11/25/2022 1704  Last data filed at 11/25/2022 1549  Gross per 24 hour   Intake 840 ml   Output 6575 ml   Net -5735 ml       Laboratory  Recent Labs     11/23/22  0027 11/24/22  0325 11/25/22  0256   WBC 8.3 7.8 8.6   RBC 3.37* 3.10* 3.49*   HEMOGLOBIN 8.9* 8.3* 9.3*   HEMATOCRIT 29.6* 26.8* 31.0*   MCV 87.8 86.5 88.8   MCH 26.4* 26.8* 26.6*   MCHC 30.1* 31.0* 30.0*   RDW 58.7* 58.7* 61.0*   PLATELETCT 82* 102* 119*   MPV  --  9.9 9.4     Recent Labs     11/23/22  0027 11/24/22  0325 11/25/22  0030   SODIUM 138 137 138   POTASSIUM 5.0 5.1 5.2   CHLORIDE 100 99 98   CO2 29 30 28   GLUCOSE 105* 103* 99   BUN 16 15 16   CREATININE 1.06 1.01 1.03   CALCIUM 8.9 8.9 9.4     Recent Labs     11/23/22  0027 11/25/22  0030   INR 1.21* 1.03               Imaging  US-RENAL   Final Result      1.  Patchy echogenic areas in the LEFT kidney of uncertain significance.   2.  No hydronephrosis.   3.  Limited evaluation of the bladder as is decompressed with Arboleda catheter in place.  Mass and/or clot is difficult to exclude.      IR-US GUIDED PIV   Final Result    Ultrasound-guided PERIPHERAL IV INSERTION performed by    qualified nursing staff as above.      EC-ECHOCARDIOGRAM COMPLETE W/O CONT   Final Result      US-EXTREMITY VENOUS LOWER BILAT   Final Result      DX-CHEST-PORTABLE (1 VIEW)   Final Result      Increased LEFT basilar atelectasis or pneumonia      CT-ABDOMEN-PELVIS W/O   Final Result      1.  Interval hemorrhage within the partially decompressed urinary bladder.  Previously visualized posterior left bladder mass is no longer seen presumably this is due to interval resection      2.  New mild left hydroureteronephrosis with moderate perinephric fat stranding. This is concerning for obstruction perhaps related to hematoma, recent instrumentation or pyelonephritis/ureterectasis.      3.  Increasing colonic stool volume with similar moderate diverticulosis of the colon but no evidence of diverticulitis      4.  Marked splenomegaly      5.  New small effusions with some lower lung zone groundglass suspicious for interstitial edema/mild congestive failure. There is stable mild pericardial fluid      6.  Persistent inhomogeneous medullary spaces with generalized increased density. Recent bone scan showed no focal mass but this could reflect underlying myelofibrosis or metastases with false-negative bone scan      CT-NEEDLE BX-DEEP BONE   Final Result      1.  Successful CT-guided core biopsy of sclerotic lesion in the posterior aspect of left inferior pubic ramus.      NM-BONE/JOINT SCAN WHOLE BODY   Final Result      No evidence of bony metastatic disease      CT-CHEST (THORAX) WITH   Final Result         1.  Emphysema   2.  Hazy bilateral upper lobe opacities with slight intralobular septal thickening, could represent component of mild pulmonary edema   3.  Linear densities the bilateral lung bases favors scarring or atelectasis.   4.  Atherosclerosis and atherosclerotic coronary artery disease   5.  Hepatomegaly with irregular hepatic contour favoring changes of cirrhosis.   6.  Subcentimeter hepatic lobe lesions, could represent small cyst or hemangioma, otherwise indeterminate.   7.  Mild mediastinal adenopathy, workup and evaluation for causes of adenopathy recommended as clinically appropriate.   8.  Splenomegaly      DX-CHEST-PORTABLE (1 VIEW)   Final Result         1.  No acute cardiopulmonary disease.   2.  Atherosclerosis      CT-ABDOMEN-PELVIS WITH   Final Result       1.  Enhancing 2.5 x 2.3 cm bladder wall mass suspicious for neoplasm.   2.  Heterogeneous sclerotic change of the pelvis and proximal femurs suspicious for metastatic disease or other infiltrating marrow process.   3.  Mild cardiomegaly.   4.  Hepatosplenomegaly.   5.  Small subcentimeter hepatic hypodensities which could represent cysts or hemangiomas but are nonspecific. If indicated, these findings could be further evaluated with dedicated liver protocol CT or MRI or ultrasound on a nonemergent basis.   6.  Colonic diverticulosis without evidence of diverticulitis.   7.  Atherosclerosis.             Assessment/Plan  * Urothelial carcinoma of bladder without invasion of muscle (HCC)- (present on admission)  Assessment & Plan  Transurethral Resection of Bladder tumor (>5cm in size)  10/27/2022  Cystoscopy, Clot evacuation, Fulguration, Transurethral resection of bladder tumor 3 cm    11/2/2022  Cystoscopy with clot evacuation from the bladder and fulguration of bleeding bladder tissue   11/12/2022  Cystoscopy with resection of bladder mass (large), Transurethral resection of bladder tumor (small), Evacuation of bladder mass/clot, Fulguration of venous oozing sites and bladder tumor resection base    11/14/2022  CBI with ALUM      Acute blood loss anemia- (present on admission)  Assessment & Plan  Transfused PRBC  Follow CBC    Hypoxia- (present on admission)  Assessment & Plan  RT protocol, encourage I-S    Hepatosplenomegaly- (present on admission)  Assessment & Plan  monitor    Pulmonary hypertension (HCC)- (present on admission)  Assessment & Plan  Monitor volume status    TSH elevation- (present on admission)  Assessment & Plan  Stop Synthroid   11/22/2022  Check TSH, FT4, FT3 on 12/23/2022    Paroxysmal atrial fibrillation (HCC)  Assessment & Plan  Monitor    Acute kidney injury (HCC)- (present on admission)  Assessment & Plan  Follow bmp    Coagulation defect (HCC)- (present on admission)  Assessment &  Plan  Follow INR    Hyponatremia- (present on admission)  Assessment & Plan  Follow bmp    Thrombocytopenia (HCC)- (present on admission)  Assessment & Plan  Transfused platelets  Follow CBC  Pathology results - preliminary Myelofibrosis?  Follow up with Oncology as outpatient    Pneumonia- (present on admission)  Assessment & Plan  Finished course of Unasyn and azithromycin (end date 11/18/2022)    Slow transit constipation- (present on admission)  Assessment & Plan  bowel protocol    Hypovolemia  Assessment & Plan  Due to acute blood loss anemia    Iron deficiency anemia- (present on admission)  Assessment & Plan  Iron replacement per pharmacy    COPD (chronic obstructive pulmonary disease) (HCC)- (present on admission)  Assessment & Plan  RT protocol    Dyslipidemia- (present on admission)  Assessment & Plan  Atorvastatin       VTE prophylaxis: SCDs/TEDs    I have performed a physical exam and reviewed and updated ROS and Plan today (11/25/2022). In review of yesterday's note (11/24/2022), there are no changes except as documented above.

## 2022-11-27 LAB
ANION GAP SERPL CALC-SCNC: 10 MMOL/L (ref 7–16)
BUN SERPL-MCNC: 15 MG/DL (ref 8–22)
CALCIUM SERPL-MCNC: 9.6 MG/DL (ref 8.5–10.5)
CHLORIDE SERPL-SCNC: 99 MMOL/L (ref 96–112)
CO2 SERPL-SCNC: 29 MMOL/L (ref 20–33)
CREAT SERPL-MCNC: 0.99 MG/DL (ref 0.5–1.4)
ERYTHROCYTE [DISTWIDTH] IN BLOOD BY AUTOMATED COUNT: 59.9 FL (ref 35.9–50)
GFR SERPLBLD CREATININE-BSD FMLA CKD-EPI: 81 ML/MIN/1.73 M 2
GLUCOSE SERPL-MCNC: 105 MG/DL (ref 65–99)
HCT VFR BLD AUTO: 30.5 % (ref 42–52)
HGB BLD-MCNC: 9.2 G/DL (ref 14–18)
INR PPP: 1.12 (ref 0.87–1.13)
MCH RBC QN AUTO: 26.4 PG (ref 27–33)
MCHC RBC AUTO-ENTMCNC: 30.2 G/DL (ref 33.7–35.3)
MCV RBC AUTO: 87.6 FL (ref 81.4–97.8)
PLATELET # BLD AUTO: 144 K/UL (ref 164–446)
PMV BLD AUTO: 9.9 FL (ref 9–12.9)
POTASSIUM SERPL-SCNC: 4.3 MMOL/L (ref 3.6–5.5)
PROTHROMBIN TIME: 14.2 SEC (ref 12–14.6)
RBC # BLD AUTO: 3.48 M/UL (ref 4.7–6.1)
SODIUM SERPL-SCNC: 138 MMOL/L (ref 135–145)
WBC # BLD AUTO: 8.6 K/UL (ref 4.8–10.8)

## 2022-11-27 PROCEDURE — 85027 COMPLETE CBC AUTOMATED: CPT

## 2022-11-27 PROCEDURE — 99232 SBSQ HOSP IP/OBS MODERATE 35: CPT | Performed by: FAMILY MEDICINE

## 2022-11-27 PROCEDURE — 700102 HCHG RX REV CODE 250 W/ 637 OVERRIDE(OP): Performed by: FAMILY MEDICINE

## 2022-11-27 PROCEDURE — A9270 NON-COVERED ITEM OR SERVICE: HCPCS | Performed by: FAMILY MEDICINE

## 2022-11-27 PROCEDURE — 36415 COLL VENOUS BLD VENIPUNCTURE: CPT

## 2022-11-27 PROCEDURE — 770001 HCHG ROOM/CARE - MED/SURG/GYN PRIV*

## 2022-11-27 PROCEDURE — 700101 HCHG RX REV CODE 250: Performed by: FAMILY MEDICINE

## 2022-11-27 PROCEDURE — 85610 PROTHROMBIN TIME: CPT

## 2022-11-27 PROCEDURE — 80048 BASIC METABOLIC PNL TOTAL CA: CPT

## 2022-11-27 RX ADMIN — Medication 30 G: at 08:15

## 2022-11-27 RX ADMIN — GABAPENTIN 200 MG: 100 CAPSULE ORAL at 16:35

## 2022-11-27 RX ADMIN — GABAPENTIN 200 MG: 100 CAPSULE ORAL at 05:11

## 2022-11-27 RX ADMIN — TAMSULOSIN HYDROCHLORIDE 0.4 MG: 0.4 CAPSULE ORAL at 08:15

## 2022-11-27 RX ADMIN — OXYBUTYNIN CHLORIDE 5 MG: 5 TABLET ORAL at 16:35

## 2022-11-27 RX ADMIN — Medication 30 G: at 19:55

## 2022-11-27 RX ADMIN — Medication 30 G: at 14:30

## 2022-11-27 RX ADMIN — OXYBUTYNIN CHLORIDE 5 MG: 5 TABLET ORAL at 05:10

## 2022-11-27 RX ADMIN — ALPRAZOLAM 0.25 MG: 0.25 TABLET ORAL at 05:11

## 2022-11-27 RX ADMIN — ATORVASTATIN CALCIUM 20 MG: 20 TABLET, FILM COATED ORAL at 16:35

## 2022-11-27 RX ADMIN — Medication 30 G: at 17:28

## 2022-11-27 RX ADMIN — THIAMINE HCL TAB 100 MG 100 MG: 100 TAB at 16:38

## 2022-11-27 RX ADMIN — MORPHINE SULFATE 15 MG: 15 TABLET ORAL at 22:39

## 2022-11-27 RX ADMIN — FOLIC ACID 1 MG: 1 TABLET ORAL at 05:10

## 2022-11-27 RX ADMIN — THIAMINE HCL TAB 100 MG 100 MG: 100 TAB at 05:10

## 2022-11-27 RX ADMIN — Medication 30 G: at 02:00

## 2022-11-27 RX ADMIN — CYANOCOBALAMIN TAB 500 MCG 1000 MCG: 500 TAB at 05:11

## 2022-11-27 RX ADMIN — FINASTERIDE 5 MG: 5 TABLET, FILM COATED ORAL at 05:10

## 2022-11-27 RX ADMIN — Medication 1 LOZENGE: at 12:36

## 2022-11-27 RX ADMIN — OXYBUTYNIN CHLORIDE 5 MG: 5 TABLET ORAL at 11:31

## 2022-11-27 RX ADMIN — ALPRAZOLAM 0.25 MG: 0.25 TABLET ORAL at 19:54

## 2022-11-27 RX ADMIN — GABAPENTIN 200 MG: 100 CAPSULE ORAL at 11:31

## 2022-11-27 RX ADMIN — Medication 2000 UNITS: at 05:10

## 2022-11-27 ASSESSMENT — ENCOUNTER SYMPTOMS
HEARTBURN: 0
WHEEZING: 0
ABDOMINAL PAIN: 0
BLURRED VISION: 0
SHORTNESS OF BREATH: 0
NERVOUS/ANXIOUS: 0
DIAPHORESIS: 0
PALPITATIONS: 0
NAUSEA: 0
FEVER: 0
NECK PAIN: 0
DIZZINESS: 0
SENSORY CHANGE: 0
HEADACHES: 0
VOMITING: 0
SPEECH CHANGE: 0
WEAKNESS: 1
CHILLS: 0
MYALGIAS: 0
DIARRHEA: 0
FLANK PAIN: 0
BACK PAIN: 0
COUGH: 0
SORE THROAT: 0
FOCAL WEAKNESS: 0

## 2022-11-27 ASSESSMENT — FIBROSIS 4 INDEX: FIB4 SCORE: 2.43

## 2022-11-27 ASSESSMENT — PAIN DESCRIPTION - PAIN TYPE
TYPE: CHRONIC PAIN
TYPE: ACUTE PAIN
TYPE: CHRONIC PAIN
TYPE: ACUTE PAIN
TYPE: CHRONIC PAIN

## 2022-11-27 NOTE — CARE PLAN
The patient is Stable - Low risk of patient condition declining or worsening    Shift Goals  Clinical Goals: CBI monitoring  Patient Goals: anxiety reduction  Family Goals: no family at bedside    Progress made toward(s) clinical / shift goals:  Pain frequently assessed. Urine is yellow and without clots.     Problem: Hemodynamics  Goal: Patient's hemodynamics, fluid balance and neurologic status will be stable or improve  Outcome: Progressing     Problem: Urinary - Renal Perfusion  Goal: Ability to achieve and maintain adequate renal perfusion and functioning will improve  Outcome: Progressing     Problem: Pain - Standard  Goal: Alleviation of pain or a reduction in pain to the patient’s comfort goal  Outcome: Progressing       Patient is not progressing towards the following goals:

## 2022-11-27 NOTE — PROGRESS NOTES
Received report from previous shift RN at 1900.  Assessment complete.  A&O x 4. Patient calls appropriately.  Patient ambulates independently.  Patient has 7/10 pain. Patient declines interventions for pain at this time, education provided.  Denies N&V. Tolerating L7: easy to chew diet.  Skin per flowsheets. 3-way Cuellar in place for CBI, clean, dry, intact.  + void via cuellar, + flatus, + BM on 11/26.  Patient denies SOB on 1L O2 via NC.    Patient pleasant and cooperative throughout assessment.  Reviewed plan of care with patient, pt verbalizes understanding. Call light and personal belongings with in reach. Hourly rounding in place. All needs met at this time.

## 2022-11-27 NOTE — CARE PLAN
The patient is Stable - Low risk of patient condition declining or worsening    Shift Goals  Clinical Goals: CBI care, pain and anxiety control  Patient Goals: reduce anxiety; pain control; rest  Family Goals: no family at bedside    Progress made toward(s) clinical / shift goals:  hand irrigated cuellar x2 today and MD x1.  Denies pain and anxiety meds this shift    Patient is not progressing towards the following goals:

## 2022-11-27 NOTE — PROGRESS NOTES
Dictation #1  MRN:[id number]  CSN:[id number] Note to reader: this note follows the APSO format rather than the historical SOAP format. Assessment and plan located at the top of the note for ease of use.    Chief Complaint  72 y.o. year old male here with bladder mass and sclerotic lesions.     Assessment/Plan  Interval History   TCC bladder s/p TURBT 10/27, 11/2 and 11/14  Hematuria and clot obstruction s/p clot evacuation and fulguration x3 11/2 and 11/12 and 11/14  Anemia   Bone biopsy with myelofibrosis  Thrombocytopenia  BPH with retention      Plan:  - Amicar CBI initiated at 1315 on 11/21. Stopped at 11/23 at 0945 and transitioned to alum irrigation. Ran out 11/24, restarted evening of 11/25  - Cont alum irrigation for now. If urine remains clear 11/28, can consider voiding trial.   - On Flomax and Finasteride, to continue        Case discussed with patient, nursing, pharmacist, and Urology    Patient seen and examined    11/27- urine clear and patient states it has been this way since alum irrigation started again. H/h 9.2/30.5(8.4/28.2). denies pain.     11/26 h/h continues to fluctuate daily. Urine strawberry lemonade. No pain. No clots irrigated on rounds today.     11/24. CBI with alum started yesterday but ran out today, pharmacy will have more bags tomorrow. Running CBI with sterile saline with clear yellow urine but darkens as soon as its turned down. H/H stable. No pain. RBUS 11/23 without clot. I hand irrigated cuellar at bedside with clear pink urine with no blood clots.     11/23. CBI on low flow with completely clear urine. H/H stable. No overnight events. No complaints.     11/22. CBI on low flow with clear light pink urine. H/H stable 8.3/27.2.No pain at this time    11/21. CBI on high flow, urine light cherry. Clots irrigated this am by nursing. Hb 6.9 (8.1), received another unit of pRBCs this am. VSS. CBI slowed at bedside and urine immediately darkened.    11/20. CBI remains with watermelon  color urine. No clots. Hgb slow downtrend.     11/19. Cuellar remains. Urine watermelon with CBI on slow rate. Hgb 8.3, Plt stable at 74    11/18. Cuellar was replaced after pt unable to void last night. CBI restarted for darkening of urine. H/H improving.     11/17. Urine clear/pink. CBI discontinued for 5hrs. H/H stable. Pt anxious to leave.    11/16. Urine clear/pink. D/C CBI. Pt feels well and has not pain. H/H stable and improving.     11/15. Repeat clot evac and fulguration with TURBT yesterday. Urine now pink and no clot obstruction. Abdominal pains and spasms resolved. H/H declined further and received 2 units PRBCs today    11/14. Hematuria continues with difficult time maintaining flow via cuellar. RN unable to irrigate and bladder scan with 500 cc in bladder. Patient in pain in SP region. Receiving PRBCs daily.            Review of Systems  Physical Exam   Review of Systems   Constitutional:  Negative for chills and fever.   Cardiovascular:  Negative for chest pain.   Gastrointestinal:  Negative for nausea and vomiting.   Genitourinary:  Negative for dysuria, flank pain and hematuria.   All other systems reviewed and are negative.  Vitals:    11/26/22 1617 11/26/22 2132 11/27/22 0426 11/27/22 0828   BP: 101/61 109/57 113/67 130/64   Pulse: 66 64 72 74   Resp: 16 20 18 18   Temp: 36.9 °C (98.5 °F) 36.6 °C (97.9 °F) 36.7 °C (98.1 °F) 36.8 °C (98.3 °F)   TempSrc: Temporal Temporal Temporal Temporal   SpO2: 96% 96% 93% 91%   Weight:       Height:         Physical Exam  Vitals and nursing note reviewed.   Constitutional:       Appearance: Normal appearance.   HENT:      Head: Normocephalic and atraumatic.      Nose: Nose normal.      Mouth/Throat:      Mouth: Mucous membranes are moist.   Eyes:      Pupils: Pupils are equal, round, and reactive to light.   Pulmonary:      Effort: Pulmonary effort is normal.   Abdominal:      General: Abdomen is flat. There is no distension.      Palpations: Abdomen is soft.       Tenderness: There is no abdominal tenderness.   Genitourinary:     Comments: CBI with sterile saline with clear yellow urine on low flow, darkens up quickly  Musculoskeletal:      Cervical back: Normal range of motion.   Skin:     General: Skin is warm and dry.   Neurological:      General: No focal deficit present.      Mental Status: He is alert and oriented to person, place, and time.   Psychiatric:         Mood and Affect: Mood normal.         Behavior: Behavior normal.        Hematology Chemistry   Lab Results   Component Value Date/Time    WBC 8.6 11/27/2022 12:24 AM    HEMOGLOBIN 9.2 (L) 11/27/2022 12:24 AM    HEMATOCRIT 30.5 (L) 11/27/2022 12:24 AM    PLATELETCT 144 (L) 11/27/2022 12:24 AM     Lab Results   Component Value Date/Time    SODIUM 138 11/27/2022 12:24 AM    POTASSIUM 4.3 11/27/2022 12:24 AM    CHLORIDE 99 11/27/2022 12:24 AM    CO2 29 11/27/2022 12:24 AM    GLUCOSE 105 (H) 11/27/2022 12:24 AM    BUN 15 11/27/2022 12:24 AM    CREATININE 0.99 11/27/2022 12:24 AM         Labs not explicitly included in this progress note were reviewed by the author.   Radiology/imaging not explicitly included in this progress note was reviewed by the author.     Core Measures

## 2022-11-27 NOTE — CARE PLAN
The patient is Stable - Low risk of patient condition declining or worsening    Shift Goals  Clinical Goals: CBI monitoring; anxiety reduction; rest  Patient Goals: anxiety reduction; CBI monitoring; oxygen  Family Goals: no family at bedside    Progress made toward(s) clinical / shift goals:  Patient medicated per MAR. Non-pharmacologic comfort measures implemented. Safety discussed. Education provided. Ambulation and repositioning encouraged.     Problem: Urinary - Renal Perfusion  Goal: Ability to achieve and maintain adequate renal perfusion and functioning will improve  Outcome: Progressing     Problem: Respiratory  Goal: Patient will achieve/maintain optimum respiratory ventilation and gas exchange  Outcome: Progressing     Problem: Knowledge Deficit - Standard  Goal: Patient and family/care givers will demonstrate understanding of plan of care, disease process/condition, diagnostic tests and medications  Outcome: Progressing

## 2022-11-27 NOTE — PROGRESS NOTES
[facility] Medicine Daily Progress Note    Date of Service  11/27/2022    Chief Complaint  Gregg Martinez is a 72 y.o. male admitted 10/26/2022 with hematuria.    Hospital Course  Admitted with hematuria, he was noted to have a bladder tumor.  Urology was consulted on the case.  Patient underwent Transurethral Resection of Bladder tumor (>5cm in size) on 10/27/2022.  Remained on continuous bladder irrigation, he had persistent hematuria. Pathology showed urothelial carcinoma. He further underwent Cystoscopy, Clot evacuation, Fulguration, Transurethral resection of bladder tumor 3 cm  on 11/2/2022. Cystoscopy with clot evacuation from the bladder and fulguration of bleeding bladder tissue on 11/12/2022. Cystoscopy with resection of bladder mass (large), Transurethral resection of bladder tumor (small), Evacuation of bladder mass/clot, Fulguration of venous oozing sites and bladder tumor resection base on 11/14/2022.  He had anemia secondary to blood loss, and required transfusion of PRBC.  He was also noted to have thrombocytopenia and required platelet transfusion.  Oncology was also consulted on the case, he underwent bone marrow biopsy.  He has continued to have persistent hematuria.  He also had an episode of acute kidney injury which resolved with IVF hydration.  He was also noted to have increasing leukocytosis, was noted to have Pneumonia, and finished a course of Unasyn and Azithromycin.  His bone marrow pathology results showed possible bilateral fibrosis.  Oncology has recommended follow-up as outpatient.  With persistent hematuria, continuous bladder irrigation was done with Amicar.    Interval Problem Update  Urothelial CA - CBI ongoing, no hematuria noted today  Anemia - hgb 9.2  Hypoxia - O2 1 lpm    I have discussed this patient's plan of care and discharge plan at IDT rounds today with Case Management, Nursing, Nursing leadership, and other members of the IDT team.    Consultants/Specialty  oncology and  urology    Code Status  Full Code    Disposition  Patient is not medically cleared for discharge.   Anticipate discharge to to home with close outpatient follow-up.  I have placed the appropriate orders for post-discharge needs.    Review of Systems  Review of Systems   Constitutional:  Positive for malaise/fatigue. Negative for chills, diaphoresis and fever.   HENT:  Negative for congestion, hearing loss and sore throat.    Eyes:  Negative for blurred vision.   Respiratory:  Negative for cough, shortness of breath and wheezing.    Cardiovascular:  Positive for leg swelling. Negative for chest pain and palpitations.   Gastrointestinal:  Negative for abdominal pain, diarrhea, heartburn, nausea and vomiting.   Genitourinary:  Negative for dysuria, flank pain and hematuria.   Musculoskeletal:  Negative for back pain, joint pain, myalgias and neck pain.   Skin:  Negative for rash.   Neurological:  Positive for weakness. Negative for dizziness, sensory change, speech change, focal weakness and headaches.   Psychiatric/Behavioral:  The patient is not nervous/anxious.       Physical Exam  Temp:  [36.6 °C (97.9 °F)-36.9 °C (98.5 °F)] 36.8 °C (98.3 °F)  Pulse:  [64-74] 74  Resp:  [16-20] 18  BP: (101-130)/(57-67) 130/64  SpO2:  [91 %-96 %] 91 %    Physical Exam  Vitals and nursing note reviewed.   HENT:      Head: Normocephalic and atraumatic.      Nose: No congestion.      Mouth/Throat:      Mouth: Mucous membranes are moist.   Eyes:      Extraocular Movements: Extraocular movements intact.      Conjunctiva/sclera: Conjunctivae normal.   Cardiovascular:      Rate and Rhythm: Normal rate and regular rhythm.   Pulmonary:      Effort: Pulmonary effort is normal.      Breath sounds: Normal breath sounds.   Abdominal:      General: There is no distension.      Tenderness: There is no abdominal tenderness. There is no right CVA tenderness, left CVA tenderness, guarding or rebound.   Musculoskeletal:      Cervical back: No  tenderness.      Right lower leg: Edema present.      Left lower leg: Edema present.   Skin:     General: Skin is warm and dry.   Neurological:      General: No focal deficit present.      Mental Status: He is alert and oriented to person, place, and time.      Cranial Nerves: No cranial nerve deficit.       Fluids    Intake/Output Summary (Last 24 hours) at 11/27/2022 1334  Last data filed at 11/27/2022 0828  Gross per 24 hour   Intake 240 ml   Output 4275 ml   Net -4035 ml         Laboratory  Recent Labs     11/25/22  0256 11/26/22  0037 11/27/22  0024   WBC 8.6 8.0 8.6   RBC 3.49* 3.20* 3.48*   HEMOGLOBIN 9.3* 8.4* 9.2*   HEMATOCRIT 31.0* 28.2* 30.5*   MCV 88.8 88.1 87.6   MCH 26.6* 26.3* 26.4*   MCHC 30.0* 29.8* 30.2*   RDW 61.0* 59.5* 59.9*   PLATELETCT 119* 128* 144*   MPV 9.4 9.6 9.9       Recent Labs     11/25/22  0030 11/26/22  0037 11/27/22  0024   SODIUM 138 138 138   POTASSIUM 5.2 4.6 4.3   CHLORIDE 98 99 99   CO2 28 30 29   GLUCOSE 99 109* 105*   BUN 16 17 15   CREATININE 1.03 1.14 0.99   CALCIUM 9.4 9.4 9.6       Recent Labs     11/25/22  0030 11/27/22  0024   INR 1.03 1.12                 Imaging  US-RENAL   Final Result      1.  Patchy echogenic areas in the LEFT kidney of uncertain significance.   2.  No hydronephrosis.   3.  Limited evaluation of the bladder as is decompressed with Arboleda catheter in place.  Mass and/or clot is difficult to exclude.      IR-US GUIDED PIV   Final Result    Ultrasound-guided PERIPHERAL IV INSERTION performed by    qualified nursing staff as above.      EC-ECHOCARDIOGRAM COMPLETE W/O CONT   Final Result      US-EXTREMITY VENOUS LOWER BILAT   Final Result      DX-CHEST-PORTABLE (1 VIEW)   Final Result      Increased LEFT basilar atelectasis or pneumonia      CT-ABDOMEN-PELVIS W/O   Final Result      1.  Interval hemorrhage within the partially decompressed urinary bladder. Previously visualized posterior left bladder mass is no longer seen presumably this is due to  interval resection      2.  New mild left hydroureteronephrosis with moderate perinephric fat stranding. This is concerning for obstruction perhaps related to hematoma, recent instrumentation or pyelonephritis/ureterectasis.      3.  Increasing colonic stool volume with similar moderate diverticulosis of the colon but no evidence of diverticulitis      4.  Marked splenomegaly      5.  New small effusions with some lower lung zone groundglass suspicious for interstitial edema/mild congestive failure. There is stable mild pericardial fluid      6.  Persistent inhomogeneous medullary spaces with generalized increased density. Recent bone scan showed no focal mass but this could reflect underlying myelofibrosis or metastases with false-negative bone scan      CT-NEEDLE BX-DEEP BONE   Final Result      1.  Successful CT-guided core biopsy of sclerotic lesion in the posterior aspect of left inferior pubic ramus.      NM-BONE/JOINT SCAN WHOLE BODY   Final Result      No evidence of bony metastatic disease      CT-CHEST (THORAX) WITH   Final Result         1.  Emphysema   2.  Hazy bilateral upper lobe opacities with slight intralobular septal thickening, could represent component of mild pulmonary edema   3.  Linear densities the bilateral lung bases favors scarring or atelectasis.   4.  Atherosclerosis and atherosclerotic coronary artery disease   5.  Hepatomegaly with irregular hepatic contour favoring changes of cirrhosis.   6.  Subcentimeter hepatic lobe lesions, could represent small cyst or hemangioma, otherwise indeterminate.   7.  Mild mediastinal adenopathy, workup and evaluation for causes of adenopathy recommended as clinically appropriate.   8.  Splenomegaly      DX-CHEST-PORTABLE (1 VIEW)   Final Result         1.  No acute cardiopulmonary disease.   2.  Atherosclerosis      CT-ABDOMEN-PELVIS WITH   Final Result      1.  Enhancing 2.5 x 2.3 cm bladder wall mass suspicious for neoplasm.   2.  Heterogeneous  sclerotic change of the pelvis and proximal femurs suspicious for metastatic disease or other infiltrating marrow process.   3.  Mild cardiomegaly.   4.  Hepatosplenomegaly.   5.  Small subcentimeter hepatic hypodensities which could represent cysts or hemangiomas but are nonspecific. If indicated, these findings could be further evaluated with dedicated liver protocol CT or MRI or ultrasound on a nonemergent basis.   6.  Colonic diverticulosis without evidence of diverticulitis.   7.  Atherosclerosis.             Assessment/Plan  * Urothelial carcinoma of bladder without invasion of muscle (HCC)- (present on admission)  Assessment & Plan  Transurethral Resection of Bladder tumor (>5cm in size)  10/27/2022  Cystoscopy, Clot evacuation, Fulguration, Transurethral resection of bladder tumor 3 cm    11/2/2022  Cystoscopy with clot evacuation from the bladder and fulguration of bleeding bladder tissue   11/12/2022  Cystoscopy with resection of bladder mass (large), Transurethral resection of bladder tumor (small), Evacuation of bladder mass/clot, Fulguration of venous oozing sites and bladder tumor resection base    11/14/2022  CBI with ALUM      Acute blood loss anemia- (present on admission)  Assessment & Plan  Transfused PRBC  Follow CBC    Hypoxia- (present on admission)  Assessment & Plan  RT protocol, encourage I-S    Hepatosplenomegaly- (present on admission)  Assessment & Plan  monitor    Pulmonary hypertension (HCC)- (present on admission)  Assessment & Plan  Monitor volume status    TSH elevation- (present on admission)  Assessment & Plan  TSH low at 7, FT4 low on 11/17 and Synthroid was started  Stopped Synthroid   11/22/2022  Check TSH, FT4, FT3 on 12/23/2022    Paroxysmal atrial fibrillation (HCC)  Assessment & Plan  Monitor    Acute kidney injury (HCC)- (present on admission)  Assessment & Plan  Follow bmp    Coagulation defect (HCC)- (present on admission)  Assessment & Plan  Follow INR    Hyponatremia-  (present on admission)  Assessment & Plan  Follow bmp    Thrombocytopenia (HCC)- (present on admission)  Assessment & Plan  Transfused platelets  Follow CBC  Pathology results - preliminary Myelofibrosis?  Follow up with Oncology as outpatient    Pneumonia- (present on admission)  Assessment & Plan  Finished course of Unasyn and azithromycin (end date 11/18/2022)    Slow transit constipation- (present on admission)  Assessment & Plan  bowel protocol    Hypovolemia  Assessment & Plan  Due to acute blood loss anemia    Iron deficiency anemia- (present on admission)  Assessment & Plan  Iron replacement per pharmacy    COPD (chronic obstructive pulmonary disease) (HCC)- (present on admission)  Assessment & Plan  RT protocol    Dyslipidemia- (present on admission)  Assessment & Plan  Atorvastatin         VTE prophylaxis: SCDs/TEDs    I have performed a physical exam and reviewed and updated ROS and Plan today (11/27/2022). In review of yesterday's note (11/26/2022), there are no changes except as documented above.

## 2022-11-27 NOTE — PROGRESS NOTES
Received report from previous shift RN  Assessment complete.  A&O x 4. Patient calls appropriately.  Patient ambulates independently  Patient has 2/10 pain. Pain managed with prescribed medications.  Denies N&V. Tolerating diet.  Skin per flowsheets  + void via cuellar,  +flatus, + BM.  CBI running, urine pale yellow without clots.  Patient denies SOB.  SCD's refused.  Patient pleasant and cooperative with plan of care.  Review plan with of care with patient. Call light and personal belongings with in reach. Hourly rounding in place. All needs met at this time.

## 2022-11-28 LAB
ANION GAP SERPL CALC-SCNC: 10 MMOL/L (ref 7–16)
BUN SERPL-MCNC: 17 MG/DL (ref 8–22)
CALCIUM SERPL-MCNC: 9.5 MG/DL (ref 8.5–10.5)
CHLORIDE SERPL-SCNC: 100 MMOL/L (ref 96–112)
CO2 SERPL-SCNC: 27 MMOL/L (ref 20–33)
CREAT SERPL-MCNC: 1 MG/DL (ref 0.5–1.4)
ERYTHROCYTE [DISTWIDTH] IN BLOOD BY AUTOMATED COUNT: 59.4 FL (ref 35.9–50)
GFR SERPLBLD CREATININE-BSD FMLA CKD-EPI: 80 ML/MIN/1.73 M 2
GLUCOSE SERPL-MCNC: 104 MG/DL (ref 65–99)
HCT VFR BLD AUTO: 28 % (ref 42–52)
HGB BLD-MCNC: 8.4 G/DL (ref 14–18)
MCH RBC QN AUTO: 26.3 PG (ref 27–33)
MCHC RBC AUTO-ENTMCNC: 30 G/DL (ref 33.7–35.3)
MCV RBC AUTO: 87.5 FL (ref 81.4–97.8)
PLATELET # BLD AUTO: 143 K/UL (ref 164–446)
PMV BLD AUTO: 10 FL (ref 9–12.9)
POTASSIUM SERPL-SCNC: 4.8 MMOL/L (ref 3.6–5.5)
RBC # BLD AUTO: 3.2 M/UL (ref 4.7–6.1)
SODIUM SERPL-SCNC: 137 MMOL/L (ref 135–145)
WBC # BLD AUTO: 7.4 K/UL (ref 4.8–10.8)

## 2022-11-28 PROCEDURE — 700102 HCHG RX REV CODE 250 W/ 637 OVERRIDE(OP): Performed by: FAMILY MEDICINE

## 2022-11-28 PROCEDURE — 99232 SBSQ HOSP IP/OBS MODERATE 35: CPT | Performed by: FAMILY MEDICINE

## 2022-11-28 PROCEDURE — 51798 US URINE CAPACITY MEASURE: CPT

## 2022-11-28 PROCEDURE — 36415 COLL VENOUS BLD VENIPUNCTURE: CPT

## 2022-11-28 PROCEDURE — A9270 NON-COVERED ITEM OR SERVICE: HCPCS | Performed by: FAMILY MEDICINE

## 2022-11-28 PROCEDURE — 80048 BASIC METABOLIC PNL TOTAL CA: CPT

## 2022-11-28 PROCEDURE — 700101 HCHG RX REV CODE 250: Performed by: FAMILY MEDICINE

## 2022-11-28 PROCEDURE — 770001 HCHG ROOM/CARE - MED/SURG/GYN PRIV*

## 2022-11-28 PROCEDURE — 85027 COMPLETE CBC AUTOMATED: CPT

## 2022-11-28 RX ADMIN — Medication 30 G: at 01:11

## 2022-11-28 RX ADMIN — MORPHINE SULFATE 15 MG: 15 TABLET ORAL at 15:42

## 2022-11-28 RX ADMIN — GABAPENTIN 200 MG: 100 CAPSULE ORAL at 11:30

## 2022-11-28 RX ADMIN — OXYBUTYNIN CHLORIDE 5 MG: 5 TABLET ORAL at 16:30

## 2022-11-28 RX ADMIN — OXYBUTYNIN CHLORIDE 5 MG: 5 TABLET ORAL at 11:30

## 2022-11-28 RX ADMIN — GABAPENTIN 200 MG: 100 CAPSULE ORAL at 05:35

## 2022-11-28 RX ADMIN — FINASTERIDE 5 MG: 5 TABLET, FILM COATED ORAL at 05:34

## 2022-11-28 RX ADMIN — Medication 2000 UNITS: at 05:35

## 2022-11-28 RX ADMIN — DOCUSATE SODIUM 50 MG AND SENNOSIDES 8.6 MG 2 TABLET: 8.6; 5 TABLET, FILM COATED ORAL at 16:30

## 2022-11-28 RX ADMIN — ALPRAZOLAM 0.25 MG: 0.25 TABLET ORAL at 05:35

## 2022-11-28 RX ADMIN — Medication 1 LOZENGE: at 09:36

## 2022-11-28 RX ADMIN — CYANOCOBALAMIN TAB 500 MCG 1000 MCG: 500 TAB at 05:35

## 2022-11-28 RX ADMIN — Medication 30 G: at 07:56

## 2022-11-28 RX ADMIN — MORPHINE SULFATE 15 MG: 15 TABLET ORAL at 02:49

## 2022-11-28 RX ADMIN — TAMSULOSIN HYDROCHLORIDE 0.4 MG: 0.4 CAPSULE ORAL at 08:39

## 2022-11-28 RX ADMIN — THIAMINE HCL TAB 100 MG 100 MG: 100 TAB at 05:35

## 2022-11-28 RX ADMIN — GABAPENTIN 200 MG: 100 CAPSULE ORAL at 16:30

## 2022-11-28 RX ADMIN — MORPHINE SULFATE 15 MG: 15 TABLET ORAL at 20:51

## 2022-11-28 RX ADMIN — Medication 1 LOZENGE: at 02:57

## 2022-11-28 RX ADMIN — THIAMINE HCL TAB 100 MG 100 MG: 100 TAB at 16:33

## 2022-11-28 RX ADMIN — Medication 30 G: at 04:00

## 2022-11-28 RX ADMIN — OXYBUTYNIN CHLORIDE 5 MG: 5 TABLET ORAL at 05:35

## 2022-11-28 RX ADMIN — FOLIC ACID 1 MG: 1 TABLET ORAL at 05:34

## 2022-11-28 RX ADMIN — ATORVASTATIN CALCIUM 20 MG: 20 TABLET, FILM COATED ORAL at 16:30

## 2022-11-28 RX ADMIN — DOCUSATE SODIUM 50 MG AND SENNOSIDES 8.6 MG 2 TABLET: 8.6; 5 TABLET, FILM COATED ORAL at 05:38

## 2022-11-28 ASSESSMENT — ENCOUNTER SYMPTOMS
ABDOMINAL PAIN: 0
DIARRHEA: 0
NAUSEA: 0
SPEECH CHANGE: 0
SENSORY CHANGE: 0
MYALGIAS: 0
SHORTNESS OF BREATH: 0
HEADACHES: 0
WHEEZING: 0
DIZZINESS: 0
BACK PAIN: 0
SORE THROAT: 0
CHILLS: 0
VOMITING: 0
NERVOUS/ANXIOUS: 0
FLANK PAIN: 0
HEARTBURN: 0
COUGH: 0
NECK PAIN: 0
FEVER: 0
BLURRED VISION: 0
PALPITATIONS: 0
WEAKNESS: 1
DIAPHORESIS: 0
FOCAL WEAKNESS: 0

## 2022-11-28 ASSESSMENT — PAIN DESCRIPTION - PAIN TYPE
TYPE: ACUTE PAIN
TYPE: CHRONIC PAIN
TYPE: ACUTE PAIN;CHRONIC PAIN
TYPE: ACUTE PAIN
TYPE: CHRONIC PAIN

## 2022-11-28 NOTE — PROGRESS NOTES
Received report from previous shift RN  Assessment complete.  A&O x 4. Patient calls appropriately.  Patient ambulates independently.  Patient has 0/10 pain at this time. Pain managed with prescribed medications.  Denies N&V. Tolerating  diet.  Skin per flowsheets.  + void, + flatus, last BM 11/27.  CBI running without obstruction. Urine is clear with no visible clots at this time.  Patient denies SOB.  SCD's refused.  Patient pleasant and cooperative with plan of care.  Review plan with of care with patient. Call light and personal belongings with in reach. Hourly rounding in place. All needs met at this time.

## 2022-11-28 NOTE — PROGRESS NOTES
Received report from previous shift RN at 1900.  Assessment complete.  A&O x 4. Patient calls appropriately.  Patient ambulates independently.  Patient has 7/10 pain. Patient declines interventions for pain at this time, education provided.  Denies N&V. Tolerating L7: east-to-chew diet.  3-way cuellar catheter in place for CBI, clean, dry, and intact..  + void via cuellar, + flatus, + BM on 11/27.  Patient denies SOB.    Patient pleasant and cooperative throughout assessment.  Reviewed plan of care with patient, pt verbalizes understanding. Call light and personal belongings with in reach. Hourly rounding in place. All needs met at this time.

## 2022-11-28 NOTE — CARE PLAN
The patient is Stable - Low risk of patient condition declining or worsening    Shift Goals  Clinical Goals: CDI monitoring; anxiety reduction  Patient Goals: rest  Family Goals: no family at bedside    Progress made toward(s) clinical / shift goals:  Pt verbalizes understanding of plan of care. Pain frequently assessed. Medicated per MAR. Reinforced use of call light to prevent falls.    Patient is not progressing towards the following goals:

## 2022-11-28 NOTE — CARE PLAN
The patient is Watcher - Medium risk of patient condition declining or worsening    Shift Goals  Clinical Goals: CBI monitoring; anxiety reduction; safety  Patient Goals: CBI monitoring; anxiety; rest  Family Goals: no family at bedside    Progress made toward(s) clinical / shift goals:  Patient medicated per MAR. Non-pharmacologic comfort measures implemented. Safety discussed. Education provided. Ambulation and repositioning encouraged.     Problem: Fluid Volume  Goal: Fluid volume balance will be maintained  Outcome: Progressing     Problem: Urinary - Renal Perfusion  Goal: Ability to achieve and maintain adequate renal perfusion and functioning will improve  Outcome: Progressing     Problem: Psychosocial  Goal: Patient's level of anxiety will decrease  Outcome: Progressing     Problem: Bowel Elimination  Goal: Establish and maintain regular bowel function  Outcome: Progressing

## 2022-11-28 NOTE — DISCHARGE PLANNING
Case Management Discharge Planning     Admission Date: 10/26/2022  GMLOS: 3.5  ALOS: 15     6-Clicks ADL Score: 24  6-Clicks Mobility Score: 19        Anticipated Discharge Dispo: Discharge Disposition: Discharged to home/self care (01)  Discharge Address: [address]  Discharge Contact Phone Number: [phone]     DME Needed: No     Action(s) Taken: Updated Provider/Nurse on Discharge Plan     Escalations Completed: None     Medically Clear: No     Next Steps: Patient discussed during morning IDT rounds with team today. Patient continues to have CBI. Urology wants patient to follow up with their office after discharge to possibly discontinue cuellar catheter after voiding trial. Patient is not medically cleared for discharge home at this time. CM will continue to follow for dc needs.      Barriers to Discharge: Medical clearance

## 2022-11-28 NOTE — PROGRESS NOTES
Pt complaining of pain. Flushed catheter x5 times. Some clots came out with flushing. Urine now yellow with very small clots.

## 2022-11-28 NOTE — PROGRESS NOTES
Dictation #1  MRN:[id number]  CSN:[id number] Note to reader: this note follows the APSO format rather than the historical SOAP format. Assessment and plan located at the top of the note for ease of use.    Chief Complaint  72 y.o. year old male here with bladder mass and sclerotic lesions.     Assessment/Plan  Interval History   TCC bladder s/p TURBT 10/27, 11/2 and 11/14  Hematuria and clot obstruction s/p clot evacuation and fulguration x3 11/2 and 11/12 and 11/14  Anemia   Bone biopsy with myelofibrosis  Thrombocytopenia  BPH with retention      Plan:  - Amicar CBI initiated at 1315 on 11/21. Stopped at 11/23 at 0945 and transitioned to alum irrigation. Ran out 11/24, restarted evening of 11/25. Notified by pharmacy 11/28 that hospital will run out of alum today.  - Urine clear and yellow with CBI on very slow drip. Cuellar was hand-irrigated by nursing and myself at bedside, with return of several small, brown clots, but urine became yellow again after irrigation  - Will removed cuellar today and bladder scan q4-6h until patient voids, after which will check PVR to ensure emptying well  - On Flomax and Finasteride, to continue now and upon discharge        Case discussed with patient, nursing, pharmacist, and Urology    Patient seen and examined    11/28- urine clear yellow with CBI on very slow drip. H/H, platelets stable. Per RN report, had a few small clots yesterday afternoon but since then urine has been clear. Pt denies pain.    11/27- urine clear and patient states it has been this way since alum irrigation started again. H/h 9.2/30.5(8.4/28.2). denies pain.     11/26 h/h continues to fluctuate daily. Urine strawberry lemonade. No pain. No clots irrigated on rounds today.     11/24. CBI with alum started yesterday but ran out today, pharmacy will have more bags tomorrow. Running CBI with sterile saline with clear yellow urine but darkens as soon as its turned down. H/H stable. No pain. RBUS 11/23 without clot.  I hand irrigated cuellar at bedside with clear pink urine with no blood clots.     11/23. CBI on low flow with completely clear urine. H/H stable. No overnight events. No complaints.     11/22. CBI on low flow with clear light pink urine. H/H stable 8.3/27.2.No pain at this time    11/21. CBI on high flow, urine light cherry. Clots irrigated this am by nursing. Hb 6.9 (8.1), received another unit of pRBCs this am. VSS. CBI slowed at bedside and urine immediately darkened.    11/20. CBI remains with watermelon color urine. No clots. Hgb slow downtrend.     11/19. Cuellar remains. Urine watermelon with CBI on slow rate. Hgb 8.3, Plt stable at 74    11/18. Cuellar was replaced after pt unable to void last night. CBI restarted for darkening of urine. H/H improving.     11/17. Urine clear/pink. CBI discontinued for 5hrs. H/H stable. Pt anxious to leave.    11/16. Urine clear/pink. D/C CBI. Pt feels well and has not pain. H/H stable and improving.     11/15. Repeat clot evac and fulguration with TURBT yesterday. Urine now pink and no clot obstruction. Abdominal pains and spasms resolved. H/H declined further and received 2 units PRBCs today    11/14. Hematuria continues with difficult time maintaining flow via cuellar. RN unable to irrigate and bladder scan with 500 cc in bladder. Patient in pain in SP region. Receiving PRBCs daily.            Review of Systems  Physical Exam   Review of Systems   Constitutional:  Negative for chills and fever.   Cardiovascular:  Negative for chest pain.   Gastrointestinal:  Negative for nausea and vomiting.   Genitourinary:  Negative for dysuria, flank pain and hematuria.   All other systems reviewed and are negative.  Vitals:    11/27/22 1749 11/27/22 1942 11/28/22 0510 11/28/22 0735   BP:  132/69 108/50 112/71   Pulse:  77 70 77   Resp:  18 18 18   Temp:  36.7 °C (98.1 °F) 36.3 °C (97.3 °F) 36.6 °C (97.9 °F)   TempSrc:  Temporal Temporal Temporal   SpO2:  95% 96% 94%   Weight: 73.8 kg (162 lb  11.2 oz)      Height:         Physical Exam  Vitals and nursing note reviewed.   Constitutional:       Appearance: Normal appearance.   HENT:      Head: Normocephalic and atraumatic.      Nose: Nose normal.      Mouth/Throat:      Mouth: Mucous membranes are moist.   Eyes:      Pupils: Pupils are equal, round, and reactive to light.   Pulmonary:      Effort: Pulmonary effort is normal.   Abdominal:      General: Abdomen is flat. There is no distension.      Palpations: Abdomen is soft.      Tenderness: There is no abdominal tenderness.   Genitourinary:     Comments: CBI with alum irrigation on very low flow, urine clear and yellow in tubing  Musculoskeletal:      Cervical back: Normal range of motion.   Skin:     General: Skin is warm and dry.   Neurological:      General: No focal deficit present.      Mental Status: He is alert and oriented to person, place, and time.   Psychiatric:         Mood and Affect: Mood normal.         Behavior: Behavior normal.        Hematology Chemistry   Lab Results   Component Value Date/Time    WBC 7.4 11/28/2022 01:02 AM    HEMOGLOBIN 8.4 (L) 11/28/2022 01:02 AM    HEMATOCRIT 28.0 (L) 11/28/2022 01:02 AM    PLATELETCT 143 (L) 11/28/2022 01:02 AM     Lab Results   Component Value Date/Time    SODIUM 137 11/28/2022 01:02 AM    POTASSIUM 4.8 11/28/2022 01:02 AM    CHLORIDE 100 11/28/2022 01:02 AM    CO2 27 11/28/2022 01:02 AM    GLUCOSE 104 (H) 11/28/2022 01:02 AM    BUN 17 11/28/2022 01:02 AM    CREATININE 1.00 11/28/2022 01:02 AM         Labs not explicitly included in this progress note were reviewed by the author.   Radiology/imaging not explicitly included in this progress note was reviewed by the author.     Radiology images reviewed, Labs reviewed and Medications reviewed

## 2022-11-28 NOTE — PROGRESS NOTES
[facility] Medicine Daily Progress Note    Date of Service  11/28/2022    Chief Complaint  Gregg Martinez is a 72 y.o. male admitted 10/26/2022 with hematuria.    Hospital Course  Admitted with hematuria, he was noted to have a bladder tumor.  Urology was consulted on the case.  Patient underwent Transurethral Resection of Bladder tumor (>5cm in size) on 10/27/2022.  Remained on continuous bladder irrigation, he had persistent hematuria. Pathology showed urothelial carcinoma. He further underwent Cystoscopy, Clot evacuation, Fulguration, Transurethral resection of bladder tumor 3 cm  on 11/2/2022. Cystoscopy with clot evacuation from the bladder and fulguration of bleeding bladder tissue on 11/12/2022. Cystoscopy with resection of bladder mass (large), Transurethral resection of bladder tumor (small), Evacuation of bladder mass/clot, Fulguration of venous oozing sites and bladder tumor resection base on 11/14/2022.  He had anemia secondary to blood loss, and required transfusion of PRBC.  He was also noted to have thrombocytopenia and required platelet transfusion.  Oncology was also consulted on the case, he underwent bone marrow biopsy.  He has continued to have persistent hematuria.  He also had an episode of acute kidney injury which resolved with IVF hydration.  He was also noted to have increasing leukocytosis, was noted to have Pneumonia, and finished a course of Unasyn and Azithromycin.  His bone marrow pathology results showed possible bilateral fibrosis.  Oncology has recommended follow-up as outpatient.  With persistent hematuria, continuous bladder irrigation was done with Amicar.  His hematuria improved, his CBI was stopped.  However he again had recurrence, but Amicar was no longer available, he was then started back on CBI with ALUM.     Interval Problem Update  Urothelial CA - CBI ongoing, no hematuria noted today  Anemia - hgb 8.4  Hypoxia - O2 1 lpm    I have discussed this patient's plan of care and  discharge plan at IDT rounds today with Case Management, Nursing, Nursing leadership, and other members of the IDT team.    Consultants/Specialty  oncology and urology    Code Status  Full Code    Disposition  Patient is not medically cleared for discharge.   Anticipate discharge to to home with close outpatient follow-up.  I have placed the appropriate orders for post-discharge needs.    Review of Systems  Review of Systems   Constitutional:  Positive for malaise/fatigue. Negative for chills, diaphoresis and fever.   HENT:  Negative for congestion, hearing loss and sore throat.    Eyes:  Negative for blurred vision.   Respiratory:  Negative for cough, shortness of breath and wheezing.    Cardiovascular:  Positive for leg swelling. Negative for chest pain and palpitations.   Gastrointestinal:  Negative for abdominal pain, diarrhea, heartburn, nausea and vomiting.   Genitourinary:  Negative for dysuria, flank pain and hematuria.   Musculoskeletal:  Negative for back pain, joint pain, myalgias and neck pain.   Skin:  Negative for rash.   Neurological:  Positive for weakness. Negative for dizziness, sensory change, speech change, focal weakness and headaches.   Psychiatric/Behavioral:  The patient is not nervous/anxious.       Physical Exam  Temp:  [36.3 °C (97.3 °F)-36.7 °C (98.1 °F)] 36.6 °C (97.9 °F)  Pulse:  [70-78] 77  Resp:  [16-18] 18  BP: (108-132)/(50-71) 112/71  SpO2:  [94 %-96 %] 94 %    Physical Exam  Vitals and nursing note reviewed.   HENT:      Head: Normocephalic and atraumatic.      Nose: No congestion.      Mouth/Throat:      Mouth: Mucous membranes are moist.   Eyes:      Extraocular Movements: Extraocular movements intact.      Conjunctiva/sclera: Conjunctivae normal.   Cardiovascular:      Rate and Rhythm: Normal rate and regular rhythm.   Pulmonary:      Effort: Pulmonary effort is normal.      Breath sounds: Normal breath sounds.   Abdominal:      General: There is no distension.      Tenderness:  There is no abdominal tenderness. There is no right CVA tenderness, left CVA tenderness, guarding or rebound.   Musculoskeletal:      Cervical back: No tenderness.      Right lower leg: Edema present.      Left lower leg: Edema present.   Skin:     General: Skin is warm and dry.   Neurological:      General: No focal deficit present.      Mental Status: He is alert and oriented to person, place, and time.      Cranial Nerves: No cranial nerve deficit.       Fluids    Intake/Output Summary (Last 24 hours) at 11/28/2022 1136  Last data filed at 11/28/2022 0700  Gross per 24 hour   Intake 600 ml   Output 2090 ml   Net -1490 ml         Laboratory  Recent Labs     11/26/22  0037 11/27/22  0024 11/28/22  0102   WBC 8.0 8.6 7.4   RBC 3.20* 3.48* 3.20*   HEMOGLOBIN 8.4* 9.2* 8.4*   HEMATOCRIT 28.2* 30.5* 28.0*   MCV 88.1 87.6 87.5   MCH 26.3* 26.4* 26.3*   MCHC 29.8* 30.2* 30.0*   RDW 59.5* 59.9* 59.4*   PLATELETCT 128* 144* 143*   MPV 9.6 9.9 10.0       Recent Labs     11/26/22  0037 11/27/22  0024 11/28/22  0102   SODIUM 138 138 137   POTASSIUM 4.6 4.3 4.8   CHLORIDE 99 99 100   CO2 30 29 27   GLUCOSE 109* 105* 104*   BUN 17 15 17   CREATININE 1.14 0.99 1.00   CALCIUM 9.4 9.6 9.5       Recent Labs     11/27/22  0024   INR 1.12                 Imaging  US-RENAL   Final Result      1.  Patchy echogenic areas in the LEFT kidney of uncertain significance.   2.  No hydronephrosis.   3.  Limited evaluation of the bladder as is decompressed with Arboleda catheter in place.  Mass and/or clot is difficult to exclude.      IR-US GUIDED PIV   Final Result    Ultrasound-guided PERIPHERAL IV INSERTION performed by    qualified nursing staff as above.      EC-ECHOCARDIOGRAM COMPLETE W/O CONT   Final Result      US-EXTREMITY VENOUS LOWER BILAT   Final Result      DX-CHEST-PORTABLE (1 VIEW)   Final Result      Increased LEFT basilar atelectasis or pneumonia      CT-ABDOMEN-PELVIS W/O   Final Result      1.  Interval hemorrhage within the  partially decompressed urinary bladder. Previously visualized posterior left bladder mass is no longer seen presumably this is due to interval resection      2.  New mild left hydroureteronephrosis with moderate perinephric fat stranding. This is concerning for obstruction perhaps related to hematoma, recent instrumentation or pyelonephritis/ureterectasis.      3.  Increasing colonic stool volume with similar moderate diverticulosis of the colon but no evidence of diverticulitis      4.  Marked splenomegaly      5.  New small effusions with some lower lung zone groundglass suspicious for interstitial edema/mild congestive failure. There is stable mild pericardial fluid      6.  Persistent inhomogeneous medullary spaces with generalized increased density. Recent bone scan showed no focal mass but this could reflect underlying myelofibrosis or metastases with false-negative bone scan      CT-NEEDLE BX-DEEP BONE   Final Result      1.  Successful CT-guided core biopsy of sclerotic lesion in the posterior aspect of left inferior pubic ramus.      NM-BONE/JOINT SCAN WHOLE BODY   Final Result      No evidence of bony metastatic disease      CT-CHEST (THORAX) WITH   Final Result         1.  Emphysema   2.  Hazy bilateral upper lobe opacities with slight intralobular septal thickening, could represent component of mild pulmonary edema   3.  Linear densities the bilateral lung bases favors scarring or atelectasis.   4.  Atherosclerosis and atherosclerotic coronary artery disease   5.  Hepatomegaly with irregular hepatic contour favoring changes of cirrhosis.   6.  Subcentimeter hepatic lobe lesions, could represent small cyst or hemangioma, otherwise indeterminate.   7.  Mild mediastinal adenopathy, workup and evaluation for causes of adenopathy recommended as clinically appropriate.   8.  Splenomegaly      DX-CHEST-PORTABLE (1 VIEW)   Final Result         1.  No acute cardiopulmonary disease.   2.  Atherosclerosis       CT-ABDOMEN-PELVIS WITH   Final Result      1.  Enhancing 2.5 x 2.3 cm bladder wall mass suspicious for neoplasm.   2.  Heterogeneous sclerotic change of the pelvis and proximal femurs suspicious for metastatic disease or other infiltrating marrow process.   3.  Mild cardiomegaly.   4.  Hepatosplenomegaly.   5.  Small subcentimeter hepatic hypodensities which could represent cysts or hemangiomas but are nonspecific. If indicated, these findings could be further evaluated with dedicated liver protocol CT or MRI or ultrasound on a nonemergent basis.   6.  Colonic diverticulosis without evidence of diverticulitis.   7.  Atherosclerosis.             Assessment/Plan  * Urothelial carcinoma of bladder without invasion of muscle (HCC)- (present on admission)  Assessment & Plan  Transurethral Resection of Bladder tumor (>5cm in size)  10/27/2022  Cystoscopy, Clot evacuation, Fulguration, Transurethral resection of bladder tumor 3 cm    11/2/2022  Cystoscopy with clot evacuation from the bladder and fulguration of bleeding bladder tissue   11/12/2022  Cystoscopy with resection of bladder mass (large), Transurethral resection of bladder tumor (small), Evacuation of bladder mass/clot, Fulguration of venous oozing sites and bladder tumor resection base    11/14/2022  CBI with ALUM      Acute blood loss anemia- (present on admission)  Assessment & Plan  Transfused PRBC  Follow CBC    Hypoxia- (present on admission)  Assessment & Plan  RT protocol, encourage I-S    Hepatosplenomegaly- (present on admission)  Assessment & Plan  monitor    Pulmonary hypertension (HCC)- (present on admission)  Assessment & Plan  Monitor volume status    TSH elevation- (present on admission)  Assessment & Plan  TSH low at 7, FT4 low on 11/17 and Synthroid was started  Stopped Synthroid   11/22/2022  Check TSH, FT4, FT3 on 12/23/2022    Paroxysmal atrial fibrillation (HCC)  Assessment & Plan  Monitor    Acute kidney injury (HCC)- (present on  admission)  Assessment & Plan  Follow bmp    Coagulation defect (HCC)- (present on admission)  Assessment & Plan  Follow INR    Hyponatremia- (present on admission)  Assessment & Plan  Follow bmp    Thrombocytopenia (HCC)- (present on admission)  Assessment & Plan  Transfused platelets  Follow CBC  Pathology results - preliminary Myelofibrosis?  Follow up with Oncology as outpatient    Pneumonia- (present on admission)  Assessment & Plan  Finished course of Unasyn and azithromycin (end date 11/18/2022)    Slow transit constipation- (present on admission)  Assessment & Plan  bowel protocol    Hypovolemia  Assessment & Plan  Due to acute blood loss anemia    Iron deficiency anemia- (present on admission)  Assessment & Plan  Iron replacement per pharmacy    COPD (chronic obstructive pulmonary disease) (HCC)- (present on admission)  Assessment & Plan  RT protocol    Dyslipidemia- (present on admission)  Assessment & Plan  Atorvastatin         VTE prophylaxis: SCDs/TEDs    I have performed a physical exam and reviewed and updated ROS and Plan today (11/28/2022). In review of yesterday's note (11/27/2022), there are no changes except as documented above.

## 2022-11-29 LAB
INR PPP: 1.17 (ref 0.87–1.13)
PROTHROMBIN TIME: 14.8 SEC (ref 12–14.6)

## 2022-11-29 PROCEDURE — A9270 NON-COVERED ITEM OR SERVICE: HCPCS | Performed by: FAMILY MEDICINE

## 2022-11-29 PROCEDURE — 770001 HCHG ROOM/CARE - MED/SURG/GYN PRIV*

## 2022-11-29 PROCEDURE — 700102 HCHG RX REV CODE 250 W/ 637 OVERRIDE(OP): Performed by: FAMILY MEDICINE

## 2022-11-29 PROCEDURE — 99232 SBSQ HOSP IP/OBS MODERATE 35: CPT | Performed by: STUDENT IN AN ORGANIZED HEALTH CARE EDUCATION/TRAINING PROGRAM

## 2022-11-29 PROCEDURE — 85610 PROTHROMBIN TIME: CPT

## 2022-11-29 PROCEDURE — 36415 COLL VENOUS BLD VENIPUNCTURE: CPT

## 2022-11-29 PROCEDURE — 51798 US URINE CAPACITY MEASURE: CPT

## 2022-11-29 RX ADMIN — DOCUSATE SODIUM 50 MG AND SENNOSIDES 8.6 MG 2 TABLET: 8.6; 5 TABLET, FILM COATED ORAL at 17:00

## 2022-11-29 RX ADMIN — DOCUSATE SODIUM 50 MG AND SENNOSIDES 8.6 MG 2 TABLET: 8.6; 5 TABLET, FILM COATED ORAL at 05:20

## 2022-11-29 RX ADMIN — CYANOCOBALAMIN TAB 500 MCG 1000 MCG: 500 TAB at 05:20

## 2022-11-29 RX ADMIN — ATORVASTATIN CALCIUM 20 MG: 20 TABLET, FILM COATED ORAL at 17:01

## 2022-11-29 RX ADMIN — FINASTERIDE 5 MG: 5 TABLET, FILM COATED ORAL at 05:20

## 2022-11-29 RX ADMIN — GABAPENTIN 200 MG: 100 CAPSULE ORAL at 05:20

## 2022-11-29 RX ADMIN — OXYBUTYNIN CHLORIDE 5 MG: 5 TABLET ORAL at 12:30

## 2022-11-29 RX ADMIN — THIAMINE HCL TAB 100 MG 100 MG: 100 TAB at 17:01

## 2022-11-29 RX ADMIN — MAGNESIUM HYDROXIDE 30 ML: 400 SUSPENSION ORAL at 05:25

## 2022-11-29 RX ADMIN — ALPRAZOLAM 0.25 MG: 0.25 TABLET ORAL at 08:39

## 2022-11-29 RX ADMIN — ALPRAZOLAM 0.25 MG: 0.25 TABLET ORAL at 17:00

## 2022-11-29 RX ADMIN — OXYBUTYNIN CHLORIDE 5 MG: 5 TABLET ORAL at 17:00

## 2022-11-29 RX ADMIN — FOLIC ACID 1 MG: 1 TABLET ORAL at 05:20

## 2022-11-29 RX ADMIN — THIAMINE HCL TAB 100 MG 100 MG: 100 TAB at 05:20

## 2022-11-29 RX ADMIN — OXYBUTYNIN CHLORIDE 5 MG: 5 TABLET ORAL at 05:20

## 2022-11-29 RX ADMIN — GABAPENTIN 200 MG: 100 CAPSULE ORAL at 17:00

## 2022-11-29 RX ADMIN — GABAPENTIN 200 MG: 100 CAPSULE ORAL at 12:30

## 2022-11-29 RX ADMIN — TAMSULOSIN HYDROCHLORIDE 0.4 MG: 0.4 CAPSULE ORAL at 08:39

## 2022-11-29 RX ADMIN — Medication 2000 UNITS: at 05:20

## 2022-11-29 ASSESSMENT — PAIN DESCRIPTION - PAIN TYPE
TYPE: CHRONIC PAIN
TYPE: CHRONIC PAIN
TYPE: ACUTE PAIN
TYPE: CHRONIC PAIN
TYPE: ACUTE PAIN
TYPE: CHRONIC PAIN

## 2022-11-29 ASSESSMENT — ENCOUNTER SYMPTOMS
COUGH: 0
ABDOMINAL PAIN: 0
MYALGIAS: 0
VOMITING: 0
NERVOUS/ANXIOUS: 0
HEADACHES: 0
NECK PAIN: 0
BACK PAIN: 0
CHILLS: 0
SPEECH CHANGE: 0
FEVER: 0
DIAPHORESIS: 0
SENSORY CHANGE: 0
SHORTNESS OF BREATH: 0
WHEEZING: 0
FLANK PAIN: 0
NAUSEA: 0
HEARTBURN: 0
WEAKNESS: 1
DIARRHEA: 0
FOCAL WEAKNESS: 0
PALPITATIONS: 0
DIZZINESS: 0

## 2022-11-29 NOTE — PROGRESS NOTES
[facility] Medicine Daily Progress Note    Date of Service  11/29/2022    Chief Complaint  Gregg Martinez is a 72 y.o. male admitted 10/26/2022 with hematuria.    Hospital Course  Admitted with hematuria, he was noted to have a bladder tumor.  Urology was consulted on the case.  Patient underwent Transurethral Resection of Bladder tumor (>5cm in size) on 10/27/2022.  Remained on continuous bladder irrigation, he had persistent hematuria. Pathology showed urothelial carcinoma. He further underwent Cystoscopy, Clot evacuation, Fulguration, Transurethral resection of bladder tumor 3 cm  on 11/2/2022. Cystoscopy with clot evacuation from the bladder and fulguration of bleeding bladder tissue on 11/12/2022. Cystoscopy with resection of bladder mass (large), Transurethral resection of bladder tumor (small), Evacuation of bladder mass/clot, Fulguration of venous oozing sites and bladder tumor resection base on 11/14/2022.  He had anemia secondary to blood loss, and required transfusion of PRBC.  He was also noted to have thrombocytopenia and required platelet transfusion.  Oncology was also consulted on the case, he underwent bone marrow biopsy.  He has continued to have persistent hematuria.  He also had an episode of acute kidney injury which resolved with IVF hydration.  He was also noted to have increasing leukocytosis, was noted to have Pneumonia, and finished a course of Unasyn and Azithromycin.  His bone marrow pathology results showed possible bilateral fibrosis.  Oncology has recommended follow-up as outpatient.  With persistent hematuria, continuous bladder irrigation was done with Amicar.  His hematuria improved, his CBI was stopped.  However he again had recurrence, but Amicar was no longer available, he was then started back on CBI with ALUM.     Interval Problem Update  Urothelial CA - CBI removed yesterday, no hematuria noted today  Anemia - hgb 8.4  Hypoxia - resolved  Patient reports that he will no longer  agree for a CBI and would like to speak with urology about radiation and chemo options      I have discussed this patient's plan of care and discharge plan at IDT rounds today with Case Management, Nursing, Nursing leadership, and other members of the IDT team.    Consultants/Specialty  oncology and urology    Code Status  Full Code    Disposition  Patient is not medically cleared for discharge.   Anticipate discharge to to home with close outpatient follow-up.  I have placed the appropriate orders for post-discharge needs.    Review of Systems  Review of Systems   Constitutional:  Positive for malaise/fatigue. Negative for chills, diaphoresis and fever.   Respiratory:  Negative for cough, shortness of breath and wheezing.    Cardiovascular:  Positive for leg swelling. Negative for chest pain and palpitations.   Gastrointestinal:  Negative for abdominal pain, diarrhea, heartburn, nausea and vomiting.   Genitourinary:  Negative for dysuria, flank pain and hematuria.   Musculoskeletal:  Negative for back pain, joint pain, myalgias and neck pain.   Neurological:  Positive for weakness. Negative for dizziness, sensory change, speech change, focal weakness and headaches.   Psychiatric/Behavioral:  The patient is not nervous/anxious.    All other systems reviewed and are negative.     Physical Exam  Temp:  [36.3 °C (97.3 °F)-36.9 °C (98.4 °F)] 36.7 °C (98 °F)  Pulse:  [67-91] 89  Resp:  [17-18] 18  BP: (104-128)/(68-99) 113/68  SpO2:  [93 %-96 %] 93 %    Physical Exam  Vitals and nursing note reviewed.   Constitutional:       General: He is not in acute distress.  HENT:      Head: Normocephalic and atraumatic.   Eyes:      General: No scleral icterus.        Right eye: No discharge.         Left eye: No discharge.   Cardiovascular:      Rate and Rhythm: Normal rate and regular rhythm.   Pulmonary:      Effort: Pulmonary effort is normal.      Breath sounds: Normal breath sounds.   Abdominal:      General: There is no  distension.      Tenderness: There is no abdominal tenderness. There is no right CVA tenderness, left CVA tenderness, guarding or rebound.   Musculoskeletal:      Cervical back: No tenderness.      Right lower leg: Edema present.      Left lower leg: Edema present.   Skin:     General: Skin is warm and dry.      Coloration: Skin is not jaundiced.   Neurological:      General: No focal deficit present.      Mental Status: He is alert and oriented to person, place, and time.      Cranial Nerves: No cranial nerve deficit.   Psychiatric:         Mood and Affect: Mood normal.       Fluids    Intake/Output Summary (Last 24 hours) at 11/29/2022 1236  Last data filed at 11/29/2022 1200  Gross per 24 hour   Intake 120 ml   Output 3975 ml   Net -3855 ml         Laboratory  Recent Labs     11/27/22  0024 11/28/22  0102   WBC 8.6 7.4   RBC 3.48* 3.20*   HEMOGLOBIN 9.2* 8.4*   HEMATOCRIT 30.5* 28.0*   MCV 87.6 87.5   MCH 26.4* 26.3*   MCHC 30.2* 30.0*   RDW 59.9* 59.4*   PLATELETCT 144* 143*   MPV 9.9 10.0       Recent Labs     11/27/22  0024 11/28/22  0102   SODIUM 138 137   POTASSIUM 4.3 4.8   CHLORIDE 99 100   CO2 29 27   GLUCOSE 105* 104*   BUN 15 17   CREATININE 0.99 1.00   CALCIUM 9.6 9.5       Recent Labs     11/27/22  0024 11/29/22  0156   INR 1.12 1.17*                 Imaging  US-RENAL   Final Result      1.  Patchy echogenic areas in the LEFT kidney of uncertain significance.   2.  No hydronephrosis.   3.  Limited evaluation of the bladder as is decompressed with Arboleda catheter in place.  Mass and/or clot is difficult to exclude.      IR-US GUIDED PIV   Final Result    Ultrasound-guided PERIPHERAL IV INSERTION performed by    qualified nursing staff as above.      EC-ECHOCARDIOGRAM COMPLETE W/O CONT   Final Result      US-EXTREMITY VENOUS LOWER BILAT   Final Result      DX-CHEST-PORTABLE (1 VIEW)   Final Result      Increased LEFT basilar atelectasis or pneumonia      CT-ABDOMEN-PELVIS W/O   Final Result      1.   Interval hemorrhage within the partially decompressed urinary bladder. Previously visualized posterior left bladder mass is no longer seen presumably this is due to interval resection      2.  New mild left hydroureteronephrosis with moderate perinephric fat stranding. This is concerning for obstruction perhaps related to hematoma, recent instrumentation or pyelonephritis/ureterectasis.      3.  Increasing colonic stool volume with similar moderate diverticulosis of the colon but no evidence of diverticulitis      4.  Marked splenomegaly      5.  New small effusions with some lower lung zone groundglass suspicious for interstitial edema/mild congestive failure. There is stable mild pericardial fluid      6.  Persistent inhomogeneous medullary spaces with generalized increased density. Recent bone scan showed no focal mass but this could reflect underlying myelofibrosis or metastases with false-negative bone scan      CT-NEEDLE BX-DEEP BONE   Final Result      1.  Successful CT-guided core biopsy of sclerotic lesion in the posterior aspect of left inferior pubic ramus.      NM-BONE/JOINT SCAN WHOLE BODY   Final Result      No evidence of bony metastatic disease      CT-CHEST (THORAX) WITH   Final Result         1.  Emphysema   2.  Hazy bilateral upper lobe opacities with slight intralobular septal thickening, could represent component of mild pulmonary edema   3.  Linear densities the bilateral lung bases favors scarring or atelectasis.   4.  Atherosclerosis and atherosclerotic coronary artery disease   5.  Hepatomegaly with irregular hepatic contour favoring changes of cirrhosis.   6.  Subcentimeter hepatic lobe lesions, could represent small cyst or hemangioma, otherwise indeterminate.   7.  Mild mediastinal adenopathy, workup and evaluation for causes of adenopathy recommended as clinically appropriate.   8.  Splenomegaly      DX-CHEST-PORTABLE (1 VIEW)   Final Result         1.  No acute cardiopulmonary disease.    2.  Atherosclerosis      CT-ABDOMEN-PELVIS WITH   Final Result      1.  Enhancing 2.5 x 2.3 cm bladder wall mass suspicious for neoplasm.   2.  Heterogeneous sclerotic change of the pelvis and proximal femurs suspicious for metastatic disease or other infiltrating marrow process.   3.  Mild cardiomegaly.   4.  Hepatosplenomegaly.   5.  Small subcentimeter hepatic hypodensities which could represent cysts or hemangiomas but are nonspecific. If indicated, these findings could be further evaluated with dedicated liver protocol CT or MRI or ultrasound on a nonemergent basis.   6.  Colonic diverticulosis without evidence of diverticulitis.   7.  Atherosclerosis.             Assessment/Plan  * Urothelial carcinoma of bladder without invasion of muscle (HCC)- (present on admission)  Assessment & Plan  Transurethral Resection of Bladder tumor (>5cm in size)  10/27/2022  Cystoscopy, Clot evacuation, Fulguration, Transurethral resection of bladder tumor 3 cm    11/2/2022  Cystoscopy with clot evacuation from the bladder and fulguration of bleeding bladder tissue   11/12/2022  Cystoscopy with resection of bladder mass (large), Transurethral resection of bladder tumor (small), Evacuation of bladder mass/clot, Fulguration of venous oozing sites and bladder tumor resection base    11/14/2022  CBI with ALUM      Hypoxia- (present on admission)  Assessment & Plan  RT protocol, encourage I-S  resolved    Hepatosplenomegaly- (present on admission)  Assessment & Plan  monitor    Pulmonary hypertension (HCC)- (present on admission)  Assessment & Plan  Monitor volume status    TSH elevation- (present on admission)  Assessment & Plan  TSH low at 7, FT4 low on 11/17 and Synthroid was started  Stopped Synthroid   11/22/2022  Check TSH, FT4, FT3 on 12/23/2022    Paroxysmal atrial fibrillation (HCC)  Assessment & Plan  Monitor    Acute kidney injury (HCC)- (present on admission)  Assessment & Plan  Follow bmp    Coagulation defect (HCC)-  (present on admission)  Assessment & Plan  Follow INR    Hyponatremia- (present on admission)  Assessment & Plan  Follow bmp    Thrombocytopenia (HCC)- (present on admission)  Assessment & Plan  Transfused platelets  Follow CBC  Pathology results - preliminary Myelofibrosis?  Follow up with Oncology as outpatient-repeat bone marrow biospy 2-3 weeks after discharge    Pneumonia- (present on admission)  Assessment & Plan  Finished course of Unasyn and azithromycin (end date 11/18/2022)    Slow transit constipation- (present on admission)  Assessment & Plan  bowel protocol    Hypovolemia  Assessment & Plan  Due to acute blood loss anemia    Iron deficiency anemia- (present on admission)  Assessment & Plan  Iron replacement per pharmacy    Acute blood loss anemia- (present on admission)  Assessment & Plan  Transfused PRBC  Follow CBC    Dyslipidemia- (present on admission)  Assessment & Plan  Atorvastatin    COPD (chronic obstructive pulmonary disease) (HCC)- (present on admission)  Assessment & Plan  RT protocol         VTE prophylaxis: SCDs/TEDs    I have performed a physical exam and reviewed and updated ROS and Plan today (11/29/2022). In review of yesterday's note (11/28/2022), there are no changes except as documented above.

## 2022-11-29 NOTE — PROGRESS NOTES
"Report received from AM RN; assumed care. A&O x 3-4, intermittently disoriented to time  VSS. 93% on RA. Assessment complete. Patient denying SOB, numbness, tingling, nausea, vomiting, dizziness. Medicated for back/penile pain; see MAR.  Abdomen round. Normoactive BS x 4 noted.  Patient voiding in urinal various amounts, old dried clots/sediment noted. Bladder scanning averaging in 400's. Urology PA notified. MD avoiding cuellar catheter placement at this time. Patient also refused cuellar catheter placement stating \"I can't do that again.\" . + void + eructation. + flatus. LBM 11/27 x 5 BMs. Patient tolerating diet. x 2 dinners ingested. Patient up self ambulating frequently around unit when awake. Discussed plan of care with patient. Patient remarking that he'd like to speak with the MD, [place]. All questions answered.  Low fall risk. Bed in locked/lowest position.  Call light/personal belongings within reach.  All needs met, patient sleeping at present time.   "

## 2022-11-29 NOTE — CARE PLAN
The patient is Stable - Low risk of patient condition declining or worsening    Shift Goals  Clinical Goals: PVR; bladder scans, pain control, rest  Patient Goals: Discharge  Family Goals: no family at bedside    Progress made toward(s) clinical / shift goals:  Voids with small old clots. Bladder scans averaging in low 400s. Medicated for pain; see MAR. Patient slept intermittently during shift.     Patient is not progressing towards the following goals: Last bladder scan <600 mL. Patient expressed feeling stir crazy. CNA accompanied patient downstairs to healing garden to get fresh air. Patient expressed desiring another option than a cuellar catheter placement to drain bladder. Patient would like to discuss with MD.

## 2022-11-29 NOTE — PROGRESS NOTES
Assumed care of patient at 0700. Patient is alert and oriented, respirations are unlabored and regular on room air. Patient sitting at edge of bed eating breakfast. Patient denies pain. Lung sounds clear throughout, diminished in bases. Abdomen soft, non tender, +bowel sounds, BM 11/27, +gas. Voiding without difficulty. Urine clear/aubrey, voided 300mL this AM. Patient eager to speak to MD personally and not the PA. Bed in lowest position, call light within reach, patient states no needs at this time.

## 2022-11-29 NOTE — CARE PLAN
The patient is Stable - Low risk of patient condition declining or worsening    Shift Goals  Clinical Goals: monitor urine output, pain control  Patient Goals: discharge  Family Goals: no family at bedside    Progress made toward(s) clinical / shift goals:  urine output adequate, pain controlled, BM today    Patient is not progressing towards the following goals:      Problem: Knowledge Deficit - Standard  Goal: Patient and family/care givers will demonstrate understanding of plan of care, disease process/condition, diagnostic tests and medications  Outcome: Progressing     Problem: Pain - Standard  Goal: Alleviation of pain or a reduction in pain to the patient’s comfort goal  Outcome: Progressing     Problem: Fall Risk  Goal: Patient will remain free from falls  Outcome: Progressing     Problem: Psychosocial  Goal: Patient's level of anxiety will decrease  Outcome: Progressing     Problem: Discharge Barriers/Planning  Goal: Patient's continuum of care needs are met  Outcome: Progressing     Problem: Urinary Elimination  Goal: Establish and maintain regular urinary output  Outcome: Progressing     Problem: Bowel Elimination  Goal: Establish and maintain regular bowel function  Outcome: Progressing     Problem: Self Care  Goal: Patient will have the ability to perform ADLs independently or with assistance (bathe, groom, dress, toilet and feed)  Outcome: Progressing

## 2022-11-30 VITALS
WEIGHT: 162.7 LBS | BODY MASS INDEX: 24.66 KG/M2 | DIASTOLIC BLOOD PRESSURE: 57 MMHG | RESPIRATION RATE: 16 BRPM | HEART RATE: 81 BPM | SYSTOLIC BLOOD PRESSURE: 102 MMHG | OXYGEN SATURATION: 95 % | TEMPERATURE: 97.1 F | HEIGHT: 68 IN

## 2022-11-30 LAB
ANION GAP SERPL CALC-SCNC: 10 MMOL/L (ref 7–16)
BUN SERPL-MCNC: 20 MG/DL (ref 8–22)
CALCIUM SERPL-MCNC: 9.1 MG/DL (ref 8.5–10.5)
CHLORIDE SERPL-SCNC: 98 MMOL/L (ref 96–112)
CO2 SERPL-SCNC: 26 MMOL/L (ref 20–33)
CREAT SERPL-MCNC: 1.02 MG/DL (ref 0.5–1.4)
ERYTHROCYTE [DISTWIDTH] IN BLOOD BY AUTOMATED COUNT: 57.4 FL (ref 35.9–50)
GFR SERPLBLD CREATININE-BSD FMLA CKD-EPI: 79 ML/MIN/1.73 M 2
GLUCOSE SERPL-MCNC: 100 MG/DL (ref 65–99)
HCT VFR BLD AUTO: 23.2 % (ref 42–52)
HCT VFR BLD AUTO: 25 % (ref 42–52)
HGB BLD-MCNC: 7.1 G/DL (ref 14–18)
HGB BLD-MCNC: 7.7 G/DL (ref 14–18)
MCH RBC QN AUTO: 25.8 PG (ref 27–33)
MCHC RBC AUTO-ENTMCNC: 30.6 G/DL (ref 33.7–35.3)
MCV RBC AUTO: 84.4 FL (ref 81.4–97.8)
PLATELET # BLD AUTO: 148 K/UL (ref 164–446)
PMV BLD AUTO: 9.9 FL (ref 9–12.9)
POTASSIUM SERPL-SCNC: 4.4 MMOL/L (ref 3.6–5.5)
RBC # BLD AUTO: 2.75 M/UL (ref 4.7–6.1)
SODIUM SERPL-SCNC: 134 MMOL/L (ref 135–145)
WBC # BLD AUTO: 7 K/UL (ref 4.8–10.8)

## 2022-11-30 PROCEDURE — A9270 NON-COVERED ITEM OR SERVICE: HCPCS | Performed by: FAMILY MEDICINE

## 2022-11-30 PROCEDURE — 85014 HEMATOCRIT: CPT

## 2022-11-30 PROCEDURE — 51798 US URINE CAPACITY MEASURE: CPT

## 2022-11-30 PROCEDURE — 700102 HCHG RX REV CODE 250 W/ 637 OVERRIDE(OP): Performed by: FAMILY MEDICINE

## 2022-11-30 PROCEDURE — 80048 BASIC METABOLIC PNL TOTAL CA: CPT

## 2022-11-30 PROCEDURE — 85018 HEMOGLOBIN: CPT

## 2022-11-30 PROCEDURE — 85027 COMPLETE CBC AUTOMATED: CPT

## 2022-11-30 PROCEDURE — 99239 HOSP IP/OBS DSCHRG MGMT >30: CPT | Performed by: STUDENT IN AN ORGANIZED HEALTH CARE EDUCATION/TRAINING PROGRAM

## 2022-11-30 RX ORDER — OXYBUTYNIN CHLORIDE 5 MG/1
5 TABLET ORAL 3 TIMES DAILY
Qty: 90 TABLET | Refills: 2 | Status: SHIPPED | OUTPATIENT
Start: 2022-11-30 | End: 2022-11-30 | Stop reason: SDUPTHER

## 2022-11-30 RX ORDER — GABAPENTIN 100 MG/1
200 CAPSULE ORAL 3 TIMES DAILY
Qty: 90 CAPSULE | Refills: 1 | Status: SHIPPED | OUTPATIENT
Start: 2022-11-30 | End: 2023-01-12 | Stop reason: SDUPTHER

## 2022-11-30 RX ORDER — TAMSULOSIN HYDROCHLORIDE 0.4 MG/1
0.4 CAPSULE ORAL
Qty: 30 CAPSULE | Refills: 2 | Status: SHIPPED | OUTPATIENT
Start: 2022-12-01 | End: 2022-11-30 | Stop reason: SDUPTHER

## 2022-11-30 RX ORDER — FINASTERIDE 5 MG/1
5 TABLET, FILM COATED ORAL DAILY
Qty: 30 TABLET | Refills: 2 | Status: SHIPPED | OUTPATIENT
Start: 2022-12-01

## 2022-11-30 RX ORDER — OXYBUTYNIN CHLORIDE 5 MG/1
5 TABLET ORAL 3 TIMES DAILY
Qty: 90 TABLET | Refills: 2 | Status: SHIPPED | OUTPATIENT
Start: 2022-11-30

## 2022-11-30 RX ORDER — TAMSULOSIN HYDROCHLORIDE 0.4 MG/1
0.4 CAPSULE ORAL
Qty: 30 CAPSULE | Refills: 2 | Status: SHIPPED | OUTPATIENT
Start: 2022-12-01

## 2022-11-30 RX ORDER — LANOLIN ALCOHOL/MO/W.PET/CERES
100 CREAM (GRAM) TOPICAL 2 TIMES DAILY
Qty: 30 TABLET | Refills: 2 | Status: SHIPPED | OUTPATIENT
Start: 2022-11-30 | End: 2022-11-30

## 2022-11-30 RX ORDER — GABAPENTIN 100 MG/1
200 CAPSULE ORAL 3 TIMES DAILY
Qty: 90 CAPSULE | Refills: 1 | Status: SHIPPED | OUTPATIENT
Start: 2022-11-30 | End: 2022-11-30 | Stop reason: SDUPTHER

## 2022-11-30 RX ORDER — FINASTERIDE 5 MG/1
5 TABLET, FILM COATED ORAL DAILY
Qty: 30 TABLET | Refills: 2 | Status: SHIPPED | OUTPATIENT
Start: 2022-12-01 | End: 2022-11-30 | Stop reason: SDUPTHER

## 2022-11-30 RX ADMIN — Medication 2000 UNITS: at 04:21

## 2022-11-30 RX ADMIN — GABAPENTIN 200 MG: 100 CAPSULE ORAL at 04:20

## 2022-11-30 RX ADMIN — FINASTERIDE 5 MG: 5 TABLET, FILM COATED ORAL at 04:20

## 2022-11-30 RX ADMIN — GABAPENTIN 200 MG: 100 CAPSULE ORAL at 12:31

## 2022-11-30 RX ADMIN — TAMSULOSIN HYDROCHLORIDE 0.4 MG: 0.4 CAPSULE ORAL at 09:37

## 2022-11-30 RX ADMIN — THIAMINE HCL TAB 100 MG 100 MG: 100 TAB at 04:20

## 2022-11-30 RX ADMIN — ACETAMINOPHEN 650 MG: 325 TABLET, FILM COATED ORAL at 04:20

## 2022-11-30 RX ADMIN — CYANOCOBALAMIN TAB 500 MCG 1000 MCG: 500 TAB at 04:21

## 2022-11-30 RX ADMIN — OXYBUTYNIN CHLORIDE 5 MG: 5 TABLET ORAL at 04:20

## 2022-11-30 RX ADMIN — OXYBUTYNIN CHLORIDE 5 MG: 5 TABLET ORAL at 12:31

## 2022-11-30 RX ADMIN — FOLIC ACID 1 MG: 1 TABLET ORAL at 04:21

## 2022-11-30 RX ADMIN — DOCUSATE SODIUM 50 MG AND SENNOSIDES 8.6 MG 2 TABLET: 8.6; 5 TABLET, FILM COATED ORAL at 04:20

## 2022-11-30 ASSESSMENT — PAIN DESCRIPTION - PAIN TYPE
TYPE: CHRONIC PAIN

## 2022-11-30 NOTE — DISCHARGE SUMMARY
Discharge Summary    CHIEF COMPLAINT ON ADMISSION  Chief Complaint   Patient presents with    Constipation     On and off for 6-8 months    Blood in Urine     X1 week    Abdominal Pain     That is generalized. Pt states the pain has become unbearable       Reason for Admission  Constipation     Admission Date  10/26/2022    CODE STATUS  Full Code    HPI & HOSPITAL COURSE    72-year-old male with past medical history of DL D and asthma presenting with hematuria.  He was admitted for acute blood loss anemia requiring 2 units of PRBC transfusion.  CT abdomen pelvis showed bladder wall mass suspicious for neoplasm.  Urology was consulted and patient for TURBT on 10/27 which showed multifocal bladder tumors which were all resected. He continued to have hematuria which improved with CBI however he did require additional blood transfusion. Pathology showed low grade urothelial carcinoma. CT chest with contrast without evidence of lung involvement. Bone scan negative for mets. IR bone biopsy 10/31 with no evidence of malignancy.      He remained in the hospital given inability to discontinue CBI given hematuria and clots and intermittently worsening hemoglobin and hematocrit.  Urology took patient to the OR on 11/12/2022 for cystoscopy with clot evacuation from the bladder and fulguration of bleeding bladder tissue and again on 11/14/2022.  Given patient's coagulopathy issues and abnormal lab values, hematology was consulted recommend bone marrow aspiration and testing and every 48 PT/INR.    CBI was continued until 11/28: Patient has been able to urinate on his own the hematuria has significantly improved.  Patient has been able to maintain a postvoid bladder scan of less than 500 mL.  He will discharge home with follow-up with urology.  He will need to feel follow-up with oncology in 2 to 3 weeks for repeat bone marrow biopsy.      Therefore, he is discharged in good and stable condition to home with close outpatient  follow-up.    The patient met 2-midnight criteria for an inpatient stay at the time of discharge.    Discharge Date  11/30/22    FOLLOW UP ITEMS POST DISCHARGE  See below    DISCHARGE DIAGNOSES  Principal Problem:    Urothelial carcinoma of bladder without invasion of muscle (HCC) POA: Yes  Active Problems:    COPD (chronic obstructive pulmonary disease) (HCC) POA: Yes    Dyslipidemia POA: Yes    Acute blood loss anemia POA: Yes    Iron deficiency anemia POA: Yes    Hypovolemia POA: No    Slow transit constipation POA: Yes    Pneumonia POA: Yes    Thrombocytopenia (HCC) POA: Yes    Hyponatremia POA: Yes    Coagulation defect (HCC) POA: Yes    Acute kidney injury (HCC) POA: Yes    Paroxysmal atrial fibrillation (HCC) POA: No    TSH elevation POA: Yes    Pulmonary hypertension (HCC) POA: Yes    Hepatosplenomegaly POA: Yes    Hypoxia POA: Yes  Resolved Problems:    * No resolved hospital problems. *      FOLLOW UP  No future appointments.  Jayesh Castellanos M.D.  [address]  [facility] [address]  [phone]    Follow up  Our office will call patient to schedule follow up for cuellar cahteter removal next week and post op appointment in 1-2 weeks to review pathology from bladder tumor resection.      MEDICATIONS ON DISCHARGE     Medication List        START taking these medications        Instructions   cyanocobalamin 1000 MCG Tabs  Start taking on: December 1, 2022  Commonly known as: VITAMIN B12   Take 1 Tablet by mouth every day.  Dose: 1,000 mcg     finasteride 5 MG Tabs  Start taking on: December 1, 2022  Commonly known as: PROSCAR   Take 1 Tablet by mouth every day.  Dose: 5 mg     gabapentin 100 MG Caps  Commonly known as: NEURONTIN   Take 2 Capsules by mouth 3 times a day.  Dose: 200 mg     oxybutynin 5 MG Tabs  Commonly known as: DITROPAN   Take 1 Tablet by mouth 3 times a day.  Dose: 5 mg     tamsulosin 0.4 MG capsule  Start taking on: December 1, 2022  Commonly known as: FLOMAX   Take 1 Capsule by mouth 1/2 hour  after breakfast.  Dose: 0.4 mg     thiamine 100 MG tablet  Commonly known as: THIAMINE   Take 1 Tablet by mouth 2 times a day.  Dose: 100 mg            CHANGE how you take these medications        Instructions   albuterol 108 (90 Base) MCG/ACT Aers inhalation aerosol  What changed:   how much to take  how to take this  when to take this  reasons to take this  Commonly known as: Proventil HFA   Doctor's comments: Please choose whichever albuterol is covered by insurance company: either Ventolin, ProAir or Proventil Please provide 2 more inhalers  INHALE 2 PUFFS BY MOUTH EVERY 6 HOURS AS NEEDED FOR SHORTNESS OF BREATH     folic acid 800 MCG tablet  What changed: Another medication with the same name was removed. Continue taking this medication, and follow the directions you see here.  Commonly known as: FOLVITE   Take 800 mcg by mouth every day. Pt is also taking over the counter 400MCG  Dose: 800 mcg            CONTINUE taking these medications        Instructions   atorvastatin 20 MG Tabs  Commonly known as: LIPITOR   Take 1 Tablet by mouth every day.  Dose: 20 mg     vitamin D3 1000 Unit (25 mcg) Tabs  Commonly known as: cholecalciferol   Take 1,000 Units by mouth every day.  Dose: 1,000 Units            STOP taking these medications      ibuprofen 200 MG Tabs  Commonly known as: MOTRIN              Allergies  No Known Allergies    DIET  Orders Placed This Encounter   Procedures    Diet Order Diet: Regular; Nutrient modifications: (optional): Low Potassium     Standing Status:   Standing     Number of Occurrences:   1     Order Specific Question:   Diet:     Answer:   Regular [1]     Order Specific Question:   Nutrient modifications: (optional)     Answer:   Low Potassium [11]       ACTIVITY  As tolerated.  Weight bearing as tolerated    CONSULTATIONS  Urology-Dr Castellanos  Oncology-Dr Reyes    PROCEDURES  11/16 bone marrow biopsy  11/14:cystoscopy, transurethral resection of bladder tumor, evacuation of bladder  mass/clot, tumor resection   11/12: Cystoscopy with clot evacuation from the bladder and   fulguration of bleeding bladder tissue.  11/12: CYSTOSCOPY WITH CLOT EVACUATION FROM THE BLADDER AND FULGERATION OF BLEEDING BLADDER TISSUE  11/2: Cystoscopy, clot evacuation, fulguration, transurethral resection of bladder tumor  10/31: CT-guided core biopsy x3  10/27: Transurethral resection of bladder tumor    LABORATORY  Lab Results   Component Value Date    SODIUM 134 (L) 11/30/2022    POTASSIUM 4.4 11/30/2022    CHLORIDE 98 11/30/2022    CO2 26 11/30/2022    GLUCOSE 100 (H) 11/30/2022    BUN 20 11/30/2022    CREATININE 1.02 11/30/2022        Lab Results   Component Value Date    WBC 7.0 11/30/2022    HEMOGLOBIN 7.7 (L) 11/30/2022    HEMATOCRIT 25.0 (L) 11/30/2022    PLATELETCT 148 (L) 11/30/2022        Total time of the discharge process exceeds 36 minutes.

## 2022-11-30 NOTE — CARE PLAN
The patient is Stable - Low risk of patient condition declining or worsening    Shift Goals  Clinical Goals: PVR, mobility, pain control, rest  Patient Goals: Discharge  Family Goals: no family at bedside    Progress made toward(s) clinical / shift goals:  PVRs ranging between 450-650. Encouraged ambulation to see if improves bladder emptying. Patient denies lower abdominal pressure/discomfort. Patient slept intermittently during shift.     Patient is not progressing towards the following goals: NA.

## 2022-11-30 NOTE — PROGRESS NOTES
Dictation #1  MRN:[id number]  CSN:[id number] Note to reader: this note follows the APSO format rather than the historical SOAP format. Assessment and plan located at the top of the note for ease of use.    Chief Complaint  72 y.o. year old male here with bladder mass and sclerotic lesions.     Assessment/Plan  Interval History   TCC bladder s/p TURBT 10/27, 11/2 and 11/14  Hematuria and clot obstruction s/p clot evacuation and fulguration x3 11/2 and 11/12 and 11/14  Anemia   Bone biopsy with myelofibrosis  Thrombocytopenia  BPH with retention    [facility] CBI initiated at 1315 on 11/21. Stopped at 11/23 at 0945 and transitioned to alum irrigation. Ran out 11/24, restarted evening of 11/25. Notified by pharmacy 11/28 that hospital will run out of alum.    WBC and creatinine normal, AFVSS      Plan:    - CBI was stopped and urine was hand-irrigated yesterday until clear  - Catheter was removed, please continue to measure urine output, check post-void residuals, and monitor urine for clots  - Please notify urology team if PVRs are increasing to >500cc or if clots return  - On tamsulosin and finasteride, to continue now and upon discharge  - If PVRs remain <500cc and urine remains clear, safe for discharge from urology standpoint  - Our office will arrange outpatient follow up      Case discussed with patient, RN, and urology team - Dr. Pantoja.  : patient seen and examined    11/29- patient with documented voids between 200-300cc overnight, bladder scans averaging in 400 range. Patient states he has been voiding with slow stream, feels he is passing some clots, although clots have not been witnessed by RN. Patient is ambulatory and states he is feeling well, no pain and abdomen is soft/non-tender. Reviewed outpatient urology plan with patient.     11/28- urine clear yellow with CBI on very slow drip. H/H, platelets stable. Per RN report, had a few small clots yesterday afternoon but since then urine has been clear. Pt  denies pain.    11/27- urine clear and patient states it has been this way since alum irrigation started again. H/h 9.2/30.5(8.4/28.2). denies pain.     11/26 h/h continues to fluctuate daily. Urine strawberry lemonade. No pain. No clots irrigated on rounds today.     11/24. CBI with alum started yesterday but ran out today, pharmacy will have more bags tomorrow. Running CBI with sterile saline with clear yellow urine but darkens as soon as its turned down. H/H stable. No pain. RBUS 11/23 without clot. I hand irrigated cuellar at bedside with clear pink urine with no blood clots.     11/23. CBI on low flow with completely clear urine. H/H stable. No overnight events. No complaints.     11/22. CBI on low flow with clear light pink urine. H/H stable 8.3/27.2.No pain at this time    11/21. CBI on high flow, urine light cherry. Clots irrigated this am by nursing. Hb 6.9 (8.1), received another unit of pRBCs this am. VSS. CBI slowed at bedside and urine immediately darkened.    11/20. CBI remains with watermelon color urine. No clots. Hgb slow downtrend.     11/19. Cuellar remains. Urine watermelon with CBI on slow rate. Hgb 8.3, Plt stable at 74    11/18. Cuellar was replaced after pt unable to void last night. CBI restarted for darkening of urine. H/H improving.     11/17. Urine clear/pink. CBI discontinued for 5hrs. H/H stable. Pt anxious to leave.    11/16. Urine clear/pink. D/C CBI. Pt feels well and has not pain. H/H stable and improving.     11/15. Repeat clot evac and fulguration with TURBT yesterday. Urine now pink and no clot obstruction. Abdominal pains and spasms resolved. H/H declined further and received 2 units PRBCs today    11/14. Hematuria continues with difficult time maintaining flow via cuellar. RN unable to irrigate and bladder scan with 500 cc in bladder. Patient in pain in SP region. Receiving PRBCs daily.            Review of Systems  Physical Exam   Review of Systems   Constitutional:  Negative for  chills and fever.   Respiratory:  Negative for cough and shortness of breath.    Cardiovascular:  Negative for chest pain.   Gastrointestinal:  Negative for abdominal pain, nausea and vomiting.   Genitourinary:  Negative for dysuria, flank pain and hematuria.   All other systems reviewed and are negative.  Vitals:    11/28/22 2326 11/29/22 0510 11/29/22 0834 11/29/22 1607   BP: 104/70 105/71 113/68 (!) 99/67   Pulse: 91 90 89 86   Resp: 18 18 18 16   Temp: 36.9 °C (98.4 °F) 36.8 °C (98.3 °F) 36.7 °C (98 °F) 37.2 °C (98.9 °F)   TempSrc: Temporal Temporal Temporal Temporal   SpO2: 93% 93% 93% 93%   Weight:       Height:         Physical Exam  Vitals and nursing note reviewed.   Constitutional:       Appearance: Normal appearance.   HENT:      Head: Normocephalic and atraumatic.      Nose: Nose normal.      Mouth/Throat:      Mouth: Mucous membranes are moist.   Eyes:      Pupils: Pupils are equal, round, and reactive to light.   Pulmonary:      Effort: Pulmonary effort is normal.   Abdominal:      General: Abdomen is flat.      Palpations: Abdomen is soft.      Tenderness: There is no abdominal tenderness.   Genitourinary:     Comments: No cuellar catheter  Musculoskeletal:      Cervical back: Normal range of motion.   Skin:     General: Skin is warm and dry.   Neurological:      General: No focal deficit present.      Mental Status: He is alert and oriented to person, place, and time.   Psychiatric:         Mood and Affect: Mood normal.         Behavior: Behavior normal.        Hematology Chemistry   Lab Results   Component Value Date/Time    WBC 7.4 11/28/2022 01:02 AM    HEMOGLOBIN 8.4 (L) 11/28/2022 01:02 AM    HEMATOCRIT 28.0 (L) 11/28/2022 01:02 AM    PLATELETCT 143 (L) 11/28/2022 01:02 AM     Lab Results   Component Value Date/Time    SODIUM 137 11/28/2022 01:02 AM    POTASSIUM 4.8 11/28/2022 01:02 AM    CHLORIDE 100 11/28/2022 01:02 AM    CO2 27 11/28/2022 01:02 AM    GLUCOSE 104 (H) 11/28/2022 01:02 AM    BUN  17 11/28/2022 01:02 AM    CREATININE 1.00 11/28/2022 01:02 AM         Labs not explicitly included in this progress note were reviewed by the author.   Radiology/imaging not explicitly included in this progress note was reviewed by the author.     Labs reviewed and Medications reviewed                  Shakira Amos PA-C  [facility]

## 2022-11-30 NOTE — PROGRESS NOTES
Report received from AM RN; assumed care. A&O x 3-4, intermittently disoriented to time  VSS. 93% on RA. Assessment complete. Patient denying SOB, numbness, tingling, nausea, vomiting, dizziness.  Abdomen round. Normoactive BS x 4 noted.  Patient voiding in urinal various amounts, old dried specks/sediment noted in urinal. Bladder scanning between 450-650. Ambulation encouraged to see if assists with emptying. + void + eructation. + flatus. LBM 11/29. Patient tolerating diet. Patient up self ambulating frequently around unit. IV removed d/t occlusion. Refused to be reinserted. Discussed plan of care/potential discharge with patient.  All questions answered.  Low fall risk. Bed in locked/lowest position.  Call light/personal belongings within reach. All needs met, patient resting at present time.

## 2022-12-01 NOTE — PROGRESS NOTES
Transferring pt to [facility].  All belongings with pt at time of transfer.  Educated on importance of calling MD or returning to ER with any issues urinating.

## 2022-12-27 ENCOUNTER — TELEPHONE (OUTPATIENT)
Dept: URGENT CARE | Facility: PHYSICIAN GROUP | Age: 72
End: 2022-12-27

## 2022-12-27 ENCOUNTER — TELEPHONE (OUTPATIENT)
Dept: MEDICAL GROUP | Facility: PHYSICIAN GROUP | Age: 72
End: 2022-12-27
Payer: MEDICARE

## 2022-12-27 NOTE — TELEPHONE ENCOUNTER
Phone Number Called: [phone]    Call outcome: Did not leave a detailed message. Requested patient to call back.    Message: Called pt to discuss making a post op follow up appointment and to discuss questions pt may have had regarding medications refills. Pt may be seen by other providers in office if pt is comfortable with doing so.

## 2022-12-28 NOTE — TELEPHONE ENCOUNTER
One of the providers covering for Dr. Alvares while she is out of the office.  An appointment with Dr. Alvares for consideration of pain medication.  However, usually the surgeon or provider who did the procedure will prescribe medication for pain.

## 2023-01-03 DIAGNOSIS — S90.01XA CONTUSION OF RIGHT ANKLE, INITIAL ENCOUNTER: ICD-10-CM

## 2023-01-03 DIAGNOSIS — Z98.890 HISTORY OF BONE MARROW BIOPSY: ICD-10-CM

## 2023-01-03 DIAGNOSIS — S40.012A CONTUSION OF LEFT SHOULDER, INITIAL ENCOUNTER: ICD-10-CM

## 2023-01-03 DIAGNOSIS — S40.022A CONTUSION OF LEFT UPPER EXTREMITY, INITIAL ENCOUNTER: ICD-10-CM

## 2023-01-03 RX ORDER — OXYCODONE HYDROCHLORIDE AND ACETAMINOPHEN 5; 325 MG/1; MG/1
1 TABLET ORAL EVERY 8 HOURS PRN
Qty: 10 TABLET | Refills: 0 | Status: SHIPPED | OUTPATIENT
Start: 2023-01-03 | End: 2023-01-08

## 2023-01-03 NOTE — TELEPHONE ENCOUNTER
Spoke with patient, he was not given anything for pain after surgery. He would like to come in and be seen only with Dr. Alvares and in the morning. He would like to discuss and anxiety and pain medication. Scheduled patient to soonest available 1/27 @ 5738

## 2023-01-05 ENCOUNTER — TELEPHONE (OUTPATIENT)
Dept: URGENT CARE | Facility: PHYSICIAN GROUP | Age: 73
End: 2023-01-05
Payer: MEDICARE

## 2023-01-05 DIAGNOSIS — S40.012A CONTUSION OF LEFT SHOULDER, INITIAL ENCOUNTER: ICD-10-CM

## 2023-01-05 DIAGNOSIS — Z98.890 HISTORY OF BONE MARROW BIOPSY: ICD-10-CM

## 2023-01-05 DIAGNOSIS — S90.01XA CONTUSION OF RIGHT ANKLE, INITIAL ENCOUNTER: ICD-10-CM

## 2023-01-05 DIAGNOSIS — S40.022A CONTUSION OF LEFT UPPER EXTREMITY, INITIAL ENCOUNTER: ICD-10-CM

## 2023-01-11 RX ORDER — OXYCODONE HYDROCHLORIDE AND ACETAMINOPHEN 5; 325 MG/1; MG/1
1 TABLET ORAL EVERY 8 HOURS PRN
Qty: 10 TABLET | Refills: 0 | OUTPATIENT
Start: 2023-01-11 | End: 2023-01-16

## 2023-01-12 DIAGNOSIS — C67.9 UROTHELIAL CARCINOMA OF BLADDER WITHOUT INVASION OF MUSCLE (HCC): ICD-10-CM

## 2023-01-12 RX ORDER — GABAPENTIN 100 MG/1
200 CAPSULE ORAL 3 TIMES DAILY
Qty: 90 CAPSULE | Refills: 1 | Status: SHIPPED | OUTPATIENT
Start: 2023-01-12

## 2024-01-24 NOTE — PROGRESS NOTES
Assumed care of pt  at 1900. Report received from Day RN. Pt is A&O x 4. Patient denies intervention for 6/10 pain in his back that is due to a chronic injury. Bed in lowest locked position, call light within reach, hourly rounding in place. Labs reviewed, orders reviewed, communication board updated.     Application Tool (Optional): Cotton Tipped Applicator Duration Of Freeze Thaw-Cycle (Seconds): 5 Show Applicator Variable?: Yes Detail Level: Detailed Consent: The patient's consent was obtained including but not limited to risks of crusting, scabbing, blistering, scarring, darker or lighter pigmentary change, recurrence, incomplete removal and infection. Render Post-Care Instructions In Note?: no Post-Care Instructions: I reviewed with the patient in detail post-care instructions. Patient is to wear sunprotection, and avoid picking at any of the treated lesions. Pt may apply Vaseline to crusted or scabbing areas. Number Of Freeze-Thaw Cycles: 1 freeze-thaw cycle

## 2024-01-30 NOTE — OP REPORT
SUBJECTIVE:     Chief Complaint & History of Present Illness:  Alivia Thomas is a 60 y.o. year old female who presents today with constant left shoulder pain that started a few weeks ago.  She is Right hand dominant.  She is s/p CVA on 1/4/24 and noticed the pain began after this.  She did have some left sided weakness following CVA.  The pain is located in the anterior aspect of the shoulder.  The pain is described as achy.  It is aggravated by reaching overhead, laying on her side, reaching, lifting.  Associated symptoms include weakness.  Previous treatments include rest which have provided minimal relief.  She was also recently diagnosed with a-fib and started on eliquis.  She is wearing cardiac monitor. There is not a history of previous injury or surgery to the shoulder.      Review of patient's allergies indicates:   Allergen Reactions    Strawberry Anaphylaxis         Current Outpatient Medications   Medication Sig Dispense Refill    albuterol (VENTOLIN HFA) 90 mcg/actuation inhaler INHALE TWO PUFFS INTO LUNGS EVERY 4 TO 6 HOURS AS NEEDED FOR SHORTNESS OF BREATH AND FOR WHEEZING 18 g 1    allopurinoL (ZYLOPRIM) 300 MG tablet Take 1 tablet (300 mg total) by mouth 2 (two) times daily. 180 tablet 3    apixaban (ELIQUIS) 5 mg Tab Take 1 tablet (5 mg total) by mouth 2 (two) times daily. 60 tablet 6    aspirin 81 MG Chew Chew and swallow 1 tablet (81 mg total) by mouth once daily. 30 tablet 11    atorvastatin (LIPITOR) 80 MG tablet Take 1 tablet (80 mg total) by mouth once daily. 90 tablet 3    b complex vitamins tablet Take 1 tablet by mouth every other day.       calcium citrate/vitamin D2 (ISIAH-CITRATE ORAL) Take 500 mg by mouth 2 (two) times daily.      colchicine, gout, (MITIGARE) 0.6 mg Cap TAKE 1 CAPSULE (0.6 MG TOTAL) BY MOUTH DAILY AS NEEDED (FLARE UP). 90 capsule 1    cyanocobalamin (VITAMIN B-12) 1000 MCG tablet Take 100 mcg by mouth every morning.      diclofenac sodium (VOLTAREN) 1 % Gel Apply 2  DATE OF SERVICE:  11/12/2022     PREOPERATIVE DIAGNOSES:  Clot urinary retention and bleeding bladder tissue   after recent transurethral resection of bladder tumor.     POSTOPERATIVE DIAGNOSES:  Clot urinary retention and bleeding bladder tissue   after recent transurethral resection of bladder tumor.     PROCEDURES PERFORMED:  Cystoscopy with clot evacuation from the bladder and   fulguration of bleeding bladder tissue.     SURGEON:  Kamaljit Boyer MD     ANESTHESIOLOGIST:  Eran Duval MD     ANESTHESIA:  General.     INDICATIONS FOR PROCEDURE:  The patient is a 72-year-old male with recent   bladder tumor resections and persistent bleeding afterwards.  After discussing   treatment options, he has elected for a cystoscopy, clot evacuation and   fulguration of bleeding bladder tissue.     DESCRIPTION OF PROCEDURE:  After obtaining informed consent, the patient was   brought to the operating room.  After the induction of general anesthesia, he   was positioned in a dorsal lithotomy position and his genitalia were prepped   and draped in sterile fashion after removing his Arboleda catheter.  I passed a   26-Romansh resectoscope per urethra into the bladder under direct vision using   a visual obturator.  There was a large amount of organized clot within the   bladder.  I evacuated a lot of it with the [employer] evacuator, but some of it was   organized and had to be resected using a monopolar resecting loop. There was   also bleeding tissue at the resection site on the left bladder wall, so I used   a monopolar rollerball to fulgurate that tissue until there was good   hemostasis.  Once there was good hemostasis and no further clot in the   bladder, I removed the scope and passed in a 22-Romansh 3-way Arboleda catheter,   filled the balloon with 30 mL sterile water, irrigated to clear and hooked up   to continuous bladder irrigation.  The patient was then awoken from anesthesia   and taken to recovery room in stable  condition.     COMPLICATIONS:  None.     ESTIMATED BLOOD LOSS:  500 mL of old clot and 100 mL of fresh bleeding.        ______________________________  MD PAWEL Hansen/PAZ    DD:  11/12/2022 13:08  DT:  11/12/2022 13:23    Job#:  [id number]   g topically 4 (four) times daily as needed. To painful area on the feet. 500 g 5    FLUoxetine (PROZAC) 20 mg/5 mL (4 mg/mL) solution TAKE 5 MLS BY MOUTH ONCE DAILY. 450 mL 3    fluticasone propionate (FLONASE) 50 mcg/actuation nasal spray 1 SPRAY BY EACH NOSTRIL ROUTE 2 TIMES DAILY AS NEEDED FOR RHINITIS. 48 g 3    LIDOcaine (XYLOCAINE) 5 % Oint ointment APPLY TOPICALLY AS NEEDED. 35.44 g 6    MOUNJARO 2.5 mg/0.5 mL PnIj INJECT 2.5 MG INTO THE SKIN EVERY 7 DAYS. 12 Pen 1    MULTIVIT-IRON-MIN-FOLIC ACID 3,500-18-0.4 UNIT-MG-MG ORAL CHEW Take 1 tablet by mouth 2 (two) times daily.       nystatin (MYCOSTATIN) powder Apply topically 2 (two) times daily. 60 g 3    omeprazole (PRILOSEC) 20 MG capsule TAKE 1 CAPSULE (20 MG TOTAL) BY MOUTH EVERY MORNING. 90 capsule 2    oxybutynin (DITROPAN-XL) 5 MG TR24 TAKE 1 TABLET BY MOUTH ONCE DAILY. 90 tablet 3    oxyCODONE-acetaminophen (PERCOCET)  mg per tablet Take 1 tablet by mouth every 8 (eight) hours as needed for Pain. 90 tablet 0    tiZANidine (ZANAFLEX) 4 MG tablet Take 1 tablet (4 mg total) by mouth every 8 (eight) hours as needed (muscle pain). 90 tablet 2    tretinoin microspheres (RETIN-A MICRO PUMP) 0.08 % GlwP Apply to affected area daily 50 g 0    urea (CARMOL) 40 % Crea Apply topically once daily. To dry skin on the feet. 120 g 5    vitamin D 185 MG Tab Take 5,000 mg by mouth every morning.        No current facility-administered medications for this visit.     Facility-Administered Medications Ordered in Other Visits   Medication Dose Route Frequency Provider Last Rate Last Admin    0.9%  NaCl infusion   Intravenous Continuous Lina Wagner MD 25 mL/hr at 12/15/20 1506 25 mL/hr at 12/15/20 1506    0.9%  NaCl infusion   Intravenous Continuous Ciro Chung MD           Past Medical History:   Diagnosis Date    Acute right MCA stroke 1/4/2024    Allergy     Anemia     Asthma     Bilateral shoulder bursitis     Cervical stenosis of spine     Chronic  pain     DDD (degenerative disc disease), cervical     Depression 01/28/2019    Diabetes mellitus type II     DJD (degenerative joint disease) of knee 06/19/2014    Facet arthritis of lumbar region 12/17/2015    Facet syndrome 12/17/2015    GERD (gastroesophageal reflux disease)     Heartburn     Hyperlipidemia     Hypertension     Morbid obesity     Neuromuscular disorder     Nuclear sclerotic cataract of left eye 8/8/2023    PADDY (obstructive sleep apnea)     Proteinuria     Right carpal tunnel syndrome     Sacroiliitis 06/13/2018    Sacroiliitis     Sleep apnea     Uterine fibroid        Past Surgical History:   Procedure Laterality Date    CARPAL TUNNEL RELEASE      CARPAL TUNNEL RELEASE  1980s    left    CARPAL TUNNEL RELEASE  2012    right    CATARACT EXTRACTION W/  INTRAOCULAR LENS IMPLANT Left 8/8/2023    Procedure: EXTRACTION, CATARACT, WITH IOL INSERTION;  Surgeon: Justin Block MD;  Location: [facility] OR;  Service: Ophthalmology;  Laterality: Left;    CATARACT EXTRACTION W/  INTRAOCULAR LENS IMPLANT Right 8/29/2023    Procedure: EXTRACTION, CATARACT, WITH IOL INSERTION;  Surgeon: Justin Block MD;  Location: [facility] OR;  Service: Ophthalmology;  Laterality: Right;    COLONOSCOPY N/A 6/15/2020    Procedure: COLONOSCOPY;  Surgeon: Jc Koo MD;  Location: [facility] (4TH FLR);  Service: Endoscopy;  Laterality: N/A;  COVID screening on 6/13/20 at [facility]  pt updated on drop off location and no visitor policy-    EPIDURAL STEROID INJECTION N/A 7/24/2023    Procedure: INJECTION, STEROID, EPIDURAL, L5/S1 SOONER DATE;  Surgeon: Israel Hurtado MD;  Location: [facility] [place] PAIN MGT;  Service: Pain Management;  Laterality: N/A;    ESOPHAGOGASTRODUODENOSCOPY N/A 3/27/2019    Procedure: EGD (ESOPHAGOGASTRODUODENOSCOPY);  Surgeon: Robbin Bar MD;  Location: [facility] ENDO (2ND FLR);  Service: Endoscopy;  Laterality: N/A;  BMI 61.3/2nd floor case/svn    INJECTION OF JOINT Bilateral 6/9/2020     Procedure: INJECTION, JOINT, BILATERAL SI;  Surgeon: Lina Wagner MD;  Location: [facility] PAIN [facility]GT;  Service: Pain Management;  Laterality: Bilateral;  B/L SI Joint Injection    INJECTION OF JOINT Bilateral 5/11/2021    Procedure: INJECTION, JOINT, SACROILIAC (SI) need consent;  Surgeon: Lina Wagner MD;  Location: [facility]IN [facility]GT;  Service: Pain Management;  Laterality: Bilateral;    JOINT REPLACEMENT Bilateral     with 2 revisions on rt    KNEE SURGERY  3/2010    orthroscope    KNEE SURGERY  6-19-14    left TKR    LAPAROSCOPIC SLEEVE GASTRECTOMY N/A 8/28/2019    Procedure: GASTRECTOMY, SLEEVE, LAPAROSCOPIC with intraop EGD;  Surgeon: Heriberto Clements MD;  Location: [facility] OR 2ND FLR;  Service: General;  Laterality: N/A;    PANNICULECTOMY N/A 6/14/2022    Procedure: PANNICULECTOMY;  Surgeon: Rhett Gray MD;  Location: [facility] OR 2ND FLR;  Service: Plastics;  Laterality: N/A;    RADIOFREQUENCY ABLATION Right 7/9/2019    Procedure: RADIOFREQUENCY ABLATION;  Surgeon: Lina Wagner MD;  Location: [facility] PAIN MGT;  Service: Pain Management;  Laterality: Right;  RIGHT RFA L2,3,4,5  2 of 2    RADIOFREQUENCY ABLATION Left 12/19/2019    Procedure: RADIOFREQUENCY ABLATION, LEFT L2-L3-L4-L5 MEDIAL BRANCH 1 OF 2;  Surgeon: Lina Wagner MD;  Location: [facility] PAIN MGT;  Service: Pain Management;  Laterality: Left;    RADIOFREQUENCY ABLATION Right 12/31/2019    Procedure: RADIOFREQUENCY ABLATION, RIGHT L2-L3-L4-L5  2 OF 2;  Surgeon: Lina Wagner MD;  Location: [facility] PAIN MGT;  Service: Pain Management;  Laterality: Right;    RADIOFREQUENCY ABLATION Right 10/13/2020    Procedure: RADIOFREQUENCY ABLATION, Genicular Cooled 1 of 2;  Surgeon: Lina Wagenr MD;  Location: [facility] PAIN MGT;  Service: Pain Management;  Laterality: Right;    RADIOFREQUENCY ABLATION Left 11/3/2020    Procedure: RADIOFREQUENCY ABLATION, GENICULAR COOLED  2 OF 2;  Surgeon: Lina Wagner MD;  Location: [facility] PAIN MGT;   Service: Pain Management;  Laterality: Left;    RADIOFREQUENCY ABLATION Left 12/15/2020    Procedure: RADIOFREQUENCY ABLATION LEFT L2,3,4,5 1 of 2;  Surgeon: Lina Wagner MD;  Location: [facility] [place] PAIN MGT;  Service: Pain Management;  Laterality: Left;    RADIOFREQUENCY ABLATION Right 12/29/2020    Procedure: RADIOFREQUENCY ABLATION RIGHT L2,3,4,5 2 of 2;  Surgeon: Lina Wagner MD;  Location: [facility] [place] PAIN MGT;  Service: Pain Management;  Laterality: Right;    RADIOFREQUENCY ABLATION Left 6/29/2021    Procedure: RADIOFREQUENCY ABLATION GENICULAR COOLED;  Surgeon: Lina Wagner MD;  Location: [facility] [place] [facility]IN MGT;  Service: Pain Management;  Laterality: Left;  1 of 2    RADIOFREQUENCY ABLATION Right 7/13/2021    Procedure: RADIOFREQUENCY ABLATION GENICULAR;  Surgeon: Lina Wagner MD;  Location: [facility] [place] [facility]IN MGT;  Service: Pain Management;  Laterality: Right;  2 of 2    RADIOFREQUENCY ABLATION Right 8/24/2021    Procedure: RADIOFREQUENCY ABLATION, L2-L3-L4-L5 MEDIAL BRANCH 1 OF 2;  Surgeon: Lina Wagner MD;  Location: [facility] [place] [facility]IN MGT;  Service: Pain Management;  Laterality: Right;    RADIOFREQUENCY ABLATION Left 9/11/2021    Procedure: RADIOFREQUENCY ABLATION, L2-L3-L4-L5 MEDIAL BR ANCH 2 OF 2;  Surgeon: Lina Wagner MD;  Location: [facility] [place] [facility]IN MGT;  Service: Pain Management;  Laterality: Left;    RADIOFREQUENCY ABLATION Right 3/7/2022    Procedure: RADIOFREQUENCY ABLATION RIGHT L3,4,5 1 of 2, needs consent;  Surgeon: Israel Hurtado MD;  Location: [facility] [place] [facility]IN MGT;  Service: Pain Management;  Laterality: Right;    RADIOFREQUENCY ABLATION Left 3/21/2022    Procedure: RADIOFREQUENCY ABLATION RIGHT L3,4,5 2 of 2, needs consent;  Surgeon: Israel Hurtado MD;  Location: [facility] [place] [facility]IN MGT;  Service: Pain Management;  Laterality: Left;    RADIOFREQUENCY ABLATION Right 5/9/2022    Procedure: RADIOFREQUENCY ABLATION RIGHT GENICULAR COOLED 1 of 2;  Surgeon: Israel Hurtado MD;  Location: [facility] [place] [facility]IN [facility]GT;  Service: Pain Management;   Laterality: Right;    RADIOFREQUENCY ABLATION Left 5/23/2022    Procedure: RADIOFREQUENCY ABLATION LEFT GENICULAR COOLED  2 of 2;  Surgeon: Israel Hurtado MD;  Location: [facility]IN [facility];  Service: Pain Management;  Laterality: Left;    RADIOFREQUENCY ABLATION Right 12/12/2022    Procedure: RADIOFREQUENCY ABLATION RIGHT GENICULAR COOLED  ONE OF TWO  NEEDS CONSENT;  Surgeon: Israel Hurtado MD;  Location: [facility] PAIN MGT;  Service: Pain Management;  Laterality: Right;    RADIOFREQUENCY ABLATION Left 1/9/2023    Procedure: RADIOFREQUENCY ABLATION LEFT GENICULAR COOLED  TWO OF TWO NEEDS CONSENT;  Surgeon: Israel Hurtado MD;  Location: [facility];  Service: Pain Management;  Laterality: Left;    RADIOFREQUENCY ABLATION Right 3/6/2023    Procedure: RADIOFREQUENCY ABLATION RIGHT L3,L4,L5;  Surgeon: Israel Hurtado MD;  Location: [facility] PAIN MGT;  Service: Pain Management;  Laterality: Right;    RADIOFREQUENCY ABLATION Left 5/22/2023    Procedure: RADIOFREQUENCY ABLATION LEFT L3,L4,L5;  Surgeon: Israel Hurtado MD;  Location: [facility] PAIN MGT;  Service: Pain Management;  Laterality: Left;  4/3 LM TO R/S    RADIOFREQUENCY ABLATION Right 11/27/2023    Procedure: RADIOFREQUENCY ABLATION RIGHT L3, 4, 5 1 OF 3;  Surgeon: Israel Hurtado MD;  Location: [facility] PAIN MGT;  Service: Pain Management;  Laterality: Right;    RADIOFREQUENCY ABLATION Right 1/22/2024    Procedure: RADIOFREQUENCY ABLATION RIGHT GENICULAR 3 NERVES 3 OF 3;  Surgeon: Israel Hurtado MD;  Location: [facility] PAIN MGT;  Service: Pain Management;  Laterality: Right;    RADIOFREQUENCY ABLATION OF LUMBAR MEDIAL BRANCH NERVE AT SINGLE LEVEL Left 6/26/2018    Procedure: RADIOFREQUENCY ABLATION, NERVE, MEDIAL BRANCH, LUMBAR, 1 LEVEL;  Surgeon: Lina Wagner MD;  Location: [facility] PAIN MGT;  Service: Pain Management;  Laterality: Left;  Left Cooled RFA @ L2,3,4,5  [id number]  with Sedation    1 of 2    RADIOFREQUENCY ABLATION OF LUMBAR MEDIAL BRANCH NERVE AT SINGLE LEVEL Right 7/10/2018     Procedure: RADIOFREQUENCY ABLATION, NERVE, MEDIAL BRANCH, LUMBAR, 1 LEVEL;  Surgeon: Lina Wagner MD;  Location: [facility] PAIN MGT;  Service: Pain Management;  Laterality: Right;  RIght Cooled RFA @ L2,3,4,5  [id number] with Sedation    2 of 2    TRIGGER FINGER RELEASE Right 2017    TRIGGER FINGER RELEASE Left 7/29/2019    Procedure: RELEASE, TRIGGER FINGER, Left Thumb;  Surgeon: Velma Garcia MD;  Location: [facility] OR;  Service: Orthopedics;  Laterality: Left;  Local w/ MAC       Review of Systems:  ROS:  Constitutional: no fever or chills  Eyes: no visual changes  ENT: no nasal congestion or sore throat  Respiratory: no cough or shortness of breath  Musculoskeletal: no arthralgias or myalgias      OBJECTIVE:     PHYSICAL EXAM:    Patient is alert, awake and oriented to time, place and person. Mood and affect are appropriate.  Patient does not appear to be in any distress, denies any constitutional symptoms and appears stated age.   HEENT: Pupils are equal and round, sclera are not injected. External examination of ears and nose reveals no abnormalities. Cranial nerves II-X are grossly intact  Neck: examination demonstrates painless  active range of motion. Spurling's sign is negative  Skin: no rashes, abrasions or open wounds on the affected extremity   Resp: No respiratory distress or audible wheezing   Psych:  normal mood and behavior  CV: 2+  pulses, all extremities warm and well perfused   Left Shoulder   Skin intact  No warmth or effusion  Tenderness: anteriorly  Range of motion is painful   ROM: forward flexion 150 active/160 passive, external rotation 50/50, internal rotation L1  Shoulder Strength: biceps 5/5, triceps 5/5, abduction 4/5, adduction 5/5  positive for impingement sign, sensory exam normal, and motor exam normal  Special Tests:    Crossbody test: positive    Neer's positive  Hawkin's positive    Fredis's positive  Drop arm negative      IMAGING:  X-rays of the left shoulder, personally  reviewed by me, demonstrate mild degenerative changes.  No fracture or dislocation.     ASSESSMENT     1. Rotator cuff dysfunction, left    2. Acute pain of left shoulder      PLAN:     Discussed with the patient at length all the different treatment options available for her left shoulder including anti-inflammatories, acetaminophen, rest, ice, Physical therapy, occasional cortisone injections for temporary relief, and shoulder arthroscopy    - Discussed that this may be some residual pain from stroke, however she does not have significant weakness to the shoulder.    - I would like to hold off on CSI for now  - We will start with home exercises- instructions provided  - Consider formal PT- hesitant to start this right now  - Follow up if symptoms worsen or fail to improve

## (undated) DEVICE — GLYCINE IRRIGATION 1.5% - 3000 ML  (4/CS)

## (undated) DEVICE — SENSOR OXIMETER ADULT SPO2 RD SET (20EA/BX)

## (undated) DEVICE — SYRINGE 3 CC 22 GA X 1-1/2 - NDL SAFETY (50/BX 8BX/CA)

## (undated) DEVICE — COVER LIGHT HANDLE ALC PLUS DISP (18EA/BX)

## (undated) DEVICE — CATHETER FOLEY 22 FR 30 CC (12EA/CA)

## (undated) DEVICE — SPONGE GAUZESTER 4 X 4 4PLY - (128PK/CA)

## (undated) DEVICE — GLOVE BIOGEL PI INDICATOR SZ 7.5 SURGICAL PF LF -(50/BX 4BX/CA)

## (undated) DEVICE — SET LEADWIRE 5 LEAD BEDSIDE DISPOSABLE ECG (1SET OF 5/EA)

## (undated) DEVICE — SYRINGE NON SAFETY 5 CC 20 GA X 1-1/2 IN (100/BX 4BX/CA)

## (undated) DEVICE — GOWN SURGEONS X-LARGE - DISP. (30/CA)

## (undated) DEVICE — SET EXTENSION WITH 2 PORTS (48EA/CA) ***PART #2C8610 IS A SUBSTITUTE*****

## (undated) DEVICE — SET IRRIGATION CYSTOSCOPY Y-TYPE L81 IN (20EA/CA)

## (undated) DEVICE — WATER IRRIG. STER 3000 ML - (4/CA)

## (undated) DEVICE — TUBING CLEARLINK DUO-VENT - C-FLO (48EA/CA)

## (undated) DEVICE — PACK SINGLE BASIN - (6/CA)

## (undated) DEVICE — CANNULA W/ SUPPLY TUBING O2 - (50/CA)

## (undated) DEVICE — CANISTER SUCTION 3000ML MECHANICAL FILTER AUTO SHUTOFF MEDI-VAC NONSTERILE LF DISP  (40EA/CA)

## (undated) DEVICE — BAG DRAINAGE ANTIREFLUX TOWER SLIDE TAP 4000 ML (20EA/CA)

## (undated) DEVICE — CUSHION EAR E-Z WRAP NASAL CANNULA - (25/CA)

## (undated) DEVICE — WATER IRRIGATION STERILE 1000ML (12EA/CA)

## (undated) DEVICE — SYRINGE 30 ML LL (56/BX)

## (undated) DEVICE — SYRINGE 50ML CATHETER TIP (40EA/BX 4BX/CA)

## (undated) DEVICE — SODIUM CHL IRRIGATION 0.9% 1000ML (12EA/CA)

## (undated) DEVICE — TRAY IRRI.W/PISTON SYRINGE & - GRADUATE  (20EA/CA)

## (undated) DEVICE — TUBE CONNECTING SUCTION - CLEAR PLASTIC STERILE 72 IN (50EA/CA)

## (undated) DEVICE — KIT  I.V. START (100EA/CA)

## (undated) DEVICE — SLEEVE VASO CALF MED - (10PR/CA)

## (undated) DEVICE — MASK OXYGEN VNYL ADLT MED CONC WITH 7 FOOT TUBING  - (50EA/CA)

## (undated) DEVICE — SOD. CHL 10CC SYRINGE PREFILL - W/10 CC (30/BX)

## (undated) DEVICE — ELECTRODE DUAL RETURN W/ CORD - (50/PK)

## (undated) DEVICE — BANDAID SHEER STRIP 3/4 IN (100EA/BX 12BX/CA)

## (undated) DEVICE — COVER FOOT UNIVERSAL DISP. - (25EA/CA)

## (undated) DEVICE — EVACUATOR BLADDER ELLIK - (10/BX)

## (undated) DEVICE — ELECTRODE BIPOLAR REGULAR CUTTING LOOP URO 24/26 FR (6EA/PK)

## (undated) DEVICE — GOWN WARMING STANDARD FLEX - (30/CA)

## (undated) DEVICE — SLEEVE, VASO, THIGH, MED

## (undated) DEVICE — CONTAINER SPECIMEN BAG OR - STERILE 4 OZ W/LID (100EA/CA)

## (undated) DEVICE — JELLY SURGILUBE STERILE TUBE 4.25 OZ (1/EA)

## (undated) DEVICE — SODIUM CHL. IRRIGATION 0.9% 3000ML (4EA/CA 65CA/PF)

## (undated) DEVICE — FIBER GREEN LIGHT SINGLE USE

## (undated) DEVICE — ELECTRODE MONOPOLAR ANGLED CUTTING LOOP DIAM 0.35 YELLOW 24FR (6EA/PK)

## (undated) DEVICE — LACTATED RINGERS INJ 1000 ML - (14EA/CA 60CA/PF)

## (undated) DEVICE — GLOVE BIOGEL INDICATOR SZ 7.5 SURGICAL PF LTX - (50PR/BX 4BX/CA)

## (undated) DEVICE — GLOVE BIOGEL PI INDICATOR SZ 6.5 SURGICAL PF LF - (50/BX 4BX/CA)

## (undated) DEVICE — BAG URODRAIN WITH TUBING - (20/CA)

## (undated) DEVICE — TOWEL STOP TIMEOUT SAFETY FLAG (40EA/CA)

## (undated) DEVICE — PACK CYSTO III (2EA/CA)

## (undated) DEVICE — SUCTION INSTRUMENT YANKAUER BULBOUS TIP W/O VENT (50EA/CA)

## (undated) DEVICE — GLOVE SZ 7.5 BIOGEL PI MICRO - PF LF (50PR/BX)

## (undated) DEVICE — CANISTER SUCTION RIGID RED 1500CC (40EA/CA)

## (undated) DEVICE — GLOVE BIOGEL SZ 7.5 SURGICAL PF LTX - (50PR/BX 4BX/CA)

## (undated) DEVICE — CONNECTOR HOSE NEPTUNE FOR CYSTO ROOM

## (undated) DEVICE — TOWELS CLOTH SURGICAL - (4/PK 20PK/CA)